# Patient Record
Sex: FEMALE | Race: BLACK OR AFRICAN AMERICAN | Employment: OTHER | URBAN - METROPOLITAN AREA
[De-identification: names, ages, dates, MRNs, and addresses within clinical notes are randomized per-mention and may not be internally consistent; named-entity substitution may affect disease eponyms.]

---

## 2017-01-16 ENCOUNTER — LAB ONLY (OUTPATIENT)
Dept: INTERNAL MEDICINE CLINIC | Age: 82
End: 2017-01-16

## 2017-01-16 ENCOUNTER — HOSPITAL ENCOUNTER (OUTPATIENT)
Dept: LAB | Age: 82
Discharge: HOME | End: 2017-01-16
Payer: MEDICARE

## 2017-01-16 DIAGNOSIS — D64.9 ANEMIA, UNSPECIFIED TYPE: ICD-10-CM

## 2017-01-16 DIAGNOSIS — E78.00 HYPERCHOLESTEROLEMIA: ICD-10-CM

## 2017-01-16 DIAGNOSIS — I10 ESSENTIAL HYPERTENSION: ICD-10-CM

## 2017-01-16 DIAGNOSIS — E55.9 VITAMIN D DEFICIENCY: ICD-10-CM

## 2017-01-16 PROCEDURE — 85025 COMPLETE CBC W/AUTO DIFF WBC: CPT

## 2017-01-16 PROCEDURE — 36415 COLL VENOUS BLD VENIPUNCTURE: CPT

## 2017-01-16 PROCEDURE — 80053 COMPREHEN METABOLIC PANEL: CPT

## 2017-01-16 PROCEDURE — 82306 VITAMIN D 25 HYDROXY: CPT

## 2017-01-16 PROCEDURE — 80061 LIPID PANEL: CPT

## 2017-01-17 LAB
25(OH)D3+25(OH)D2 SERPL-MCNC: 34.4 NG/ML (ref 30–100)
ALBUMIN SERPL-MCNC: 3.7 G/DL (ref 3.5–4.7)
ALBUMIN/GLOB SERPL: 1.4 {RATIO} (ref 1.1–2.5)
ALP SERPL-CCNC: 76 IU/L (ref 39–117)
ALT SERPL-CCNC: 33 IU/L (ref 0–32)
AST SERPL-CCNC: 24 IU/L (ref 0–40)
BASOPHILS # BLD AUTO: 0 X10E3/UL (ref 0–0.2)
BASOPHILS NFR BLD AUTO: 0 %
BILIRUB SERPL-MCNC: 0.3 MG/DL (ref 0–1.2)
BUN SERPL-MCNC: 14 MG/DL (ref 8–27)
BUN/CREAT SERPL: 16 (ref 11–26)
CALCIUM SERPL-MCNC: 10.4 MG/DL (ref 8.7–10.3)
CHLORIDE SERPL-SCNC: 101 MMOL/L (ref 96–106)
CHOLEST SERPL-MCNC: 157 MG/DL (ref 100–199)
CO2 SERPL-SCNC: 21 MMOL/L (ref 18–29)
CREAT SERPL-MCNC: 0.89 MG/DL (ref 0.57–1)
EOSINOPHIL # BLD AUTO: 0.1 X10E3/UL (ref 0–0.4)
EOSINOPHIL NFR BLD AUTO: 1 %
ERYTHROCYTE [DISTWIDTH] IN BLOOD BY AUTOMATED COUNT: 12.5 % (ref 12.3–15.4)
GLOBULIN SER CALC-MCNC: 2.7 G/DL (ref 1.5–4.5)
GLUCOSE SERPL-MCNC: 142 MG/DL (ref 65–99)
HCT VFR BLD AUTO: 37.5 % (ref 34–46.6)
HDLC SERPL-MCNC: 66 MG/DL
HGB BLD-MCNC: 12.3 G/DL (ref 11.1–15.9)
IMM GRANULOCYTES # BLD: 0 X10E3/UL (ref 0–0.1)
IMM GRANULOCYTES NFR BLD: 0 %
LDLC SERPL CALC-MCNC: 70 MG/DL (ref 0–99)
LYMPHOCYTES # BLD AUTO: 1.8 X10E3/UL (ref 0.7–3.1)
LYMPHOCYTES NFR BLD AUTO: 25 %
MCH RBC QN AUTO: 29.4 PG (ref 26.6–33)
MCHC RBC AUTO-ENTMCNC: 32.8 G/DL (ref 31.5–35.7)
MCV RBC AUTO: 90 FL (ref 79–97)
MONOCYTES # BLD AUTO: 0.5 X10E3/UL (ref 0.1–0.9)
MONOCYTES NFR BLD AUTO: 6 %
NEUTROPHILS # BLD AUTO: 4.9 X10E3/UL (ref 1.4–7)
NEUTROPHILS NFR BLD AUTO: 68 %
PLATELET # BLD AUTO: 310 X10E3/UL (ref 150–379)
POTASSIUM SERPL-SCNC: 4.3 MMOL/L (ref 3.5–5.2)
PROT SERPL-MCNC: 6.4 G/DL (ref 6–8.5)
RBC # BLD AUTO: 4.18 X10E6/UL (ref 3.77–5.28)
SODIUM SERPL-SCNC: 145 MMOL/L (ref 134–144)
TRIGL SERPL-MCNC: 107 MG/DL (ref 0–149)
VLDLC SERPL CALC-MCNC: 21 MG/DL (ref 5–40)
WBC # BLD AUTO: 7.2 X10E3/UL (ref 3.4–10.8)

## 2017-01-30 ENCOUNTER — OFFICE VISIT (OUTPATIENT)
Dept: INTERNAL MEDICINE CLINIC | Age: 82
End: 2017-01-30

## 2017-01-30 VITALS
HEART RATE: 60 BPM | HEIGHT: 62 IN | DIASTOLIC BLOOD PRESSURE: 70 MMHG | RESPIRATION RATE: 16 BRPM | WEIGHT: 121.6 LBS | TEMPERATURE: 97.9 F | BODY MASS INDEX: 22.38 KG/M2 | SYSTOLIC BLOOD PRESSURE: 149 MMHG | OXYGEN SATURATION: 97 %

## 2017-01-30 DIAGNOSIS — J45.20 MILD INTERMITTENT ASTHMA WITHOUT COMPLICATION: ICD-10-CM

## 2017-01-30 DIAGNOSIS — G56.90 NEUROPATHY, UPPER EXTREMITY, UNSPECIFIED LATERALITY: ICD-10-CM

## 2017-01-30 DIAGNOSIS — E78.00 HYPERCHOLESTEROLEMIA: ICD-10-CM

## 2017-01-30 DIAGNOSIS — E55.9 VITAMIN D DEFICIENCY: ICD-10-CM

## 2017-01-30 DIAGNOSIS — I10 ESSENTIAL HYPERTENSION: Primary | ICD-10-CM

## 2017-01-30 DIAGNOSIS — Z23 ENCOUNTER FOR IMMUNIZATION: ICD-10-CM

## 2017-01-30 DIAGNOSIS — D64.9 ANEMIA, UNSPECIFIED TYPE: ICD-10-CM

## 2017-01-30 NOTE — PROGRESS NOTES
1. Have you been to the ER, urgent care clinic since your last visit? Hospitalized since your last visit?no    2. Have you seen or consulted any other health care providers outside of the [address] [place] [address] since your last visit? Include any pap smears or colon screening.  no

## 2017-01-30 NOTE — MR AVS SNAPSHOT
Visit Information Date & Time Provider Department Dept. Phone Encounter #  
 1/30/2017  9:45 AM Yaima Lopez, [address] [phone] [id number] Follow-up Instructions Return in about 4 months (around 5/30/2017) for htn. Upcoming Health Maintenance Date Due DTaP/Tdap/Td series (1 - Tdap) 9/12/1949 GLAUCOMA SCREENING Q2Y 9/12/1993 INFLUENZA AGE 9 TO ADULT 8/1/2016 MEDICARE YEARLY EXAM 2/22/2017 Pneumococcal 65+ Low/Medium Risk (2 of 2 - PPSV23) 10/17/2017 Allergies as of 1/30/2017  Review Complete On: 1/30/2017 By: Yaima Lopez MD  
  
 Severity Noted Reaction Type Reactions Aspirin  10/27/2009    Nausea and Vomiting Diclofenac  02/22/2016    Itching The voltaren gel caused itching. Gabapentin  10/13/2016    Swelling Hydrochlorothiazide  10/27/2009    Other (comments)  
 dizziness Pcn [Penicillins]  10/27/2009    Swelling Current Immunizations  Reviewed on 10/15/2010 Name Date Influenza Vaccine 12/1/2014, 10/21/2013 Influenza Vaccine Alon Me) 10/7/2015 Influenza Vaccine Split 10/17/2012, 10/15/2010 12:30 PM  
 Pneumococcal Vaccine (Unspecified Type) 10/17/2012 Not reviewed this visit Vitals BP Pulse Temp Resp Height(growth percentile) Weight(growth percentile) 149/70 (BP 1 [place] Left arm, BP Patient Position: Sitting) 60 97.9 °F (36.6 °C) (Oral) 16 5' 1.5\" (1.562 m) 121 lb 9.6 oz (55.2 kg) SpO2 BMI OB Status Smoking Status 97% 22.6 kg/m2 Hysterectomy Never Smoker Vitals History BMI and BSA Data Body Mass Index Body Surface Area  
 22.6 kg/m 2 1.55 m 2 Preferred Pharmacy Pharmacy Name Phone [facility]  [place] [phone] Your Updated Medication List  
  
   
This list is accurate as of: 1/30/17 10:27 AM.  Always use your most recent med list.  
  
  
  
  
 albuterol 90 mcg/actuation inhaler Commonly known as:  PROVENTIL HFA, VENTOLIN HFA, PROAIR HFA Take 1 Puff by inhalation as needed for Wheezing. amLODIPine-valsartan  mg per tablet Commonly known as:  Wm Query Take 1 Tab by mouth daily. atenolol 25 mg tablet Commonly known as:  TENORMIN  
TAKE 1 TABLET BY MOUTH DAILY  
  
 atorvastatin 20 mg tablet Commonly known as:  LIPITOR Take 1 Tab by mouth nightly. DULoxetine 30 mg capsule Commonly known as:  CYMBALTA Take 1 Cap by mouth daily. lidocaine 5 % Commonly known as:  LIDODERM  
1 Patch by TransDERmal route every twenty-four (24) hours. Apply patch to the affected area for 12 hours a day and remove for 12 hours a day. MULTIVITAMIN PO Take  by mouth daily. VITAMIN D3 1,000 unit tablet Generic drug:  cholecalciferol Take  by mouth daily. Follow-up Instructions Return in about 4 months (around 5/30/2017) for htn. Introducing \[facility] [place] [facility]\"" & HEALTH SERVICES! [employer] introduces [employer] patient portal. Now you can access parts of your medical record, email your doctor's office, and request medication refills online. 1. In your internet browser, go to [email or web address] 2. Click on the First Time User? Click Here link in the Sign In box. You will see the New Member Sign Up page. 3. Enter your [employer] Access Code exactly as it appears below. You will not need to use this code after youve completed the sign-up process. If you do not sign up before the expiration date, you must request a new code. · [employer] Access Code: [email or web address] Expires: 4/30/2017 10:27 AM 
 
4. Enter the last four digits of your Social Security Number (xxxx) and Date of Birth (mm/dd/yyyy) as indicated and click Submit. You will be taken to the next sign-up page. 5. Create a [email or web address] ID.  This will be your [employer] login ID and cannot be changed, so think of one that is secure and easy to remember. 6. Create a [email or web address] password. You can change your password at any time. 7. Enter your Password Reset Question and Answer. This can be used at a later time if you forget your password. 8. Enter your e-mail address. You will receive e-mail notification when new information is available in [address] 9. Click Sign Up. You can now view and download portions of your medical record. 10. Click the Download Summary menu link to download a portable copy of your medical information. If you have questions, please visit the Frequently Asked Questions section of the [email or web address] website. Remember, [email or web address] is NOT to be used for urgent needs. For medical emergencies, dial 911. Now available from your iPhone and Android! Please provide this summary of care documentation to your next provider. Your primary care clinician is listed as [place]. If you have any questions after today's visit, please call [phone].

## 2017-01-30 NOTE — PROGRESS NOTES
SUBJECTIVE  Ms. Ramya Izquierdo presents today for follow up. Chief Complaint   Patient presents with    Hypertension    Follow-up     4 month f.u    Immunization/Injection     pt wants a flu shot        She continues to have a lot of pain, saqib left knee, however \"those blue and white capsules are doing great\" (cymbalta). She was unable to tolerate gabapentin . The tramadol we gave her \"didn't do anything for the pain and kept me awake, so I stopped taking it. \"   Also has pain in R lateral pelvis. We noted in early 2016:  L knee swells. In the past has received corticosteroid injections from Dr. Twila Hodgkins. She sees Dr. Chamorro, and he has done injections. \"He says you can do the needle\"--no plans for further follow up with him. GI bleed Anemia: No recent melena or hematochezia. She has had problems with diclofenac (itching) and celebrex--As we noted in January 2015: \"I had to stop the pills [celebrex] because of bleeding, and had a scope. \"  Dr. Javier Mendez did colonoscopy finding a cecal polyp and diverticuli. Still has the neuropathic pain in leg, as noted in prior notes. This is doing better on cymbalta. Vertigo is \"every now and then\". She is not as worried about weight loss; however, it appears this is stable: Wt Readings from Last 5 Encounters:   01/30/17 121 lb 9.6 oz (55.2 kg)   10/13/16 130 lb 3.2 oz (59.1 kg)   09/26/16 126 lb 12.8 oz (57.5 kg)   06/22/16 125 lb (56.7 kg)   02/22/16 124 lb 3.2 oz (56.3 kg)       She has seen Dr. En Jaimes for spinal stenosis / sciatica. Asthma stable, uses inhaler \"now and then. \"    She still has arthritis pain: R neck pain is stable. Saw Dr. Liam Alonzo, who diagnosed cervical spine arthritis. Depression is doing fine. No suicidal ideation. For all issues addressed today, see A/P below. PMH: She has a past medical history of Anemia NEC;  Asthma; Cervical spondylarthritis; Depression; DJD (degenerative joint disease) of knee; Esophageal spasm; GERD (gastroesophageal reflux disease); Hypercholesterolemia; Hypertension; Lumbar stenosis; Neuropathy, upper extremity; Vertigo ([id number]); and Vitamin D deficiency (1/28/2011). Had pneumovax once. PSH:  has a past surgical history that includes hysterectomy; cataract removal; tonsillectomy; and orthopaedic. Colonoscopy 2014 showed cecal polyp, diverticuli, and internal hemorrhoids, Dr. Roslyn Schultz. All and MED reviewed. Current Outpatient Prescriptions on File Prior to Visit   Medication Sig Dispense Refill    lidocaine (LIDODERM) 5 % 1 Patch by TransDERmal route every twenty-four (24) hours. Apply patch to the affected area for 12 hours a day and remove for 12 hours a day. 30 Each 11    DULoxetine (CYMBALTA) 30 mg capsule Take 1 Cap by mouth daily. 30 Cap 11    atenolol (TENORMIN) 25 mg tablet TAKE 1 TABLET BY MOUTH DAILY 30 Tab 5    atorvastatin (LIPITOR) 20 mg tablet Take 1 Tab by mouth nightly. 90 Tab 1    amLODIPine-valsartan (EXFORGE)  mg per tablet Take 1 Tab by mouth daily. 90 Tab 1    albuterol (PROVENTIL HFA, VENTOLIN HFA, PROAIR HFA) 90 mcg/actuation inhaler Take 1 Puff by inhalation as needed for Wheezing. 1 Inhaler 5    cholecalciferol, vitamin d3, (VITAMIN D) 1,000 unit tablet Take  by mouth daily.  MULTIVITAMINS (MULTIVITAMIN PO) Take  by mouth daily. No current facility-administered medications on file prior to visit. FH: Her family history includes Heart Disease in her brother, brother, father, and mother; Hypertension in her mother; Stroke in her mother. SH: She is . Her son lives with her. She reports that she has never used tobacco.  She reports that she drinks alcohol. ROS: Complete review of systems was performed and is otherwise unremarkable except as noted elsewhere.      OBJECTIVE  Vitals:   Visit Vitals    /70 (BP 1 [place] Left arm, BP Patient Position: Sitting)    Pulse 60    Temp 97.9 °F (36.6 °C) (Oral)    Resp 16    Ht 5' 1.5\" (1.562 m)    Wt 121 lb 9.6 oz (55.2 kg)    SpO2 97%    BMI 22.6 kg/m2    Gen: Pleasant 80 y.o. AA female in NAD.   HEENT: PERRLA. EOMI. OP moist and pink. +TTP R cheek and forehead.  Neck: Supple.  No LAD.  HEART: RRR, No M/G/R.   LUNGS: CTAB No W/R.   ABDOMEN: S, NT, ND, BS+.   EXTREMITIES: Warm. No C/C/E. MUSCULOSKELETAL: Normal ROM, muscle strength 5/5 all groups. NEURO: Alert and oriented x 3.  Cranial nerves grossly intact.  No focal sensory or motor deficits noted. SKIN: Warm. Dry. No rashes or other lesions noted. Lab Results   Component Value Date/Time    Cholesterol, total 157 01/16/2017 10:46 AM    HDL Cholesterol 66 01/16/2017 10:46 AM    LDL, calculated 70 01/16/2017 10:46 AM    VLDL, calculated 21 01/16/2017 10:46 AM    Triglyceride 107 01/16/2017 10:46 AM    CHOL/HDL Ratio 2.3 07/20/2010 09:25 AM      Lab Results   Component Value Date/Time    WBC 7.2 01/16/2017 10:46 AM    WBC 6.7 05/21/2012 08:23 AM    HGB 12.3 01/16/2017 10:46 AM    HCT 37.5 01/16/2017 10:46 AM    PLATELET 687 48/33/7314 10:46 AM    MCV 90 01/16/2017 10:46 AM     Lab Results   Component Value Date/Time    Vitamin D 25-Hydroxy 24.2 09/23/2011 09:38 AM    VITAMIN D, 25-HYDROXY 34.4 01/16/2017 10:46 AM       Lab Results   Component Value Date/Time    Sodium 145 01/16/2017 10:46 AM    Potassium 4.3 01/16/2017 10:46 AM    Chloride 101 01/16/2017 10:46 AM    CO2 21 01/16/2017 10:46 AM    Anion gap 8 02/28/2012 01:40 PM    Glucose 142 01/16/2017 10:46 AM    BUN 14 01/16/2017 10:46 AM    Creatinine 0.89 01/16/2017 10:46 AM    BUN/Creatinine ratio 16 01/16/2017 10:46 AM    GFR est AA 67 01/16/2017 10:46 AM    GFR est non-AA 58 01/16/2017 10:46 AM    Calcium 10.4 01/16/2017 10:46 AM       ASSESSMENT / PLAN :  Daya Alcazar was seen today for follow up chronic condition. 1. Knee pain: DJD. Better with cymbalta. Tylenol. Off celebrex. Follow up with orthopedics. Also, try lidoderm patch.    2. L leg neuropathic pain of lower extremity, left: Ongoing. Likely secondary to her spinal stenosis. Sees Dr. Nydia Freire. On cymbalta. 3. GI bleed: S/p colonoscopy. As per Dr. Whit Vega. None recently. Off NSAIDs and ASA. 4. Vertigo: None recently. Rx for meclizine. Follow up with Neurology. 5. Cervical spine stenosis: Not wanting surgery. 6. Hypertension: BP fine today. 7. Hypercholesterolemia: Reasonable at last check. 8. Anemia: Hb okay. 9. Depression: Stable to improved. 10. GERD (gastroesophageal reflux disease): Stable. 11. Asthma: Controlled. 12. Head and neck pain: Stable. Arthritis. Avoid NSAIDs. 13. Vit D deficiency: Continue supplement. Recheck. 14. Night sweats: Improved. Follow-up Disposition:  Return in about 4 months (around 5/30/2017) for htn.

## 2017-02-17 RX ORDER — DULOXETIN HYDROCHLORIDE 30 MG/1
CAPSULE, DELAYED RELEASE ORAL
Qty: 90 CAP | Refills: 11 | Status: SHIPPED | OUTPATIENT
Start: 2017-02-17 | End: 2018-09-10

## 2017-03-14 RX ORDER — ATENOLOL 25 MG/1
TABLET ORAL
Qty: 90 TAB | Refills: 5 | Status: SHIPPED | OUTPATIENT
Start: 2017-03-14 | End: 2018-09-10

## 2017-04-03 RX ORDER — AMLODIPINE AND VALSARTAN 10; 320 MG/1; MG/1
TABLET ORAL
Qty: 90 TAB | Refills: 0 | Status: SHIPPED | OUTPATIENT
Start: 2017-04-03 | End: 2017-05-02

## 2017-04-04 ENCOUNTER — PATIENT OUTREACH (OUTPATIENT)
Dept: INTERNAL MEDICINE CLINIC | Age: 82
End: 2017-04-04

## 2017-04-11 RX ORDER — ATORVASTATIN CALCIUM 20 MG/1
TABLET, FILM COATED ORAL
Qty: 90 TAB | Refills: 0 | Status: SHIPPED | OUTPATIENT
Start: 2017-04-11 | End: 2017-07-09 | Stop reason: SDUPTHER

## 2017-04-27 ENCOUNTER — HOSPITAL ENCOUNTER (INPATIENT)
Age: 82
LOS: 5 days | Discharge: NURSING, REHAB OR LONG-TERM CARE | DRG: 469 | End: 2017-05-02
Attending: EMERGENCY MEDICINE | Admitting: INTERNAL MEDICINE
Payer: MEDICARE

## 2017-04-27 ENCOUNTER — APPOINTMENT (OUTPATIENT)
Dept: GENERAL RADIOLOGY | Age: 82
DRG: 469 | End: 2017-04-27
Attending: PHYSICIAN ASSISTANT
Payer: MEDICARE

## 2017-04-27 DIAGNOSIS — S72.001A CLOSED RIGHT HIP FRACTURE, INITIAL ENCOUNTER (HCC): Primary | ICD-10-CM

## 2017-04-27 PROBLEM — S72.009A HIP FRACTURE (HCC): Status: ACTIVE | Noted: 2017-04-27

## 2017-04-27 LAB
ABO + RH BLD: NORMAL
ALBUMIN SERPL BCP-MCNC: 3.3 G/DL (ref 3.5–5)
ALBUMIN/GLOB SERPL: 1 {RATIO} (ref 1.1–2.2)
ALP SERPL-CCNC: 74 U/L (ref 45–117)
ALT SERPL-CCNC: 38 U/L (ref 12–78)
ANION GAP BLD CALC-SCNC: 10 MMOL/L (ref 5–15)
APPEARANCE UR: CLEAR
APTT PPP: 23.2 SEC (ref 22.1–32.5)
AST SERPL W P-5'-P-CCNC: 21 U/L (ref 15–37)
BACTERIA URNS QL MICRO: NEGATIVE /HPF
BASOPHILS # BLD AUTO: 0 K/UL (ref 0–0.1)
BASOPHILS # BLD: 0 % (ref 0–1)
BILIRUB SERPL-MCNC: 0.3 MG/DL (ref 0.2–1)
BILIRUB UR QL: NEGATIVE
BLOOD GROUP ANTIBODIES SERPL: NORMAL
BUN SERPL-MCNC: 18 MG/DL (ref 6–20)
BUN/CREAT SERPL: 21 (ref 12–20)
CALCIUM SERPL-MCNC: 9.8 MG/DL (ref 8.5–10.1)
CHLORIDE SERPL-SCNC: 107 MMOL/L (ref 97–108)
CO2 SERPL-SCNC: 26 MMOL/L (ref 21–32)
COLOR UR: ABNORMAL
CREAT SERPL-MCNC: 0.87 MG/DL (ref 0.55–1.02)
EOSINOPHIL # BLD: 0 K/UL (ref 0–0.4)
EOSINOPHIL NFR BLD: 0 % (ref 0–7)
EPITH CASTS URNS QL MICRO: ABNORMAL /LPF
ERYTHROCYTE [DISTWIDTH] IN BLOOD BY AUTOMATED COUNT: 12 % (ref 11.5–14.5)
GLOBULIN SER CALC-MCNC: 3.4 G/DL (ref 2–4)
GLUCOSE SERPL-MCNC: 126 MG/DL (ref 65–100)
GLUCOSE UR STRIP.AUTO-MCNC: NEGATIVE MG/DL
HCT VFR BLD AUTO: 35.4 % (ref 35–47)
HGB BLD-MCNC: 12 G/DL (ref 11.5–16)
HGB UR QL STRIP: NEGATIVE
INR PPP: 1 (ref 0.9–1.1)
KETONES UR QL STRIP.AUTO: ABNORMAL MG/DL
LEUKOCYTE ESTERASE UR QL STRIP.AUTO: NEGATIVE
LYMPHOCYTES # BLD AUTO: 11 % (ref 12–49)
LYMPHOCYTES # BLD: 1.3 K/UL (ref 0.8–3.5)
MCH RBC QN AUTO: 30.2 PG (ref 26–34)
MCHC RBC AUTO-ENTMCNC: 33.9 G/DL (ref 30–36.5)
MCV RBC AUTO: 89.2 FL (ref 80–99)
MONOCYTES # BLD: 0.5 K/UL (ref 0–1)
MONOCYTES NFR BLD AUTO: 5 % (ref 5–13)
MUCOUS THREADS URNS QL MICRO: ABNORMAL /LPF
NEUTS SEG # BLD: 9.3 K/UL (ref 1.8–8)
NEUTS SEG NFR BLD AUTO: 84 % (ref 32–75)
NITRITE UR QL STRIP.AUTO: NEGATIVE
PH UR STRIP: 7 [PH] (ref 5–8)
PLATELET # BLD AUTO: 241 K/UL (ref 150–400)
POTASSIUM SERPL-SCNC: 4 MMOL/L (ref 3.5–5.1)
PROT SERPL-MCNC: 6.7 G/DL (ref 6.4–8.2)
PROT UR STRIP-MCNC: ABNORMAL MG/DL
PROTHROMBIN TIME: 10.3 SEC (ref 9–11.1)
RBC # BLD AUTO: 3.97 M/UL (ref 3.8–5.2)
RBC #/AREA URNS HPF: ABNORMAL /HPF (ref 0–5)
SODIUM SERPL-SCNC: 143 MMOL/L (ref 136–145)
SP GR UR REFRACTOMETRY: 1.02 (ref 1–1.03)
SPECIMEN EXP DATE BLD: NORMAL
THERAPEUTIC RANGE,PTTT: NORMAL SECS (ref 58–77)
UA: UC IF INDICATED,UAUC: ABNORMAL
UROBILINOGEN UR QL STRIP.AUTO: 1 EU/DL (ref 0.2–1)
WBC # BLD AUTO: 11.1 K/UL (ref 3.6–11)
WBC URNS QL MICRO: ABNORMAL /HPF (ref 0–4)

## 2017-04-27 PROCEDURE — 71010 XR CHEST PORT: CPT

## 2017-04-27 PROCEDURE — 96374 THER/PROPH/DIAG INJ IV PUSH: CPT

## 2017-04-27 PROCEDURE — 96361 HYDRATE IV INFUSION ADD-ON: CPT

## 2017-04-27 PROCEDURE — 74011250637 HC RX REV CODE- 250/637: Performed by: NURSE PRACTITIONER

## 2017-04-27 PROCEDURE — 73502 X-RAY EXAM HIP UNI 2-3 VIEWS: CPT

## 2017-04-27 PROCEDURE — 65270000029 HC RM PRIVATE

## 2017-04-27 PROCEDURE — 85730 THROMBOPLASTIN TIME PARTIAL: CPT | Performed by: PHYSICIAN ASSISTANT

## 2017-04-27 PROCEDURE — 85610 PROTHROMBIN TIME: CPT | Performed by: PHYSICIAN ASSISTANT

## 2017-04-27 PROCEDURE — 74011250636 HC RX REV CODE- 250/636: Performed by: NURSE PRACTITIONER

## 2017-04-27 PROCEDURE — 74011250636 HC RX REV CODE- 250/636: Performed by: PHYSICIAN ASSISTANT

## 2017-04-27 PROCEDURE — 81001 URINALYSIS AUTO W/SCOPE: CPT | Performed by: PHYSICIAN ASSISTANT

## 2017-04-27 PROCEDURE — 85025 COMPLETE CBC W/AUTO DIFF WBC: CPT | Performed by: PHYSICIAN ASSISTANT

## 2017-04-27 PROCEDURE — 36415 COLL VENOUS BLD VENIPUNCTURE: CPT | Performed by: PHYSICIAN ASSISTANT

## 2017-04-27 PROCEDURE — 93005 ELECTROCARDIOGRAM TRACING: CPT

## 2017-04-27 PROCEDURE — 80053 COMPREHEN METABOLIC PANEL: CPT | Performed by: PHYSICIAN ASSISTANT

## 2017-04-27 PROCEDURE — 86900 BLOOD TYPING SEROLOGIC ABO: CPT | Performed by: NURSE PRACTITIONER

## 2017-04-27 PROCEDURE — 99285 EMERGENCY DEPT VISIT HI MDM: CPT

## 2017-04-27 PROCEDURE — 96375 TX/PRO/DX INJ NEW DRUG ADDON: CPT

## 2017-04-27 RX ORDER — NALOXONE HYDROCHLORIDE 0.4 MG/ML
0.4 INJECTION, SOLUTION INTRAMUSCULAR; INTRAVENOUS; SUBCUTANEOUS AS NEEDED
Status: DISCONTINUED | OUTPATIENT
Start: 2017-04-27 | End: 2017-04-28

## 2017-04-27 RX ORDER — AMOXICILLIN 250 MG
2 CAPSULE ORAL 2 TIMES DAILY
Status: DISCONTINUED | OUTPATIENT
Start: 2017-04-27 | End: 2017-04-28

## 2017-04-27 RX ORDER — SODIUM CHLORIDE 0.9 % (FLUSH) 0.9 %
5-10 SYRINGE (ML) INJECTION AS NEEDED
Status: DISCONTINUED | OUTPATIENT
Start: 2017-04-27 | End: 2017-04-28

## 2017-04-27 RX ORDER — HYDROMORPHONE HYDROCHLORIDE 1 MG/ML
0.5 INJECTION, SOLUTION INTRAMUSCULAR; INTRAVENOUS; SUBCUTANEOUS
Status: COMPLETED | OUTPATIENT
Start: 2017-04-27 | End: 2017-04-27

## 2017-04-27 RX ORDER — OXYCODONE HYDROCHLORIDE 5 MG/1
5 TABLET ORAL
Status: DISCONTINUED | OUTPATIENT
Start: 2017-04-27 | End: 2017-04-28

## 2017-04-27 RX ORDER — ACETAMINOPHEN 325 MG/1
650 TABLET ORAL EVERY 6 HOURS
Status: DISCONTINUED | OUTPATIENT
Start: 2017-04-27 | End: 2017-04-28

## 2017-04-27 RX ORDER — SODIUM CHLORIDE 0.9 % (FLUSH) 0.9 %
5-10 SYRINGE (ML) INJECTION EVERY 8 HOURS
Status: DISCONTINUED | OUTPATIENT
Start: 2017-04-27 | End: 2017-04-28

## 2017-04-27 RX ORDER — SODIUM CHLORIDE 9 MG/ML
75 INJECTION, SOLUTION INTRAVENOUS CONTINUOUS
Status: DISCONTINUED | OUTPATIENT
Start: 2017-04-27 | End: 2017-04-28

## 2017-04-27 RX ORDER — OXYCODONE HYDROCHLORIDE 5 MG/1
2.5 TABLET ORAL
Status: DISCONTINUED | OUTPATIENT
Start: 2017-04-27 | End: 2017-04-28

## 2017-04-27 RX ORDER — IPRATROPIUM BROMIDE AND ALBUTEROL SULFATE 2.5; .5 MG/3ML; MG/3ML
3 SOLUTION RESPIRATORY (INHALATION)
Status: DISCONTINUED | OUTPATIENT
Start: 2017-04-27 | End: 2017-05-02 | Stop reason: HOSPADM

## 2017-04-27 RX ORDER — MORPHINE SULFATE 2 MG/ML
2 INJECTION, SOLUTION INTRAMUSCULAR; INTRAVENOUS
Status: COMPLETED | OUTPATIENT
Start: 2017-04-27 | End: 2017-04-27

## 2017-04-27 RX ORDER — ONDANSETRON 2 MG/ML
4 INJECTION INTRAMUSCULAR; INTRAVENOUS
Status: DISCONTINUED | OUTPATIENT
Start: 2017-04-27 | End: 2017-04-28

## 2017-04-27 RX ADMIN — OXYCODONE HYDROCHLORIDE 5 MG: 5 TABLET ORAL at 19:02

## 2017-04-27 RX ADMIN — HYDROMORPHONE HYDROCHLORIDE 0.5 MG: 1 INJECTION, SOLUTION INTRAMUSCULAR; INTRAVENOUS; SUBCUTANEOUS at 16:45

## 2017-04-27 RX ADMIN — Medication 2 MG: at 15:30

## 2017-04-27 RX ADMIN — OXYCODONE HYDROCHLORIDE 5 MG: 5 TABLET ORAL at 22:58

## 2017-04-27 RX ADMIN — SODIUM CHLORIDE 75 ML/HR: 900 INJECTION, SOLUTION INTRAVENOUS at 19:01

## 2017-04-27 NOTE — IP AVS SNAPSHOT
[address] [facility] 
[phone] Patient: Adele Beltran MRN: [id number] JLR:[id number] You are allergic to the following Allergen Reactions Aspirin Nausea and Vomiting Diclofenac Itching The voltaren gel caused itching. Gabapentin Swelling Hydrochlorothiazide Other (comments)  
 dizziness Pcn (Penicillins) Swelling Recent Documentation Height Weight BMI OB Status Smoking Status 1.549 m 47.6 kg 19.84 kg/m2 Hysterectomy Never Smoker Unresulted Labs Order Current Status SAMPLE TO BLOOD BANK In process Emergency Contacts Name Discharge Info Relation Home Work Mobile Tali Patton [2] [phone] About your hospitalization You were admitted on:  April 27, 2017 You last received care in the:  [facility] ORTHOPEDICS You were discharged on:  May 2, 2017 Unit phone number:  [phone] Why you were hospitalized Your primary diagnosis was:  Not on File Your diagnoses also included:  Hip Fracture (Hcc) Providers Seen During Your Hospitalizations Provider Role Specialty Primary office phone Erin Arana MD Attending Provider Emergency Medicine [phone] Tam Gomes MD Attending Provider Internal Medicine [phone] Your Primary Care Physician (PCP) Primary Care Physician Office Phone Office Fax Amberly Davis [phone] Follow-up Information Follow up With Details Comments Contact Info Tam Gomes MD   [address] [facility] [address] [facility] 
[phone] Avril Longoria MD In 2 weeks  For wound re-check [id number] [facility] [address] [facility] 
[phone] [facility]) On 5/2/2017 This is your skilled nursing provider [address] [facility] [address] [address] [phone] Your Appointments Wednesday May 31, 2017  9:15 AM EDT ROUTINE CARE with Jose David Gentile MD  
[facility] [address] [facility] [address] [facility].  
[phone] Current Discharge Medication List  
  
START taking these medications Dose & Instructions Dispensing Information Comments Morning Noon Evening Bedtime  
 acetaminophen 325 mg tablet Commonly known as:  TYLENOL Your last dose was: Your next dose is:    
   
   
 Dose:  650 mg Take 2 Tabs by mouth every six (6) hours for 14 days. Quantity:  112 Tab Refills:  0  
     
   
   
   
  
 aspirin 81 mg chewable tablet Your last dose was: Your next dose is:    
   
   
 Dose:  81 mg Take 1 Tab by mouth two (2) times a day for 30 days. Quantity:  60 Tab Refills:  0  
     
   
   
   
  
 celecoxib 200 mg capsule Commonly known as:  CELEBREX Your last dose was: Your next dose is:    
   
   
 Dose:  200 mg Take 1 Cap by mouth daily for 90 days. Quantity:  1 Cap Refills:  0  
     
   
   
   
  
 ergocalciferol 50,000 unit capsule Commonly known as:  ERGOCALCIFEROL Your last dose was: Your next dose is:    
   
   
 Dose:  12311 Units Take 1 Cap by mouth every seven (7) days. Quantity:  1 Cap Refills:  0  
     
   
   
   
  
 famotidine 10 mg tablet Commonly known as:  PEPCID Your last dose was: Your next dose is:    
   
   
 Dose:  10 mg Take 1 Tab by mouth two (2) times a day for 30 days. Quantity:  60 Tab Refills:  0  
     
   
   
   
  
 haloperidol 1 mg tablet Commonly known as:  HALDOL Your last dose was: Your next dose is:    
   
   
 Dose:  1 mg Take 1 Tab by mouth every six (6) hours as needed. Quantity:  1 Tab Refills:  0  
     
   
   
   
  
 oxyCODONE IR 5 mg immediate release tablet Commonly known as:  Eric Kellogg Your last dose was: Your next dose is:    
   
   
 Dose:  2.5-5 mg Take 0.5-1 Tabs by mouth every three (3) hours as needed. Max Daily Amount: 40 mg.  
 Quantity:  30 Tab Refills:  0  
     
   
   
   
  
 polyethylene glycol 17 gram packet Commonly known as:  Linda Alexandre Your last dose was: Your next dose is:    
   
   
 Dose:  17 g Take 1 Packet by mouth daily as needed (constipation) for up to 15 days. Quantity:  15 Packet Refills:  0  
     
   
   
   
  
 senna-docusate 8.6-50 mg per tablet Commonly known as:  Yeimy Eugene Your last dose was: Your next dose is:    
   
   
 Dose:  1 Tab Take 1 Tab by mouth daily. Quantity:  30 Tab Refills:  0 CONTINUE these medications which have NOT CHANGED Dose & Instructions Dispensing Information Comments Morning Noon Evening Bedtime  
 albuterol 90 mcg/actuation inhaler Commonly known as:  PROVENTIL HFA, VENTOLIN HFA, PROAIR HFA Your last dose was: Your next dose is:    
   
   
 Dose:  1 Puff Take 1 Puff by inhalation as needed for Wheezing. Quantity:  1 Inhaler Refills:  5  
     
   
   
   
  
 atenolol 25 mg tablet Commonly known as:  TENORMIN Your last dose was: Your next dose is: TAKE 1 TABLET BY MOUTH DAILY Quantity:  90 Tab Refills:  5  
 **Patient requests 90 days supply**  
    
   
   
   
  
 atorvastatin 20 mg tablet Commonly known as:  LIPITOR Your last dose was: Your next dose is: TAKE 1 TABLET BY MOUTH EVERY NIGHT Quantity:  90 Tab Refills:  0 DULoxetine 30 mg capsule Commonly known as:  CYMBALTA Your last dose was: Your next dose is: TAKE 1 CAPSULE BY MOUTH DAILY Quantity:  90 Cap Refills:  11  
 **Patient requests 90 days supply**  
    
   
   
   
  
 lidocaine 5 % Commonly known as:  Hiral Lawson Your last dose was: Your next dose is:    
   
   
 Dose:  1 Patch 1 Patch by TransDERmal route every twenty-four (24) hours. Apply patch to the affected area for 12 hours a day and remove for 12 hours a day. Quantity:  30 Each Refills:  11 Wes Pool is OKAY MULTIVITAMIN PO Your last dose was: Your next dose is: Take  by mouth daily. Refills:  0  
     
   
   
   
  
 VITAMIN D3 1,000 unit tablet Generic drug:  cholecalciferol Your last dose was: Your next dose is: Take  by mouth daily. Refills:  0 STOP taking these medications   
 amLODIPine-valsartan  mg per tablet Commonly known as:  Sascha Dillard Where to Get Your Medications Information on where to get these meds will be given to you by the nurse or doctor. ! Ask your nurse or doctor about these medications  
  acetaminophen 325 mg tablet  
 aspirin 81 mg chewable tablet  
 celecoxib 200 mg capsule  
 ergocalciferol 50,000 unit capsule  
 famotidine 10 mg tablet  
 haloperidol 1 mg tablet  
 oxyCODONE IR 5 mg immediate release tablet  
 polyethylene glycol 17 gram packet  
 senna-docusate 8.6-50 mg per tablet Discharge Instructions PATIENT DISCHARGE INSTRUCTIONS PATIENT DISCHARGE INSTRUCTIONS Nima Shone / [id number] : 1928 Admitted 2017 Discharged: 2017 · It is important that you take the medication exactly as they are prescribed. · Keep your medication in the bottles provided by the pharmacist and keep a list of the medication names, dosages, and times to be taken in your wallet. · Do not take other medications without consulting your doctor. What to do at [facility] Recommended Diet: Cardiac Diet Recommended Activity: PT/OT Eval and Treat Signed By: Ashok Mendoza MD   
 May 1, 2017 [facility] Orthopedics [facility] Discharge Instruction Sheet: Hip Fracture Repair Dr. Sheila Langley Blood Clot Prevention Aspirin You will be discharged on Aspirin to prevent blood clots. You will have to take it for approximately 4 weeks. Do not take non-steroid anti-flammatory medications (Ibuprofen, Advil, Motrin, Naproxen,. etc) until told to do so. Pain control: 
Medications ? Typically, we will prescribe a narcotic usually 1-2 tabs every three to four hours as needed for your pain. Most patients need this only for the first few weeks. ? You should discontinue this as the pain decreases. ? Some of these medications contain a large dose of Tylenol (acetaminophen). Therefore, you should consult your pharmacist before taking any additional Tylenol at the same time. You should not drive while taking any narcotic pain medications. Take your pain medications with food to prevent nausea! Your last dose of pain medication in the hospital was given @ :__________ Ice Therapy ? You will be discharged home with ice/gel packs. ? Continue using these regularly to minimize pain and swelling, especially after physical activity. Constipation ? Pain medication and anesthesia can be constipating-this can be prevented by gentle physical activity and drinking plenty of fluid. ? It should be treated with over-the-counter medications such as Dulcolax, Miralax, Magnesium Citrate and/or Fleets enema. ? You should have a bowel movement at least every other day following surgery. Incision care ? You will be discharged with a transparent and waterproof dressing over your incision. o This should remain in place for 5-7 days after discharge. ? You will be given one additional dressing to use at home in 5-7 days if you are still having drainage ? You may shower with this dressing in place after you are discharged. o After showering pat the dressing/incision dry. ? When the incision is no longer draining, it may be left open to the air. ? DO NOT rub the incision. ? DO NOT take a tub bath or go swimming until cleared by your doctor. ? DO NOT apply lotions, oils, or creams to incision. To increase and promote healing: 
? Stop Smoking (or at least cut back on smoking). ? Eat a well-balanced diet (high in protein and vitamin C) ? If your appetite is poor, consider nutritional supplements like Ensure, Glucerna, or Dixie Instant Breakfast. 
? If you are diabetic, controlling you blood sugars is very important to prevent infection and promote wound healing. Nutrition: ? If you were on a supplement such as Ensure or Glucerna) while in the hospital, please continue using them with each meal for the next 30 days. ? Eat a well-balanced diet - High in protein, high in vitamins and minerals, especially vitamin C and zinc.  
 
Home Health: 
? If you have staples a home health nurse will remove them in about 10 days. ? Physical Therapy will come to your home to continue training for walking, transferring and exercises Physical Activity ? You can weight bear as tolerated on your new hip ***. ? Bed-rest may slow your recovery. ? Use your walker and take short walks about every 2 hours. ? Increase your distance each day. ? NO DRIVING until told to do so. Warning signs: Please call your physician immediately at [phone] if you have ? Bleeding from incision that is constant. ? Change in mental status (unusual behavior or confusion) ? If your incision develops redness or swelling 
? Change in wound drainage (increase in amount, color, or foul odor) ? Temperature over 101.5 degrees Fahrenheit ? Pain in the calf of your leg ? Tenderness or redness in the calf of your leg 
? Increased swelling of the thigh, ankle, calf, or foot. Emergency: CALL 911 if you have ? Shortness of breath ? Chest pain ? Localized chest pain when coughing or taking a deep breath Follow-up ? Please call your surgeons office at [phone] for a follow up appointment. o You should call as soon as you get home or the next day after discharge. o Ask to make an appointment in 2 weeks. ? You can return to work when cleared by a physician. During normal business hours you may reach Dr. Vicky Wang' team directly at [phone] if you have concerns or questions. Discharge Orders None ACO Transitions of Care Introducing [employer] offers a voluntary care coordination program to provide high quality service and care to [facility] fee-for-service beneficiaries. Samuellilia Altamirano was designed to help you enhance your health and well-being through the following services: ? Transitions of Care  support for individuals who are transitioning from one care setting to another (example: Hospital to home). ? Chronic and Complex Care Coordination  support for individuals and caregivers of those with serious or chronic illnesses or with more than one chronic (ongoing) condition and those who take a number of different medications. If you meet specific medical criteria, a [facility] [address] may call you directly to coordinate your care with your primary care physician and your other care providers. For questions about the [facility] programs, please, contact your physicians office. For general questions or additional information about Accountable Care Organizations: 
Please visit www.medicare.gov/acos. html or call 1-800-MEDICARE (2-716.511.9254) TTY users should call 5-132.508.2573. [employer] Announcement We are excited to announce that we are making your provider's discharge notes available to you in [employer].   You will see these notes when they are completed and signed by the physician that discharged you from your recent hospital stay. If you have any questions or concerns about any information you see in [employer], please call the Health Information Department where you were seen or reach out to your Primary Care Provider for more information about your plan of care. Introducing [facility]! Kylahalda Zhaodyan introduces [employer] patient portal. Now you can access parts of your medical record, email your doctor's office, and request medication refills online. 1. In your internet browser, go to [email or web address] 2. Click on the First Time User? Click Here link in the Sign In box. You will see the New Member Sign Up page. 3. Enter your [employer] Access Code exactly as it appears below. You will not need to use this code after youve completed the sign-up process. If you do not sign up before the expiration date, you must request a new code. · [employer] Access Code: [email or web address] Expires: 7/31/2017 10:38 AM 
 
4. Enter the last four digits of your Social Security Number (xxxx) and Date of Birth (mm/dd/yyyy) as indicated and click Submit. You will be taken to the next sign-up page. 5. Create a [employer] ID. This will be your [employer] login ID and cannot be changed, so think of one that is secure and easy to remember. 6. Create a [employer] password. You can change your password at any time. 7. Enter your Password Reset Question and Answer. This can be used at a later time if you forget your password. 8. Enter your e-mail address. You will receive e-mail notification when new information is available in [address] 9. Click Sign Up. You can now view and download portions of your medical record. 10. Click the Download Summary menu link to download a portable copy of your medical information.  
 
If you have questions, please visit the Frequently Asked Questions section of the [facility]. Remember, MyChart is NOT to be used for urgent needs. For medical emergencies, dial 911. Now available from your iPhone and Android! General Information Please provide this summary of care documentation to your next provider. Patient Signature:  ____________________________________________________________ Date:  ____________________________________________________________  
  
Cathlean Abu Provider Signature:  ____________________________________________________________ Date:  ____________________________________________________________

## 2017-04-27 NOTE — ED PROVIDER NOTES
HPI Comments: Deepa Jensen is a 80 y.o. female with PMhx significant for anemia, asthma, HTN, DJD, lumbar stenosis, and vertigo who presents via EMS to the ED for evaluation of acute right hip pain s/p GLF that occurred PTA. Pt states that she was walking when she suddenly began to feel dizzy, turned, and fell; she denies any head injury or LOC. She denies any hx of recurrent falls or prior falls. She does have a walker and cane, however, she denies use of either when ambulating. She has not been able to ambulate since the fall. She denies any current anticoagulant use. She denies any neck pain, back pain, HA, nausea, vomiting, vision changes. PCP: Hi Duff MD    There are no other complaints, changes or physical findings at this time. The history is provided by the patient. No  was used. Past Medical History:   Diagnosis Date    Anemia NEC     Asthma     Cervical spondylarthritis     Depression     DJD (degenerative joint disease) of knee     Esophageal spasm     Has required esophageal dilatation; Dr. Colt Rubio GERD (gastroesophageal reflux disease)     Hypercholesterolemia     Hypertension     Lumbar stenosis     Neuropathy, upper extremity     left    Vertigo [id number]    Saw Dr. Marisol Anthony; improved.  Vitamin D deficiency 1/28/2011       Past Surgical History:   Procedure Laterality Date    HX CATARACT REMOVAL      bilateral    HX HYSTERECTOMY      HX ORTHOPAEDIC      right bunion excision    HX TONSILLECTOMY           Family History:   Problem Relation Age of Onset    Heart Disease Mother     Hypertension Mother     Stroke Mother     Heart Disease Brother     Heart Disease Father     Heart Disease Brother        Social History     Social History    Marital status:      Spouse name: N/A    Number of children: N/A    Years of education: N/A     Occupational History    Not on file.      Social History Main Topics    Smoking status: Never Smoker    Smokeless tobacco: Never Used    Alcohol use No    Drug use: No    Sexual activity: No     Other Topics Concern    Not on file     Social History Narrative         ALLERGIES: Aspirin; Diclofenac; Gabapentin; Hydrochlorothiazide; and Pcn [penicillins]    Review of Systems   Constitutional: Negative. Negative for activity change, appetite change, chills, diaphoresis, fever and unexpected weight change. HENT: Negative for congestion, hearing loss, rhinorrhea, sinus pressure, sneezing, sore throat and trouble swallowing. Eyes: Negative for pain, redness, itching and visual disturbance. Respiratory: Negative for cough, shortness of breath and wheezing. Cardiovascular: Negative for chest pain, palpitations and leg swelling. Gastrointestinal: Negative for abdominal pain, constipation, diarrhea, nausea and vomiting. Genitourinary: Negative for dysuria. Musculoskeletal: Positive for arthralgias (R hip). Negative for gait problem, myalgias and neck pain. Skin: Negative for color change, pallor, rash and wound. Neurological: Negative for tremors, weakness, light-headedness, numbness and headaches. All other systems reviewed and are negative. Patient Vitals for the past 12 hrs:   Temp Pulse Resp BP SpO2   04/27/17 1720 - - - - 95 %   04/27/17 1715 - - - 129/56 94 %   04/27/17 1700 - - - 128/61 93 %   04/27/17 1646 - 75 16 130/59 95 %   04/27/17 1626 - - - 127/64 95 %   04/27/17 1524 98 °F (36.7 °C) 71 18 136/61 95 %            Physical Exam   Constitutional: She is oriented to person, place, and time. Vital signs are normal. She appears well-developed and well-nourished. No distress. HENT:   Head: Normocephalic and atraumatic. Right Ear: External ear normal. No hemotympanum. Left Ear: External ear normal. No hemotympanum. Nose: Nose normal. No septal deviation. Mouth/Throat: Oropharynx is clear and moist. No oropharyngeal exudate. No signs of head trauma.  Negative joseph sign or racoon eyes. Eyes: Conjunctivae and EOM are normal. Pupils are equal, round, and reactive to light. Right eye exhibits no discharge. Left eye exhibits no discharge. No scleral icterus. Neck: Normal range of motion. Neck supple. No tracheal deviation present. No midline cervical spinal tenderness   Cardiovascular: Normal rate, regular rhythm, normal heart sounds and intact distal pulses. Exam reveals no gallop and no friction rub. No murmur heard. Pulmonary/Chest: Effort normal and breath sounds normal. No stridor. No respiratory distress. She has no wheezes. She has no rales. She exhibits no tenderness. Abdominal: Soft. Bowel sounds are normal. She exhibits no distension. There is no tenderness. Musculoskeletal: She exhibits tenderness and deformity. She exhibits no edema. The right leg is lengthened and externally rotated; distal pulse is strong; non-ambulatory since the event  Tenderness to palpation over R hip joint  No neurologic, motor, vascular, or compartment embarrassment observed on exam. No focal neurologic deficits. Lymphadenopathy:     She has no cervical adenopathy. Neurological: She is alert and oriented to person, place, and time. No cranial nerve deficit. Coordination normal.   Pt communicating in full sentences and appropriately. Skin: Skin is warm and dry. No rash noted. She is not diaphoretic. No erythema. No pallor. Psychiatric: She has a normal mood and affect. Nursing note and vitals reviewed.        MDM  Number of Diagnoses or Management Options  Closed right hip fracture, initial encounter [facility]):   Diagnosis management comments: DDx: Fracture, sprain, strain, contusion, dislocation       Amount and/or Complexity of Data Reviewed  Clinical lab tests: ordered and reviewed  Tests in the radiology section of CPT®: ordered and reviewed  Tests in the medicine section of CPT®: ordered and reviewed  Review and summarize past medical records: yes  Discuss the patient with other providers: yes (Orthopedics, Hospitalist)  Independent visualization of images, tracings, or specimens: yes    Patient Progress  Patient progress: stable    ED Course       Procedures     I reviewed our electronic medical record system for any past medical records that were available that may contribute to the patients current condition, the nursing notes and and vital signs from today's visit     Nursing notes will be reviewed as they become available in realtime while the pt is in the ED. EKG interpretation: (Preliminary)  3:36 PM  Rhythm: sinus rhythm with premature supraventricular complexes; and regular . Rate (approx.): 71 bpm; Axis: left axis deviation; DE interval: normal; QRS interval: normal ; ST/T wave: normal; Other findings: LBBB. No significant change. Written by Konrad King ED Scribe, as dictated by Jennie Khanna MD     Progress Note:  [id number]  The patients presenting problems have been discussed, and they are in agreement with the care plan formulated and outlined with them. I have encouraged them to ask questions as they arise throughout their visit. CONSULT NOTE:  4:09 PM  JAYCE Linn PA-C spoke with Jordana Chacon NP,  Specialty: Orthopedics  Discussed pt's hx, disposition, and available diagnostic and imaging results. Reviewed care plans. Consultant will see and evaluate the pt. PROGRESS NOTE:  4:24 PM  Spoke with attending Jennie Khanna MD who has seen and evaluated pt. Dr. Sonia Meneses states she has updated pt on her x-ray results as well as the need for admission. CONSULT NOTE:   6:10 PM  JAYCE Linn PA-C spoke with Dr. Celso Chin,   Specialty: Hospitalist  Discussed pt's hx, disposition, and available diagnostic and imaging results. Reviewed care plans. Consultant will evaluate pt for admission.     LABORATORY TESTS:  Recent Results (from the past 12 hour(s))   EKG, 12 LEAD, INITIAL    Collection Time: 04/27/17  3:36 PM   Result Value Ref Range Ventricular Rate 71 BPM    Atrial Rate 71 BPM    P-R Interval 204 ms    QRS Duration 130 ms    Q-T Interval 446 ms    QTC Calculation (Bezet) 484 ms    Calculated P Axis 69 degrees    Calculated R Axis -50 degrees    Calculated T Axis 78 degrees    Diagnosis       Sinus rhythm with premature supraventricular complexes  Left axis deviation  Left bundle branch block  When compared with ECG of 18-SEP-2014 14:48,  premature supraventricular complexes are now present     CBC WITH AUTOMATED DIFF    Collection Time: 04/27/17  4:22 PM   Result Value Ref Range    WBC 11.1 (H) 3.6 - 11.0 K/uL    RBC 3.97 3.80 - 5.20 M/uL    HGB 12.0 11.5 - 16.0 g/dL    HCT 35.4 35.0 - 47.0 %    MCV 89.2 80.0 - 99.0 FL    MCH 30.2 26.0 - 34.0 PG    MCHC 33.9 30.0 - 36.5 g/dL    RDW 12.0 11.5 - 14.5 %    PLATELET 939 497 - 009 K/uL    NEUTROPHILS 84 (H) 32 - 75 %    LYMPHOCYTES 11 (L) 12 - 49 %    MONOCYTES 5 5 - 13 %    EOSINOPHILS 0 0 - 7 %    BASOPHILS 0 0 - 1 %    ABS. NEUTROPHILS 9.3 (H) 1.8 - 8.0 K/UL    ABS. LYMPHOCYTES 1.3 0.8 - 3.5 K/UL    ABS. MONOCYTES 0.5 0.0 - 1.0 K/UL    ABS. EOSINOPHILS 0.0 0.0 - 0.4 K/UL    ABS. BASOPHILS 0.0 0.0 - 0.1 K/UL   METABOLIC PANEL, COMPREHENSIVE    Collection Time: 04/27/17  4:22 PM   Result Value Ref Range    Sodium 143 136 - 145 mmol/L    Potassium 4.0 3.5 - 5.1 mmol/L    Chloride 107 97 - 108 mmol/L    CO2 26 21 - 32 mmol/L    Anion gap 10 5 - 15 mmol/L    Glucose 126 (H) 65 - 100 mg/dL    BUN 18 6 - 20 MG/DL    Creatinine 0.87 0.55 - 1.02 MG/DL    BUN/Creatinine ratio 21 (H) 12 - 20      GFR est AA >60 >60 ml/min/1.73m2    GFR est non-AA >60 >60 ml/min/1.73m2    Calcium 9.8 8.5 - 10.1 MG/DL    Bilirubin, total 0.3 0.2 - 1.0 MG/DL    ALT (SGPT) 38 12 - 78 U/L    AST (SGOT) 21 15 - 37 U/L    Alk.  phosphatase 74 45 - 117 U/L    Protein, total 6.7 6.4 - 8.2 g/dL    Albumin 3.3 (L) 3.5 - 5.0 g/dL    Globulin 3.4 2.0 - 4.0 g/dL    A-G Ratio 1.0 (L) 1.1 - 2.2     PROTHROMBIN TIME + INR    Collection Time: 04/27/17  4:22 PM   Result Value Ref Range    INR 1.0 0.9 - 1.1      Prothrombin time 10.3 9.0 - 11.1 sec   PTT    Collection Time: 04/27/17  4:22 PM   Result Value Ref Range    aPTT 23.2 22.1 - 32.5 sec    aPTT, therapeutic range     58.0 - 77.0 SECS       IMAGING RESULTS:  R HIP RT W OR WO PELV 2-3 VWS   Final Result   EXAM: XR HIP RT W OR WO PELV 2-3 VWS     INDICATION: Right hip pain with leg externally rotated.     COMPARISON: None.     FINDINGS: An AP view of the pelvis and a frogleg lateral view of the right hip  demonstrate a basocervical femoral neck fracture. The femoral head is located. There are degenerative changes of the lumbar spine.     IMPRESSION  IMPRESSION: Right basicervical femoral neck fracture. .     CXR Results  (Last 48 hours)               04/27/17 1556  XR CHEST PORT Final result    Impression:  IMPRESSION: Cardiomegaly. No acute abnormality. Narrative:  EXAM:  XR CHEST PORT. INDICATION: Ground-level fall. COMPARISON: .       FINDINGS:    A portable AP radiograph of the chest was obtained at a time not indicated on   the image. Lines and tubes: None. Lungs: The lungs are clear. Pleura: There is no pneumothorax or pleural effusion. Mediastinum: The heart is enlarged and the aorta is mildly atherosclerotic. Bones and soft tissues: There are degenerative changes of the spine. MEDICATIONS GIVEN:  Medications   morphine injection 2 mg (2 mg IntraVENous Given 4/27/17 1530)   HYDROmorphone (PF) (DILAUDID) injection 0.5 mg (0.5 mg IntraVENous Given 4/27/17 1645)       IMPRESSION:  No diagnosis found. PLAN:  1. Admit to Hospitalist     Admit Note:  6:11 PM  Patient is being admitted to the hospital by Dr. Elena Smith. The results of their tests and reasons for their admission have been discussed with them and/or available family. They convey agreement and understanding for the need to be admitted and for their admission diagnosis.   Consultation has been made with the inpatient physician specialist for hospitalization. Attestation: This note is prepared by Arvin Dunaway, acting as Scribe for [employer]. [employer]: The scribe's documentation has been prepared under my direction and personally reviewed by me in its entirety. I confirm that the note above accurately reflects all work, treatment, procedures, and medical decision making performed by me. This note will not be viewable in [address]

## 2017-04-27 NOTE — ED NOTES
Patient presents via EMS with son with complaint of right hip pain after falling prior to arrival. Patient denies LOC but thinks she may have hit her head. Patient denies any other complaints.

## 2017-04-27 NOTE — CONSULTS
ORTHOPAEDIC CONSULT NOTE    Subjective:     Date of Consultation:  April 27, 2017      Tere Casey is a 80 y.o. female who is being seen for R hip pain s/p GLF this afternoon. Work up has reveled R fem neck fracture. Pt lives at home with her son and does not use a walker. Pt's family at bedside during exam. Plan of care discussed with pt and family, all questions/concerns addressed and pt and family verbalized understanding of plan of care. Patient Active Problem List    Diagnosis Date Noted    Lumbar stenosis 03/27/2014    Anemia 03/27/2014    Dizziness 07/24/2013    Vertigo     Vitamin D deficiency 01/28/2011    Hypertension     Hypercholesterolemia     Depression     GERD (gastroesophageal reflux disease)     Asthma     Neuropathy, upper extremity      Family History   Problem Relation Age of Onset    Heart Disease Mother     Hypertension Mother     Stroke Mother     Heart Disease Brother     Heart Disease Father     Heart Disease Brother       Social History   Substance Use Topics    Smoking status: Never Smoker    Smokeless tobacco: Never Used    Alcohol use No     Past Medical History:   Diagnosis Date    Anemia NEC     Asthma     Cervical spondylarthritis     Depression     DJD (degenerative joint disease) of knee     Esophageal spasm     Has required esophageal dilatation; Dr. Arnie Phillips GERD (gastroesophageal reflux disease)     Hypercholesterolemia     Hypertension     Lumbar stenosis     Neuropathy, upper extremity     left    Vertigo [id number]    Saw Dr. Dorcas Napier; improved.  Vitamin D deficiency 1/28/2011      Past Surgical History:   Procedure Laterality Date    HX CATARACT REMOVAL      bilateral    HX HYSTERECTOMY      HX ORTHOPAEDIC      right bunion excision    HX TONSILLECTOMY        Prior to Admission medications    Medication Sig Start Date End Date Taking?  Authorizing Provider   atorvastatin (LIPITOR) 20 mg tablet TAKE 1 TABLET BY MOUTH EVERY NIGHT 4/11/17   Rosalino Cr III, DO   amLODIPine-valsartan (EXFORGE)  mg per tablet TAKE 1 TABLET BY MOUTH DAILY 4/3/17   Ashok Mendoza MD   atenolol (TENORMIN) 25 mg tablet TAKE 1 TABLET BY MOUTH DAILY 3/14/17   Ashok Mendoza MD   DULoxetine (CYMBALTA) 30 mg capsule TAKE 1 CAPSULE BY MOUTH DAILY 2/17/17   Ashok Mendoza MD   lidocaine (LIDODERM) 5 % 1 Patch by TransDERmal route every twenty-four (24) hours. Apply patch to the affected area for 12 hours a day and remove for 12 hours a day. 10/13/16   Ashok Mendoza MD   albuterol (PROVENTIL HFA, VENTOLIN HFA, PROAIR HFA) 90 mcg/actuation inhaler Take 1 Puff by inhalation as needed for Wheezing. 2/22/16   Ashok Mendoza MD   cholecalciferol, vitamin d3, (VITAMIN D) 1,000 unit tablet Take  by mouth daily. Historical Provider   MULTIVITAMINS (MULTIVITAMIN PO) Take  by mouth daily. Historical Provider     No current facility-administered medications for this encounter. Current Outpatient Prescriptions   Medication Sig    atorvastatin (LIPITOR) 20 mg tablet TAKE 1 TABLET BY MOUTH EVERY NIGHT    amLODIPine-valsartan (EXFORGE)  mg per tablet TAKE 1 TABLET BY MOUTH DAILY    atenolol (TENORMIN) 25 mg tablet TAKE 1 TABLET BY MOUTH DAILY    DULoxetine (CYMBALTA) 30 mg capsule TAKE 1 CAPSULE BY MOUTH DAILY    lidocaine (LIDODERM) 5 % 1 Patch by TransDERmal route every twenty-four (24) hours. Apply patch to the affected area for 12 hours a day and remove for 12 hours a day.  albuterol (PROVENTIL HFA, VENTOLIN HFA, PROAIR HFA) 90 mcg/actuation inhaler Take 1 Puff by inhalation as needed for Wheezing.  cholecalciferol, vitamin d3, (VITAMIN D) 1,000 unit tablet Take  by mouth daily.  MULTIVITAMINS (MULTIVITAMIN PO) Take  by mouth daily. Allergies   Allergen Reactions    Aspirin Nausea and Vomiting    Diclofenac Itching     The voltaren gel caused itching.      Gabapentin Swelling    Hydrochlorothiazide Other (comments) dizziness    Pcn [Penicillins] Swelling        Review of Systems:  A comprehensive review of systems was negative except for that written in the HPI. Mental Status: no dementia    Objective:     Patient Vitals for the past 8 hrs:   BP Temp Pulse Resp SpO2 Height Weight   17 1720 - - - - 95 % - -   17 1715 129/56 - - - 94 % - -   17 1700 128/61 - - - 93 % - -   17 1646 130/59 - 75 16 95 % - -   17 1626 127/64 - - - 95 % - -   17 1524 136/61 98 °F (36.7 °C) 71 18 95 % 5' 1\" (1.549 m) 56.7 kg (125 lb)     Temp (24hrs), Av °F (36.7 °C), Min:98 °F (36.7 °C), Max:98 °F (36.7 °C)      Gen: Well-developed,  in no acute distress    Musc: RLE - shortened, externally rotated, NVI    Skin: No skin breakdown noted. Skin warm, pink, dry  Neuro: Cranial nerves are grossly intact, no focal motor weakness, follows commands appropriately   Psych: Good insight, oriented to person, place and time, alert    Imaging Review: INDICATION: Right hip pain with leg externally rotated.     COMPARISON: None.     FINDINGS: An AP view of the pelvis and a frogleg lateral view of the right hip  demonstrate a basocervical femoral neck fracture. The femoral head is located. There are degenerative changes of the lumbar spine.     IMPRESSION  IMPRESSION: Right basicervical femoral neck fracture. .          Labs:   Recent Results (from the past 24 hour(s))   EKG, 12 LEAD, INITIAL    Collection Time: 17  3:36 PM   Result Value Ref Range    Ventricular Rate 71 BPM    Atrial Rate 71 BPM    P-R Interval 204 ms    QRS Duration 130 ms    Q-T Interval 446 ms    QTC Calculation (Bezet) 484 ms    Calculated P Axis 69 degrees    Calculated R Axis -50 degrees    Calculated T Axis 78 degrees    Diagnosis       Sinus rhythm with premature supraventricular complexes  Left axis deviation  Left bundle branch block  When compared with ECG of 18-SEP-2014 14:48,  premature supraventricular complexes are now present     CBC WITH AUTOMATED DIFF    Collection Time: 04/27/17  4:22 PM   Result Value Ref Range    WBC 11.1 (H) 3.6 - 11.0 K/uL    RBC 3.97 3.80 - 5.20 M/uL    HGB 12.0 11.5 - 16.0 g/dL    HCT 35.4 35.0 - 47.0 %    MCV 89.2 80.0 - 99.0 FL    MCH 30.2 26.0 - 34.0 PG    MCHC 33.9 30.0 - 36.5 g/dL    RDW 12.0 11.5 - 14.5 %    PLATELET 117 161 - 174 K/uL    NEUTROPHILS 84 (H) 32 - 75 %    LYMPHOCYTES 11 (L) 12 - 49 %    MONOCYTES 5 5 - 13 %    EOSINOPHILS 0 0 - 7 %    BASOPHILS 0 0 - 1 %    ABS. NEUTROPHILS 9.3 (H) 1.8 - 8.0 K/UL    ABS. LYMPHOCYTES 1.3 0.8 - 3.5 K/UL    ABS. MONOCYTES 0.5 0.0 - 1.0 K/UL    ABS. EOSINOPHILS 0.0 0.0 - 0.4 K/UL    ABS. BASOPHILS 0.0 0.0 - 0.1 K/UL   METABOLIC PANEL, COMPREHENSIVE    Collection Time: 04/27/17  4:22 PM   Result Value Ref Range    Sodium 143 136 - 145 mmol/L    Potassium 4.0 3.5 - 5.1 mmol/L    Chloride 107 97 - 108 mmol/L    CO2 26 21 - 32 mmol/L    Anion gap 10 5 - 15 mmol/L    Glucose 126 (H) 65 - 100 mg/dL    BUN 18 6 - 20 MG/DL    Creatinine 0.87 0.55 - 1.02 MG/DL    BUN/Creatinine ratio 21 (H) 12 - 20      GFR est AA >60 >60 ml/min/1.73m2    GFR est non-AA >60 >60 ml/min/1.73m2    Calcium 9.8 8.5 - 10.1 MG/DL    Bilirubin, total 0.3 0.2 - 1.0 MG/DL    ALT (SGPT) 38 12 - 78 U/L    AST (SGOT) 21 15 - 37 U/L    Alk.  phosphatase 74 45 - 117 U/L    Protein, total 6.7 6.4 - 8.2 g/dL    Albumin 3.3 (L) 3.5 - 5.0 g/dL    Globulin 3.4 2.0 - 4.0 g/dL    A-G Ratio 1.0 (L) 1.1 - 2.2     PROTHROMBIN TIME + INR    Collection Time: 04/27/17  4:22 PM   Result Value Ref Range    INR 1.0 0.9 - 1.1      Prothrombin time 10.3 9.0 - 11.1 sec   PTT    Collection Time: 04/27/17  4:22 PM   Result Value Ref Range    aPTT 23.2 22.1 - 32.5 sec    aPTT, therapeutic range     58.0 - 77.0 SECS         Impression:     Patient Active Problem List    Diagnosis Date Noted    Lumbar stenosis 03/27/2014    Anemia 03/27/2014    Dizziness 07/24/2013    Vertigo     Vitamin D deficiency 01/28/2011    Hypertension  Hypercholesterolemia     Depression     GERD (gastroesophageal reflux disease)     Asthma     Neuropathy, upper extremity      Active Problems:    * No active hospital problems. *      Plan:   -  Plan for R hemiarthroplasty with Dr. Ivana John at noon on Friday   -  NPO after midnight, bedrest, ladd, IVF, pain management  -  clearance pending  -  DVT Prophylaxis - SCDs pre op      Khurram Tolentino, NP  Orthopedic Nurse Practitioner   [place]     This patient was seen and examined by be me personally with the findings as documented above by the NP/PA with the following changes/additions:    NONE - displaced R femoral neck fx - plan for hemiarthroplasty    All pertinent medical history, medications, and test results and imaging were reviewed by me personally. Plan for treatment is as described and formulated by me after discussion with the patient and family personally.

## 2017-04-28 ENCOUNTER — APPOINTMENT (OUTPATIENT)
Dept: GENERAL RADIOLOGY | Age: 82
DRG: 469 | End: 2017-04-28
Attending: ORTHOPAEDIC SURGERY
Payer: MEDICARE

## 2017-04-28 ENCOUNTER — ANESTHESIA (OUTPATIENT)
Dept: SURGERY | Age: 82
DRG: 469 | End: 2017-04-28
Payer: MEDICARE

## 2017-04-28 ENCOUNTER — ANESTHESIA EVENT (OUTPATIENT)
Dept: SURGERY | Age: 82
DRG: 469 | End: 2017-04-28
Payer: MEDICARE

## 2017-04-28 LAB
ATRIAL RATE: 71 BPM
CALCULATED P AXIS, ECG09: 69 DEGREES
CALCULATED R AXIS, ECG10: -50 DEGREES
CALCULATED T AXIS, ECG11: 78 DEGREES
DIAGNOSIS, 93000: NORMAL
P-R INTERVAL, ECG05: 204 MS
Q-T INTERVAL, ECG07: 446 MS
QRS DURATION, ECG06: 130 MS
QTC CALCULATION (BEZET), ECG08: 484 MS
VENTRICULAR RATE, ECG03: 71 BPM

## 2017-04-28 PROCEDURE — 65270000029 HC RM PRIVATE

## 2017-04-28 PROCEDURE — 74011250637 HC RX REV CODE- 250/637: Performed by: INTERNAL MEDICINE

## 2017-04-28 PROCEDURE — 77030034479 HC ADH SKN CLSR PRINEO J&J -B: Performed by: ORTHOPAEDIC SURGERY

## 2017-04-28 PROCEDURE — 74011250637 HC RX REV CODE- 250/637: Performed by: PHYSICIAN ASSISTANT

## 2017-04-28 PROCEDURE — 77030006789 HC BLD SAW OSC STRY -C: Performed by: ORTHOPAEDIC SURGERY

## 2017-04-28 PROCEDURE — 77030020782 HC GWN BAIR PAWS FLX 3M -B

## 2017-04-28 PROCEDURE — 77030026438 HC STYL ET INTUB CARD -A: Performed by: ANESTHESIOLOGY

## 2017-04-28 PROCEDURE — 74011000250 HC RX REV CODE- 250

## 2017-04-28 PROCEDURE — 77030019908 HC STETH ESOPH SIMS -A: Performed by: ANESTHESIOLOGY

## 2017-04-28 PROCEDURE — 74011250636 HC RX REV CODE- 250/636: Performed by: ORTHOPAEDIC SURGERY

## 2017-04-28 PROCEDURE — 76060000033 HC ANESTHESIA 1 TO 1.5 HR: Performed by: ORTHOPAEDIC SURGERY

## 2017-04-28 PROCEDURE — 77030020788: Performed by: ORTHOPAEDIC SURGERY

## 2017-04-28 PROCEDURE — 77030003666 HC NDL SPINAL BD -A: Performed by: ORTHOPAEDIC SURGERY

## 2017-04-28 PROCEDURE — 77010033678 HC OXYGEN DAILY

## 2017-04-28 PROCEDURE — 76210000006 HC OR PH I REC 0.5 TO 1 HR: Performed by: ORTHOPAEDIC SURGERY

## 2017-04-28 PROCEDURE — 74011250636 HC RX REV CODE- 250/636: Performed by: ANESTHESIOLOGY

## 2017-04-28 PROCEDURE — 77030018723 HC ELCTRD BLD COVD -A: Performed by: ORTHOPAEDIC SURGERY

## 2017-04-28 PROCEDURE — 76000 FLUOROSCOPY <1 HR PHYS/QHP: CPT

## 2017-04-28 PROCEDURE — 77030018846 HC SOL IRR STRL H20 ICUM -A: Performed by: ORTHOPAEDIC SURGERY

## 2017-04-28 PROCEDURE — 77030032490 HC SLV COMPR SCD KNE COVD -B: Performed by: ORTHOPAEDIC SURGERY

## 2017-04-28 PROCEDURE — 77030011640 HC PAD GRND REM COVD -A: Performed by: ORTHOPAEDIC SURGERY

## 2017-04-28 PROCEDURE — 77030008684 HC TU ET CUF COVD -B: Performed by: ANESTHESIOLOGY

## 2017-04-28 PROCEDURE — 74011250637 HC RX REV CODE- 250/637: Performed by: NURSE PRACTITIONER

## 2017-04-28 PROCEDURE — 77030008467 HC STPLR SKN COVD -B: Performed by: ORTHOPAEDIC SURGERY

## 2017-04-28 PROCEDURE — 74011250636 HC RX REV CODE- 250/636: Performed by: NURSE PRACTITIONER

## 2017-04-28 PROCEDURE — 77030018836 HC SOL IRR NACL ICUM -A: Performed by: ORTHOPAEDIC SURGERY

## 2017-04-28 PROCEDURE — 77030033138 HC SUT PGA STRATFX J&J -B: Performed by: ORTHOPAEDIC SURGERY

## 2017-04-28 PROCEDURE — 74011250636 HC RX REV CODE- 250/636

## 2017-04-28 PROCEDURE — 77030013160 HC PROTCT ARM THMB DERY -A

## 2017-04-28 PROCEDURE — 74011000258 HC RX REV CODE- 258: Performed by: ORTHOPAEDIC SURGERY

## 2017-04-28 PROCEDURE — 76010000149 HC OR TIME 1 TO 1.5 HR: Performed by: ORTHOPAEDIC SURGERY

## 2017-04-28 PROCEDURE — 0SRR0JA REPLACEMENT OF RIGHT HIP JOINT, FEMORAL SURFACE WITH SYNTHETIC SUBSTITUTE, UNCEMENTED, OPEN APPROACH: ICD-10-PCS | Performed by: ORTHOPAEDIC SURGERY

## 2017-04-28 PROCEDURE — 73501 X-RAY EXAM HIP UNI 1 VIEW: CPT

## 2017-04-28 PROCEDURE — 74011250636 HC RX REV CODE- 250/636: Performed by: PHYSICIAN ASSISTANT

## 2017-04-28 PROCEDURE — C1776 JOINT DEVICE (IMPLANTABLE): HCPCS | Performed by: ORTHOPAEDIC SURGERY

## 2017-04-28 PROCEDURE — 77030035236 HC SUT PDS STRATFX BARB J&J -B: Performed by: ORTHOPAEDIC SURGERY

## 2017-04-28 DEVICE — IMPLANTABLE DEVICE: Type: IMPLANTABLE DEVICE | Site: HIP | Status: FUNCTIONAL

## 2017-04-28 DEVICE — SLEEVE FEM -3.5MM 12/14 TI UPLR TAPR PLSM SPRY: Type: IMPLANTABLE DEVICE | Site: HIP | Status: FUNCTIONAL

## 2017-04-28 RX ORDER — AMOXICILLIN 250 MG
1 CAPSULE ORAL 2 TIMES DAILY
Status: DISCONTINUED | OUTPATIENT
Start: 2017-04-28 | End: 2017-05-02 | Stop reason: HOSPADM

## 2017-04-28 RX ORDER — POLYETHYLENE GLYCOL 3350 17 G/17G
17 POWDER, FOR SOLUTION ORAL DAILY
Status: DISCONTINUED | OUTPATIENT
Start: 2017-04-29 | End: 2017-05-02 | Stop reason: HOSPADM

## 2017-04-28 RX ORDER — LIDOCAINE HYDROCHLORIDE 20 MG/ML
INJECTION, SOLUTION EPIDURAL; INFILTRATION; INTRACAUDAL; PERINEURAL AS NEEDED
Status: DISCONTINUED | OUTPATIENT
Start: 2017-04-28 | End: 2017-04-28 | Stop reason: HOSPADM

## 2017-04-28 RX ORDER — DIPHENHYDRAMINE HCL 12.5MG/5ML
12.5 ELIXIR ORAL
Status: ACTIVE | OUTPATIENT
Start: 2017-04-28 | End: 2017-04-29

## 2017-04-28 RX ORDER — MORPHINE SULFATE 2 MG/ML
1 INJECTION, SOLUTION INTRAMUSCULAR; INTRAVENOUS
Status: ACTIVE | OUTPATIENT
Start: 2017-04-28 | End: 2017-04-29

## 2017-04-28 RX ORDER — MELATONIN
1000 DAILY
Status: DISCONTINUED | OUTPATIENT
Start: 2017-04-28 | End: 2017-05-02 | Stop reason: HOSPADM

## 2017-04-28 RX ORDER — SODIUM CHLORIDE 0.9 % (FLUSH) 0.9 %
5-10 SYRINGE (ML) INJECTION AS NEEDED
Status: DISCONTINUED | OUTPATIENT
Start: 2017-04-28 | End: 2017-04-28 | Stop reason: HOSPADM

## 2017-04-28 RX ORDER — DEXAMETHASONE SODIUM PHOSPHATE 4 MG/ML
INJECTION, SOLUTION INTRA-ARTICULAR; INTRALESIONAL; INTRAMUSCULAR; INTRAVENOUS; SOFT TISSUE AS NEEDED
Status: DISCONTINUED | OUTPATIENT
Start: 2017-04-28 | End: 2017-04-28 | Stop reason: HOSPADM

## 2017-04-28 RX ORDER — ATORVASTATIN CALCIUM 20 MG/1
20 TABLET, FILM COATED ORAL
Status: DISCONTINUED | OUTPATIENT
Start: 2017-04-28 | End: 2017-05-02 | Stop reason: HOSPADM

## 2017-04-28 RX ORDER — ATENOLOL 25 MG/1
25 TABLET ORAL DAILY
Status: DISCONTINUED | OUTPATIENT
Start: 2017-04-28 | End: 2017-05-02 | Stop reason: HOSPADM

## 2017-04-28 RX ORDER — NEOSTIGMINE METHYLSULFATE 1 MG/ML
INJECTION INTRAVENOUS AS NEEDED
Status: DISCONTINUED | OUTPATIENT
Start: 2017-04-28 | End: 2017-04-28 | Stop reason: HOSPADM

## 2017-04-28 RX ORDER — SODIUM CHLORIDE, SODIUM LACTATE, POTASSIUM CHLORIDE, CALCIUM CHLORIDE 600; 310; 30; 20 MG/100ML; MG/100ML; MG/100ML; MG/100ML
25 INJECTION, SOLUTION INTRAVENOUS CONTINUOUS
Status: DISCONTINUED | OUTPATIENT
Start: 2017-04-28 | End: 2017-04-28 | Stop reason: HOSPADM

## 2017-04-28 RX ORDER — GUAIFENESIN 100 MG/5ML
81 LIQUID (ML) ORAL 2 TIMES DAILY
Status: DISCONTINUED | OUTPATIENT
Start: 2017-04-28 | End: 2017-05-02 | Stop reason: HOSPADM

## 2017-04-28 RX ORDER — ROCURONIUM BROMIDE 10 MG/ML
INJECTION, SOLUTION INTRAVENOUS AS NEEDED
Status: DISCONTINUED | OUTPATIENT
Start: 2017-04-28 | End: 2017-04-28 | Stop reason: HOSPADM

## 2017-04-28 RX ORDER — ACETAMINOPHEN 325 MG/1
650 TABLET ORAL EVERY 6 HOURS
Status: DISCONTINUED | OUTPATIENT
Start: 2017-04-28 | End: 2017-05-02 | Stop reason: HOSPADM

## 2017-04-28 RX ORDER — MORPHINE SULFATE 10 MG/ML
2 INJECTION, SOLUTION INTRAMUSCULAR; INTRAVENOUS
Status: DISCONTINUED | OUTPATIENT
Start: 2017-04-28 | End: 2017-04-28 | Stop reason: HOSPADM

## 2017-04-28 RX ORDER — DEXAMETHASONE SODIUM PHOSPHATE 4 MG/ML
10 INJECTION, SOLUTION INTRA-ARTICULAR; INTRALESIONAL; INTRAMUSCULAR; INTRAVENOUS; SOFT TISSUE ONCE
Status: COMPLETED | OUTPATIENT
Start: 2017-04-29 | End: 2017-04-29

## 2017-04-28 RX ORDER — FENTANYL CITRATE 50 UG/ML
INJECTION, SOLUTION INTRAMUSCULAR; INTRAVENOUS AS NEEDED
Status: DISCONTINUED | OUTPATIENT
Start: 2017-04-28 | End: 2017-04-28 | Stop reason: HOSPADM

## 2017-04-28 RX ORDER — ONDANSETRON 2 MG/ML
INJECTION INTRAMUSCULAR; INTRAVENOUS AS NEEDED
Status: DISCONTINUED | OUTPATIENT
Start: 2017-04-28 | End: 2017-04-28 | Stop reason: HOSPADM

## 2017-04-28 RX ORDER — ONDANSETRON 2 MG/ML
4 INJECTION INTRAMUSCULAR; INTRAVENOUS
Status: DISCONTINUED | OUTPATIENT
Start: 2017-04-28 | End: 2017-05-02 | Stop reason: HOSPADM

## 2017-04-28 RX ORDER — FENTANYL CITRATE 50 UG/ML
25 INJECTION, SOLUTION INTRAMUSCULAR; INTRAVENOUS
Status: DISCONTINUED | OUTPATIENT
Start: 2017-04-28 | End: 2017-04-28 | Stop reason: HOSPADM

## 2017-04-28 RX ORDER — OXYCODONE HYDROCHLORIDE 5 MG/1
5 TABLET ORAL
Status: DISCONTINUED | OUTPATIENT
Start: 2017-04-28 | End: 2017-05-02 | Stop reason: HOSPADM

## 2017-04-28 RX ORDER — LORAZEPAM 2 MG/ML
1 INJECTION INTRAMUSCULAR ONCE
Status: COMPLETED | OUTPATIENT
Start: 2017-04-28 | End: 2017-04-28

## 2017-04-28 RX ORDER — SODIUM CHLORIDE 0.9 % (FLUSH) 0.9 %
5-10 SYRINGE (ML) INJECTION AS NEEDED
Status: DISCONTINUED | OUTPATIENT
Start: 2017-04-28 | End: 2017-05-02 | Stop reason: HOSPADM

## 2017-04-28 RX ORDER — MORPHINE SULFATE 10 MG/ML
INJECTION, SOLUTION INTRAMUSCULAR; INTRAVENOUS AS NEEDED
Status: DISCONTINUED | OUTPATIENT
Start: 2017-04-28 | End: 2017-04-28 | Stop reason: HOSPADM

## 2017-04-28 RX ORDER — ONDANSETRON 2 MG/ML
4 INJECTION INTRAMUSCULAR; INTRAVENOUS AS NEEDED
Status: DISCONTINUED | OUTPATIENT
Start: 2017-04-28 | End: 2017-04-28 | Stop reason: HOSPADM

## 2017-04-28 RX ORDER — LEVOFLOXACIN 5 MG/ML
500 INJECTION, SOLUTION INTRAVENOUS ONCE
Status: COMPLETED | OUTPATIENT
Start: 2017-04-28 | End: 2017-04-28

## 2017-04-28 RX ORDER — NALOXONE HYDROCHLORIDE 0.4 MG/ML
0.4 INJECTION, SOLUTION INTRAMUSCULAR; INTRAVENOUS; SUBCUTANEOUS AS NEEDED
Status: DISCONTINUED | OUTPATIENT
Start: 2017-04-28 | End: 2017-05-02 | Stop reason: HOSPADM

## 2017-04-28 RX ORDER — OXYCODONE HYDROCHLORIDE 5 MG/1
2.5 TABLET ORAL
Status: DISCONTINUED | OUTPATIENT
Start: 2017-04-28 | End: 2017-05-02 | Stop reason: HOSPADM

## 2017-04-28 RX ORDER — HYDROMORPHONE HYDROCHLORIDE 1 MG/ML
.2-.5 INJECTION, SOLUTION INTRAMUSCULAR; INTRAVENOUS; SUBCUTANEOUS
Status: DISCONTINUED | OUTPATIENT
Start: 2017-04-28 | End: 2017-04-28 | Stop reason: HOSPADM

## 2017-04-28 RX ORDER — GLYCOPYRROLATE 0.2 MG/ML
INJECTION INTRAMUSCULAR; INTRAVENOUS AS NEEDED
Status: DISCONTINUED | OUTPATIENT
Start: 2017-04-28 | End: 2017-04-28 | Stop reason: HOSPADM

## 2017-04-28 RX ORDER — FAMOTIDINE 20 MG/1
10 TABLET, FILM COATED ORAL 2 TIMES DAILY
Status: DISCONTINUED | OUTPATIENT
Start: 2017-04-28 | End: 2017-05-02 | Stop reason: HOSPADM

## 2017-04-28 RX ORDER — PROPOFOL 10 MG/ML
INJECTION, EMULSION INTRAVENOUS AS NEEDED
Status: DISCONTINUED | OUTPATIENT
Start: 2017-04-28 | End: 2017-04-28 | Stop reason: HOSPADM

## 2017-04-28 RX ORDER — FACIAL-BODY WIPES
10 EACH TOPICAL DAILY PRN
Status: DISCONTINUED | OUTPATIENT
Start: 2017-04-30 | End: 2017-05-02 | Stop reason: HOSPADM

## 2017-04-28 RX ORDER — DIPHENHYDRAMINE HYDROCHLORIDE 50 MG/ML
12.5 INJECTION, SOLUTION INTRAMUSCULAR; INTRAVENOUS AS NEEDED
Status: DISCONTINUED | OUTPATIENT
Start: 2017-04-28 | End: 2017-04-28 | Stop reason: HOSPADM

## 2017-04-28 RX ORDER — SODIUM CHLORIDE 0.9 % (FLUSH) 0.9 %
5-10 SYRINGE (ML) INJECTION EVERY 8 HOURS
Status: DISCONTINUED | OUTPATIENT
Start: 2017-04-29 | End: 2017-05-02 | Stop reason: HOSPADM

## 2017-04-28 RX ORDER — SODIUM CHLORIDE 0.9 % (FLUSH) 0.9 %
5-10 SYRINGE (ML) INJECTION EVERY 8 HOURS
Status: DISCONTINUED | OUTPATIENT
Start: 2017-04-28 | End: 2017-04-28 | Stop reason: HOSPADM

## 2017-04-28 RX ORDER — SODIUM CHLORIDE 9 MG/ML
125 INJECTION, SOLUTION INTRAVENOUS CONTINUOUS
Status: DISPENSED | OUTPATIENT
Start: 2017-04-28 | End: 2017-04-29

## 2017-04-28 RX ORDER — MORPHINE SULFATE 2 MG/ML
2 INJECTION, SOLUTION INTRAMUSCULAR; INTRAVENOUS
Status: DISCONTINUED | OUTPATIENT
Start: 2017-04-28 | End: 2017-04-28

## 2017-04-28 RX ORDER — SUCCINYLCHOLINE CHLORIDE 20 MG/ML
INJECTION INTRAMUSCULAR; INTRAVENOUS AS NEEDED
Status: DISCONTINUED | OUTPATIENT
Start: 2017-04-28 | End: 2017-04-28 | Stop reason: HOSPADM

## 2017-04-28 RX ADMIN — SODIUM CHLORIDE 125 ML/HR: 900 INJECTION, SOLUTION INTRAVENOUS at 14:03

## 2017-04-28 RX ADMIN — SODIUM CHLORIDE, SODIUM LACTATE, POTASSIUM CHLORIDE, AND CALCIUM CHLORIDE 25 ML/HR: 600; 310; 30; 20 INJECTION, SOLUTION INTRAVENOUS at 11:44

## 2017-04-28 RX ADMIN — ROCURONIUM BROMIDE 15 MG: 10 INJECTION, SOLUTION INTRAVENOUS at 12:40

## 2017-04-28 RX ADMIN — FENTANYL CITRATE 25 MCG: 50 INJECTION, SOLUTION INTRAMUSCULAR; INTRAVENOUS at 12:32

## 2017-04-28 RX ADMIN — ROCURONIUM BROMIDE 5 MG: 10 INJECTION, SOLUTION INTRAVENOUS at 12:32

## 2017-04-28 RX ADMIN — GLYCOPYRROLATE 0.4 MG: 0.2 INJECTION INTRAMUSCULAR; INTRAVENOUS at 13:28

## 2017-04-28 RX ADMIN — PROPOFOL 100 MG: 10 INJECTION, EMULSION INTRAVENOUS at 12:32

## 2017-04-28 RX ADMIN — ROCURONIUM BROMIDE 10 MG: 10 INJECTION, SOLUTION INTRAVENOUS at 13:02

## 2017-04-28 RX ADMIN — OXYCODONE HYDROCHLORIDE 5 MG: 5 TABLET ORAL at 08:05

## 2017-04-28 RX ADMIN — DOCUSATE SODIUM AND SENNOSIDES 2 TABLET: 8.6; 5 TABLET, FILM COATED ORAL at 08:05

## 2017-04-28 RX ADMIN — ACETAMINOPHEN 650 MG: 325 TABLET, FILM COATED ORAL at 00:03

## 2017-04-28 RX ADMIN — SODIUM CHLORIDE 75 ML/HR: 900 INJECTION, SOLUTION INTRAVENOUS at 10:05

## 2017-04-28 RX ADMIN — ASPIRIN 81 MG 81 MG: 81 TABLET ORAL at 17:03

## 2017-04-28 RX ADMIN — SUCCINYLCHOLINE CHLORIDE 120 MG: 20 INJECTION INTRAMUSCULAR; INTRAVENOUS at 12:32

## 2017-04-28 RX ADMIN — LEVOFLOXACIN 500 MG: 5 INJECTION, SOLUTION INTRAVENOUS at 17:32

## 2017-04-28 RX ADMIN — LIDOCAINE HYDROCHLORIDE 40 MG: 20 INJECTION, SOLUTION EPIDURAL; INFILTRATION; INTRACAUDAL; PERINEURAL at 12:32

## 2017-04-28 RX ADMIN — NEOSTIGMINE METHYLSULFATE 3 MG: 1 INJECTION INTRAVENOUS at 13:28

## 2017-04-28 RX ADMIN — FENTANYL CITRATE 25 MCG: 50 INJECTION, SOLUTION INTRAMUSCULAR; INTRAVENOUS at 12:24

## 2017-04-28 RX ADMIN — ROPIVACAINE HYDROCHLORIDE: 5 INJECTION, SOLUTION EPIDURAL; INFILTRATION; PERINEURAL at 13:33

## 2017-04-28 RX ADMIN — MORPHINE SULFATE 2 MG: 10 INJECTION, SOLUTION INTRAMUSCULAR; INTRAVENOUS at 13:14

## 2017-04-28 RX ADMIN — VANCOMYCIN HYDROCHLORIDE 1000 MG: 1 INJECTION, POWDER, LYOPHILIZED, FOR SOLUTION INTRAVENOUS at 11:44

## 2017-04-28 RX ADMIN — DEXAMETHASONE SODIUM PHOSPHATE 4 MG: 4 INJECTION, SOLUTION INTRA-ARTICULAR; INTRALESIONAL; INTRAMUSCULAR; INTRAVENOUS; SOFT TISSUE at 12:47

## 2017-04-28 RX ADMIN — ONDANSETRON 4 MG: 2 INJECTION INTRAMUSCULAR; INTRAVENOUS at 13:27

## 2017-04-28 RX ADMIN — LORAZEPAM 1 MG: 2 INJECTION INTRAMUSCULAR; INTRAVENOUS at 19:09

## 2017-04-28 RX ADMIN — ATENOLOL 25 MG: 25 TABLET ORAL at 08:05

## 2017-04-28 NOTE — PROGRESS NOTES
Dr. Suzan Chase paged for order for sitter due to patient's uncooperative behavior. Pt is consistently trying to take off oxygen, pull off clothes, and jump out of the bed despite continuous education. Pt is also being verbally and physically aggressive as well. Currently pt is only alert to self. New order given for ativan 1mg iv once and prn sitter.

## 2017-04-28 NOTE — PROGRESS NOTES
2255- TRANSFER - IN REPORT:    Verbal report received from [address] [place] [address] Namrata RN(name) on Lyndol Osgood  being received from ED (unit) for routine progression of care      Report consisted of patients Situation, Background, Assessment and   Recommendations(SBAR). Information from the following report(s) SBAR, Kardex, ED Summary, Intake/Output, MAR, Recent Results and Cardiac Rhythm NSR with PACs was reviewed with the receiving nurse. Opportunity for questions and clarification was provided. 4895- Family at bedside. Bedside shift change report given to Divina KITCHEN (oncoming nurse) by Kassandra Suresh (offgoing nurse). Report included the following information SBAR, Kardex, ED Summary, Intake/Output, MAR and Recent Results.

## 2017-04-28 NOTE — ANESTHESIA POSTPROCEDURE EVALUATION
Post-Anesthesia Evaluation and Assessment    Patient: Chao Beltran MRN: [id number]  SSN: [id number]    YOB: 1928  Age: 80 y.o. Sex: female       Cardiovascular Function/Vital Signs  Visit Vitals    /57 (BP 1 [place] Right arm, BP Patient Position: At rest)    Pulse 81    Temp 36.9 °C (98.4 °F)    Resp 18    Ht 5' 1\" (1.549 m)    Wt 56.7 kg (125 lb)    SpO2 96%    BMI 23.62 kg/m2       Patient is status post general anesthesia for Procedure(s):  RIGHT HIP HEMIARTHROPLASTY. Nausea/Vomiting: None    Postoperative hydration reviewed and adequate. Pain:  Pain Scale 1: Numeric (0 - 10) (04/28/17 1430)  Pain Intensity 1: 0 (04/28/17 1430)   Managed    Neurological Status:   Neuro (WDL): Exceptions to WDL (04/28/17 1349)  Neuro  Orientation Level: Disoriented to place; Disoriented to situation;Oriented to person (04/28/17 1349)  Cognition: Follows commands (04/28/17 1349)  Speech: Nods appropriately (04/28/17 1349)  LUE Motor Response: Purposeful (04/28/17 1349)  LLE Motor Response: Purposeful (04/28/17 1349)  RUE Motor Response: Purposeful (04/28/17 1349)  RLE Motor Response: Weak (04/28/17 1349)   At baseline    Mental Status and Level of Consciousness: Arousable    Pulmonary Status:   O2 Device: Nasal cannula (04/28/17 1430)   Adequate oxygenation and airway patent    Complications related to anesthesia: None    Post-anesthesia assessment completed.  No concerns    Signed By: Priscilla Matias MD     April 28, 2017

## 2017-04-28 NOTE — PERIOP NOTES
TRANSFER - IN REPORT:    Verbal report received from Divina RN(name) on Stu Bird  being received from [facility](unit) for ordered procedure      Report consisted of patients Situation, Background, Assessment and   Recommendations(SBAR). Information from the following report(s) SBAR, MAR and Recent Results was reviewed with the receiving nurse. Opportunity for questions and clarification was provided. Assessment completed upon patients arrival to unit and care assumed.

## 2017-04-28 NOTE — OP NOTES
Judson Michelle MD - Adult Reconstruction and Total Joint Replacement    Taran Spears - MRN [id number] - : 1928 (80 y.o.)      Date: 2017    Pre-operative Diagnosis: Right Hip Femoral Neck Fracture    Post-operative Diagnosis: same     Procedure: Right  Hip Hemiarthroplasty, Direct Anterior Approach     Implants Used:     Implant Name Type Inv. Item Serial No.  Lot No. LRB No. Used Action   NOVATION ELEMENT FEMORAL STEM  COLLARED HA COATED STANDARD OFFSET CEMENTLESS SIZE 10 NECK LENGTH 32.8MM   [address] [facility] [id number] Right 1 Implanted   SLEEVE HIP UNIPOLAR 14 -3.5 --  - [id number]  SLEEVE HIP UNIPOLAR 14 -3.5 --  [id number] [facility] [id number] Right 1 Implanted   [facility] STD 47MM --  - [id number]   [facility] STD 47MM --  [id number] [address] [id number] Right 1 Implanted       Anesthesia: GETA    Surgeon: Judson Michelle     Assistant: VIN Florez    EBL: 250cc     Drains: none     Specimens: none     Complications: none     Condition: stable to PACU     Brief History: Displaced femoral neck fracture. The patient/family understood no guarantees could be given about the outcome of the procedure and wished to proceed. Description of Procedure: After being identified in the preoperative holding area and having their operative site marked, the patient was brought back to the operating room where they underwent anesthesia to good effect. They were  placed in the supine position with a bump under the hip. The operative extremity was then prepped and draped in the usual fashion using sterile technique. Preoperative antibiotics had been administered. An appropriate time-out was performed. A curvilinear incision was made 2 fingerbreadths lateral and distal to the ASIS. The skin flaps were developed down to the tensor fascia. This was divided sharply. Blunt dissection was taken medially over the tensor muscle.  Retractors were placed superior and inferior to the femoral neck. The anterior fat pad was debrided. Circumflex vessels were identified and cauterized. A provisional neck cut was performed. The head was removed from the acetabulum and sized. We then turned our attention to the femur. Elevators were used to gain access to the proximal femur. Soft tissue releases were performed as necessary. The lateralizer was utilized. We then sequentially broached up to the final size trial. We placed a trial neck and head and had excellent restoration of leg length and offset with good soft tissue balance. We selected these as our final implants. Final components were inserted and hip was reduced after thorough lavage. Local anesthetic was utilized. The tensor fascia was closed. Periarticular injection was performed. Skin closure was performed in layers. A sterile dressing was applied. The patient was awakened, moved to the stretcher, and taken to the recovery room in stable condition. At the conclusion of the procedure, all counts were correct. There were no immediate complications.       Khushboo Stubbs MD

## 2017-04-28 NOTE — ROUTINE PROCESS
TRANSFER - OUT REPORT:    Verbal report given to David Cornell RN(name) on Lashaun Ketan  being transferred to Orthopedic(unit) for routine progression of care       Report consisted of patients Situation, Background, Assessment and   Recommendations(SBAR). Information from the following report(s) SBAR, ED Summary, [facility] and Recent Results was reviewed with the receiving nurse. Lines:   Peripheral IV 04/27/17 Right Antecubital (Active)   Site Assessment Clean, dry, & intact 4/27/2017  3:29 PM   Phlebitis Assessment 0 4/27/2017  3:29 PM   Infiltration Assessment 0 4/27/2017  3:29 PM   Dressing Status Clean, dry, & intact 4/27/2017  3:29 PM   Dressing Type Transparent 4/27/2017  3:29 PM   Hub Color/Line Status Pink 4/27/2017  3:29 PM        Opportunity for questions and clarification was provided.

## 2017-04-28 NOTE — ANESTHESIA PREPROCEDURE EVALUATION
Anesthetic History   No history of anesthetic complications            Review of Systems / Medical History  Patient summary reviewed, nursing notes reviewed and pertinent labs reviewed    Pulmonary            Asthma        Neuro/Psych         Psychiatric history    Comments: Depression  Cervical spine arthritis  Neuropathy, upper extremity  Vertigo  Lumbar Stenosis Cardiovascular    Hypertension              Exercise tolerance: <4 METS  Comments: BB at 8:05 this am.  Known LBB   GI/Hepatic/Renal     GERD: well controlled          Comments: Rectal Bleeding Endo/Other        Arthritis and anemia     Other Findings   Comments: Cervical spondylarthritis          Physical Exam    Airway  Mallampati: II  TM Distance: > 6 cm  Neck ROM: normal range of motion   Mouth opening: Normal     Cardiovascular  Regular rate and rhythm,  S1 and S2 normal,  no murmur, click, rub, or gallop             Dental    Dentition: Full lower dentures, Edentulous and Full upper dentures     Pulmonary  Breath sounds clear to auscultation               Abdominal  GI exam deferred       Other Findings            Anesthetic Plan    ASA: 2  Anesthesia type: general          Induction: Intravenous  Anesthetic plan and risks discussed with: Patient

## 2017-04-28 NOTE — OP NOTES
Vida Montoya MD - Adult Reconstruction and Total Joint Replacement    Yaquelin Dumont - MRN [id number] - : 1928 (80 y.o.)      Date: 2017    Preoperative diagnosis: Right Femoral Neck Fracture    Postoperative diagnosis: Right Femoral Neck Fracture    Procedure performed: Procedure(s) with comments:  RIGHT HIP HEMIARTHROPLASTY - Ex[employer]ech, Anterior approach. Anesthesia: General    Surgeon(s) and Role:     * Vida Montoya MD - Primary    Assistant: VIN Yusuf      This note describes the use of intraoperative fluoroscopy. Fluoroscopic imaging was utilized in orthogonal planes as well as using live technique for all phases of the procedure, described separately in the operative report, including hardware placement.     Vida Montoya MD    CPT code: 44604

## 2017-04-28 NOTE — PROGRESS NOTES
CM met pt however she wasn't oriented for an assessment. JOSEPH send to pt PCP office. CM will revisit pt to complete an initial assessment.      Tami Ureña  Ext [phone]

## 2017-04-28 NOTE — PROGRESS NOTES
Bedside and Verbal shift change report given to IMTIAZ Tran (oncoming nurse) by Monica Maurer (offgoing nurse). Report included the following information SBAR, Kardex, ED Summary, Procedure Summary, Intake/Output, MAR, Recent Results and Med Rec Status.

## 2017-04-28 NOTE — PROGRESS NOTES
Duloxetine 30mg daily not resumed on admission.     If appropriate, please consider resuming Duloxetine 30mg daily    thanks

## 2017-04-28 NOTE — PROGRESS NOTES
PT consult received; noted procedure this date. Will f/u tomorrow AM for PT evaluation. Hakan Judge PT, DPT.

## 2017-04-28 NOTE — PROGRESS NOTES
S: Ms. Inderjit Hayes was seen by me today during rounds. At this time, she is resting comfortably. Rates pain 8/10. The patient has no new complaints today. ROS otherwise unremarkable except as noted elsewhere. O: Blood pressure 138/61, pulse 80, temperature 98.7 °F (37.1 °C), resp. rate 18, height 5' 1\" (1.549 m), weight 125 lb (56.7 kg), SpO2 94 %. Gen: Patient is in no acute distress. Lungs: CTAB. Heart: RRR. Abd: S, NT, ND, BS present. Extremities: Warm. No results found for this or any previous visit (from the past 12 hour(s)). A / P:  1. Hip fracture ([facility] [address] (4/27/2017): Orthopedics consulted; ORIF planned. 2. Anemia NEC: Watch labs. 3. Asthma: No wheezing/ SOB at this time. Stable. Prn duonebs. 4. Hypertension: BP okay. 5. Hyperlipidemia: Continue statin. 6. Vitamin D deficiency (1/28/2011): I'll check this with AM labs. Sage Islas

## 2017-04-28 NOTE — PERIOP NOTES
TRANSFER - OUT REPORT:    Verbal report given to Michelle) on Taran Spears  being transferred to [facility](unit) for routine progression of care       Report consisted of patients Situation, Background, Assessment and   Recommendations(SBAR). Information from the following report(s) OR Summary, Procedure Summary, Intake/Output and MAR was reviewed with the receiving nurse. Opportunity for questions and clarification was provided.       Patient transported with:   O2 @ 3 liters  Tech

## 2017-04-28 NOTE — PERIOP NOTES
08:19= called Solange Koenig RN to inform that we would be coming to  pt for OR around 10:45 for 12PM surgery.

## 2017-04-28 NOTE — H&P
Hospitalist Admission Note    NAME: Lashaun Aceves   :  1928   MRN:  [id number]     Date/Time:  2017 9:40 PM    Patient PCP: Brinti Sherwood MD  ________________________________________________________________________    My assessment of this patient's clinical condition and my plan of care is as follows. Assessment / Plan:    Right femoral neck fracture  Pre-op cardiac risk assessment:  Pt evaluated using revised cardiac risk index and is felt to be low cardiovascular risk for intermediate risk hip surgery with a 0.4 risk for major perioperative cardiac complications based on these criteria. This risk has been discussed with the patient and family and pt wishes to proceed. Plan for surgery without further cardiac testing if this risk is acceptable per surgery and anesthesia. Further risk reduction will involve medical management of other comorbid conditions in the perioperative period. -pain management and DVT prophylaxis per Ortho service    HTN  -continue atenolol. Can restart Exforge post op if appropriate. Hold for now. HLD  -continue statin    Asthma  -stable. Not wheezing. Duonebs prn      Code Status:   Full    DVT Prophylaxis:  Per orthopedics  GI Prophylaxis: not indicated    Baseline: . Lives with son. Subjective:   CHIEF COMPLAINT:   Right hip pain    HISTORY OF PRESENT ILLNESS:     Lupillo Adler is a 80 y.o.  female with a history of HTN, Asthma, vertigo and DJD who presents with right hip pain after a GLF today. Patient was in her kitchen when she fell. Her son saw her fall and reports no head injury or LOC. Patient denies feeling lightheaded or having palpitation, CP or SOB. Patient has no history of CAD, MI, CHF, CVA, TIA, CKD or DM. She has no prior history of reactions to anesthesia. ER evaluation demonstrates right femoral neck fracture. Orthopedics has seen her and surgery is scheduled for tomorrow.     We were asked to admit for Dr Patrizia Scott and to complete her pre op evaluation. Past Medical History:   Diagnosis Date    Anemia NEC     Asthma     Cervical spondylarthritis     Depression     DJD (degenerative joint disease) of knee     Esophageal spasm     Has required esophageal dilatation; Dr. Mickie Mejia GERD (gastroesophageal reflux disease)     Hypercholesterolemia     Hypertension     Lumbar stenosis     Neuropathy, upper extremity     left    Vertigo [id number]    Saw Dr. Grace Lewis; improved.  Vitamin D deficiency 1/28/2011        Past Surgical History:   Procedure Laterality Date    HX CATARACT REMOVAL      bilateral    HX HYSTERECTOMY      HX ORTHOPAEDIC      right bunion excision    HX TONSILLECTOMY         Social History   Substance Use Topics    Smoking status: Never Smoker    Smokeless tobacco: Never Used    Alcohol use No        Family History   Problem Relation Age of Onset    Heart Disease Mother     Hypertension Mother     Stroke Mother     Heart Disease Brother     Heart Disease Father     Heart Disease Brother      Allergies   Allergen Reactions    Aspirin Nausea and Vomiting    Diclofenac Itching     The voltaren gel caused itching.  Gabapentin Swelling    Hydrochlorothiazide Other (comments)     dizziness    Pcn [Penicillins] Swelling        Prior to Admission medications    Medication Sig Start Date End Date Taking? Authorizing Provider   atorvastatin (LIPITOR) 20 mg tablet TAKE 1 TABLET BY MOUTH EVERY NIGHT 4/11/17   Rosalino Cr III, DO   amLODIPine-valsartan (EXFORGE)  mg per tablet TAKE 1 TABLET BY MOUTH DAILY 4/3/17   Priti Gray MD   atenolol (TENORMIN) 25 mg tablet TAKE 1 TABLET BY MOUTH DAILY 3/14/17   Priti Gray MD   DULoxetine (CYMBALTA) 30 mg capsule TAKE 1 CAPSULE BY MOUTH DAILY 2/17/17   Priti Gray MD   lidocaine (LIDODERM) 5 % 1 Patch by TransDERmal route every twenty-four (24) hours.  Apply patch to the affected area for 12 hours a day and remove for 12 hours a day. 10/13/16   Eladio Shin MD   albuterol (PROVENTIL HFA, VENTOLIN HFA, PROAIR HFA) 90 mcg/actuation inhaler Take 1 Puff by inhalation as needed for Wheezing. 2/22/16   Eladio Shin MD   cholecalciferol, vitamin d3, (VITAMIN D) 1,000 unit tablet Take  by mouth daily. Historical Provider   MULTIVITAMINS (MULTIVITAMIN PO) Take  by mouth daily. Historical Provider       REVIEW OF SYSTEMS:       Total of 12 systems reviewed as follows:       POSITIVE= underlined text  Negative = text not underlined  General:  fever, chills, sweats, generalized weakness, weight loss/gain,      loss of appetite   Eyes:    blurred vision, eye pain, loss of vision, double vision  ENT:    rhinorrhea, pharyngitis   Respiratory:   cough, sputum production, SOB, [place], wheezing, pleuritic pain   Cardiology:   chest pain, palpitations, orthopnea, PND, edema, syncope   Gastrointestinal:  abdominal pain , N/V, diarrhea, dysphagia, constipation, bleeding   Genitourinary:  frequency, urgency, dysuria, hematuria, incontinence   Muskuloskeletal :  arthralgia, myalgia, back pain  Right hip pain  Hematology:  easy bruising, nose or gum bleeding, lymphadenopathy   Dermatological: rash, ulceration, pruritis, color change / jaundice  Endocrine:   hot flashes or polydipsia   Neurological:  headache, dizziness, confusion, focal weakness, paresthesia,     Speech difficulties, memory loss, gait difficulty  Psychological: Feelings of anxiety, depression, agitation    Objective:   VITALS:    Visit Vitals    /63    Pulse 75    Temp 98 °F (36.7 °C)    Resp 16    Ht 5' 1\" (1.549 m)    Wt 56.7 kg (125 lb)    SpO2 97%    BMI 23.62 kg/m2       PHYSICAL EXAM:    General:    Alert, cooperative, no distress, appears stated age.      HEENT: Atraumatic, anicteric sclerae, pink conjunctivae     No oral ulcers, mucosa moist, throat clear, dentition fair  Neck:  Supple, symmetrical,  thyroid: non tender  No carotid bruits  Lungs:   Clear to auscultation bilaterally. No Wheezing or Rhonchi. No rales. Chest wall:  No tenderness  No Accessory muscle use. Heart:   Regular  rhythm,  No  murmur   No edema  Abdomen:   Soft, non-tender. Not distended. Bowel sounds normal  Extremities: No cyanosis. No clubbing,  (+) right leg externally rotated and shortened     Skin turgor normal, Capillary refill normal, Radial dial pulse 2+  Skin:     Not pale. Not Jaundiced  No rashes   Psych:  Good insight. Not depressed. Not anxious or agitated. Neurologic: EOMs intact. No facial asymmetry. No aphasia or slurred speech. Symmetrical strength, Sensation grossly intact. Alert and oriented X 4.     _______________________________________________________________________  Care Plan discussed with:    Comments   Patient x    Family  x    RN     Care Manager                    Consultant:      _______________________________________________________________________  Expected  Disposition:   Home with Family    HH/PT/OT/RN    SNF/LTC x   DALJIT    ________________________________________________________________________  TOTAL TIME:  39    Minutes    Critical Care Provided     Minutes non procedure based      Comments    x Reviewed previous records   >50% of visit spent in counseling and coordination of care  Discussion with patient and/or family and questions answered       Given the patient's current clinical presentation, I have a high level of concern for decompensation if discharged from the ED. Complex decision making was performed which includes reviewing the patient's available past medical records, laboratory results, and Xray films.  I have also directly communicated my plan and discussed this case with the involved ED physician.     ____________________________________________________________________  Farrah De La Fuente MD    Procedures: see electronic medical records for all procedures/Xrays and details which were not copied into this note but were reviewed prior to creation of Plan. LAB DATA REVIEWED:    Recent Results (from the past 24 hour(s))   EKG, 12 LEAD, INITIAL    Collection Time: 04/27/17  3:36 PM   Result Value Ref Range    Ventricular Rate 71 BPM    Atrial Rate 71 BPM    P-R Interval 204 ms    QRS Duration 130 ms    Q-T Interval 446 ms    QTC Calculation (Bezet) 484 ms    Calculated P Axis 69 degrees    Calculated R Axis -50 degrees    Calculated T Axis 78 degrees    Diagnosis       Sinus rhythm with premature supraventricular complexes  Left axis deviation  Left bundle branch block  When compared with ECG of 18-SEP-2014 14:48,  premature supraventricular complexes are now present     CBC WITH AUTOMATED DIFF    Collection Time: 04/27/17  4:22 PM   Result Value Ref Range    WBC 11.1 (H) 3.6 - 11.0 K/uL    RBC 3.97 3.80 - 5.20 M/uL    HGB 12.0 11.5 - 16.0 g/dL    HCT 35.4 35.0 - 47.0 %    MCV 89.2 80.0 - 99.0 FL    MCH 30.2 26.0 - 34.0 PG    MCHC 33.9 30.0 - 36.5 g/dL    RDW 12.0 11.5 - 14.5 %    PLATELET 107 418 - 743 K/uL    NEUTROPHILS 84 (H) 32 - 75 %    LYMPHOCYTES 11 (L) 12 - 49 %    MONOCYTES 5 5 - 13 %    EOSINOPHILS 0 0 - 7 %    BASOPHILS 0 0 - 1 %    ABS. NEUTROPHILS 9.3 (H) 1.8 - 8.0 K/UL    ABS. LYMPHOCYTES 1.3 0.8 - 3.5 K/UL    ABS. MONOCYTES 0.5 0.0 - 1.0 K/UL    ABS. EOSINOPHILS 0.0 0.0 - 0.4 K/UL    ABS. BASOPHILS 0.0 0.0 - 0.1 K/UL   METABOLIC PANEL, COMPREHENSIVE    Collection Time: 04/27/17  4:22 PM   Result Value Ref Range    Sodium 143 136 - 145 mmol/L    Potassium 4.0 3.5 - 5.1 mmol/L    Chloride 107 97 - 108 mmol/L    CO2 26 21 - 32 mmol/L    Anion gap 10 5 - 15 mmol/L    Glucose 126 (H) 65 - 100 mg/dL    BUN 18 6 - 20 MG/DL    Creatinine 0.87 0.55 - 1.02 MG/DL    BUN/Creatinine ratio 21 (H) 12 - 20      GFR est AA >60 >60 ml/min/1.73m2    GFR est non-AA >60 >60 ml/min/1.73m2    Calcium 9.8 8.5 - 10.1 MG/DL    Bilirubin, total 0.3 0.2 - 1.0 MG/DL    ALT (SGPT) 38 12 - 78 U/L    AST (SGOT) 21 15 - 37 U/L    Alk. phosphatase 74 45 - 117 U/L    Protein, total 6.7 6.4 - 8.2 g/dL    Albumin 3.3 (L) 3.5 - 5.0 g/dL    Globulin 3.4 2.0 - 4.0 g/dL    A-G Ratio 1.0 (L) 1.1 - 2.2     PROTHROMBIN TIME + INR    Collection Time: 04/27/17  4:22 PM   Result Value Ref Range    INR 1.0 0.9 - 1.1      Prothrombin time 10.3 9.0 - 11.1 sec   PTT    Collection Time: 04/27/17  4:22 PM   Result Value Ref Range    aPTT 23.2 22.1 - 32.5 sec    aPTT, therapeutic range     58.0 - 77.0 SECS   TYPE & SCREEN    Collection Time: 04/27/17  4:22 PM   Result Value Ref Range    Crossmatch Expiration 04/30/2017     ABO/Rh(D) O POSITIVE     Antibody screen NEG    URINALYSIS W/ REFLEX CULTURE    Collection Time: 04/27/17  6:55 PM   Result Value Ref Range    Color YELLOW/STRAW      Appearance CLEAR CLEAR      Specific gravity 1.022 1.003 - 1.030      pH (UA) 7.0 5.0 - 8.0      Protein TRACE (A) NEG mg/dL    Glucose NEGATIVE  NEG mg/dL    Ketone TRACE (A) NEG mg/dL    Bilirubin NEGATIVE  NEG      Blood NEGATIVE  NEG      Urobilinogen 1.0 0.2 - 1.0 EU/dL    Nitrites NEGATIVE  NEG      Leukocyte Esterase NEGATIVE  NEG      WBC 0-4 0 - 4 /hpf    RBC 0-5 0 - 5 /hpf    Epithelial cells FEW FEW /lpf    Bacteria NEGATIVE  NEG /hpf    UA:UC IF INDICATED CULTURE NOT INDICATED BY UA RESULT CNI      Mucus TRACE (A) NEG /lpf

## 2017-04-28 NOTE — ROUTINE PROCESS
TRANSFER - IN REPORT:    Verbal report received from S/.Heather RN(name) on Mary Brice  being received from OR(unit) for routine post - op      Report consisted of patients Situation, Background, Assessment and   Recommendations(SBAR). Information from the following report(s) OR Summary was reviewed with the receiving nurse. Opportunity for questions and clarification was provided. Assessment completed upon patients arrival to unit and care assumed.

## 2017-04-28 NOTE — ROUTINE PROCESS
Bedside and Verbal shift change report given to [address] [place] [address] (oncoming nurse) by Marycruz Sanders RN (offgoing nurse). Report included the following information SBAR, Kardex, Intake/Output, MAR and Recent Results.

## 2017-04-28 NOTE — ROUTINE PROCESS
1337: Patient: Johny Lieberman MRN: [id number]  SSN: [id number]   YOB: 1928  Age: 80 y.o. Sex: female     Patient is status post Procedure(s) with comments:  RIGHT HIP HEMIARTHROPLASTY - [employer]ech, Anterior approach. Debbie Canchola) and Role:     * Shanell Velasquez MD - Primary    Local/Dose/Irrigation:  DR. SUAZO SOLUTION                  Peripheral IV 04/27/17 Right Antecubital (Active)   Site Assessment Clean, dry, & intact 4/28/2017  9:26 AM   Phlebitis Assessment 0 4/28/2017  9:26 AM   Infiltration Assessment 0 4/28/2017  9:26 AM   Dressing Status Clean, dry, & intact 4/28/2017  9:26 AM   Dressing Type Tape;Transparent 4/28/2017  9:26 AM   Hub Color/Line Status Pink; Infusing 4/28/2017  9:26 AM            Airway - Endotracheal Tube 04/28/17 Oral (Active)                   Dressing/Packing:  Wound Hip Right-DRESSING TYPE: Staples; Other (Comment) (HONEY COMB) (04/28/17 2135)  Splint/Cast:  ]    Other:

## 2017-04-28 NOTE — PROGRESS NOTES
Jessica 70- Primary Nurse Yogi Solorzano, RN and Divina RN, RN performed a dual skin assessment on this patient No impairment noted  Victorino score is 16

## 2017-04-29 LAB
25(OH)D3 SERPL-MCNC: 13.8 NG/ML (ref 30–100)
ALBUMIN SERPL BCP-MCNC: 2.2 G/DL (ref 3.5–5)
ALBUMIN/GLOB SERPL: 0.7 {RATIO} (ref 1.1–2.2)
ALP SERPL-CCNC: 53 U/L (ref 45–117)
ALT SERPL-CCNC: 26 U/L (ref 12–78)
ANION GAP BLD CALC-SCNC: 34 MMOL/L (ref 5–15)
AST SERPL W P-5'-P-CCNC: 17 U/L (ref 15–37)
BASOPHILS # BLD AUTO: 0 K/UL (ref 0–0.1)
BASOPHILS # BLD: 0 % (ref 0–1)
BILIRUB SERPL-MCNC: 0.4 MG/DL (ref 0.2–1)
BUN SERPL-MCNC: 9 MG/DL (ref 6–20)
BUN/CREAT SERPL: 18 (ref 12–20)
CALCIUM SERPL-MCNC: 7.8 MG/DL (ref 8.5–10.1)
CHLORIDE SERPL-SCNC: 93 MMOL/L (ref 97–108)
CHOLEST SERPL-MCNC: 128 MG/DL
CO2 SERPL-SCNC: 19 MMOL/L (ref 21–32)
CREAT SERPL-MCNC: 0.51 MG/DL (ref 0.55–1.02)
DIFFERENTIAL METHOD BLD: ABNORMAL
EOSINOPHIL # BLD: 0 K/UL (ref 0–0.4)
EOSINOPHIL NFR BLD: 0 % (ref 0–7)
ERYTHROCYTE [DISTWIDTH] IN BLOOD BY AUTOMATED COUNT: 12.1 % (ref 11.5–14.5)
GLOBULIN SER CALC-MCNC: 3.1 G/DL (ref 2–4)
GLUCOSE SERPL-MCNC: 108 MG/DL (ref 65–100)
HCT VFR BLD AUTO: 34.2 % (ref 35–47)
HDLC SERPL-MCNC: 82 MG/DL
HDLC SERPL: 1.6 {RATIO} (ref 0–5)
HGB BLD-MCNC: 11.4 G/DL (ref 11.5–16)
LDLC SERPL CALC-MCNC: 37 MG/DL (ref 0–100)
LIPID PROFILE,FLP: NORMAL
LYMPHOCYTES # BLD AUTO: 5 % (ref 12–49)
LYMPHOCYTES # BLD: 0.6 K/UL (ref 0.8–3.5)
MCH RBC QN AUTO: 29.3 PG (ref 26–34)
MCHC RBC AUTO-ENTMCNC: 33.3 G/DL (ref 30–36.5)
MCV RBC AUTO: 87.9 FL (ref 80–99)
MONOCYTES # BLD: 1.6 K/UL (ref 0–1)
MONOCYTES NFR BLD AUTO: 14 % (ref 5–13)
NEUTS SEG # BLD: 9 K/UL (ref 1.8–8)
NEUTS SEG NFR BLD AUTO: 81 % (ref 32–75)
PLATELET # BLD AUTO: 192 K/UL (ref 150–400)
POTASSIUM SERPL-SCNC: 3.5 MMOL/L (ref 3.5–5.1)
PROT SERPL-MCNC: 5.3 G/DL (ref 6.4–8.2)
RBC # BLD AUTO: 3.89 M/UL (ref 3.8–5.2)
RBC MORPH BLD: ABNORMAL
SODIUM SERPL-SCNC: 146 MMOL/L (ref 136–145)
TRIGL SERPL-MCNC: 45 MG/DL (ref ?–150)
VLDLC SERPL CALC-MCNC: 9 MG/DL
WBC # BLD AUTO: 11.2 K/UL (ref 3.6–11)

## 2017-04-29 PROCEDURE — 97165 OT EVAL LOW COMPLEX 30 MIN: CPT | Performed by: OCCUPATIONAL THERAPIST

## 2017-04-29 PROCEDURE — 97161 PT EVAL LOW COMPLEX 20 MIN: CPT | Performed by: PHYSICAL THERAPIST

## 2017-04-29 PROCEDURE — G8987 SELF CARE CURRENT STATUS: HCPCS | Performed by: OCCUPATIONAL THERAPIST

## 2017-04-29 PROCEDURE — 65270000029 HC RM PRIVATE

## 2017-04-29 PROCEDURE — 77010033678 HC OXYGEN DAILY

## 2017-04-29 PROCEDURE — 36415 COLL VENOUS BLD VENIPUNCTURE: CPT | Performed by: INTERNAL MEDICINE

## 2017-04-29 PROCEDURE — 74011250637 HC RX REV CODE- 250/637: Performed by: INTERNAL MEDICINE

## 2017-04-29 PROCEDURE — 97116 GAIT TRAINING THERAPY: CPT | Performed by: PHYSICAL THERAPIST

## 2017-04-29 PROCEDURE — G8978 MOBILITY CURRENT STATUS: HCPCS | Performed by: PHYSICAL THERAPIST

## 2017-04-29 PROCEDURE — 85025 COMPLETE CBC W/AUTO DIFF WBC: CPT | Performed by: INTERNAL MEDICINE

## 2017-04-29 PROCEDURE — 74011250636 HC RX REV CODE- 250/636: Performed by: PHYSICIAN ASSISTANT

## 2017-04-29 PROCEDURE — 80053 COMPREHEN METABOLIC PANEL: CPT | Performed by: INTERNAL MEDICINE

## 2017-04-29 PROCEDURE — G8979 MOBILITY GOAL STATUS: HCPCS | Performed by: PHYSICAL THERAPIST

## 2017-04-29 PROCEDURE — 82306 VITAMIN D 25 HYDROXY: CPT | Performed by: INTERNAL MEDICINE

## 2017-04-29 PROCEDURE — 74011250637 HC RX REV CODE- 250/637: Performed by: PHYSICIAN ASSISTANT

## 2017-04-29 PROCEDURE — 97535 SELF CARE MNGMENT TRAINING: CPT | Performed by: OCCUPATIONAL THERAPIST

## 2017-04-29 PROCEDURE — G8988 SELF CARE GOAL STATUS: HCPCS | Performed by: OCCUPATIONAL THERAPIST

## 2017-04-29 PROCEDURE — 97530 THERAPEUTIC ACTIVITIES: CPT

## 2017-04-29 PROCEDURE — 80061 LIPID PANEL: CPT | Performed by: INTERNAL MEDICINE

## 2017-04-29 RX ADMIN — DOCUSATE SODIUM AND SENNOSIDES 1 TABLET: 8.6; 5 TABLET, FILM COATED ORAL at 18:14

## 2017-04-29 RX ADMIN — ACETAMINOPHEN 650 MG: 325 TABLET, FILM COATED ORAL at 13:59

## 2017-04-29 RX ADMIN — POLYETHYLENE GLYCOL 3350 17 G: 17 POWDER, FOR SOLUTION ORAL at 08:54

## 2017-04-29 RX ADMIN — ASPIRIN 81 MG 81 MG: 81 TABLET ORAL at 18:14

## 2017-04-29 RX ADMIN — SODIUM CHLORIDE 125 ML/HR: 900 INJECTION, SOLUTION INTRAVENOUS at 08:57

## 2017-04-29 RX ADMIN — ACETAMINOPHEN 650 MG: 325 TABLET, FILM COATED ORAL at 18:14

## 2017-04-29 RX ADMIN — DEXAMETHASONE SODIUM PHOSPHATE 10 MG: 4 INJECTION, SOLUTION INTRAMUSCULAR; INTRAVENOUS at 08:53

## 2017-04-29 RX ADMIN — FAMOTIDINE 10 MG: 20 TABLET ORAL at 18:14

## 2017-04-29 RX ADMIN — OXYCODONE HYDROCHLORIDE 5 MG: 5 TABLET ORAL at 22:30

## 2017-04-29 RX ADMIN — ATORVASTATIN CALCIUM 20 MG: 20 TABLET, FILM COATED ORAL at 22:00

## 2017-04-29 RX ADMIN — ACETAMINOPHEN 650 MG: 325 TABLET, FILM COATED ORAL at 00:00

## 2017-04-29 RX ADMIN — Medication 10 ML: at 23:06

## 2017-04-29 RX ADMIN — Medication 10 ML: at 06:19

## 2017-04-29 NOTE — PROGRESS NOTES
Tele video monitoring utilized. Staff sitter outside the room monitoring pt through room window to ensure pt safety since pt is more cooperative and less combative than earlier this morning.

## 2017-04-29 NOTE — PROGRESS NOTES
Problem: Mobility Impaired (Adult and Pediatric)  Goal: *Acute Goals and Plan of Care (Insert Text)  Physical Therapy Goals  Initiated 4/29/2017  1. Patient will move from supine to sit and sit to supine in bed with minimal assistance/contact guard assist within 7 day(s). 2. Patient will transfer from bed to chair and chair to bed with minimal assistance/contact guard assist using the least restrictive device within 7 day(s). 3. Patient will perform sit to stand with minimal assistance/contact guard assist within 7 day(s). 4. Patient will ambulate with minimal assistance/contact guard assist for 50 feet with the least restrictive device within 7 day(s). 5. Patient will participate in general rom/strengthening exercises with min assist within 7 days. PHYSICAL THERAPY EVALUATION  SEEN 1204 TO 1233        Patient: Rogers Wallace (77 y.o. female)  Date: 4/29/2017  Primary Diagnosis: Right Femoral Neck Fracture  Hip fracture (HCC)  Procedure(s) (LRB):  RIGHT HIP HEMIARTHROPLASTY (Right) 1 Day Post-Op   Precautions: WBAT RLE; Falls         ASSESSMENT :  Based on the objective data described below, the patient presents with decreased functional mobility and gait skills. She was very cooperative and pleasant during the PT evaluation/treatment session. Did gentle rom exercises with her prior to getting her up OOB. Assist x 2 for bed mobility and transfers to/from the RW. Ambulated 10' with RW and min assist x 2. Slow steady pace. Impaired balance. Antalgic gait. Up in chair at end of session. Ice to right hip. Chair alarm in place and activated. Nurse aware that patient is up [address] [place] [address]. Has telesitter and regular sitter. Will need SNF placement. Patient will benefit from skilled intervention to address the above impairments.   Patients rehabilitation potential is considered to be Good  Factors which may influence rehabilitation potential include:   [ ]         None noted  [ ]         Mental ability/status  [X]         Medical condition  [ ]         Home/family situation and support systems  [X]         Safety awareness  [ ]         Pain tolerance/management  [ ]         Other:        PLAN :  Recommendations and Planned Interventions:  [X]           Bed Mobility Training             [ ]    Neuromuscular Re-Education  [X]           Transfer Training                   [ ]    Orthotic/Prosthetic Training  [X]           Gait Training                         [ ]    Modalities  [X]           Therapeutic Exercises           [ ]    Edema Management/Control  [ ]           Therapeutic Activities            [X]    Patient and Family Training/Education  [ ]           Other (comment):     Frequency/Duration: Patient will be followed by physical therapy  twice daily to address goals. Discharge Recommendations: [facility] Dele[facility]  Further Equipment Recommendations for Discharge: defer to SNF rehab       SUBJECTIVE:   Patient stated Sure, I'll try.   Stated when we explained that we were there from therapy to work with her. OBJECTIVE DATA SUMMARY:   HISTORY:    Past Medical History:   Diagnosis Date    Anemia NEC      Asthma      Cervical spondylarthritis      Depression      DJD (degenerative joint disease) of knee      Esophageal spasm       Has required esophageal dilatation; Dr. Vika Mandujano GERD (gastroesophageal reflux disease)      Hypercholesterolemia      Hypertension      Lumbar stenosis      Neuropathy, upper extremity       left    Vertigo 2419-1076     Saw  [address] [place] [address]; improved.      Vitamin D deficiency 1/28/2011     Past Surgical History:   Procedure Laterality Date    HX CATARACT REMOVAL         bilateral    HX HYSTERECTOMY        HX ORTHOPAEDIC         right bunion excision    HX TONSILLECTOMY         Prior Level of Function/Home Situation: ambulating in home without an assistive device, using cane or RW in community  Personal factors and/or comorbidities impacting plan of care: Home Situation  Home Environment: Private residence  One/Two Story Residence: One story  Living Alone: No  Support Systems: Family member(s)  Patient Expects to be Discharged to[de-identified] Private residence  Current DME Used/Available at Home: Grab bars, Shower chair, Walker, rolling, Cane, straight  Tub or Shower Type: Tub/Shower combination     EXAMINATION/PRESENTATION/DECISION MAKING:   Critical Behavior:  Neurologic State: Alert, Confused  Orientation Level: Oriented X4  Cognition: Follows commands, Appropriate for age attention/concentration, Impaired decision making  Safety/Judgement: Decreased awareness of need for safety  Hearing: Auditory  Auditory Impairment: None  Hearing Aids/Status: Does not own     Range Of Motion:  AROM: Generally decreased, functional     Strength:    Strength: Generally decreased, functional     Tone & Sensation:   Tone: Normal  Sensation: Intact     Vision:   Acuity: Within Defined Limits  Corrective Lenses: Glasses  Functional Mobility:  Bed Mobility:  Rolling: Maximum assistance  Supine to Sit: Maximum assistance  Scooting: Maximum assistance      Transfers:  Sit to Stand:  Moderate assistance;Assist x2  Stand to Sit: Moderate assistance;Assist x2  Bed to Chair: Moderate assistance;Assist x2        Balance:   Sitting: Impaired  Sitting - Static: Fair (occasional)  Sitting - Dynamic: Fair (occasional)  Standing: Impaired (with support of RW)  Standing - Static: Poor  Standing - Dynamic : Poor     Ambulation/Gait Training:  Distance (ft): 10 Feet (ft)  Assistive Device: Gait belt;Walker, rolling  Ambulation - Level of Assistance: Minimal assistance;Assist x2  Gait Abnormalities: Antalgic;Decreased step clearance  Right Side Weight Bearing: As tolerated           Functional Measure:     Elder Mobility Scale      4/20         EMS and G-code impairment scale:  Percentage of impairment CH  0% CI  1-19% CJ  20-39% CK  40-59% CL  60-79% CM  80-99% CN  100%   EMS Score 0-20 20 17-19 13-16 9-12 5-8 1-4 0      Scores under 10  generally these patients are dependent in mobility maneuvers; require help with  basic ADL, such as transfers, toileting and dressing. Scores between 10  13  generally these patients are borderline in terms of safe mobility and  independence in ADL i.e. they require some help with some mobility maneuvers. Scores over 14  Generally these patients are able to perform mobility maneuvers alone and safely  and are independent in basic ADL. G codes: In compliance with CMSs Claims Based Outcome Reporting, the following G-code set was chosen for this patient based on their primary functional limitation being treated: The outcome measure chosen to determine the severity of the functional limitation was the Elderly Mobility Scale Score with a score of 4/20 which was correlated with the impairment scale. · Mobility - Walking and Moving Around:               - CURRENT STATUS:    CM - 80%-99% impaired, limited or restricted               - GOAL STATUS:           CL - 60%-79% impaired, limited or restricted               - D/C STATUS:                       ---------------To be determined---------------      Pain:  Pain Scale 1: Numeric (0 - 10)  Pain Intensity 1: 0  Pain Location 1: Hip  Pain Orientation 1: Right  Pain Description 1: Aching  Pain Intervention(s) 1: Cold pack; Medication (see MAR) (allowed administration of tylenol)      Activity Tolerance: Tolerated PT evaluation/treatment session well. Please refer to the flowsheet for vital signs taken during this treatment.   After treatment:   [X]         Patient left in no apparent distress sitting up in chair  [ ]         Patient left in no apparent distress in bed  [X]         Call bell left within reach  [X]         Nursing notified  [ ]         Caregiver present  [X]         Bed alarm activated      COMMUNICATION/EDUCATION:   The patients plan of care was discussed with: Occupational Therapist and Registered Nurse.  [X]         Fall prevention education was provided and the patient/caregiver indicated understanding. [ ]         Patient/family have participated as able in goal setting and plan of care. [X]         Patient/family agree to work toward stated goals and plan of care. [ ]         Patient understands intent and goals of therapy, but is neutral about his/her participation. [ ]         Patient is unable to participate in goal setting and plan of care.      Thank you for this referral.  Partha Gandhi, PT  Time Calculation: 29 mins

## 2017-04-29 NOTE — PROGRESS NOTES
Occupational Therapy Goals  Initiated 4/29/2017  1. Patient will perform grooming standing at sink with minimal assistance/contact guard assist within 7 day(s). 2.  Patient will perform upper body dressing with minimal assistance/contact guard assist within 7 day(s). 3.  Patient will perform lower body dressing with moderate assistance, using AE, within 7 day(s). 4.  Patient will perform toilet/BSC transfers with minimal assistance/contact guard assist within 7 day(s). 5.  Patient will perform all aspects of toileting with minimal assistance/contact guard assist within 7 day(s). 6.  Patient will perform sponge bating with minimal assistance/contact guard assist within 7 day(s). Occupational Therapy EVALUATION  Patient: Thompson Villafuerte (69 y.o. female)  Date: 4/29/2017  Primary Diagnosis: Right Femoral Neck Fracture  Hip fracture (HCC)  Procedure(s) (LRB):  RIGHT HIP HEMIARTHROPLASTY (Right) 1 Day Post-Op   Precautions: WBAT on RLE s/p anterior CHAPIS, Falls       ASSESSMENT :  Based on the objective data described below, the patient presents with R hip pain and decreased R hip AROM, GW, resolving confusion, decreased activity tolerance, decreased safety awareness and decreased balance which is impairing her functional independence. She is functioning below her independent to mod I baseline, now min A to total A for ADLs, max A for bed mobility and is mod A of 2 for transfers and ambulation with a RW. Patient will benefit from skilled intervention to address the above impairments.   Patients rehabilitation potential is considered to be Good  Factors which may influence rehabilitation potential include:   []             None noted  [x]             Mental ability/status  []             Medical condition  []             Home/family situation and support systems  [x]             Safety awareness  [x]             Pain tolerance/management  []             Other:      PLAN :  Recommendations and Planned Interventions:  [x]               Self Care Training                  []        Therapeutic Activities  [x]               Functional Mobility Training    []        Cognitive Retraining  [x]               Therapeutic Exercises           [x]        Endurance Activities  [x]               Balance Training                   []        Neuromuscular Re-Education  []               Visual/Perceptual Training     [x]   Home Safety Training  [x]               Patient Education                 [x]        Family Training/Education  []               Other (comment):    Frequency/Duration: Patient will be followed by occupational therapy 5 times a week to address goals. Discharge Recommendations: Skilled Nursing Facility  Further Equipment Recommendations for Discharge: TBD         OBJECTIVE DATA SUMMARY:     Past Medical History:   Diagnosis Date    Anemia NEC     Asthma     Cervical spondylarthritis     Depression     DJD (degenerative joint disease) of knee     Esophageal spasm     Has required esophageal dilatation; Dr. Edward Wang GERD (gastroesophageal reflux disease)     Hypercholesterolemia     Hypertension     Lumbar stenosis     Neuropathy, upper extremity     left    Vertigo [id number]    Saw Dr. Nestora Cushing; improved.      Vitamin D deficiency 1/28/2011     Past Surgical History:   Procedure Laterality Date    HX CATARACT REMOVAL      bilateral    HX HYSTERECTOMY      HX ORTHOPAEDIC      right bunion excision    HX TONSILLECTOMY       Prior Level of Function/Home Situation: patient may be a limited historian, but reports sometimes ambulating with a cane or RW, and is independent to mod I for ADLs  Expanded or extensive additional review of patient history:     Home Situation  Home Environment: Private residence  One/Two Story Residence: One story  Living Alone: No  Support Systems: Family member(s)  Patient Expects to be Discharged to[de-identified] Private residence  Current DME Used/Available at Home: Grab bars, Shower chair, Walker, rolling, Cane, straight  Tub or Shower Type: Tub/Shower combination  [x]  Right hand dominant   []  Left hand dominant  Cognitive/Behavioral Status:  Neurologic State: Alert;Confused  Orientation Level: Oriented X4  Cognition: Follows commands; Appropriate for age attention/concentration; Impaired decision making        Safety/Judgement: Decreased awareness of need for safety    Vision/Perceptual:            Acuity: Within Defined Limits    Corrective Lenses: Glasses  Range of Motion:   Generally decreased, but functional     Strength:    Generally decreased, but functional  Coordination:     Fine Motor Skills-Upper: Left Impaired;Right Impaired    Gross Motor Skills-Upper: Left Impaired;Right Impaired  Tone & Sensation:     Balance:  Sitting: Impaired  Sitting - Static: Fair (occasional)  Sitting - Dynamic: Fair (occasional)  Standing: Impaired (with support of RW)  Standing - Static: Poor  Standing - Dynamic : Poor  Functional Mobility and Transfers for ADLs:  Bed Mobility:  Rolling: Maximum assistance  Supine to Sit: Maximum assistance     Scooting: Maximum assistance  Transfers:  Sit to Stand: Moderate assistance;Assist x2     Bed to Chair: Moderate assistance;Assist x2                         ADL Assessment:  Feeding: Minimum assistance    Oral Facial Hygiene/Grooming: Moderate assistance    Bathing: Maximum assistance    Upper Body Dressing: Maximum assistance    Lower Body Dressing: Maximum assistance    Toileting:  Total assistance                Functional Measure:  Barthel Index:    Bathin  Bladder: 0  Bowels: 5  Groomin  Dressin  Feedin  Mobility: 0  Stairs: 0  Toilet Use: 0  Transfer (Bed to Chair and Back): 5  Total: 10       Barthel and G-code impairment scale:  Percentage of impairment CH  0% CI  1-19% CJ  20-39% CK  40-59% CL  60-79% CM  80-99% CN  100%   Barthel Score 0-100 100 99-80 79-60 59-40 20-39 1-19   0   Barthel Score 0-20 20 17-19 13-16 9-12 5-8 1-4 0      The Barthel ADL Index: Guidelines  1. The index should be used as a record of what a patient does, not as a record of what a patient could do. 2. The main aim is to establish degree of independence from any help, physical or verbal, however minor and for whatever reason. 3. The need for supervision renders the patient not independent. 4. A patient's performance should be established using the best available evidence. Asking the patient, friends/relatives and nurses are the usual sources, but direct observation and common sense are also important. However direct testing is not needed. 5. Usually the patient's performance over the preceding 24-48 hours is important, but occasionally longer periods will be relevant. 6. Middle categories imply that the patient supplies over 50 per cent of the effort. 7. Use of aids to be independent is allowed. Shikha Gamble., Barthel, DCARLA. (0396). Functional evaluation: the Barthel Index. [facility] (14)2. Deepa Jimenez win OLYA Katz, Elaine Bernstein., Chika Driver., [place], [address] [place] [address] (1999). Measuring the change indisability after inpatient rehabilitation; comparison of the responsiveness of the Barthel Index and Functional [place] Measure. Journal of Neurology, Neurosurgery, and Psychiatry, 66(4), 451-131. Francia Andrade, N.J.A, HANNA Pathak, & Lali Damon M.A. (2004.) Assessment of post-stroke quality of life in cost-effectiveness studies: The usefulness of the Barthel Index and the EuroQoL-5D. Quality of Life Research, 13, 427-01       In compliance with CMSs Claims Based Outcome Reporting, the following G-code set was chosen for this patient based on their primary functional limitation being treated: The outcome measure chosen to determine the severity of the functional limitation was the Barthel Index with a score of 10/100 which was correlated with the impairment scale. ?  Self Care:     - CURRENT STATUS: CM - 80%-99% impaired, limited or restricted    - GOAL STATUS:  CK - 40%-59% impaired, limited or restricted    - D/C STATUS:  ---------------To be determined---------------       Occupational Therapy Evaluation Charge Determination   History Examination Decision-Making   LOW Complexity : Brief history review  LOW Complexity : 1-3 performance deficits relating to physical, cognitive , or psychosocial skils that result in activity limitations and / or participation restrictions  LOW Complexity : No comorbidities that affect functional and no verbal or physical assistance needed to complete eval tasks       Based on the above components, the patient evaluation is determined to be of the following complexity level: LOW       ADL Intervention and task modifications:  Initiated UB dressing, bed mobility, transfer, grooming and safety training. Pain:  Pain Scale 1: Numeric (0 - 10)  Pain Intensity 1: 0  Pain Location 1: Hip  Pain Orientation 1: Right  Pain Description 1: Aching  Pain Intervention(s) 1: Cold pack; Medication (see MAR) (allowed administration of tylenol)  Activity Tolerance:   VSS. Patient with h/o vertigo and complaining of feeling d  Please refer to the flowsheet for vital signs taken during this treatment. After treatment:   [x] Patient left in no apparent distress sitting up in chair  [] Patient left in no apparent distress in bed  [x] Call bell left within reach  [x] Nursing notified  [] Caregiver present  [x] Bed alarm activated    COMMUNICATION/EDUCATION:   The patients plan of care was discussed with: Physical Therapist.  [x] Home safety education was provided and the patient/caregiver indicated understanding. [x] Patient/family have participated as able in goal setting and plan of care. [x] Patient/family agree to work toward stated goals and plan of care. [] Patient understands intent and goals of therapy, but is neutral about his/her participation.   [] Patient is unable to participate in goal setting and plan of care.  This patients plan of care is appropriate for delegation to [facility].     Thank you for this referral.  Theron Alcantar, OTR/L  Time Calculation: 32 mins

## 2017-04-29 NOTE — PROGRESS NOTES
CM spoke with pt's daughter and FOC given; she selected [facility]. [facility] through [address] [place] [address].  Awaiting approval.     Usman Oliveira  [phone]

## 2017-04-29 NOTE — PROGRESS NOTES
[facility] FRACTURE PROGRESS NOTE    2017  Admit Date:   2017    Post Op day: 1 Day Post-Op    Subjective:    David Chauhan is resting comfortably at this time. Events from overnight noted. Sitter in room. No current complaints.      PT/OT:   Gait:                    Vital Signs:    Patient Vitals for the past 8 hrs:   BP Temp Pulse Resp SpO2   17 0751 136/66 99 °F (37.2 °C) 85 18 94 %   17 0600 127/64 99.2 °F (37.3 °C) 82 16 97 %     Temp (24hrs), Av.6 °F (37 °C), Min:97.7 °F (36.5 °C), Max:99.2 °F (37.3 °C)      Pain Control:   Pain Assessment  Pain Scale 1: Visual  Pain Intensity 1: 0  Pain Location 1: Hip  Pain Orientation 1: Right  Pain Description 1: Aching  Pain Intervention(s) 1: Cold pack, Repositioned, Rest    Meds:    Current Facility-Administered Medications   Medication Dose Route Frequency    atenolol (TENORMIN) tablet 25 mg  25 mg Oral DAILY    atorvastatin (LIPITOR) tablet 20 mg  20 mg Oral QHS    cholecalciferol (VITAMIN D3) tablet 1,000 Units  1,000 Units Oral DAILY    0.9% sodium chloride infusion  125 mL/hr IntraVENous CONTINUOUS    sodium chloride (NS) flush 5-10 mL  5-10 mL IntraVENous Q8H    sodium chloride (NS) flush 5-10 mL  5-10 mL IntraVENous PRN    acetaminophen (TYLENOL) tablet 650 mg  650 mg Oral Q6H    oxyCODONE IR (ROXICODONE) tablet 2.5 mg  2.5 mg Oral Q3H PRN    oxyCODONE IR (ROXICODONE) tablet 5 mg  5 mg Oral Q3H PRN    naloxone (NARCAN) injection 0.4 mg  0.4 mg IntraVENous PRN    ondansetron (ZOFRAN) injection 4 mg  4 mg IntraVENous Q4H PRN    diphenhydrAMINE (BENADRYL) 12.5 mg/5 mL oral elixir 12.5 mg  12.5 mg Oral Q6H PRN    senna-docusate (PERICOLACE) 8.6-50 mg per tablet 1 Tab  1 Tab Oral BID    polyethylene glycol (MIRALAX) packet 17 g  17 g Oral DAILY    [START ON 2017] bisacodyl (DULCOLAX) suppository 10 mg  10 mg Rectal DAILY PRN    morphine injection 1 mg  1 mg IntraVENous Q3H PRN    famotidine (PEPCID) tablet 10 mg  10 mg Oral BID    aspirin chewable tablet 81 mg  81 mg Oral BID    albuterol-ipratropium (DUO-NEB) 2.5 MG-0.5 MG/3 ML  3 mL Nebulization Q4H PRN       LAB:    Recent Labs      04/29/17   0538  04/27/17   1622   HCT  34.2*  35.4   HGB  11.4*  12.0   INR   --   1.0       Transfuse PRBC's:      Assessment & Physician's Comment:  Patient asleep  Wound is clean dry and intact  Disoriented overnight - possibly new onset    Active Problems:    Hip fracture ([address] (4/27/2017)        Plan:    Cont PT/OT - work to mobilize if able  Cont Tylenol for pain for now  Cont ASA for DVT prophylaxis  Ativan given for combativeness last night   Medical management per primary team  Disposition pending    Patient seen and examined with Dr Shari Mckenna, [address]

## 2017-04-29 NOTE — PROGRESS NOTES
Dr. Menjivar Lines notified due to patient's defiant combative both verbally and physically aggression towards staff and inability to administer po medications to patient this am, the main concern being atenolol and Asprin. Spoke to pt daughter julio cesar in attempt to see if they would be by this morning as pt may be more willing to take medication from family. Apurva Robels stated they will not be by till later this afternoon. No new orders given at this time.

## 2017-04-29 NOTE — PROGRESS NOTES
General Daily Progress Note    Admit Date: 4/27/2017  Hospital day 3    Subjective:     Patient has no complaint of shortness of breath or chest pain. Was combative last night refusing to take meds, and now has sitter. ..   Medication side effects: none    Current Facility-Administered Medications   Medication Dose Route Frequency    atenolol (TENORMIN) tablet 25 mg  25 mg Oral DAILY    atorvastatin (LIPITOR) tablet 20 mg  20 mg Oral QHS    cholecalciferol (VITAMIN D3) tablet 1,000 Units  1,000 Units Oral DAILY    0.9% sodium chloride infusion  125 mL/hr IntraVENous CONTINUOUS    sodium chloride (NS) flush 5-10 mL  5-10 mL IntraVENous Q8H    sodium chloride (NS) flush 5-10 mL  5-10 mL IntraVENous PRN    acetaminophen (TYLENOL) tablet 650 mg  650 mg Oral Q6H    oxyCODONE IR (ROXICODONE) tablet 2.5 mg  2.5 mg Oral Q3H PRN    oxyCODONE IR (ROXICODONE) tablet 5 mg  5 mg Oral Q3H PRN    naloxone (NARCAN) injection 0.4 mg  0.4 mg IntraVENous PRN    ondansetron (ZOFRAN) injection 4 mg  4 mg IntraVENous Q4H PRN    diphenhydrAMINE (BENADRYL) 12.5 mg/5 mL oral elixir 12.5 mg  12.5 mg Oral Q6H PRN    senna-docusate (PERICOLACE) 8.6-50 mg per tablet 1 Tab  1 Tab Oral BID    polyethylene glycol (MIRALAX) packet 17 g  17 g Oral DAILY    [START ON 4/30/2017] bisacodyl (DULCOLAX) suppository 10 mg  10 mg Rectal DAILY PRN    morphine injection 1 mg  1 mg IntraVENous Q3H PRN    famotidine (PEPCID) tablet 10 mg  10 mg Oral BID    aspirin chewable tablet 81 mg  81 mg Oral BID    albuterol-ipratropium (DUO-NEB) 2.5 MG-0.5 MG/3 ML  3 mL Nebulization Q4H PRN        Review of Systems  Pertinent items are noted in HPI.     Objective:     Patient Vitals for the past 8 hrs:   BP Temp Pulse Resp SpO2   04/29/17 0751 136/66 99 °F (37.2 °C) 85 18 94 %   04/29/17 0600 127/64 99.2 °F (37.3 °C) 82 16 97 %        04/27 1901 - 04/29 0700  In: 2156.3 [I.V.:2156.3]  Out: 2300 [Urine:2200]    Physical Exam:   Visit Vitals    /66  Pulse 85    Temp 99 °F (37.2 °C)    Resp 18    Ht 5' 1\" (1.549 m)    Wt 125 lb (56.7 kg)    SpO2 94%    BMI 23.62 kg/m2     Lungs: clear to auscultation bilaterally  Heart: regular rate and rhythm, S1, S2 normal, no murmur, click, rub or gallop  Abdomen: soft, non-tender. Bowel sounds normal. No masses,  no organomegaly  Extremities: extremities normal, atraumatic, no cyanosis or edema      ECG: normal sinus rhythm     Data Review   Recent Results (from the past 24 hour(s))   VITAMIN D, 25 HYDROXY    Collection Time: 04/29/17  5:37 AM   Result Value Ref Range    Vitamin D 25-Hydroxy 13.8 (L) 30 - 905 ng/mL   METABOLIC PANEL, COMPREHENSIVE    Collection Time: 04/29/17  5:38 AM   Result Value Ref Range    Sodium 146 (H) 136 - 145 mmol/L    Potassium 3.5 3.5 - 5.1 mmol/L    Chloride 93 (L) 97 - 108 mmol/L    CO2 19 (L) 21 - 32 mmol/L    Anion gap 34 (H) 5 - 15 mmol/L    Glucose 108 (H) 65 - 100 mg/dL    BUN 9 6 - 20 MG/DL    Creatinine 0.51 (L) 0.55 - 1.02 MG/DL    BUN/Creatinine ratio 18 12 - 20      GFR est AA >60 >60 ml/min/1.73m2    GFR est non-AA >60 >60 ml/min/1.73m2    Calcium 7.8 (L) 8.5 - 10.1 MG/DL    Bilirubin, total 0.4 0.2 - 1.0 MG/DL    ALT (SGPT) 26 12 - 78 U/L    AST (SGOT) 17 15 - 37 U/L    Alk.  phosphatase 53 45 - 117 U/L    Protein, total 5.3 (L) 6.4 - 8.2 g/dL    Albumin 2.2 (L) 3.5 - 5.0 g/dL    Globulin 3.1 2.0 - 4.0 g/dL    A-G Ratio 0.7 (L) 1.1 - 2.2     LIPID PANEL    Collection Time: 04/29/17  5:38 AM   Result Value Ref Range    LIPID PROFILE          Cholesterol, total 128 <200 MG/DL    Triglyceride 45 <150 MG/DL    HDL Cholesterol 82 MG/DL    LDL, calculated 37 0 - 100 MG/DL    VLDL, calculated 9 MG/DL    CHOL/HDL Ratio 1.6 0 - 5.0     CBC WITH AUTOMATED DIFF    Collection Time: 04/29/17  5:38 AM   Result Value Ref Range    WBC 11.2 (H) 3.6 - 11.0 K/uL    RBC 3.89 3.80 - 5.20 M/uL    HGB 11.4 (L) 11.5 - 16.0 g/dL    HCT 34.2 (L) 35.0 - 47.0 %    MCV 87.9 80.0 - 99.0 FL    MCH 29.3 26.0 - 34.0 PG    MCHC 33.3 30.0 - 36.5 g/dL    RDW 12.1 11.5 - 14.5 %    PLATELET 182 619 - 665 K/uL    NEUTROPHILS 81 (H) 32 - 75 %    LYMPHOCYTES 5 (L) 12 - 49 %    MONOCYTES 14 (H) 5 - 13 %    EOSINOPHILS 0 0 - 7 %    BASOPHILS 0 0 - 1 %    ABS. NEUTROPHILS 9.0 (H) 1.8 - 8.0 K/UL    ABS. LYMPHOCYTES 0.6 (L) 0.8 - 3.5 K/UL    ABS. MONOCYTES 1.6 (H) 0.0 - 1.0 K/UL    ABS. EOSINOPHILS 0.0 0.0 - 0.4 K/UL    ABS. BASOPHILS 0.0 0.0 - 0.1 K/UL    DF SMEAR SCANNED      RBC COMMENTS NORMOCYTIC, NORMOCHROMIC             Assessment:     Active Problems:    [facility] (4/27/2017)        Plan:     1. Sp surgery for hip fracture  2. Combativeness, refusal to take meds- continue sitter. Try to avoid oversedation unless becomes much worse. Otherwise seems to be doing well.

## 2017-04-29 NOTE — PROGRESS NOTES
Prn sitter no longer needed at the moment. Pt's confusion and aggression has improved. Between the tele video monitor sitter and the bed alarm pt seems to be doing well and not trying to get out of the bed currently. Will continue to monitor and if prn sitter is needed one can be provided.

## 2017-04-29 NOTE — ROUTINE PROCESS
Bedside and Verbal shift change report given to [address] [place] [address] (oncoming nurse) by Jared Grace RN (offgoing nurse). Report included the following information SBAR, Kardex, Intake/Output, MAR and Recent Results.

## 2017-04-30 LAB — HGB BLD-MCNC: 10.8 G/DL (ref 11.5–16)

## 2017-04-30 PROCEDURE — 74011250637 HC RX REV CODE- 250/637: Performed by: INTERNAL MEDICINE

## 2017-04-30 PROCEDURE — 65270000029 HC RM PRIVATE

## 2017-04-30 PROCEDURE — 97530 THERAPEUTIC ACTIVITIES: CPT

## 2017-04-30 PROCEDURE — 74011250637 HC RX REV CODE- 250/637: Performed by: PHYSICIAN ASSISTANT

## 2017-04-30 PROCEDURE — 85018 HEMOGLOBIN: CPT | Performed by: PHYSICIAN ASSISTANT

## 2017-04-30 PROCEDURE — 36415 COLL VENOUS BLD VENIPUNCTURE: CPT | Performed by: PHYSICIAN ASSISTANT

## 2017-04-30 PROCEDURE — 97116 GAIT TRAINING THERAPY: CPT

## 2017-04-30 RX ORDER — CELECOXIB 200 MG/1
200 CAPSULE ORAL DAILY
Status: DISCONTINUED | OUTPATIENT
Start: 2017-04-30 | End: 2017-05-02 | Stop reason: HOSPADM

## 2017-04-30 RX ADMIN — FAMOTIDINE 10 MG: 20 TABLET ORAL at 17:34

## 2017-04-30 RX ADMIN — POLYETHYLENE GLYCOL 3350 17 G: 17 POWDER, FOR SOLUTION ORAL at 08:31

## 2017-04-30 RX ADMIN — Medication 10 ML: at 06:30

## 2017-04-30 RX ADMIN — DOCUSATE SODIUM AND SENNOSIDES 1 TABLET: 8.6; 5 TABLET, FILM COATED ORAL at 17:34

## 2017-04-30 RX ADMIN — ACETAMINOPHEN 650 MG: 325 TABLET, FILM COATED ORAL at 12:35

## 2017-04-30 RX ADMIN — CELECOXIB 200 MG: 200 CAPSULE ORAL at 12:35

## 2017-04-30 RX ADMIN — ACETAMINOPHEN 650 MG: 325 TABLET, FILM COATED ORAL at 17:34

## 2017-04-30 RX ADMIN — ATENOLOL 25 MG: 25 TABLET ORAL at 08:32

## 2017-04-30 RX ADMIN — ACETAMINOPHEN 650 MG: 325 TABLET, FILM COATED ORAL at 06:30

## 2017-04-30 RX ADMIN — VITAMIN D, TAB 1000IU (100/BT) 1000 UNITS: 25 TAB at 08:32

## 2017-04-30 RX ADMIN — ASPIRIN 81 MG 81 MG: 81 TABLET ORAL at 08:32

## 2017-04-30 RX ADMIN — DOCUSATE SODIUM AND SENNOSIDES 1 TABLET: 8.6; 5 TABLET, FILM COATED ORAL at 08:32

## 2017-04-30 RX ADMIN — ASPIRIN 81 MG 81 MG: 81 TABLET ORAL at 17:34

## 2017-04-30 RX ADMIN — FAMOTIDINE 10 MG: 20 TABLET ORAL at 08:32

## 2017-04-30 NOTE — ROUTINE PROCESS
Bedside and Verbal shift change report given to Gloria Mcknight (oncoming nurse) by Claudell Madison RN (offgoing nurse). Report included the following information SBAR, Kardex, Intake/Output, MAR and Recent Results.

## 2017-04-30 NOTE — PROGRESS NOTES
Problem: Mobility Impaired (Adult and Pediatric)  Goal: *Acute Goals and Plan of Care (Insert Text)  Physical Therapy Goals  Initiated 4/29/2017  1. Patient will move from supine to sit and sit to supine in bed with minimal assistance/contact guard assist within 7 day(s). 2. Patient will transfer from bed to chair and chair to bed with minimal assistance/contact guard assist using the least restrictive device within 7 day(s). 3. Patient will perform sit to stand with minimal assistance/contact guard assist within 7 day(s). 4. Patient will ambulate with minimal assistance/contact guard assist for 50 feet with the least restrictive device within 7 day(s). 5. Patient will participate in general rom/strengthening exercises with min assist within 7 days. PHYSICAL THERAPY TREATMENT  Patient: Laxmi Martin (76 y.o. female)  Date: 4/30/2017  Diagnosis: Right Femoral Neck Fracture  Hip fracture (HCC) <principal problem not specified>  Procedure(s) (LRB):  RIGHT HIP HEMIARTHROPLASTY (Right) 2 Days Post-Op  Precautions: WBAT      ASSESSMENT:  Patient received sitting in the chair, agreeable to PT. Patient required mod A for sit <> stand with RW. Patient requires constant verbal cues for hand placement with transfer and repeatedly needing cues, likely due to confusion. Patient continues to be a high fall risk due to decreased safety awareness and confusion. Patient improved gait distance this date to 75 feet with CGA-min A and RW. Patient with mild posterior lean requiring assist and cues to correct. Patient required min A for RW management with turning. At the end of the session, patient attempting to get up to go to the bathroom, requiring cues for safety. Patient able to perform bathroom hygiene while sitting without assist. Patient left sitting in the chair with the bed alarm on at the conclusion of PT treatment session. Patient will benefit from SNF at discharge.    Progression toward goals:  [X]    Improving appropriately and progressing toward goals  [ ]    Improving slowly and progressing toward goals  [ ]    Not making progress toward goals and plan of care will be adjusted       PLAN:  Patient continues to benefit from skilled intervention to address the above impairments. Continue treatment per established plan of care. Discharge Recommendations:  Skilled Nursing Facility  Further Equipment Recommendations for Discharge:  TBD       SUBJECTIVE:   Patient stated You want me to grab here?  (grabbing the walker)      OBJECTIVE DATA SUMMARY:   Critical Behavior:  Neurologic State: Alert, Confused  Orientation Level: Oriented X4  Cognition: Follows commands  Safety/Judgement: Decreased awareness of need for safety  Functional Mobility Training:  Bed Mobility:                    Transfers:  Sit to Stand: Moderate assistance  Stand to Sit: Moderate assistance        Bed to Chair: Minimum assistance;Assist x2                    Balance:  Sitting: Intact; Without support  Standing: Impaired; With support  Standing - Static: Constant support; Fair  Standing - Dynamic : Fair  Ambulation/Gait Training:  Distance (ft): 60 Feet (ft)  Assistive Device: Gait belt;Walker, rolling  Ambulation - Level of Assistance: Minimal assistance;Assist x2        Gait Abnormalities: Antalgic;Decreased step clearance                             Therapeutic Exercises:   Seated: LAQ, ankle pumps x 10 B  Pain:  Pain Scale 1: Numeric (0 - 10)  Pain Intensity 1: 0              Activity Tolerance:   Improving. Please refer to the flowsheet for vital signs taken during this treatment.   After treatment:   [X]    Patient left in no apparent distress sitting up in chair  [ ]    Patient left in no apparent distress in bed  [X]    Call bell left within reach  [X]    Nursing notified  [ ]    Caregiver present  [X]    Bed alarm activated      COMMUNICATION/COLLABORATION:   The patients plan of care was discussed with: Registered Nurse and  Clifford Tran, PT, DPT   Time Calculation: 32 mins

## 2017-04-30 NOTE — PROGRESS NOTES
General Daily Progress Note    Admit Date: 4/27/2017  Hospital day 4    Subjective:     Patient has no complaint of shortness of breath or chest pain. Up and walking this am with PT. Some hip pain. Medication side effects: none    Current Facility-Administered Medications   Medication Dose Route Frequency    celecoxib (CELEBREX) capsule 200 mg  200 mg Oral DAILY    atenolol (TENORMIN) tablet 25 mg  25 mg Oral DAILY    atorvastatin (LIPITOR) tablet 20 mg  20 mg Oral QHS    cholecalciferol (VITAMIN D3) tablet 1,000 Units  1,000 Units Oral DAILY    sodium chloride (NS) flush 5-10 mL  5-10 mL IntraVENous Q8H    sodium chloride (NS) flush 5-10 mL  5-10 mL IntraVENous PRN    acetaminophen (TYLENOL) tablet 650 mg  650 mg Oral Q6H    oxyCODONE IR (ROXICODONE) tablet 2.5 mg  2.5 mg Oral Q3H PRN    oxyCODONE IR (ROXICODONE) tablet 5 mg  5 mg Oral Q3H PRN    naloxone (NARCAN) injection 0.4 mg  0.4 mg IntraVENous PRN    ondansetron (ZOFRAN) injection 4 mg  4 mg IntraVENous Q4H PRN    senna-docusate (PERICOLACE) 8.6-50 mg per tablet 1 Tab  1 Tab Oral BID    polyethylene glycol (MIRALAX) packet 17 g  17 g Oral DAILY    bisacodyl (DULCOLAX) suppository 10 mg  10 mg Rectal DAILY PRN    famotidine (PEPCID) tablet 10 mg  10 mg Oral BID    aspirin chewable tablet 81 mg  81 mg Oral BID    albuterol-ipratropium (DUO-NEB) 2.5 MG-0.5 MG/3 ML  3 mL Nebulization Q4H PRN        Review of Systems  Pertinent items are noted in HPI. Objective:     Patient Vitals for the past 8 hrs:   BP Temp Pulse Resp SpO2   04/30/17 0758 142/65 99.1 °F (37.3 °C) 88 18 99 %   04/30/17 0347 157/78 98.5 °F (36.9 °C) 64 18 100 %        04/28 1901 - 04/30 0700  In: 2511.7 [P.O.:220;  I.V.:2291.7]  Out: 1475 [Urine:1475]    Physical Exam:   Visit Vitals    /65    Pulse 88    Temp 99.1 °F (37.3 °C)    Resp 18    Ht 5' 1\" (1.549 m)    Wt 125 lb (56.7 kg)    SpO2 99%    BMI 23.62 kg/m2     Lungs: clear to auscultation bilaterally  Heart: regular rate and rhythm, S1, S2 normal, no murmur, click, rub or gallop  Abdomen: soft, non-tender. Bowel sounds normal. No masses,  no organomegaly  Extremities: extremities normal, atraumatic, no cyanosis or edema  Musculoskeletal - walking in sepulveda with PT>       ECG: normal sinus rhythm     Data Review   Recent Results (from the past 24 hour(s))   HEMOGLOBIN    Collection Time: 04/30/17  3:35 AM   Result Value Ref Range    HGB 10.8 (L) 11.5 - 16.0 g/dL           Assessment:     Active Problems:    Hip fracture ([facility] (4/27/2017)        Plan:     1. Sp surgery for hip fracture. Up and walking this am with PT.   2. Combativeness, refusal to take meds- much better. Try to avoid oversedation unless becomes much worse. Otherwise seems to be doing well.

## 2017-04-30 NOTE — PROGRESS NOTES
Bedside and Verbal shift change report given to Selma RN (oncoming nurse) by Phuong Milligan RN (offgoing nurse). Report included the following information SBAR, Kardex, Procedure Summary, Intake/Output, MAR, Accordion and Recent Results.

## 2017-04-30 NOTE — PROGRESS NOTES
Ortho:     S/p R hip hemiarthroplasty    Pain is mild. Has some knee pain - apparently hx of knee OA. States she was dizzy at first getting OOB today. Better now. No sitter. Tolerating diet. No N/V.    AA, pleasant, oriented to person, place, events  OOB in matthieu  No dyspnea, no pallor  Dressing with scant old drainage  Min edema  NVI   No calf tenderness    VSS, mild hypertension, AF  Hgb 10.8  SCr 0.5 and GFR >60    Plan:    Mobilize with PT today  No sitter needed  Minimize narcotic use - has Tylenol and I added low dose Celebrex  Disposition pending, awaiting response from [facility] Gayle aware of and agrees with above plan

## 2017-05-01 PROCEDURE — 74011250637 HC RX REV CODE- 250/637: Performed by: PHYSICIAN ASSISTANT

## 2017-05-01 PROCEDURE — 74011250637 HC RX REV CODE- 250/637: Performed by: INTERNAL MEDICINE

## 2017-05-01 PROCEDURE — 97110 THERAPEUTIC EXERCISES: CPT

## 2017-05-01 PROCEDURE — 65270000029 HC RM PRIVATE

## 2017-05-01 PROCEDURE — 97116 GAIT TRAINING THERAPY: CPT

## 2017-05-01 RX ORDER — HALOPERIDOL 1 MG/1
1 TABLET ORAL
Qty: 1 TAB | Refills: 0 | Status: ON HOLD
Start: 2017-05-01 | End: 2017-05-09

## 2017-05-01 RX ORDER — ERGOCALCIFEROL 1.25 MG/1
50000 CAPSULE ORAL
Qty: 1 CAP | Refills: 0 | Status: SHIPPED
Start: 2017-05-01 | End: 2021-12-06 | Stop reason: SDUPTHER

## 2017-05-01 RX ORDER — HALOPERIDOL 1 MG/1
1 TABLET ORAL
Status: DISCONTINUED | OUTPATIENT
Start: 2017-05-01 | End: 2017-05-02 | Stop reason: HOSPADM

## 2017-05-01 RX ORDER — CELECOXIB 200 MG/1
200 CAPSULE ORAL DAILY
Qty: 1 CAP | Refills: 0 | Status: SHIPPED
Start: 2017-05-01 | End: 2017-06-01 | Stop reason: SDUPTHER

## 2017-05-01 RX ADMIN — FAMOTIDINE 10 MG: 20 TABLET ORAL at 10:07

## 2017-05-01 RX ADMIN — VITAMIN D, TAB 1000IU (100/BT) 1000 UNITS: 25 TAB at 10:07

## 2017-05-01 RX ADMIN — ASPIRIN 81 MG 81 MG: 81 TABLET ORAL at 10:05

## 2017-05-01 RX ADMIN — ACETAMINOPHEN 650 MG: 325 TABLET, FILM COATED ORAL at 12:00

## 2017-05-01 RX ADMIN — HALOPERIDOL 1 MG: 1 TABLET ORAL at 18:01

## 2017-05-01 RX ADMIN — CELECOXIB 200 MG: 200 CAPSULE ORAL at 10:07

## 2017-05-01 RX ADMIN — OXYCODONE HYDROCHLORIDE 5 MG: 5 TABLET ORAL at 23:15

## 2017-05-01 RX ADMIN — OXYCODONE HYDROCHLORIDE 5 MG: 5 TABLET ORAL at 19:26

## 2017-05-01 RX ADMIN — HALOPERIDOL 1 MG: 1 TABLET ORAL at 23:15

## 2017-05-01 RX ADMIN — Medication 10 ML: at 06:19

## 2017-05-01 RX ADMIN — ATENOLOL 25 MG: 25 TABLET ORAL at 10:05

## 2017-05-01 RX ADMIN — Medication 10 ML: at 14:02

## 2017-05-01 NOTE — DISCHARGE SUMMARY
Physician Discharge Summary     Patient ID:  Johny Lieberman  [id number]  68 y.o.  1928    Admit date: 2017    Discharge date and time: 2017    Admission Diagnoses: Right Femoral Neck Fracture; Hip fracture [facility])    Discharge Diagnoses: Active Problems:    Hip fracture ([facility] (2017)       Consults: Orthopedic Surgery    Significant Diagnostic Studies:   XR hip: Right basicervical femoral neck fracture. Hospital Course: Ms. Johny Lieberman is a patient of mine who presented with the problems above. See the initial H and P for full details. By problem. ..    1. Hip fracture ([facility] (2017): Orthopedics consulted; s/p R hip hemiarthroplasty . PT / OT recommending inpatient PT at SNF. 2. Encephalopathy/combativeness:  Possibly some \"sundowning\" due to underlying dementia +/- effect of meds. Hold narcotics. Prn haldol. 3. Anemia NEC: Watch labs. 4. Asthma: No wheezing/ SOB at this time. Stable. Prn duonebs. 5. Hypertension: BP okay. She is off the Valsartan-Amlodipine; for now, stay off.   6. Hyperlipidemia: Continue statin. 7. Vitamin D deficiency (2011): I'll start her on ergocalciferal.   8. D/C plannin [facility] for inpt PT / OT. Disposition: [facility]    Patient Instructions:   Current Discharge Medication List      START taking these medications    Details   haloperidol (HALDOL) 1 mg tablet Take 1 Tab by mouth every six (6) hours as needed. Qty: 1 Tab, Refills: 0      celecoxib (CELEBREX) 200 mg capsule Take 1 Cap by mouth daily for 90 days.   Qty: 1 Cap, Refills: 0      Ergocalciferal 50,000 units per dose Take 1 by mouth weekly  Qty: 1 Cap, Refills: 0         CONTINUE these medications which have NOT CHANGED    Details   atorvastatin (LIPITOR) 20 mg tablet TAKE 1 TABLET BY MOUTH EVERY NIGHT  Qty: 90 Tab, Refills: 0      atenolol (TENORMIN) 25 mg tablet TAKE 1 TABLET BY MOUTH DAILY  Qty: 90 Tab, Refills: 5    Comments: **Patient requests 90 days supply**      albuterol (PROVENTIL HFA, VENTOLIN HFA, PROAIR HFA) 90 mcg/actuation inhaler Take 1 Puff by inhalation as needed for Wheezing. Qty: 1 Inhaler, Refills: 5      cholecalciferol, vitamin d3, (VITAMIN D) 1,000 unit tablet Take  by mouth daily. MULTIVITAMINS (MULTIVITAMIN PO) Take  by mouth daily. DULoxetine (CYMBALTA) 30 mg capsule TAKE 1 CAPSULE BY MOUTH DAILY  Qty: 90 Cap, Refills: 11    Comments: **Patient requests 90 days supply**      lidocaine (LIDODERM) 5 % 1 Patch by TransDERmal route every twenty-four (24) hours. Apply patch to the affected area for 12 hours a day and remove for 12 hours a day. Qty: 30 Each, Refills: 11    Comments: GENERIC EQUIVALENT is OKAY         STOP taking these medications       amLODIPine-valsartan (EXFORGE)  mg per tablet Comments:   Reason for Stopping:             Activity: PT/OT Eval and Treat  Diet: Cardiac Diet  Wound Care: Per Dr. Maritza Philippe. Follow-up with Dr. Vicki Hubbard after d/c from SNF. Follow-up with Dr. Maritza Philippe as directed by him. Signed:  Montez Cortez MD  5/2/2017  8:31 AM    Note: Greater than 30 minutes were spent on activities related to this discharge.

## 2017-05-01 NOTE — ROUTINE PROCESS
Patient is becoming agitated and refuses to follow directions. Patient is becoming verbally aggressive, threatened to hit staff. Paged Dr. Pauline Givens to get medication for agitation.   He prescribed haldol 1 mg po prn

## 2017-05-01 NOTE — DISCHARGE INSTRUCTIONS
PATIENT DISCHARGE INSTRUCTIONS      PATIENT DISCHARGE INSTRUCTIONS    Inderjit aHyes / [id number] : 1928    Admitted 2017 Discharged: 2017       · It is important that you take the medication exactly as they are prescribed. · Keep your medication in the bottles provided by the pharmacist and keep a list of the medication names, dosages, and times to be taken in your wallet. · Do not take other medications without consulting your doctor. What to do at Home    Recommended Diet: Cardiac Diet    Recommended Activity: PT/OT Eval and Treat        Signed By: Giuliana Vickers MD     May 1, 2017       [address] [facility]    Discharge Instruction Sheet: Hip Fracture Repair    Dr. Audi Bragg    Blood Clot Prevention  Aspirin  You will be discharged on Aspirin to prevent blood clots. You will have to take it for approximately 4 weeks. Do not take non-steroid anti-flammatory medications (Ibuprofen, Advil, Motrin, Naproxen,. etc) until told to do so. Pain control:  Medications   Typically, we will prescribe a narcotic usually 1-2 tabs every three to four hours as needed for your pain. Most patients need this only for the first few weeks.  You should discontinue this as the pain decreases.  Some of these medications contain a large dose of Tylenol (acetaminophen). Therefore, you should consult your pharmacist before taking any additional Tylenol at the same time. You should not drive while taking any narcotic pain medications. Take your pain medications with food to prevent nausea! Your last dose of pain medication in the hospital was given @ :__________    [address] will be discharged home with ice/gel packs.  Continue using these regularly to minimize pain and swelling, especially after physical activity.     Constipation   Pain medication and anesthesia can be constipating-this can be prevented by gentle physical activity and drinking plenty of fluid.  It should be treated with over-the-counter medications such as Dulcolax, Miralax, Magnesium Citrate and/or Fleets enema.  You should have a bowel movement at least every other day following surgery. Incision care   You will be discharged with a transparent and waterproof dressing over your incision. o This should remain in place for 5-7 days after discharge.   You will be given one additional dressing to use at home in 5-7 days if you are still having drainage   You may shower with this dressing in place after you are discharged. o After showering pat the dressing/incision dry.  When the incision is no longer draining, it may be left open to the air.  DO NOT rub the incision.  DO NOT take a tub bath or go swimming until cleared by your doctor.  DO NOT apply lotions, oils, or creams to incision. To increase and promote healing:   Stop Smoking (or at least cut back on smoking).  Eat a well-balanced diet (high in protein and vitamin C)   If your appetite is poor, consider nutritional supplements like Ensure, Glucerna, or [place] Instant Breakfast.   If you are diabetic, controlling you blood sugars is very important to prevent infection and promote wound healing. Nutrition:   If you were on a supplement such as Ensure or Glucerna) while in the hospital, please continue using them with each meal for the next 30 days.  Eat a well-balanced diet - High in protein, high in vitamins and minerals, especially vitamin C and zinc.     Home Health:   If you have staples a home health nurse will remove them in about 10 days.  Physical Therapy will come to your home to continue training for walking, transferring and exercises    Physical Activity     You can weight bear as tolerated on your new hip ***.  Bed-rest may slow your recovery.  Use your walker and take short walks about every 2 hours.  Increase your distance each day.  NO DRIVING until told to do so. Warning signs: Please call your physician immediately at [phone] if you have   Bleeding from incision that is constant.  Change in mental status (unusual behavior or confusion)   If your incision develops redness or swelling   Change in wound drainage (increase in amount, color, or foul odor)   Temperature over 101.5 degrees Fahrenheit    Pain in the calf of your leg   Tenderness or redness in the calf of your leg   Increased swelling of the thigh, ankle, calf, or foot. Emergency: CALL 911 if you have   Shortness of breath   Chest pain   Localized chest pain when coughing or taking a deep breath    Follow-up    Please call your surgeons office at [phone] for a follow up appointment.   o You should call as soon as you get home or the next day after discharge. o Ask to make an appointment in 2 weeks.  You can return to work when cleared by a physician. During normal business hours you may reach Dr. Louis Cortes' team directly at [phone] if you have concerns or questions.

## 2017-05-01 NOTE — PROGRESS NOTES
Problem: Mobility Impaired (Adult and Pediatric)  Goal: *Acute Goals and Plan of Care (Insert Text)  Physical Therapy Goals  Initiated 4/29/2017  1. Patient will move from supine to sit and sit to supine in bed with minimal assistance/contact guard assist within 7 day(s). 2. Patient will transfer from bed to chair and chair to bed with minimal assistance/contact guard assist using the least restrictive device within 7 day(s). 3. Patient will perform sit to stand with minimal assistance/contact guard assist within 7 day(s). 4. Patient will ambulate with minimal assistance/contact guard assist for 50 feet with the least restrictive device within 7 day(s). 5. Patient will participate in general rom/strengthening exercises with min assist within 7 days. PHYSICAL THERAPY TREATMENT  Patient: Frida Serna (89 y.o. female)  Date: 5/1/2017  Diagnosis: Right Femoral Neck Fracture  Hip fracture ([facility] [address] <principal problem not specified>  Procedure(s) (LRB):  RIGHT HIP HEMIARTHROPLASTY (Right) 3 Days Post-Op  Precautions: WBAT  Chart, physical therapy assessment, plan of care and goals were reviewed. ASSESSMENT:  Pt pleasantly confused this am and agreeable to PT session. Pt required mod assist for bed mobility and min assist to stand from bed. Pt able to ambulate 50 ft to hallway with RW and min assist including assist for RW management and safety. Pt remained in chair after session with bed alarm applied. Recommend SNF upon discharge. Progression toward goals:  [ ]      Improving appropriately and progressing toward goals  [X]      Improving slowly and progressing toward goals  [ ]      Not making progress toward goals and plan of care will be adjusted       PLAN:  Patient continues to benefit from skilled intervention to address the above impairments. Continue treatment per established plan of care.   Discharge Recommendations:  Emile Deleon  Further Equipment Recommendations for Discharge:  rolling walker       SUBJECTIVE:   Patient stated we can go see my [address].       OBJECTIVE DATA SUMMARY:   Critical Behavior:  Neurologic State: Confused, Restless  Orientation Level: Disoriented to person  Cognition: Appropriate decision making, Appropriate for age attention/concentration, Appropriate safety awareness  Safety/Judgement: Decreased awareness of need for safety  Functional Mobility Training:  Bed Mobility:     Supine to Sit: Moderate assistance;Assist x1;Additional time                          Transfers:  Sit to Stand: Moderate assistance;Assist x1  Stand to Sit: Minimum assistance;Assist x1              Balance:  Sitting: Intact  Standing: Impaired; With support  Standing - Static: Fair  Standing - Dynamic : Fair  Ambulation/Gait Training:  Distance (ft): 50 Feet (ft)  Assistive Device: Gait belt;Walker, rolling  Ambulation - Level of Assistance: Minimal assistance;Assist x2        Gait Abnormalities: Antalgic;Decreased step clearance  Right Side Weight Bearing: As tolerated              Therapeutic Exercises:   SUPINE  EXERCISES   Sets   Reps   Active Active Assist   Passive Self ROM   Comments   Ankle Pumps 1 10 [X]                           [ ]                           [ ]                           [ ]                               Quad Sets 1 10 [X]                           [ ]                           [ ]                           [ ]                               Heel Slides 1 8 [ ]                           [X]                           [ ]                           [ ]                               Hip Abduction 1 8 [ ]                           [X]                           [ ]                           [ ]                               Glut Sets 1 10 [X]                           [ ]                           [ ]                           [ ]                                     [ ]                           [ ]                           [ ]                           [ ] [ ]                           [ ]                           [ ]                           [ ]                                        Pain:  Pain Scale 1: Numeric (0 - 10)  Pain Intensity 1: 7  Pain Location 1: Groin  Pain Orientation 1: Right  Pain Description 1: Burning  Pain Intervention(s) 1: Medication (see MAR); Cold pack  Activity Tolerance:   No s/s of distress with activity     Please refer to the flowsheet for vital signs taken during this treatment.   After treatment:   [X] Patient left in no apparent distress sitting up in chair  [ ] Patient left in no apparent distress in bed  [X] Call bell left within reach  [X] Nursing notified  [ ] Caregiver present  [X] Bed alarm activated      COMMUNICATION/COLLABORATION:   The patients plan of care was discussed with: Registered Nurse     Kay Lenz PTA   Time Calculation: 23 mins

## 2017-05-01 NOTE — PROGRESS NOTES
Bedside interdisciplinary rounds were held today to discuss patient plan of care and outcomes. The following members were present: Physician, Nurse, Clinical Care Leader, Pharmacy, Physical Therapy, and Case Management.     Plan:  BETTINA planning for [address] [facility], possibly this PM.

## 2017-05-01 NOTE — PROGRESS NOTES
Met with patient for CM initial assessment and to discuss discharge planning needs. Confirmed contact information and emergency contact information. CM spoke to pt daughter Eloisa Rahman at [phone] to discuss pt transitional care plan. Pt daughter chose [facility] SNF and requested for Ambulance service for her transportation. FOC given copy attached to pt bedside chart. CM sends referral through CC link to [address]. CM will follow-up with SNF regarding pt referral status. 12.30 PM    Spoke with pt attending physician  about pt discharge status.  informed that he will work on discharge plan afternoon. CM spoke with Miller Estrada at [facility] regarding pt referral status. Pt has a bed available today. CM will assist pt to arrange ambulance transportation to SNF. Care Management Interventions  PCP Verified by CM:  Yes  Mode of Transport at Discharge: BLS  Transition of Care Consult (CM Consult): SNF ([facility])  Partner SNF: Yes  Health Maintenance Reviewed: Yes  Physical Therapy Consult: Yes  Occupational Therapy Consult: Yes  Speech Therapy Consult: No  Current Support Network: Lives with Caregiver (Pt lives with daughter Demar)  Confirm Follow Up Transport: Family  Plan discussed with Pt/Family/Caregiver: Yes  Freedom of Choice Offered: Yes  Discharge Location  Discharge Placement: Skilled nursing facility    [place]  Ext [phone]

## 2017-05-01 NOTE — ROUTINE PROCESS
Bedside and Verbal shift change report given to Mika Monsalve RN (oncoming nurse) by Zoe Mcghee RN (offgoing nurse). Report included the following information SBAR, Kardex, Intake/Output, MAR, Recent Results and Med Rec Status.

## 2017-05-01 NOTE — PROGRESS NOTES
Ortho/ NeuroSurgery NP Note    POD# 3  s/p RIGHT HIP HEMIARTHROPLASTY   Pt seen by Dr. Paco Agudelo this AM.     Pt OOB to chair. No complaints. Denies pain. Reports walking in the sepulveda this AM. Pleasant and appropriate. VSS Afebrile. Sands removed. + void. Labs  Lab Results   Component Value Date/Time    HGB 10.8 04/30/2017 03:35 AM      Lab Results   Component Value Date/Time    INR 1.0 04/27/2017 04:22 PM        Body mass index is 19.84 kg/(m^2). BMI greater than 30 is classified as obesity. Dressing with bloody drainage. Nursing to change. Cryotherapy  Calves soft and supple; No pain with passive stretch  Sensation and motor intact  SCDs for mechanical DVT proph while in bed     PLAN:  1) PT BID  2) Aspirin 81 mg PO BID for DVT Prophylaxis   3) Plan d/c to SNF. Orthopedically ready for d/c.      iMkey Weber NP

## 2017-05-01 NOTE — PROGRESS NOTES
Physical Therapy  Pt seated in chair from am session. Pt appears agitated this pm, asking for her mother and brother and insisting that people are lying to her. Pt refusing to work with PT or get back into bed. Pt remained in chair with safety alarm applied and tele sitter. nsg aware.

## 2017-05-01 NOTE — PROGRESS NOTES
S: Ms. David Chauhan was seen by me today during rounds. At this time, she is resting comfortably. She has a sitter; has been combative at times. The patient has no new complaints today. ROS otherwise unremarkable except as noted elsewhere. O: Blood pressure 141/68, pulse 72, temperature 98.5 °F (36.9 °C), resp. rate 18, height 5' 1\" (1.549 m), weight 105 lb (47.6 kg), SpO2 95 %. Gen: Patient is in no acute distress. Lungs: CTAB. Heart: RRR. Abd: S, NT, ND, BS present. Extremities: Warm. No results found for this or any previous visit (from the past 12 hour(s)). A / P:  1. Hip fracture ([address] (4/27/2017): Orthopedics consulted; s/p hemiarthroplasty 4/28. 2. Encephalopathy/combativeness:    3. Anemia NEC: Watch labs. 4. Asthma: No wheezing/ SOB at this time. Stable. Prn duonebs. 5. Hypertension: BP okay. 6. Hyperlipidemia: Continue statin. 7. Vitamin D deficiency (1/28/2011): I'll check this with AM labs. 8. D/C planning: ? SNF

## 2017-05-01 NOTE — PROGRESS NOTES
Spoke with PTA to discuss patient's progress in therapy. Per PTA patient very cooperative and pleasant during morning session, but upon attempting to see patient this afternoon the patient was agitated, paranoid and resistant to working with therapy. Will defer OT this PM and try to see her tomorrow before noon.

## 2017-05-02 VITALS
BODY MASS INDEX: 19.83 KG/M2 | HEART RATE: 79 BPM | DIASTOLIC BLOOD PRESSURE: 77 MMHG | RESPIRATION RATE: 18 BRPM | WEIGHT: 105 LBS | HEIGHT: 61 IN | OXYGEN SATURATION: 95 % | TEMPERATURE: 98 F | SYSTOLIC BLOOD PRESSURE: 138 MMHG

## 2017-05-02 PROCEDURE — 97116 GAIT TRAINING THERAPY: CPT

## 2017-05-02 PROCEDURE — 97530 THERAPEUTIC ACTIVITIES: CPT

## 2017-05-02 PROCEDURE — 74011250637 HC RX REV CODE- 250/637: Performed by: PHYSICIAN ASSISTANT

## 2017-05-02 RX ORDER — GUAIFENESIN 100 MG/5ML
81 LIQUID (ML) ORAL 2 TIMES DAILY
Qty: 60 TAB | Refills: 0 | Status: SHIPPED
Start: 2017-05-02 | End: 2017-06-01

## 2017-05-02 RX ORDER — POLYETHYLENE GLYCOL 3350 17 G/17G
17 POWDER, FOR SOLUTION ORAL
Qty: 15 PACKET | Refills: 0 | Status: SHIPPED
Start: 2017-05-02 | End: 2017-05-17

## 2017-05-02 RX ORDER — OXYCODONE HYDROCHLORIDE 5 MG/1
2.5-5 TABLET ORAL
Qty: 30 TAB | Refills: 0 | Status: SHIPPED | OUTPATIENT
Start: 2017-05-02 | End: 2017-10-09

## 2017-05-02 RX ORDER — ACETAMINOPHEN 325 MG/1
650 TABLET ORAL EVERY 6 HOURS
Qty: 112 TAB | Refills: 0 | Status: SHIPPED
Start: 2017-05-02 | End: 2017-05-16

## 2017-05-02 RX ORDER — FAMOTIDINE 10 MG/1
10 TABLET ORAL 2 TIMES DAILY
Qty: 60 TAB | Refills: 0 | Status: SHIPPED
Start: 2017-05-02 | End: 2017-06-01

## 2017-05-02 RX ORDER — AMOXICILLIN 250 MG
1 CAPSULE ORAL DAILY
Qty: 30 TAB | Refills: 0 | Status: SHIPPED
Start: 2017-05-02 | End: 2018-09-10

## 2017-05-02 RX ADMIN — ACETAMINOPHEN 650 MG: 325 TABLET, FILM COATED ORAL at 00:00

## 2017-05-02 NOTE — PROGRESS NOTES
Problem: Mobility Impaired (Adult and Pediatric)  Goal: *Acute Goals and Plan of Care (Insert Text)  Physical Therapy Goals  Initiated 4/29/2017  1. Patient will move from supine to sit and sit to supine in bed with minimal assistance/contact guard assist within 7 day(s). 2. Patient will transfer from bed to chair and chair to bed with minimal assistance/contact guard assist using the least restrictive device within 7 day(s). 3. Patient will perform sit to stand with minimal assistance/contact guard assist within 7 day(s). 4. Patient will ambulate with minimal assistance/contact guard assist for 50 feet with the least restrictive device within 7 day(s). 5. Patient will participate in general rom/strengthening exercises with min assist within 7 days. PHYSICAL THERAPY TREATMENT  Patient: Rogers Wallace (31 y.o. female)  Date: 5/2/2017  Diagnosis: Right Femoral Neck Fracture  Hip fracture ([facility] [address] <principal problem not specified>  Procedure(s) (LRB):  RIGHT HIP HEMIARTHROPLASTY (Right) 4 Days Post-Op  Precautions: WBAT      ASSESSMENT:  Pt was received for a.m. treatment session confused but pleasant and agreeable to PT session. She required constant verbal cues for safety to reduce impulsivity and perseveration on objects in environment such as gown pocket, brief, wanting to carry personal items etc. She followed commands to ambulate in room around bed to chair; verbal and tactile cues required for hand placement on RW and keeping walker close to COG as she tended to hold it at arm's length with trunk flexion. Chair alarm placed but pt kept attempting to stand up and pull brief off, then stated she had to go to the bathroom. Ambulated to toilet with min A x2, bowel leakage noted and pt required VCs to attend to B knee buckling and activate LE muscles. Nurse and assistant returned pt to bed once sheets were changed.  She repeatedly attempted to get up from bed or chair and will need 24 hr care; D/C planned to SNF today.   Progression toward goals:  [ ]    Improving appropriately and progressing toward goals  [X]    Improving slowly and progressing toward goals  [ ]    Not making progress toward goals and plan of care will be adjusted       PLAN:  Patient continues to benefit from skilled intervention to address the above impairments. Continue treatment per established plan of care. Discharge Recommendations:  Skilled Nursing Facility  Further Equipment Recommendations for Discharge:  TBD        SUBJECTIVE:   Patient stated Weren't you already in here today?       OBJECTIVE DATA SUMMARY:   Critical Behavior:  Neurologic State: Alert, Confused  Orientation Level: Oriented to person  Cognition: Impulsive, Impaired decision making, Poor safety awareness, Follows commands  Safety/Judgement: Decreased insight into deficits, Decreased awareness of need for assistance, Fall prevention, Home safety, Decreased awareness of need for safety, Decreased awareness of environment  Functional Mobility Training:  Bed Mobility:     Supine to Sit: Moderate assistance;Assist x1     Scooting: Moderate assistance        Transfers:  Sit to Stand: Minimum assistance;Assist x1;Additional time (VC's for hand placement)  Stand to Sit: Minimum assistance;Assist x1        Bed to Chair: Minimum assistance;Assist x1                    Balance:  Sitting - Static: Fair (occasional)  Sitting - Dynamic: Fair (occasional)  Standing: Impaired  Standing - Static: Fair  Standing - Dynamic : Fair (occasional knees buckling)  Ambulation/Gait Training:  Distance (ft): 30 Feet (ft)  Assistive Device: Gait belt;Walker, rolling  Ambulation - Level of Assistance: Minimal assistance     Gait Description (WDL): Exceptions to WDL  Gait Abnormalities: Decreased step clearance; Step to gait; Path deviations  Right Side Weight Bearing: As tolerated           Speed/Hollie: Shuffled  Step Length: Left shortened;Right shortened                             Therapeutic Exercises: Pain:  Pain Scale 1: Numeric (0 - 10)  Pain Intensity 1: 0  Pain Location 1: Hip  Pain Orientation 1: Right  Pain Description 1: Aching  Pain Intervention(s) 1: Medication (see MAR)  Activity Tolerance:      Please refer to the flowsheet for vital signs taken during this treatment.   After treatment:   [X]    Patient left in no apparent distress sitting up in chair  [ ]    Patient left in no apparent distress in bed  [X]    Call bell left within reach  [X]    Nursing notified  [X]    Caregiver present  [X]    Bed alarm activated      COMMUNICATION/COLLABORATION:   The patients plan of care was discussed with: Occupational Therapist and Registered Nurse     Sal Staton, PT, DPT   Time Calculation: 24 mins

## 2017-05-02 NOTE — ROUTINE PROCESS
[facility] Kathy Mehta                                                                        80 y.o.   female    [address] [facility]   Room: 3240/[facility] 3 ORTHOPEDICS  Unit Phone# :  [phone]      [address]  [facility] [address]  [address] [address]  Dept: [phone]  Loc: [phone]                    SITUATION     Admitted:  4/27/2017         Attending Provider:  Priti Gray MD       Consultations:  IP CONSULT TO ORTHOPEDIC SURGERY    PCP:  Priti Gray MD   [phone]    Treatment Team: Attending Provider: Priti Gray MD; Consulting Provider: Paz Stuart MD; Consulting Provider: Priti Gray MD; Utilization Review: Chad Rivera RN    Admitting Dx:  Right Femoral Neck Fracture  Hip fracture [facility])       Principal Problem: <principal problem not specified>    4 Days Post-Op of   Procedure(s):  RIGHT HIP HEMIARTHROPLASTY   BY: Paz Stuart MD             ON: 4/28/2017                  Code Status: Prior                Advance Directives:   Advance Care Planning 4/28/2017   Patient's Healthcare Decision Maker is: Verbal statement (Legal Next of Kin remains as decision maker)   Primary Decision Maker Name Marilee Ellsworth Decision Maker Phone Number [phone]   Primary Decision Maker Relationship to Patient Adult child   Confirm Advance Directive Yes, on file    (Send w/patient)   Yes Not W Pt       Isolation:  There are currently no Active Isolations       MDRO: No current active infections    Pain Medications given:  NA    Last dose: 5/2/2017 at  [address] needed: yes  Type of equipment:    hemodialysis  (Not currently on dialysis)  (Not currently on dialysis)  (Not currently on dialysis)     BACKGROUND     Allergies: Allergies   Allergen Reactions    Aspirin Nausea and Vomiting    Diclofenac Itching     The voltaren gel caused itching.      Gabapentin Swelling    Hydrochlorothiazide Other (comments)     dizziness    Pcn [Penicillins] Swelling       Past Medical History:   Diagnosis Date    Anemia NEC     Asthma     Cervical spondylarthritis     Depression     DJD (degenerative joint disease) of knee     Esophageal spasm     Has required esophageal dilatation; Dr. Barbara Kilpatrick GERD (gastroesophageal reflux disease)     Hypercholesterolemia     Hypertension     Lumbar stenosis     Neuropathy, upper extremity     left    Vertigo [id number]    Saw Dr. Jamee Cabral; improved.  Vitamin D deficiency 1/28/2011       Past Surgical History:   Procedure Laterality Date    HX CATARACT REMOVAL      bilateral    HX HYSTERECTOMY      HX ORTHOPAEDIC      right bunion excision    HX TONSILLECTOMY         Prescriptions Prior to Admission   Medication Sig    atorvastatin (LIPITOR) 20 mg tablet TAKE 1 TABLET BY MOUTH EVERY NIGHT    atenolol (TENORMIN) 25 mg tablet TAKE 1 TABLET BY MOUTH DAILY    albuterol (PROVENTIL HFA, VENTOLIN HFA, PROAIR HFA) 90 mcg/actuation inhaler Take 1 Puff by inhalation as needed for Wheezing.  cholecalciferol, vitamin d3, (VITAMIN D) 1,000 unit tablet Take  by mouth daily.  MULTIVITAMINS (MULTIVITAMIN PO) Take  by mouth daily.  amLODIPine-valsartan (EXFORGE)  mg per tablet TAKE 1 TABLET BY MOUTH DAILY    DULoxetine (CYMBALTA) 30 mg capsule TAKE 1 CAPSULE BY MOUTH DAILY    lidocaine (LIDODERM) 5 % 1 Patch by TransDERmal route every twenty-four (24) hours. Apply patch to the affected area for 12 hours a day and remove for 12 hours a day.        Hard scripts included in transfer packet yes    Vaccinations:    Immunization History   Administered Date(s) Administered    Influenza Vaccine 10/21/2013, 12/01/2014    Influenza Vaccine (Quad) 10/07/2015    Influenza Vaccine (Quad) PF 01/30/2017    Influenza Vaccine Split 10/15/2010, 10/17/2012    Pneumococcal Vaccine (Unspecified Type) 10/17/2012       Readmission Risks:    Known Risks: none          The Charlson CoMorbitiy Index tool is an evidenced based tool that has more automatic generated information. The tool looks at many different items such as the age of the patient, how many times they were admitted in the last calendar year, current length of stay in the hospital and their diagnosis. All of these items are pulled automatically from information documented in the chart from various places and will generate a score that predicts whether a patient is at low (less than 13), medium (13-20) or high (21 or greater) risk of being readmitted.         ASSESSMENT                Temp: 98 °F (36.7 °C) (05/02/17 0806) Pulse (Heart Rate): 79 (05/02/17 0806)     Resp Rate: 18 (05/02/17 0806)           BP: 138/77 (05/02/17 0806)     O2 Sat (%): 95 % (05/02/17 0806)     Weight: 47.6 kg (105 lb)    Height: 5' 1\" (154.9 cm) (04/28/17 1118)       If above not within 1 hour of discharge:    BP:_____  P:____  R:____ T:_____ O2 Sat: ___%  O2: ______    Active Orders   Diet    DIET REGULAR         Orientation: only aware of  person     Active Behaviors: Agitation                                   Active Lines/Drains:  (Peg Tube / Sands / CL or S/L?): no    Urinary Status: Voiding     Last BM: Last Bowel Movement Date: 05/01/17     Skin Integrity: Intact   Wound Hip Right-DRESSING STATUS: Breakthrough drainage    Wound Hip Right-DRESSING TYPE: Non-adherent    Mobility: Very limited   Weight Bearing Status: WBAT (Weight Bearing as Tolerated)      Gait Training  Assistive Device: Gait belt, Walker, rolling  Ambulation - Level of Assistance: Minimal assistance  Distance (ft): 30 Feet (ft)         Lab Results   Component Value Date/Time    Glucose 108 04/29/2017 05:38 AM    INR 1.0 04/27/2017 04:22 PM    HGB 10.8 04/30/2017 03:35 AM    HGB 11.4 04/29/2017 05:38 AM        RECOMMENDATION     See After Visit Summary (AVS) for:  · Discharge instructions  · After [address]   · Special equipment needed (entered pre-discharge by Care Management)  · Medication Reconciliation    · Follow up Appointment(s)         Report given/sent by:  Karlee Maharaj RN                    Verbal report given to: Chrissy Verduzco RN  FAXED to:  ***         Estimated discharge time:  5/2/2017 at [facility]

## 2017-05-02 NOTE — PROGRESS NOTES
As per pt family request CM arranged transportation through [facility]. Notified family that if pt insurance denied pt will responsible to pay the bill for transportation. Family agreed. [facility] given copy attached to pt bedside chart. notified family and SNF regarding pt discharge plan. Provider info updated to pt AVS.    Transport has been arranged with a  time of 11.00 AM  by [facility]. Floor nurse can call report to [address] [place] [address] at [phone] pt is going to room 314 at [facility], please send most recent MAR, copy of d/c instructions, SNF hand off report, and any prescriptions that need to go with the pt.     Tobias Morataya   Ext [phone]

## 2017-05-02 NOTE — PROGRESS NOTES
Ortho/ NeuroSurgery NP Note    POD# 4  s/p RIGHT HIP HEMIARTHROPLASTY   Pt seen by Dr. Fabiana Ryan this AM.     Pt resting in bed. No complaints. Denies pain reports expected post-op \"stinging\". Pleasant and appropriate. VSS Afebrile. Wearing an incontinent brief. Labs  Lab Results   Component Value Date/Time    HGB 10.8 04/30/2017 03:35 AM      Lab Results   Component Value Date/Time    INR 1.0 04/27/2017 04:22 PM        Body mass index is 19.84 kg/(m^2). BMI greater than 30 is classified as obesity. Dressing scant serous drainage. Cryotherapy  Calves soft and supple; No pain with passive stretch  Sensation and motor intact  SCDs for mechanical DVT proph while in bed     PLAN:  1) PT BID  2) Aspirin 81 mg PO BID for DVT Prophylaxis   3) Plan d/c to SNF. Orthopedically ready for d/c. Dr. Fabiana Ryan d/c'd this morning.      Martir Weaver NP

## 2017-05-03 ENCOUNTER — PATIENT OUTREACH (OUTPATIENT)
Dept: INTERNAL MEDICINE CLINIC | Age: 82
End: 2017-05-03

## 2017-05-03 NOTE — PROGRESS NOTES
[facility] Hospitalization             Referral from [address]; [address] \[facility]\"". Patient admitted to [facility] on 4/27/17 and discharged on 5/2/17 with diagnosis of Hip Fracture. - s/p Right Hip Hemiarthroplasty (Dr. Daja Nelson) on 4/28/17; diagnosis of Right Hip Femoral Neck Fracture. Significant Lab/Diagnostic Findings:  Lab Results  Component Value Date/Time   WBC 11.2 04/29/2017 05:38 AM   WBC 6.7 05/21/2012 08:23 AM   HGB 10.8 04/30/2017 03:35 AM   HCT 34.2 04/29/2017 05:38 AM   PLATELET 801 26/43/0221 05:38 AM   MCV 87.9 04/29/2017 05:38 AM       Lab Results  Component Value Date/Time   Cholesterol, total 128 04/29/2017 05:38 AM   HDL Cholesterol 82 04/29/2017 05:38 AM   LDL, calculated 37 04/29/2017 05:38 AM   Triglyceride 45 04/29/2017 05:38 AM   CHOL/HDL Ratio 1.6 04/29/2017 05:38 AM       Lab Results   Component Value Date/Time    Sodium 146 04/29/2017 05:38 AM    Potassium 3.5 04/29/2017 05:38 AM    Chloride 93 04/29/2017 05:38 AM    CO2 19 04/29/2017 05:38 AM    Anion gap 34 04/29/2017 05:38 AM    Glucose 108 04/29/2017 05:38 AM    BUN 9 04/29/2017 05:38 AM    Creatinine 0.51 04/29/2017 05:38 AM    BUN/Creatinine ratio 18 04/29/2017 05:38 AM    GFR est AA >60 04/29/2017 05:38 AM    GFR est non-AA >60 04/29/2017 05:38 AM    Calcium 7.8 04/29/2017 05:38 AM      Lab Results   Component Value Date/Time    Sodium 146 04/29/2017 05:38 AM    Potassium 3.5 04/29/2017 05:38 AM    Chloride 93 04/29/2017 05:38 AM    CO2 19 04/29/2017 05:38 AM    Anion gap 34 04/29/2017 05:38 AM    Glucose 108 04/29/2017 05:38 AM    BUN 9 04/29/2017 05:38 AM    Creatinine 0.51 04/29/2017 05:38 AM    BUN/Creatinine ratio 18 04/29/2017 05:38 AM    GFR est AA >60 04/29/2017 05:38 AM    GFR est non-AA >60 04/29/2017 05:38 AM    Calcium 7.8 04/29/2017 05:38 AM    Bilirubin, total 0.4 04/29/2017 05:38 AM    ALT (SGPT) 26 04/29/2017 05:38 AM    AST (SGOT) 17 04/29/2017 05:38 AM    Alk.  phosphatase 53 04/29/2017 05:38 AM    Protein, total 5.3 04/29/2017 05:38 AM    Albumin 2.2 04/29/2017 05:38 AM    Globulin 3.1 04/29/2017 05:38 AM    A-G Ratio 0.7 04/29/2017 05:38 AM      Component      Latest Ref Rng & Units 4/30/2017           3:35 AM   HGB      11.5 - 16.0 g/dL 10.8 (L)               NN has updated care team members in the patient's chart. RRAT score:   Low Risk            12       Total Score        3 Relationship with PCP    4 More than 1 Admission in calendar year    5 Charlson Comorbidity Score        Criteria that do not apply:    Patient Living Status    Patient Length of Stay > 5    Patient Insurance is Medicare, Medicaid or Self Pay                  Advance Medical Directive on file in EMR? No.  Patient was evaluated by care management during hospitalization. Per notes, Discharge Disposition from [facility]: [address] [place] [address],[address] Floor (SNF). Recommended Post-Hospital Discharge follow-up (see below as listed on Hospital Discharge AVS/Instructions). Follow-up Information     Follow up With Details Comments [address] [facility],[address] [place] [address], MD     [facility]  [address] [facility] [address]  [facility]  [phone]     Almita Nichole MD In 2 weeks For wound re-check [facility]  [address] [facility],[address]  [facility]  [phone]     [facility] [address] [facility] On 5/2/2017 This is your skilled nursing provider [address] [facility]  [phone]                 Most recent HIPAA form in the patient's chart (dated 2/22/16) was reviewed; authorized individual(s) listed on HIPAA form include Bello Allen.        - Patient currently admitted to [address] [place] [address],[address] Floor (SNF). NN follow-up  Patient to be followed by [facility] during SNF admission.       New medications at discharge include: Tylenol, Aspirin, Celebrex, Ergocalciferol, Haldol, Oxycodone, Miralax, Pericolace  Medication(s) changed at hospital discharge include: N/A  Medication(s) discontinued at hospital discharge include: Exforge            NN will route this encounter to Dr. Krystina Wood for notification/review. This note will not be viewable in [address]

## 2017-05-04 ENCOUNTER — PATIENT OUTREACH (OUTPATIENT)
Dept: CASE MANAGEMENT | Age: 82
End: 2017-05-04
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
## 2017-05-08 ENCOUNTER — APPOINTMENT (OUTPATIENT)
Dept: ULTRASOUND IMAGING | Age: 82
DRG: 689 | End: 2017-05-08
Attending: EMERGENCY MEDICINE
Payer: MEDICARE

## 2017-05-08 ENCOUNTER — APPOINTMENT (OUTPATIENT)
Dept: GENERAL RADIOLOGY | Age: 82
DRG: 689 | End: 2017-05-08
Attending: EMERGENCY MEDICINE
Payer: MEDICARE

## 2017-05-08 ENCOUNTER — HOSPITAL ENCOUNTER (INPATIENT)
Age: 82
LOS: 2 days | Discharge: NURSING, REHAB OR LONG-TERM CARE | DRG: 689 | End: 2017-05-10
Attending: EMERGENCY MEDICINE | Admitting: HOSPITALIST
Payer: MEDICARE

## 2017-05-08 ENCOUNTER — APPOINTMENT (OUTPATIENT)
Dept: CT IMAGING | Age: 82
DRG: 689 | End: 2017-05-08
Attending: EMERGENCY MEDICINE
Payer: MEDICARE

## 2017-05-08 DIAGNOSIS — N30.00 ACUTE CYSTITIS WITHOUT HEMATURIA: ICD-10-CM

## 2017-05-08 DIAGNOSIS — G93.40 ACUTE ENCEPHALOPATHY: Primary | ICD-10-CM

## 2017-05-08 PROBLEM — N39.0 UTI (URINARY TRACT INFECTION): Status: ACTIVE | Noted: 2017-05-08

## 2017-05-08 LAB
ALBUMIN SERPL BCP-MCNC: 2.7 G/DL (ref 3.5–5)
ALBUMIN/GLOB SERPL: 0.7 {RATIO} (ref 1.1–2.2)
ALP SERPL-CCNC: 107 U/L (ref 45–117)
ALT SERPL-CCNC: 64 U/L (ref 12–78)
ANION GAP BLD CALC-SCNC: 9 MMOL/L (ref 5–15)
APPEARANCE UR: ABNORMAL
AST SERPL W P-5'-P-CCNC: 27 U/L (ref 15–37)
BACTERIA URNS QL MICRO: ABNORMAL /HPF
BASOPHILS # BLD AUTO: 0 K/UL (ref 0–0.1)
BASOPHILS # BLD: 0 % (ref 0–1)
BILIRUB SERPL-MCNC: 0.4 MG/DL (ref 0.2–1)
BILIRUB UR QL: NEGATIVE
BUN SERPL-MCNC: 18 MG/DL (ref 6–20)
BUN/CREAT SERPL: 24 (ref 12–20)
CALCIUM SERPL-MCNC: 9.6 MG/DL (ref 8.5–10.1)
CHLORIDE SERPL-SCNC: 106 MMOL/L (ref 97–108)
CK MB CFR SERPL CALC: 2.6 % (ref 0–2.5)
CK MB SERPL-MCNC: 2.6 NG/ML (ref 5–25)
CK SERPL-CCNC: 101 U/L (ref 26–192)
CO2 SERPL-SCNC: 27 MMOL/L (ref 21–32)
COLOR UR: ABNORMAL
CREAT SERPL-MCNC: 0.76 MG/DL (ref 0.55–1.02)
EOSINOPHIL # BLD: 0.2 K/UL (ref 0–0.4)
EOSINOPHIL NFR BLD: 2 % (ref 0–7)
EPITH CASTS URNS QL MICRO: ABNORMAL /LPF
ERYTHROCYTE [DISTWIDTH] IN BLOOD BY AUTOMATED COUNT: 12.7 % (ref 11.5–14.5)
GLOBULIN SER CALC-MCNC: 3.7 G/DL (ref 2–4)
GLUCOSE SERPL-MCNC: 97 MG/DL (ref 65–100)
GLUCOSE UR STRIP.AUTO-MCNC: NEGATIVE MG/DL
HCT VFR BLD AUTO: 30.8 % (ref 35–47)
HGB BLD-MCNC: 10.2 G/DL (ref 11.5–16)
HGB UR QL STRIP: ABNORMAL
KETONES UR QL STRIP.AUTO: NEGATIVE MG/DL
LEUKOCYTE ESTERASE UR QL STRIP.AUTO: ABNORMAL
LYMPHOCYTES # BLD AUTO: 11 % (ref 12–49)
LYMPHOCYTES # BLD: 1.4 K/UL (ref 0.8–3.5)
MAGNESIUM SERPL-MCNC: 2.2 MG/DL (ref 1.6–2.4)
MCH RBC QN AUTO: 29.4 PG (ref 26–34)
MCHC RBC AUTO-ENTMCNC: 33.1 G/DL (ref 30–36.5)
MCV RBC AUTO: 88.8 FL (ref 80–99)
MONOCYTES # BLD: 1 K/UL (ref 0–1)
MONOCYTES NFR BLD AUTO: 8 % (ref 5–13)
NEUTS SEG # BLD: 9.4 K/UL (ref 1.8–8)
NEUTS SEG NFR BLD AUTO: 79 % (ref 32–75)
NITRITE UR QL STRIP.AUTO: POSITIVE
PH UR STRIP: 7 [PH] (ref 5–8)
PLATELET # BLD AUTO: 318 K/UL (ref 150–400)
POTASSIUM SERPL-SCNC: 4.5 MMOL/L (ref 3.5–5.1)
PROT SERPL-MCNC: 6.4 G/DL (ref 6.4–8.2)
PROT UR STRIP-MCNC: NEGATIVE MG/DL
RBC # BLD AUTO: 3.47 M/UL (ref 3.8–5.2)
RBC #/AREA URNS HPF: ABNORMAL /HPF (ref 0–5)
SODIUM SERPL-SCNC: 142 MMOL/L (ref 136–145)
SP GR UR REFRACTOMETRY: 1.01 (ref 1–1.03)
TROPONIN I SERPL-MCNC: <0.04 NG/ML
UA: UC IF INDICATED,UAUC: ABNORMAL
UROBILINOGEN UR QL STRIP.AUTO: 0.2 EU/DL (ref 0.2–1)
WBC # BLD AUTO: 12 K/UL (ref 3.6–11)
WBC URNS QL MICRO: ABNORMAL /HPF (ref 0–4)

## 2017-05-08 PROCEDURE — 36415 COLL VENOUS BLD VENIPUNCTURE: CPT | Performed by: EMERGENCY MEDICINE

## 2017-05-08 PROCEDURE — 93971 EXTREMITY STUDY: CPT

## 2017-05-08 PROCEDURE — 87086 URINE CULTURE/COLONY COUNT: CPT | Performed by: EMERGENCY MEDICINE

## 2017-05-08 PROCEDURE — 82550 ASSAY OF CK (CPK): CPT | Performed by: EMERGENCY MEDICINE

## 2017-05-08 PROCEDURE — 93005 ELECTROCARDIOGRAM TRACING: CPT

## 2017-05-08 PROCEDURE — 87186 SC STD MICRODIL/AGAR DIL: CPT | Performed by: EMERGENCY MEDICINE

## 2017-05-08 PROCEDURE — 84484 ASSAY OF TROPONIN QUANT: CPT | Performed by: EMERGENCY MEDICINE

## 2017-05-08 PROCEDURE — 87040 BLOOD CULTURE FOR BACTERIA: CPT | Performed by: EMERGENCY MEDICINE

## 2017-05-08 PROCEDURE — 70450 CT HEAD/BRAIN W/O DYE: CPT

## 2017-05-08 PROCEDURE — 71010 XR CHEST PORT: CPT

## 2017-05-08 PROCEDURE — 87077 CULTURE AEROBIC IDENTIFY: CPT | Performed by: EMERGENCY MEDICINE

## 2017-05-08 PROCEDURE — 83735 ASSAY OF MAGNESIUM: CPT | Performed by: EMERGENCY MEDICINE

## 2017-05-08 PROCEDURE — 81001 URINALYSIS AUTO W/SCOPE: CPT | Performed by: EMERGENCY MEDICINE

## 2017-05-08 PROCEDURE — 74011250636 HC RX REV CODE- 250/636: Performed by: EMERGENCY MEDICINE

## 2017-05-08 PROCEDURE — 80053 COMPREHEN METABOLIC PANEL: CPT | Performed by: EMERGENCY MEDICINE

## 2017-05-08 PROCEDURE — 74011000258 HC RX REV CODE- 258: Performed by: EMERGENCY MEDICINE

## 2017-05-08 PROCEDURE — 99285 EMERGENCY DEPT VISIT HI MDM: CPT

## 2017-05-08 PROCEDURE — 65270000029 HC RM PRIVATE

## 2017-05-08 PROCEDURE — 85025 COMPLETE CBC W/AUTO DIFF WBC: CPT | Performed by: EMERGENCY MEDICINE

## 2017-05-08 RX ORDER — DEXTROSE MONOHYDRATE AND SODIUM CHLORIDE 5; .45 G/100ML; G/100ML
75 INJECTION, SOLUTION INTRAVENOUS CONTINUOUS
Status: DISCONTINUED | OUTPATIENT
Start: 2017-05-08 | End: 2017-05-10 | Stop reason: HOSPADM

## 2017-05-08 RX ORDER — SODIUM CHLORIDE 0.9 % (FLUSH) 0.9 %
5-10 SYRINGE (ML) INJECTION AS NEEDED
Status: DISCONTINUED | OUTPATIENT
Start: 2017-05-08 | End: 2017-05-10 | Stop reason: HOSPADM

## 2017-05-08 RX ORDER — ACETAMINOPHEN 325 MG/1
650 TABLET ORAL
Status: DISCONTINUED | OUTPATIENT
Start: 2017-05-08 | End: 2017-05-10 | Stop reason: HOSPADM

## 2017-05-08 RX ORDER — DULOXETIN HYDROCHLORIDE 30 MG/1
30 CAPSULE, DELAYED RELEASE ORAL DAILY
Status: DISCONTINUED | OUTPATIENT
Start: 2017-05-09 | End: 2017-05-10 | Stop reason: HOSPADM

## 2017-05-08 RX ORDER — AMOXICILLIN 250 MG
1 CAPSULE ORAL DAILY
Status: DISCONTINUED | OUTPATIENT
Start: 2017-05-09 | End: 2017-05-10 | Stop reason: HOSPADM

## 2017-05-08 RX ORDER — ONDANSETRON 2 MG/ML
4 INJECTION INTRAMUSCULAR; INTRAVENOUS
Status: DISCONTINUED | OUTPATIENT
Start: 2017-05-08 | End: 2017-05-10 | Stop reason: HOSPADM

## 2017-05-08 RX ORDER — FAMOTIDINE 20 MG/1
10 TABLET, FILM COATED ORAL 2 TIMES DAILY
Status: DISCONTINUED | OUTPATIENT
Start: 2017-05-09 | End: 2017-05-10 | Stop reason: HOSPADM

## 2017-05-08 RX ORDER — HYDRALAZINE HYDROCHLORIDE 20 MG/ML
10 INJECTION INTRAMUSCULAR; INTRAVENOUS
Status: DISCONTINUED | OUTPATIENT
Start: 2017-05-08 | End: 2017-05-10 | Stop reason: HOSPADM

## 2017-05-08 RX ORDER — POLYETHYLENE GLYCOL 3350 17 G/17G
17 POWDER, FOR SOLUTION ORAL DAILY
Status: DISCONTINUED | OUTPATIENT
Start: 2017-05-09 | End: 2017-05-10 | Stop reason: HOSPADM

## 2017-05-08 RX ORDER — MELATONIN
1000 DAILY
Status: DISCONTINUED | OUTPATIENT
Start: 2017-05-09 | End: 2017-05-10 | Stop reason: HOSPADM

## 2017-05-08 RX ORDER — ENOXAPARIN SODIUM 100 MG/ML
40 INJECTION SUBCUTANEOUS EVERY 24 HOURS
Status: DISCONTINUED | OUTPATIENT
Start: 2017-05-09 | End: 2017-05-10 | Stop reason: HOSPADM

## 2017-05-08 RX ORDER — ATENOLOL 25 MG/1
25 TABLET ORAL DAILY
Status: DISCONTINUED | OUTPATIENT
Start: 2017-05-09 | End: 2017-05-10 | Stop reason: HOSPADM

## 2017-05-08 RX ORDER — SODIUM CHLORIDE 0.9 % (FLUSH) 0.9 %
5-10 SYRINGE (ML) INJECTION EVERY 8 HOURS
Status: DISCONTINUED | OUTPATIENT
Start: 2017-05-08 | End: 2017-05-10 | Stop reason: HOSPADM

## 2017-05-08 RX ORDER — OXYCODONE HYDROCHLORIDE 5 MG/1
2.5-5 TABLET ORAL
Status: DISCONTINUED | OUTPATIENT
Start: 2017-05-08 | End: 2017-05-10 | Stop reason: HOSPADM

## 2017-05-08 RX ORDER — IPRATROPIUM BROMIDE AND ALBUTEROL SULFATE 2.5; .5 MG/3ML; MG/3ML
3 SOLUTION RESPIRATORY (INHALATION)
Status: DISCONTINUED | OUTPATIENT
Start: 2017-05-08 | End: 2017-05-10 | Stop reason: HOSPADM

## 2017-05-08 RX ORDER — ATORVASTATIN CALCIUM 20 MG/1
20 TABLET, FILM COATED ORAL
Status: DISCONTINUED | OUTPATIENT
Start: 2017-05-08 | End: 2017-05-10 | Stop reason: HOSPADM

## 2017-05-08 RX ORDER — GUAIFENESIN 100 MG/5ML
81 LIQUID (ML) ORAL 2 TIMES DAILY
Status: DISCONTINUED | OUTPATIENT
Start: 2017-05-09 | End: 2017-05-10 | Stop reason: HOSPADM

## 2017-05-08 RX ADMIN — CEFTRIAXONE 1 G: 1 INJECTION, POWDER, FOR SOLUTION INTRAMUSCULAR; INTRAVENOUS at 23:36
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
## 2017-05-08 NOTE — IP AVS SNAPSHOT
[address] [facility] 83. [phone] Patient: Thompson Villafuerte MRN: [id number] DR You are allergic to the following Allergen Reactions Aspirin Nausea and Vomiting Diclofenac Itching The voltaren gel caused itching. Gabapentin Swelling Hydrochlorothiazide Other (comments)  
 dizziness Pcn (Penicillins) Swelling Recent Documentation Weight BMI OB Status Smoking Status 61 kg 25.41 kg/m2 Hysterectomy Never Smoker Unresulted Labs Order Current Status CULTURE, BLOOD Preliminary result CULTURE, BLOOD Preliminary result CULTURE, URINE Preliminary result Emergency Contacts Name Discharge Info Relation Home Work Mobile Tali Patton  Child [2] [phone] About your hospitalization You were admitted on: May 8, 2017 You last received care in the:  [facility] 2 GENERAL SURGERY You were discharged on:  May 10, 2017 Unit phone number:  [phone] Why you were hospitalized Your primary diagnosis was:  Uti (Urinary Tract Infection) Providers Seen During Your Hospitalizations Provider Role Specialty Primary office phone Malia Miranda MD Attending Provider Emergency Medicine [phone] Jose L Chmabers MD Attending Provider Internal Medicine [phone] Your Primary Care Physician (PCP) Primary Care Physician Office Phone Office Fax Sayda Swanson [phone] Follow-up Information Follow up With Details Comments Contact Info Jose L Chambers MD   [address]  [address] [facility] 83. [phone] [facility] On 5/10/2017 THIS IS YOUR SNF PLACEMENT. IF YOU HAVE ADDITIONAL QUESTIONS AND/OR CONCERNS, PLEASE CONTACT FACILITY DIRECTLY. Kathleen [address] 
[phone] Your Appointments Wednesday May 31, 2017  9:15 AM EDT ROUTINE CARE with Nick Bradley MD  
[facility] [address]  
[phone] Current Discharge Medication List  
  
START taking these medications Dose & Instructions Dispensing Information Comments Morning Noon Evening Bedtime  
 cefUROXime 500 mg tablet Commonly known as:  CEFTIN Your last dose was: Your next dose is:    
   
   
 Dose:  500 mg Take 1 Tab by mouth two (2) times a day for 3 days. Quantity:  6 Tab Refills:  0 CONTINUE these medications which have CHANGED Dose & Instructions Dispensing Information Comments Morning Noon Evening Bedtime  
 ergocalciferol 50,000 unit capsule Commonly known as:  ERGOCALCIFEROL What changed:  when to take this Your last dose was: Your next dose is:    
   
   
 Dose:  10919 Units Take 1 Cap by mouth every seven (7) days. Quantity:  1 Cap Refills:  0  
     
   
   
   
  
 lidocaine 5 % Commonly known as:  Sonia Cote What changed:   
- how much to take 
- additional instructions Your last dose was: Your next dose is:    
   
   
 Dose:  1 Patch 1 Patch by TransDERmal route every twenty-four (24) hours. Apply patch to the affected area for 12 hours a day and remove for 12 hours a day. Quantity:  30 Each Refills:  11 GENERIC EQUIVALENT is OKAY  
    
   
   
   
  
 * oxyCODONE IR 5 mg immediate release tablet Commonly known as:  Irene Galloway What changed:  Another medication with the same name was changed. Make sure you understand how and when to take each. Your last dose was: Your next dose is:    
   
   
 Dose:  5 mg Take 5 mg by mouth every three (3) hours as needed for Pain (6-10). Refills:  0 * oxyCODONE IR 5 mg immediate release tablet Commonly known as:  David Zelaya What changed:   
- how much to take 
- reasons to take this Your last dose was: Your next dose is:    
   
   
 Dose:  2.5-5 mg Take 0.5-1 Tabs by mouth every three (3) hours as needed. Max Daily Amount: 40 mg.  
 Quantity:  30 Tab Refills:  0  
     
   
   
   
  
 * Notice: This list has 2 medication(s) that are the same as other medications prescribed for you. Read the directions carefully, and ask your doctor or other care provider to review them with you. CONTINUE these medications which have NOT CHANGED Dose & Instructions Dispensing Information Comments Morning Noon Evening Bedtime  
 acetaminophen 325 mg tablet Commonly known as:  TYLENOL Your last dose was: Your next dose is:    
   
   
 Dose:  650 mg Take 2 Tabs by mouth every six (6) hours for 14 days. Quantity:  112 Tab Refills:  0  
     
   
   
   
  
 albuterol 90 mcg/actuation inhaler Commonly known as:  PROVENTIL HFA, VENTOLIN HFA, PROAIR HFA Your last dose was: Your next dose is:    
   
   
 Dose:  1 Puff Take 1 Puff by inhalation as needed for Wheezing. Quantity:  1 Inhaler Refills:  5  
     
   
   
   
  
 aspirin 81 mg chewable tablet Your last dose was: Your next dose is:    
   
   
 Dose:  81 mg Take 1 Tab by mouth two (2) times a day for 30 days. Quantity:  60 Tab Refills:  0  
     
   
   
   
  
 atenolol 25 mg tablet Commonly known as:  TENORMIN Your last dose was: Your next dose is: TAKE 1 TABLET BY MOUTH DAILY Quantity:  90 Tab Refills:  5  
 **Patient requests 90 days supply**  
    
   
   
   
  
 atorvastatin 20 mg tablet Commonly known as:  LIPITOR Your last dose was: Your next dose is: TAKE 1 TABLET BY MOUTH EVERY NIGHT Quantity:  90 Tab Refills:  0 bisacodyl 10 mg suppository Commonly known as:  DULCOLAX Your last dose was: Your next dose is:    
   
   
 Dose:  10 mg Insert 10 mg into rectum daily as needed (Constipation). Refills:  0  
     
   
   
   
  
 celecoxib 200 mg capsule Commonly known as:  CELEBREX Your last dose was: Your next dose is:    
   
   
 Dose:  200 mg Take 1 Cap by mouth daily for 90 days. Quantity:  1 Cap Refills:  0 DULoxetine 30 mg capsule Commonly known as:  CYMBALTA Your last dose was: Your next dose is: TAKE 1 CAPSULE BY MOUTH DAILY Quantity:  90 Cap Refills:  11  
 **Patient requests 90 days supply**  
    
   
   
   
  
 famotidine 10 mg tablet Commonly known as:  PEPCID Your last dose was: Your next dose is:    
   
   
 Dose:  10 mg Take 1 Tab by mouth two (2) times a day for 30 days. Quantity:  60 Tab Refills:  0  
     
   
   
   
  
 magnesium hydroxide 400 mg/5 mL suspension Commonly known as:  MILK OF MAGNESIA Your last dose was: Your next dose is:    
   
   
 Dose:  30 mL Take 30 mL by mouth DIALYSIS PRN for Constipation (if no BM after 3 days). Refills:  0 MULTIVITAMIN PO Your last dose was: Your next dose is: Take  by mouth daily. Refills:  0  
     
   
   
   
  
 polyethylene glycol 17 gram packet Commonly known as:  Artice Daunt Your last dose was: Your next dose is:    
   
   
 Dose:  17 g Take 1 Packet by mouth daily as needed (constipation) for up to 15 days. Quantity:  15 Packet Refills:  0  
     
   
   
   
  
 senna-docusate 8.6-50 mg per tablet Commonly known as:  Melodye Alex Your last dose was: Your next dose is:    
   
   
 Dose:  1 Tab Take 1 Tab by mouth daily. Quantity:  30 Tab Refills:  0  
     
   
   
   
  
 sodium phosphate 19-7 gram/118 mL enema Commonly known as:  FLEET'S Your last dose was: Your next dose is:    
   
   
 Dose:  1 Enema Insert 1 Enema into rectum as needed (if no results from Bisacodyl Suppository). Refills:  0 Where to Get Your Medications Information on where to get these meds will be given to you by the nurse or doctor. ! Ask your nurse or doctor about these medications  
  cefUROXime 500 mg tablet Discharge Instructions PATIENT DISCHARGE INSTRUCTIONS PATIENT DISCHARGE INSTRUCTIONS Amara Mcintosh / [id number] : 1928 Admitted 2017 Discharged: 5/10/2017 · It is important that you take the medication exactly as they are prescribed. · Keep your medication in the bottles provided by the pharmacist and keep a list of the medication names, dosages, and times to be taken in your wallet. · Do not take other medications without consulting your doctor. What to do at [facility] Recommended Diet: Cardiac Diet Recommended Activity: PT/OT Eval and Treat Signed By: Jose David Gentile MD   
 May 10, 2017 Discharge Orders None ACO Transitions of Care Introducing [facility] [address] Kendal Dustin offers a voluntary care coordination program to provide high quality service and care to [facility] fee-for-service beneficiaries. Viviane Best was designed to help you enhance your health and well-being through the following services: ? Transitions of Care  support for individuals who are transitioning from one care setting to another (example: Hospital to home). ? Chronic and Complex Care Coordination  support for individuals and caregivers of those with serious or chronic illnesses or with more than one chronic (ongoing) condition and those who take a number of different medications.   
 
 
If you meet specific medical criteria, a [address] Coordinator may call you directly to coordinate your care with your primary care physician and your other care providers. For questions about the [facility] programs, please, contact your physicians office. For general questions or additional information about Accountable Care Organizations: 
Please visit www.medicare.gov/acos. html or call 1-800-MEDICARE (1-731.196.3312) TTY users should call 5-335.216.3161. Textingly Announcement We are excited to announce that we are making your provider's discharge notes available to you in Textingly. You will see these notes when they are completed and signed by the physician that discharged you from your recent hospital stay. If you have any questions or concerns about any information you see in Textingly, please call the Health Information Department where you were seen or reach out to your Primary Care Provider for more information about your plan of care. Introducing [facility]! Ai Navarrete introduces Textingly patient portal. Now you can access parts of your medical record, email your doctor's office, and request medication refills online. 1. In your internet browser, go to [email or web address] 2. Click on the First Time User? Click Here link in the Sign In box. You will see the New Member Sign Up page. 3. Enter your Textingly Access Code exactly as it appears below. You will not need to use this code after youve completed the sign-up process. If you do not sign up before the expiration date, you must request a new code. · Textingly Access Code: [email or web address] Expires: 7/31/2017 10:38 AM 
 
4. Enter the last four digits of your Social Security Number (xxxx) and Date of Birth (mm/dd/yyyy) as indicated and click Submit. You will be taken to the next sign-up page. 5. Create a Textingly ID. This will be your Textingly login ID and cannot be changed, so think of one that is secure and easy to remember. 6. Create a Kizoom password. You can change your password at any time. 7. Enter your Password Reset Question and Answer. This can be used at a later time if you forget your password. 8. Enter your e-mail address. You will receive e-mail notification when new information is available in [address] 9. Click Sign Up. You can now view and download portions of your medical record. 10. Click the Download Summary menu link to download a portable copy of your medical information. If you have questions, please visit the Frequently Asked Questions section of the Kizoom website. Remember, Kizoom is NOT to be used for urgent needs. For medical emergencies, dial 911. Now available from your iPhone and Android! General Information Please provide this summary of care documentation to your next provider. Patient Signature:  ____________________________________________________________ Date:  ____________________________________________________________  
  
Samantha Pepper Provider Signature:  ____________________________________________________________ Date:  ____________________________________________________________

## 2017-05-08 NOTE — IP AVS SNAPSHOT
Summary of Care Report The Summary of Care report has been created to help improve care coordination. Users with access to [employer] or [employer] [address] (Web-based application) may access additional patient information including the Discharge Summary. If you are not currently a [facility] user and need more information, please call the number listed below in the [address] section and ask to be connected with Medical Records. Facility Information Name Address Phone [address] [address] [phone] Patient Information Patient Name Sex LAQUITA Garrett ([id number]) Female 1928 Discharge Information Admitting Provider Service Area Unit Florin Roblero MD / [phone] [address] [facility] 2 General Surgery / [phone] Discharge Provider Discharge Date/Time Discharge Disposition Destination (none) 5/10/2017 (Pending) SNF (none) Patient Language Language ENGLISH [13] Hospital Problems as of 5/10/2017  Reviewed: 2017 11:47 PM by Florin Roblero MD  
  
  
  
 Class Noted - Resolved Last Modified POA Active Problems * (Principal)UTI (urinary tract infection)  2017 - Present 2017 by Florin Roblero MD Unknown Entered by Florin Roblero MD  
  
Non-Hospital Problems as of 5/10/2017  Reviewed: 2017 11:47 PM by Florin Roblero MD  
  
  
  
 Class Noted - Resolved Last Modified Active Problems   Hypertension  Unknown - Present 2009 by Liv Harmon MD  
  Entered by Liv Harmon MD  
  Hypercholesterolemia  Unknown - Present 2009 by Liv Harmon MD  
  Entered by Liv Harmon MD  
  Depression  Unknown - Present 2009 by Liv Harmon MD  
  Entered by Liv Harmon MD  
 GERD (gastroesophageal reflux disease)  Unknown - Present 12/4/2009 by Serafin Peterson MD  
  Entered by Serafin Peterson MD  
  Asthma  Unknown - Present 12/4/2009 by Serafin Peterson MD  
  Entered by Serafin Peterson MD  
  Neuropathy, upper extremity  Unknown - Present 12/4/2009 by Serafin Peterson MD  
  Entered by Serafin Peterson MD  
  Vitamin D deficiency  1/28/2011 - Present 1/28/2011 by Serafin Peterson MD  
  Entered by Serafin Peterson MD  
  Vertigo  Unknown - Present 10/17/2012 by Serafin Peterson MD  
  Entered by Serafin Peterson MD  
  Dizziness  7/24/2013 - Present 7/24/2013 by Dalia Junior Entered by Dalia Junior Lumbar stenosis  3/27/2014 - Present 3/27/2014 by Serafin Peterson MD  
  Entered by Serafin Peterson MD  
  Anemia  3/27/2014 - Present 3/27/2014 by Serafin Peterson MD  
  Entered by Serafin Peterson MD  
  Hip fracture [facility])  4/27/2017 - Present 4/27/2017 by Deborah Ferris MD  
  Entered by Deborah Ferris MD  
  
You are allergic to the following Allergen Reactions Aspirin Nausea and Vomiting Diclofenac Itching The voltaren gel caused itching. Gabapentin Swelling Hydrochlorothiazide Other (comments)  
 dizziness Pcn (Penicillins) Swelling Current Discharge Medication List  
  
START taking these medications Dose & Instructions Dispensing Information Comments  
 cefUROXime 500 mg tablet Commonly known as:  CEFTIN Dose:  500 mg Take 1 Tab by mouth two (2) times a day for 3 days. Quantity:  6 Tab Refills:  0 CONTINUE these medications which have CHANGED Dose & Instructions Dispensing Information Comments  
 ergocalciferol 50,000 unit capsule Commonly known as:  ERGOCALCIFEROL What changed:  when to take this Dose:  80421 Units Take 1 Cap by mouth every seven (7) days. Quantity:  1 Cap Refills:  0  
   
 lidocaine 5 % Commonly known as:  Karine Pillion What changed:   
- how much to take - additional instructions Dose:  1 Patch 1 Patch by TransDERmal route every twenty-four (24) hours. Apply patch to the affected area for 12 hours a day and remove for 12 hours a day. Quantity:  30 Each Refills:  11 GENERIC EQUIVALENT is OKAY  
  
 * oxyCODONE IR 5 mg immediate release tablet Commonly known as:  Sterling Bergman What changed:  Another medication with the same name was changed. Make sure you understand how and when to take each. Dose:  5 mg Take 5 mg by mouth every three (3) hours as needed for Pain (6-10). Refills:  0  
   
 * oxyCODONE IR 5 mg immediate release tablet Commonly known as:  Sterling Bergman What changed:   
- how much to take 
- reasons to take this Dose:  2.5-5 mg Take 0.5-1 Tabs by mouth every three (3) hours as needed. Max Daily Amount: 40 mg.  
 Quantity:  30 Tab Refills:  0  
   
 * Notice: This list has 2 medication(s) that are the same as other medications prescribed for you. Read the directions carefully, and ask your doctor or other care provider to review them with you. CONTINUE these medications which have NOT CHANGED Dose & Instructions Dispensing Information Comments  
 acetaminophen 325 mg tablet Commonly known as:  TYLENOL Dose:  650 mg Take 2 Tabs by mouth every six (6) hours for 14 days. Quantity:  112 Tab Refills:  0  
   
 albuterol 90 mcg/actuation inhaler Commonly known as:  PROVENTIL HFA, VENTOLIN HFA, PROAIR HFA Dose:  1 Puff Take 1 Puff by inhalation as needed for Wheezing. Quantity:  1 Inhaler Refills:  5  
   
 aspirin 81 mg chewable tablet Dose:  81 mg Take 1 Tab by mouth two (2) times a day for 30 days. Quantity:  60 Tab Refills:  0  
   
 atenolol 25 mg tablet Commonly known as:  TENORMIN  
 TAKE 1 TABLET BY MOUTH DAILY Quantity:  90 Tab Refills:  5  
 **Patient requests 90 days supply**  
  
 atorvastatin 20 mg tablet Commonly known as:  LIPITOR TAKE 1 TABLET BY MOUTH EVERY NIGHT Quantity:  90 Tab Refills:  0  
   
 bisacodyl 10 mg suppository Commonly known as:  DULCOLAX Dose:  10 mg Insert 10 mg into rectum daily as needed (Constipation). Refills:  0  
   
 celecoxib 200 mg capsule Commonly known as:  CELEBREX Dose:  200 mg Take 1 Cap by mouth daily for 90 days. Quantity:  1 Cap Refills:  0 DULoxetine 30 mg capsule Commonly known as:  CYMBALTA TAKE 1 CAPSULE BY MOUTH DAILY Quantity:  90 Cap Refills:  11  
 **Patient requests 90 days supply**  
  
 famotidine 10 mg tablet Commonly known as:  PEPCID Dose:  10 mg Take 1 Tab by mouth two (2) times a day for 30 days. Quantity:  60 Tab Refills:  0  
   
 magnesium hydroxide 400 mg/5 mL suspension Commonly known as:  MILK OF MAGNESIA Dose:  30 mL Take 30 mL by mouth DIALYSIS PRN for Constipation (if no BM after 3 days). Refills:  0 MULTIVITAMIN PO Take  by mouth daily. Refills:  0  
   
 polyethylene glycol 17 gram packet Commonly known as:  Olivia Hodgson Dose:  17 g Take 1 Packet by mouth daily as needed (constipation) for up to 15 days. Quantity:  15 Packet Refills:  0  
   
 senna-docusate 8.6-50 mg per tablet Commonly known as:  Elmer Salinas Dose:  1 Tab Take 1 Tab by mouth daily. Quantity:  30 Tab Refills:  0  
   
 sodium phosphate 19-7 gram/118 mL enema Commonly known as:  FLEET'S Dose:  1 Enema Insert 1 Enema into rectum as needed (if no results from Bisacodyl Suppository). Refills:  0 Current Immunizations Name Date Influenza Vaccine 12/1/2014, 10/21/2013 Influenza Vaccine Marene Coats) 10/7/2015 Influenza Vaccine (Quad) PF 1/30/2017 Influenza Vaccine Split 10/17/2012, 10/15/2010 Pneumococcal Vaccine (Unspecified Type) 10/17/2012 Follow-up Information Follow up With Details Comments Contact Info Guero Griffiths MD   [facility] [address] [facility]. 
[phone] [facility] On 5/10/2017 THIS IS YOUR SNF PLACEMENT. IF YOU HAVE ADDITIONAL QUESTIONS AND/OR CONCERNS, PLEASE CONTACT FACILITY DIRECTLY. Kathleen [address] [facility] 
[phone] Discharge Instructions PATIENT DISCHARGE INSTRUCTIONS PATIENT DISCHARGE INSTRUCTIONS Lyric Jerry / [id number] : 1928 Admitted 2017 Discharged: 5/10/2017 · It is important that you take the medication exactly as they are prescribed. · Keep your medication in the bottles provided by the pharmacist and keep a list of the medication names, dosages, and times to be taken in your wallet. · Do not take other medications without consulting your doctor. What to do at [facility] Recommended Diet: Cardiac Diet Recommended Activity: PT/OT Eval and Treat Signed By: Serafin Peterson MD   
 May 10, 2017 Chart Review Routing History Recipient Method Report Sent By Leanna Peterson MD  
Phone: [phone] In Basket IP Auto Routed Notes MD Koby Garcia 2017  9:49 PM 2017 Serafin Peterson MD  
Phone: [phone] In Basket IP Auto Routed Notes Shira Matias MD [[id number]] 2017 11:51 PM 2017
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
## 2017-05-08 NOTE — IP AVS SNAPSHOT
Current Discharge Medication List  
  
START taking these medications Dose & Instructions Dispensing Information Comments Morning Noon Evening Bedtime  
 cefUROXime 500 mg tablet Commonly known as:  CEFTIN Your last dose was: Your next dose is:    
   
   
 Dose:  500 mg Take 1 Tab by mouth two (2) times a day for 3 days. Quantity:  6 Tab Refills:  0 CONTINUE these medications which have CHANGED Dose & Instructions Dispensing Information Comments Morning Noon Evening Bedtime  
 ergocalciferol 50,000 unit capsule Commonly known as:  ERGOCALCIFEROL What changed:  when to take this Your last dose was: Your next dose is:    
   
   
 Dose:  42574 Units Take 1 Cap by mouth every seven (7) days. Quantity:  1 Cap Refills:  0  
     
   
   
   
  
 lidocaine 5 % Commonly known as:  Wanda Isaac What changed:   
- how much to take 
- additional instructions Your last dose was: Your next dose is:    
   
   
 Dose:  1 Patch 1 Patch by TransDERmal route every twenty-four (24) hours. Apply patch to the affected area for 12 hours a day and remove for 12 hours a day. Quantity:  30 Each Refills:  11 GENERIC EQUIVALENT is OKAY  
    
   
   
   
  
 * oxyCODONE IR 5 mg immediate release tablet Commonly known as:  Minda August What changed:  Another medication with the same name was changed. Make sure you understand how and when to take each. Your last dose was: Your next dose is:    
   
   
 Dose:  5 mg Take 5 mg by mouth every three (3) hours as needed for Pain (6-10). Refills:  0  
     
   
   
   
  
 * oxyCODONE IR 5 mg immediate release tablet Commonly known as:  Minda August What changed:   
- how much to take 
- reasons to take this Your last dose was:     
   
Your next dose is:    
   
   
 Dose:  2.5-5 mg  
 Take 0.5-1 Tabs by mouth every three (3) hours as needed. Max Daily Amount: 40 mg.  
 Quantity:  30 Tab Refills:  0  
     
   
   
   
  
 * Notice: This list has 2 medication(s) that are the same as other medications prescribed for you. Read the directions carefully, and ask your doctor or other care provider to review them with you. CONTINUE these medications which have NOT CHANGED Dose & Instructions Dispensing Information Comments Morning Noon Evening Bedtime  
 acetaminophen 325 mg tablet Commonly known as:  TYLENOL Your last dose was: Your next dose is:    
   
   
 Dose:  650 mg Take 2 Tabs by mouth every six (6) hours for 14 days. Quantity:  112 Tab Refills:  0  
     
   
   
   
  
 albuterol 90 mcg/actuation inhaler Commonly known as:  PROVENTIL HFA, VENTOLIN HFA, PROAIR HFA Your last dose was: Your next dose is:    
   
   
 Dose:  1 Puff Take 1 Puff by inhalation as needed for Wheezing. Quantity:  1 Inhaler Refills:  5  
     
   
   
   
  
 aspirin 81 mg chewable tablet Your last dose was: Your next dose is:    
   
   
 Dose:  81 mg Take 1 Tab by mouth two (2) times a day for 30 days. Quantity:  60 Tab Refills:  0  
     
   
   
   
  
 atenolol 25 mg tablet Commonly known as:  TENORMIN Your last dose was: Your next dose is: TAKE 1 TABLET BY MOUTH DAILY Quantity:  90 Tab Refills:  5  
 **Patient requests 90 days supply**  
    
   
   
   
  
 atorvastatin 20 mg tablet Commonly known as:  LIPITOR Your last dose was: Your next dose is: TAKE 1 TABLET BY MOUTH EVERY NIGHT Quantity:  90 Tab Refills:  0  
     
   
   
   
  
 bisacodyl 10 mg suppository Commonly known as:  DULCOLAX Your last dose was: Your next dose is:    
   
   
 Dose:  10 mg Insert 10 mg into rectum daily as needed (Constipation). Refills:  0 celecoxib 200 mg capsule Commonly known as:  CELEBREX Your last dose was: Your next dose is:    
   
   
 Dose:  200 mg Take 1 Cap by mouth daily for 90 days. Quantity:  1 Cap Refills:  0 DULoxetine 30 mg capsule Commonly known as:  CYMBALTA Your last dose was: Your next dose is: TAKE 1 CAPSULE BY MOUTH DAILY Quantity:  90 Cap Refills:  11  
 **Patient requests 90 days supply**  
    
   
   
   
  
 famotidine 10 mg tablet Commonly known as:  PEPCID Your last dose was: Your next dose is:    
   
   
 Dose:  10 mg Take 1 Tab by mouth two (2) times a day for 30 days. Quantity:  60 Tab Refills:  0  
     
   
   
   
  
 magnesium hydroxide 400 mg/5 mL suspension Commonly known as:  MILK OF MAGNESIA Your last dose was: Your next dose is:    
   
   
 Dose:  30 mL Take 30 mL by mouth DIALYSIS PRN for Constipation (if no BM after 3 days). Refills:  0 MULTIVITAMIN PO Your last dose was: Your next dose is: Take  by mouth daily. Refills:  0  
     
   
   
   
  
 polyethylene glycol 17 gram packet Commonly known as:  Miguelina Atwood Your last dose was: Your next dose is:    
   
   
 Dose:  17 g Take 1 Packet by mouth daily as needed (constipation) for up to 15 days. Quantity:  15 Packet Refills:  0  
     
   
   
   
  
 senna-docusate 8.6-50 mg per tablet Commonly known as:  Selene Posadas Your last dose was: Your next dose is:    
   
   
 Dose:  1 Tab Take 1 Tab by mouth daily. Quantity:  30 Tab Refills:  0  
     
   
   
   
  
 sodium phosphate 19-7 gram/118 mL enema Commonly known as:  FLEET'S Your last dose was: Your next dose is:    
   
   
 Dose:  1 Enema Insert 1 Enema into rectum as needed (if no results from Bisacodyl Suppository). Refills:  0 Where to Get Your Medications Information on where to get these meds will be given to you by the nurse or doctor. ! Ask your nurse or doctor about these medications  
  cefUROXime 500 mg tablet

## 2017-05-09 LAB
ANION GAP BLD CALC-SCNC: 7 MMOL/L (ref 5–15)
ATRIAL RATE: 63 BPM
BUN SERPL-MCNC: 12 MG/DL (ref 6–20)
BUN/CREAT SERPL: 19 (ref 12–20)
CALCIUM SERPL-MCNC: 9.5 MG/DL (ref 8.5–10.1)
CALCULATED P AXIS, ECG09: 87 DEGREES
CALCULATED R AXIS, ECG10: -46 DEGREES
CALCULATED T AXIS, ECG11: 61 DEGREES
CHLORIDE SERPL-SCNC: 107 MMOL/L (ref 97–108)
CO2 SERPL-SCNC: 28 MMOL/L (ref 21–32)
CREAT SERPL-MCNC: 0.62 MG/DL (ref 0.55–1.02)
DIAGNOSIS, 93000: NORMAL
ERYTHROCYTE [DISTWIDTH] IN BLOOD BY AUTOMATED COUNT: 12.6 % (ref 11.5–14.5)
GLUCOSE SERPL-MCNC: 89 MG/DL (ref 65–100)
HCT VFR BLD AUTO: 33 % (ref 35–47)
HGB BLD-MCNC: 10.7 G/DL (ref 11.5–16)
MAGNESIUM SERPL-MCNC: 2.1 MG/DL (ref 1.6–2.4)
MCH RBC QN AUTO: 28.8 PG (ref 26–34)
MCHC RBC AUTO-ENTMCNC: 32.4 G/DL (ref 30–36.5)
MCV RBC AUTO: 88.9 FL (ref 80–99)
P-R INTERVAL, ECG05: 188 MS
PHOSPHATE SERPL-MCNC: 2.5 MG/DL (ref 2.6–4.7)
PLATELET # BLD AUTO: 321 K/UL (ref 150–400)
POTASSIUM SERPL-SCNC: 4 MMOL/L (ref 3.5–5.1)
Q-T INTERVAL, ECG07: 450 MS
QRS DURATION, ECG06: 122 MS
QTC CALCULATION (BEZET), ECG08: 460 MS
RBC # BLD AUTO: 3.71 M/UL (ref 3.8–5.2)
SODIUM SERPL-SCNC: 142 MMOL/L (ref 136–145)
VENTRICULAR RATE, ECG03: 63 BPM
WBC # BLD AUTO: 10.9 K/UL (ref 3.6–11)

## 2017-05-09 PROCEDURE — G8987 SELF CARE CURRENT STATUS: HCPCS | Performed by: OCCUPATIONAL THERAPIST

## 2017-05-09 PROCEDURE — 83735 ASSAY OF MAGNESIUM: CPT | Performed by: HOSPITALIST

## 2017-05-09 PROCEDURE — 97535 SELF CARE MNGMENT TRAINING: CPT | Performed by: OCCUPATIONAL THERAPIST

## 2017-05-09 PROCEDURE — 85027 COMPLETE CBC AUTOMATED: CPT | Performed by: HOSPITALIST

## 2017-05-09 PROCEDURE — 65270000029 HC RM PRIVATE

## 2017-05-09 PROCEDURE — G8979 MOBILITY GOAL STATUS: HCPCS

## 2017-05-09 PROCEDURE — 36415 COLL VENOUS BLD VENIPUNCTURE: CPT | Performed by: HOSPITALIST

## 2017-05-09 PROCEDURE — 74011250637 HC RX REV CODE- 250/637: Performed by: HOSPITALIST

## 2017-05-09 PROCEDURE — 97165 OT EVAL LOW COMPLEX 30 MIN: CPT | Performed by: OCCUPATIONAL THERAPIST

## 2017-05-09 PROCEDURE — 80048 BASIC METABOLIC PNL TOTAL CA: CPT | Performed by: HOSPITALIST

## 2017-05-09 PROCEDURE — 74011000258 HC RX REV CODE- 258: Performed by: HOSPITALIST

## 2017-05-09 PROCEDURE — G8988 SELF CARE GOAL STATUS: HCPCS | Performed by: OCCUPATIONAL THERAPIST

## 2017-05-09 PROCEDURE — G8978 MOBILITY CURRENT STATUS: HCPCS

## 2017-05-09 PROCEDURE — 84100 ASSAY OF PHOSPHORUS: CPT | Performed by: HOSPITALIST

## 2017-05-09 PROCEDURE — 97116 GAIT TRAINING THERAPY: CPT

## 2017-05-09 PROCEDURE — 74011250636 HC RX REV CODE- 250/636: Performed by: HOSPITALIST

## 2017-05-09 PROCEDURE — 97161 PT EVAL LOW COMPLEX 20 MIN: CPT

## 2017-05-09 RX ORDER — OXYCODONE HYDROCHLORIDE 5 MG/1
5 TABLET ORAL
COMMUNITY
End: 2017-10-09

## 2017-05-09 RX ORDER — FACIAL-BODY WIPES
10 EACH TOPICAL
COMMUNITY
End: 2017-10-09

## 2017-05-09 RX ORDER — ADHESIVE BANDAGE
30 BANDAGE TOPICAL
COMMUNITY
End: 2017-10-09

## 2017-05-09 RX ADMIN — DOCUSATE SODIUM AND SENNOSIDES 1 TABLET: 8.6; 5 TABLET, FILM COATED ORAL at 09:11

## 2017-05-09 RX ADMIN — DULOXETINE HYDROCHLORIDE 30 MG: 30 CAPSULE, DELAYED RELEASE ORAL at 09:11

## 2017-05-09 RX ADMIN — ATENOLOL 25 MG: 25 TABLET ORAL at 09:11

## 2017-05-09 RX ADMIN — DEXTROSE MONOHYDRATE AND SODIUM CHLORIDE 75 ML/HR: 5; .45 INJECTION, SOLUTION INTRAVENOUS at 17:27

## 2017-05-09 RX ADMIN — ENOXAPARIN SODIUM 40 MG: 40 INJECTION SUBCUTANEOUS at 09:11

## 2017-05-09 RX ADMIN — OXYCODONE HYDROCHLORIDE 5 MG: 5 TABLET ORAL at 20:30

## 2017-05-09 RX ADMIN — Medication 10 ML: at 22:57

## 2017-05-09 RX ADMIN — ASPIRIN 81 MG CHEWABLE TABLET 81 MG: 81 TABLET CHEWABLE at 09:11

## 2017-05-09 RX ADMIN — FAMOTIDINE 10 MG: 20 TABLET ORAL at 17:11

## 2017-05-09 RX ADMIN — ATORVASTATIN CALCIUM 20 MG: 20 TABLET, FILM COATED ORAL at 22:55

## 2017-05-09 RX ADMIN — ASPIRIN 81 MG CHEWABLE TABLET 81 MG: 81 TABLET CHEWABLE at 17:11

## 2017-05-09 RX ADMIN — CEFTRIAXONE 1 G: 1 INJECTION, POWDER, FOR SOLUTION INTRAMUSCULAR; INTRAVENOUS at 22:55

## 2017-05-09 RX ADMIN — CHOLECALCIFEROL TAB 25 MCG (1000 UNIT) 1000 UNITS: 25 TAB at 09:11

## 2017-05-09 RX ADMIN — DEXTROSE MONOHYDRATE AND SODIUM CHLORIDE 75 ML/HR: 5; .45 INJECTION, SOLUTION INTRAVENOUS at 02:05

## 2017-05-09 RX ADMIN — ATORVASTATIN CALCIUM 20 MG: 20 TABLET, FILM COATED ORAL at 02:04

## 2017-05-09 RX ADMIN — POLYETHYLENE GLYCOL 3350 17 G: 17 POWDER, FOR SOLUTION ORAL at 09:11

## 2017-05-09 RX ADMIN — FAMOTIDINE 10 MG: 20 TABLET ORAL at 09:11

## 2017-05-09 RX ADMIN — Medication 10 ML: at 02:05

## 2017-05-09 NOTE — PROGRESS NOTES
S: Ms. Gail Lanes was seen by me today during rounds. At this time, she is resting comfortably. She is awake and answers questions. No SOB, CP, N/V. Hip pain okay right now. The patient has no new complaints today. ROS otherwise unremarkable except as noted elsewhere. O: Blood pressure 136/55, pulse 86, temperature 98.4 °F (36.9 °C), resp. rate 16, weight 134 lb 7.7 oz (61 kg), SpO2 99 %. Gen: Patient is in no acute distress. Lungs: CTAB. Heart: RRR. Abd: S, NT, ND, BS present. Extremities: Warm.     Recent Results (from the past 12 hour(s))   CULTURE, BLOOD    Collection Time: 05/08/17 10:32 PM   Result Value Ref Range    Special Requests: NO SPECIAL REQUESTS      Culture result: NO GROWTH AFTER 8 HOURS     CULTURE, BLOOD    Collection Time: 05/08/17 10:32 PM   Result Value Ref Range    Special Requests: NO SPECIAL REQUESTS      Culture result: NO GROWTH AFTER 8 HOURS     MAGNESIUM    Collection Time: 05/09/17  2:23 AM   Result Value Ref Range    Magnesium 2.1 1.6 - 2.4 mg/dL   CBC W/O DIFF    Collection Time: 05/09/17  2:23 AM   Result Value Ref Range    WBC 10.9 3.6 - 11.0 K/uL    RBC 3.71 (L) 3.80 - 5.20 M/uL    HGB 10.7 (L) 11.5 - 16.0 g/dL    HCT 33.0 (L) 35.0 - 47.0 %    MCV 88.9 80.0 - 99.0 FL    MCH 28.8 26.0 - 34.0 PG    MCHC 32.4 30.0 - 36.5 g/dL    RDW 12.6 11.5 - 14.5 %    PLATELET 833 353 - 689 K/uL   METABOLIC PANEL, BASIC    Collection Time: 05/09/17  2:23 AM   Result Value Ref Range    Sodium 142 136 - 145 mmol/L    Potassium 4.0 3.5 - 5.1 mmol/L    Chloride 107 97 - 108 mmol/L    CO2 28 21 - 32 mmol/L    Anion gap 7 5 - 15 mmol/L    Glucose 89 65 - 100 mg/dL    BUN 12 6 - 20 MG/DL    Creatinine 0.62 0.55 - 1.02 MG/DL    BUN/Creatinine ratio 19 12 - 20      GFR est AA >60 >60 ml/min/1.73m2    GFR est non-AA >60 >60 ml/min/1.73m2    Calcium 9.5 8.5 - 10.1 MG/DL   PHOSPHORUS    Collection Time: 05/09/17  2:23 AM   Result Value Ref Range    Phosphorus 2.5 (L) 2.6 - 4.7 MG/DL        A / P:  1. Acute encephalopathy: Likely due to infection. 2. UTI (urinary tract infection) (5/8/2017): Rocephin. 3. Recent Hip fracture ([facility] [address] (4/27/2017); s/p hemiarthroplasty 4/28: PT / OT. 4. Anemia NEC: Watch labs. 5. Asthma: No wheezing/ SOB at this time. Stable. Prn duonebs. 6. Hypertension: BP okay. 7. Hyperlipidemia: Continue statin.    8. D/C planning: Likely back to SNF

## 2017-05-09 NOTE — FORENSIC NURSE
FNE attempted to speak with patient regarding concerns at [facility]. Patient awakes but unable to carry on conversation. FNE will follow up with this evening.
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
## 2017-05-09 NOTE — PROGRESS NOTES
Problem: Mobility Impaired (Adult and Pediatric)  Goal: *Acute Goals and Plan of Care (Insert Text)  Physical Therapy Goals  Initiated 5/9/2017  1. Patient will move from supine to sit and sit to supine , scoot up and down and roll side to side in bed with minimal assistance/contact guard assist within 7 day(s). 2. Patient will transfer from bed to chair and chair to bed with supervision/set-up using the least restrictive device within 7 day(s). 3. Patient will perform sit to stand with supervision/set-up within 7 day(s). 4. Patient will ambulate with supervision/set-up for 75 feet with the least restrictive device within 7 day(s). PHYSICAL THERAPY EVALUATION  Patient: Inderjit Hayes (51 y.o. female)  Date: 5/9/2017  Primary Diagnosis: UTI (urinary tract infection)        Precautions: Fall, Bed alarm,  WBAT (R anterior hemiarthroplasty 4/28)      ASSESSMENT :  Based on the objective data described below, the patient presents with impaired functional mobility secondary to R LE weakness, impaired gait mechanics, poor safety awareness/decision making, impaired activity tolerance/endurance, and increased confusion. Pt received supine in bed, pleasant and cooperative throughout entire therapy session. Pt oriented to self and time however not to place this date. Pt required modAx1 in order to assume seated position EOB. Pt sit>>stand w/ modAx1 and ambulated 55ft w/ RW and minAx1 (however required mod-maxA for management/propulsion/navigation of RW). Pt exhibited double step-to gait pattern through R LE, taking 2 steps with RLE for every 1 step with L LE. Decreased step length throughout RLE initially however improved with verbal cueing. Gait slow and shuffled overall. Pt fatigued quickly with increased bilateral knee flexion noted. Verbal cues required for attention to task. Of note, pt required maxA during stand>>sit transfer secondary to uncontrolled descent.  At this time, recommend continued 1 person assist with use of RW for all OOB mobility as pt is a falls risk given the above mentioned deficits. Upon discharge, recommend return to SNF. Patient will benefit from skilled intervention to address the above impairments. Patients rehabilitation potential is considered to be Good  Factors which may influence rehabilitation potential include:   [ ]         None noted  [X]         Mental ability/status  [ ]         Medical condition  [ ]         Home/family situation and support systems  [ ]         Safety awareness  [ ]         Pain tolerance/management  [ ]         Other:        PLAN :  Recommendations and Planned Interventions:  [X]           Bed Mobility Training             [ ]    Neuromuscular Re-Education  [X]           Transfer Training                   [ ]    Orthotic/Prosthetic Training  [X]           Gait Training                         [ ]    Modalities  [X]           Therapeutic Exercises           [ ]    Edema Management/Control  [X]           Therapeutic Activities            [X]    Patient and Family Training/Education  [ ]           Other (comment):     Frequency/Duration: Patient will be followed by physical therapy  4 times a week to address goals. Discharge Recommendations: Skilled Nursing Facility  Further Equipment Recommendations for Discharge: TBD by facility        SUBJECTIVE:   Patient stated I feel much better today.       OBJECTIVE DATA SUMMARY:   HISTORY:    Past Medical History:   Diagnosis Date    Anemia NEC      Asthma      Cervical spondylarthritis      Depression      DJD (degenerative joint disease) of knee      Esophageal spasm       Has required esophageal dilatation; Dr. Lexii Small GERD (gastroesophageal reflux disease)      Hypercholesterolemia      Hypertension      Lumbar stenosis      Neuropathy, upper extremity       left    Vertigo [id number]     Saw Dr. Unique Nicole; improved.      Vitamin D deficiency 1/28/2011     Past Surgical History:   Procedure Laterality Date    HX CATARACT REMOVAL         bilateral    HX HYSTERECTOMY        HX ORTHOPAEDIC         right bunion excision    HX TONSILLECTOMY         Prior Level of Function/Home Situation: Pt recently discharged from [facility] on 5/2/17 to [facility] following R hip hemiarthroplasty s/p GLF at home. Pt has been ambulating with use of RW since fall/R hemiarthroplasty however was previously independent with household ambulation, use of RW or SPC during community ambulation. Personal factors and/or comorbidities impacting plan of care:      Home Situation  Home Environment: Rehabilitation facility  One/Two Story Residence: One story  Living Alone: Yes  Support Systems: Family member(s)  Patient Expects to be Discharged to[de-identified] Rehabilitation facility  Current DME Used/Available at Home: None     EXAMINATION/PRESENTATION/DECISION MAKING:   Critical Behavior:  Neurologic State: Alert, Confused  Orientation Level: Oriented to person, Disoriented to place, Disoriented to situation (Able to state month and year)  Cognition: Decreased attention/concentration, Decreased command following, Impaired decision making, Poor safety awareness  Safety/Judgement: Decreased awareness of environment, Decreased insight into deficits, Decreased awareness of need for safety, Decreased awareness of need for assistance  Hearing: Auditory  Auditory Impairment: None  Skin:  Intact   Edema: None noted  Range Of Motion:  AROM: Generally decreased, functional                       Strength:    Strength: Generally decreased, functional                    Tone & Sensation:   Tone: Normal                              Coordination:     Vision:      Functional Mobility:  Bed Mobility:     Supine to Sit: Moderate assistance;Assist x1;Additional time     Scooting: Moderate assistance  Transfers:  Sit to Stand:  Moderate assistance  Stand to Sit: Maximum assistance                       Balance:   Sitting: Intact  Standing: Impaired  Standing - Static: Fair  Standing - Dynamic : Poor  Ambulation/Gait Training:  Distance (ft): 55 Feet (ft)  Assistive Device: Walker, rolling;Gait belt  Ambulation - Level of Assistance: Minimal assistance        Gait Abnormalities: Shuffling gait; Step to gait;Trunk sway increased (double step-to w/ RLE)        Base of Support: Narrowed     Speed/Hollie: Pace decreased (<100 feet/min); Shuffled  Step Length: Right shortened                                                Functional Measure:  Barthel Index:      Bathin  Bladder: 0  Bowels: 10  Groomin  Dressin  Feedin  Mobility: 0  Stairs: 0  Toilet Use: 0  Transfer (Bed to Chair and Back): 5  Total: 20         Barthel and G-code impairment scale:  Percentage of impairment CH  0% CI  1-19% CJ  20-39% CK  40-59% CL  60-79% CM  80-99% CN  100%   Barthel Score 0-100 100 99-80 79-60 59-40 20-39 1-19    0   Barthel Score 0-20 20 17-19 13-16 9-12 5-8 1-4 0      The Barthel ADL Index: Guidelines  1. The index should be used as a record of what a patient does, not as a record of what a patient could do. 2. The main aim is to establish degree of independence from any help, physical or verbal, however minor and for whatever reason. 3. The need for supervision renders the patient not independent. 4. A patient's performance should be established using the best available evidence. Asking the patient, friends/relatives and nurses are the usual sources, but direct observation and common sense are also important. However direct testing is not needed. 5. Usually the patient's performance over the preceding 24-48 hours is important, but occasionally longer periods will be relevant. 6. Middle categories imply that the patient supplies over 50 per cent of the effort. 7. Use of aids to be independent is allowed. Randall Bob., Barthel, D.W. (3138). Functional evaluation: the Barthel Index. [facility] (14)2.   OLYA Nolasco, Alfredo Matias., Jess Amezquita., Portillo Nones. (1999). Measuring the change indisability after inpatient rehabilitation; comparison of the responsiveness of the Barthel Index and Functional Bulloch Measure. Journal of Neurology, Neurosurgery, and Psychiatry, 66(4), 247-375. ALISIA Dyson, HANNA Pathak, & Roosevelt Persaud M.A. (2004.) Assessment of post-stroke quality of life in cost-effectiveness studies: The usefulness of the Barthel Index and the EuroQoL-5D. Quality of Life Research, 13, 413-25         G codes: In compliance with CMSs Claims Based Outcome Reporting, the following G-code set was chosen for this patient based on their primary functional limitation being treated: The outcome measure chosen to determine the severity of the functional limitation was the Barthel Index with a score of 20/100 which was correlated with the impairment scale. · Mobility - Walking and Moving Around:               - CURRENT STATUS:    CL - 60%-79% impaired, limited or restricted               - GOAL STATUS:           CJ - 20%-39% impaired, limited or restricted               - D/C STATUS:                       ---------------To be determined---------------      Physical Therapy Evaluation Charge Determination   History Examination Presentation Decision-Making   MEDIUM  Complexity : 1-2 comorbidities / personal factors will impact the outcome/ POC  MEDIUM Complexity : 3 Standardized tests and measures addressing body structure, function, activity limitation and / or participation in recreation  MEDIUM Complexity : Evolving with changing characteristics  MEDIUM Complexity : FOTO score of 26-74      Based on the above components, the patient evaluation is determined to be of the following complexity level: MEDIUM     Pain:                    Activity Tolerance:   VSS throughout, pt fatigues quickly   Please refer to the flowsheet for vital signs taken during this treatment.   After treatment:   [X]         Patient left in no apparent distress sitting up in chair  [ ]         Patient left in no apparent distress in bed  [X]         Call bell left within reach  [X]         Nursing notified  [ ]         Caregiver present  [X]         Bed alarm activated      COMMUNICATION/EDUCATION:   The patients plan of care was discussed with: Registered Nurse.  [X]         Fall prevention education was provided and the patient/caregiver indicated understanding. [X]         Patient/family have participated as able in goal setting and plan of care. [X]         Patient/family agree to work toward stated goals and plan of care. [ ]         Patient understands intent and goals of therapy, but is neutral about his/her participation. [ ]         Patient is unable to participate in goal setting and plan of care.      Thank you for this referral.  Elsa Lainez, PT, DPT   Time Calculation: 24 mins

## 2017-05-09 NOTE — PROGRESS NOTES
End of Shift Nursing Note    Bedside shift change report given to Delfin Primrose, RN (oncoming nurse) by Kinga Dempsey RN (offgoing nurse). Report included the following information SBAR, Kardex and Intake/Output. Zone Phone:       Significant changes during shift:    none   Non-emergent issues for physician to address:   none     Number times ambulated in hallway past shift: 0      Number of times OOB to chair past shift: 2    POD #:      Vital Signs:    Temp: 98.5 °F (36.9 °C)     Pulse (Heart Rate): 69     BP: 123/42     Resp Rate: 14     O2 Sat (%): 100 %    Lines & Drains:     Urinary Catheter? No   Placement Date:    Medical Necessity:   Central Line? No   Placement Date:    Medical Necessity:   PICC Line? No   Placement Date:    Medical Necessity:     NG tube [] in [] removed [x] not applicable   Drains [] in [] removed [x] not applicable     Skin Integrity:      Wounds: yes   Dressings Present: yes    Wound Concerns: no      GI:    Current diet:  DIET REGULAR    Nausea: NO  Vomiting: NO  Bowel Sounds: YES  Flatus: YES  Last Bowel Movement: today   Appearance:     Respiratory:  Supplemental O2: No      Device:    via  Liters/min     Incentive Spirometer: YES  Volume:   Coughing and Deep Breathing: YES  Oral Care: YES  Understanding (patient/family education): YES   Getting out of bed: YES  Head of bed elevation: YES    Patient Safety:    Falls Score: 3  Mobility Score: 3  Bed Alarm On? Yes  Sitter? No      Opportunity for questions and clarification was given to oncoming nurse. Patient bed is in low position, side rails are up x 2, door & observation blinds open as needed, call bell within reach and patient not in distress.     Obie Benson

## 2017-05-09 NOTE — ED NOTES
Patients linen changed on urine incontinence, patient has no complaints at this time. Patient is resting comfortably, bed in the lowest position, side rails raised, call bell in hand, lights dim. Instructed patient to not get up without assistance, and to ring the call bell for any questions or concerns.  Updated patient on the plan of care: admission

## 2017-05-09 NOTE — ED NOTES
Spoke with Mellisa Lebron. FNE - patient will be seen tomorrow as the patient is being admitted. Per FNE, she will be seen on day shift tomorrow.

## 2017-05-09 NOTE — PROGRESS NOTES
Pharmacy Medication Reconciliation     The patient's current medication list was obtained from [facility]    Recommendations/Findings: The following amendments were made to the patient's active medication list on file at [id number]:     1)  Additions:       Bisacodyl       Fleets Enema       Milk of Magnesia    2)  Deletions:       Cholecalciferol       Haloperidol    3)  Changes:       Lidocaine Patches to Bilateral Knees       Oxycodone 2.5mg for Pain 1-5       Oxycodone 5mg for Pain 6-10      Prior to Admission Medications   Prescriptions Last Dose Informant Patient Reported? Taking? DULoxetine (CYMBALTA) 30 mg capsule   No No   Sig: TAKE 1 CAPSULE BY MOUTH DAILY   MULTIVITAMINS (MULTIVITAMIN PO)   Yes No   Sig: Take  by mouth daily. acetaminophen (TYLENOL) 325 mg tablet   No No   Sig: Take 2 Tabs by mouth every six (6) hours for 14 days. albuterol (PROVENTIL HFA, VENTOLIN HFA, PROAIR HFA) 90 mcg/actuation inhaler   No No   Sig: Take 1 Puff by inhalation as needed for Wheezing. aspirin 81 mg chewable tablet   No No   Sig: Take 1 Tab by mouth two (2) times a day for 30 days. atenolol (TENORMIN) 25 mg tablet   No No   Sig: TAKE 1 TABLET BY MOUTH DAILY   atorvastatin (LIPITOR) 20 mg tablet   No No   Sig: TAKE 1 TABLET BY MOUTH EVERY NIGHT   bisacodyl (DULCOLAX) 10 mg suppository   Yes Yes   Sig: Insert 10 mg into rectum daily as needed (Constipation). celecoxib (CELEBREX) 200 mg capsule   No No   Sig: Take 1 Cap by mouth daily for 90 days. ergocalciferol (ERGOCALCIFEROL) 50,000 unit capsule   No No   Sig: Take 1 Cap by mouth every seven (7) days. Patient taking differently: Take 50,000 Units by mouth every . famotidine (PEPCID) 10 mg tablet   No No   Sig: Take 1 Tab by mouth two (2) times a day for 30 days. lidocaine (LIDODERM) 5 %   No No   Si Patch by TransDERmal route every twenty-four (24) hours. Apply patch to the affected area for 12 hours a day and remove for 12 hours a day. Patient taking differently: 2 Patches by TransDERmal route every twenty-four (24) hours. Apply one patch to each knee for 12 hours a day and remove for 12 hours a day. magnesium hydroxide (MILK OF MAGNESIA) 400 mg/5 mL suspension   Yes Yes   Sig: Take 30 mL by mouth DIALYSIS PRN for Constipation (if no BM after 3 days). oxyCODONE IR (ROXICODONE) 5 mg immediate release tablet   No No   Sig: Take 0.5-1 Tabs by mouth every three (3) hours as needed. Max Daily Amount: 40 mg. Patient taking differently: Take 2.5 mg by mouth every three (3) hours as needed for Pain (1-5). oxyCODONE IR (ROXICODONE) 5 mg immediate release tablet   Yes Yes   Sig: Take 5 mg by mouth every three (3) hours as needed for Pain (6-10). polyethylene glycol (MIRALAX) 17 gram packet   No No   Sig: Take 1 Packet by mouth daily as needed (constipation) for up to 15 days. senna-docusate (PERICOLACE) 8.6-50 mg per tablet   No No   Sig: Take 1 Tab by mouth daily. sodium phosphate (FLEET'S) 19-7 gram/118 mL enema   Yes Yes   Sig: Insert 1 Enema into rectum as needed (if no results from Bisacodyl Suppository).       Facility-Administered Medications: None          Thank you,  Magdy Moran, [facility]
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
## 2017-05-09 NOTE — ROUTINE PROCESS
TRANSFER - OUT REPORT:    Verbal report given to [facility] on Lyndol Osgood  being transferred to General Surgery 2123(unit) for routine progression of care       Report consisted of patients Situation, Background, Assessment and   Recommendations(SBAR). Information from the following report(s) SBAR, Kardex, ED Summary, Procedure Summary, Intake/Output, MAR, Recent Results and Med Rec Status was reviewed with the receiving nurse. Lines:   Peripheral IV 05/08/17 Left Hand (Active)   Site Assessment Clean, dry, & intact 5/8/2017  6:45 PM   Phlebitis Assessment 0 5/8/2017  6:45 PM   Infiltration Assessment 0 5/8/2017  6:45 PM   Dressing Status Clean, dry, & intact 5/8/2017  6:45 PM   Dressing Type Transparent 5/8/2017  6:45 PM   Hub Color/Line Status Blue 5/8/2017  6:45 PM        Opportunity for questions and clarification was provided.

## 2017-05-09 NOTE — ED NOTES
Patient assisted to the bedside commode to provide urine specimen, patient incontinent in the bed. Linens changed.  Patient tolerated well, urine specimen sent to the lab
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
## 2017-05-09 NOTE — PROGRESS NOTES
Problem: Self Care Deficits Care Plan (Adult)  Goal: *Acute Goals and Plan of Care (Insert Text)  Occupational Therapy Goals  Initiated 5/9/2017  1. Patient will perform grooming while standing at the sink with contact guard assist within 7 day(s). 2. Patient will perform upper body dressing with supervision/set-up within 7 day(s). 3. Patient will perform lower body dressing with moderate assistance within 7 day(s). 4. Patient will perform toilet transfers with minimal assistance/contact guard assist within 7 day(s). 5. Patient will perform all aspects of toileting with moderate assistance within 7 day(s). OCCUPATIONAL THERAPY EVALUATION  Patient: Carlos Karimi (62 y.o. female)  Date: 5/9/2017  Primary Diagnosis: UTI (urinary tract infection)        Precautions:  WBAT (R anterior hemiarthroplasty 4/28)      ASSESSMENT :  Based on the objective data described below, the patient presents with significant deficits in self-care, primarily due to confusion/decreased safety, decreased awareness of deficits, decreased dynamic balance, general weakness, and decreased activity tolerance. She currently requires Min A to Total A for basic self-care tasks. Patient was here at the end of April and underwent R hemiarthroplasty (anterior) and was discharged to a SNF for rehab. She will benefit from skilled OT treatment to maximize independence and safety in basic self-care tasks. Recommend patient return to rehab in a SNF. Patient will benefit from skilled intervention to address the above impairments.   Patients rehabilitation potential is considered to be Fair  Factors which may influence rehabilitation potential include:   [ ]             None noted  [ ]             Mental ability/status  [ ]             Medical condition  [ ]             Home/family situation and support systems  [ ]             Safety awareness  [ ]             Pain tolerance/management  [ ]             Other:        PLAN :  Recommendations and Planned Interventions:  [x]               Self Care Training                  [x]        Therapeutic Activities  [x]               Functional Mobility Training    [x]        Cognitive Retraining  [x]               Therapeutic Exercises           [x]        Endurance Activities  [x]               Balance Training                   []        Neuromuscular Re-Education  [ ]               Visual/Perceptual Training     [x]   Home Safety Training  [x]               Patient Education                 [x]        Family Training/Education  [ ]               Other (comment):     Frequency/Duration: Patient will be followed by occupational therapy 4 times a week to address goals. Discharge Recommendations: Emile Deleon  Further Equipment Recommendations for Discharge: Defer to facility       SUBJECTIVE:   Patient was pleasantly confused and agreeable to therapy. OBJECTIVE DATA SUMMARY:   HISTORY:   Past Medical History:   Diagnosis Date    Anemia NEC      Asthma      Cervical spondylarthritis      Depression      DJD (degenerative joint disease) of knee      Esophageal spasm       Has required esophageal dilatation; Dr. Zee Bravo GERD (gastroesophageal reflux disease)      Hypercholesterolemia      Hypertension      Lumbar stenosis      Neuropathy, upper extremity       left    Vertigo [id number]     Saw Dr. Mir Renteria; improved.  Vitamin D deficiency 1/28/2011     Past Surgical History:   Procedure Laterality Date    HX CATARACT REMOVAL         bilateral    HX HYSTERECTOMY        HX ORTHOPAEDIC         right bunion excision    HX TONSILLECTOMY            Prior Level of Function/Home Situation: Admitted from SNF. She was there for rehab after recent femoral neck fracture/hemiarthroplasty. Expanded or extensive additional review of patient history: Per chart, patient was living with her son prior to recent admission and was Mod I to I with basic self-care tasks.      Home Situation  Home Environment: Rehabilitation facility  One/Two Story Residence: One story  Living Alone: Yes  Support Systems: Family member(s)  Patient Expects to be Discharged to[de-identified] Rehabilitation facility  Current DME Used/Available at Home: None  [X]  Right hand dominant             [ ]  Left hand dominant     EXAMINATION OF PERFORMANCE DEFICITS:  Cognitive/Behavioral Status:  Neurologic State: Alert;Confused  Orientation Level: Oriented to person;Disoriented to place; Disoriented to situation (Able to state month and year)  Cognition: Decreased attention/concentration;Decreased command following; Impaired decision making;Poor safety awareness  Perception: Cues to maintain midline in standing  Perseveration: No perseveration noted  Safety/Judgement: Decreased awareness of environment;Decreased insight into deficits; Decreased awareness of need for safety;Decreased awareness of need for assistance        Hearing: Auditory  Auditory Impairment: None     Vision/Perceptual:    Not formally assessed. Wears glasses. Range of Motion:  AROM: Generally decreased, functional                          Strength:  Strength: Generally decreased, functional                 Coordination:     Fine Motor Skills-Upper: Left Intact; Right Intact (grossly)    Gross Motor Skills-Upper: Left Intact; Right Intact (within available ROM)     Tone & Sensation:  Tone: Normal                          Balance:  Sitting: Intact  Standing: Impaired  Standing - Static: Fair  Standing - Dynamic : Poor     Functional Mobility and Transfers for ADLs:  Bed Mobility:  Supine to Sit: Moderate assistance;Assist x1;Additional time  Scooting: Moderate assistance     Transfers:  Sit to Stand: Moderate assistance  Stand to Sit: Maximum assistance  Toilet Transfer : Moderate assistance     ADL Assessment:        Oral Facial Hygiene/Grooming: Minimum assistance     Bathing: Maximum assistance     Upper Body Dressing:  Moderate assistance     Lower Body Dressing: Total assistance     Toileting: Maximum assistance                 ADL Intervention and task modifications:  Discussed safety during functional tasks. Educated on proper transfer techniques, but she has a difficult time. She tends to pull up on the walker, despite cues, and does not reach back for the chair when sitting. Reviewed use of call bell, including how to get assistance from the nurse if needed. Cognitive Retraining  Safety/Judgement: Decreased awareness of environment;Decreased insight into deficits; Decreased awareness of need for safety;Decreased awareness of need for assistance     Functional Measure:  Barthel Index:      Bathin  Bladder: 0  Bowels: 10  Groomin  Dressin  Feedin  Mobility: 0  Stairs: 0  Toilet Use: 0  Transfer (Bed to Chair and Back): 5  Total: 20         Barthel and G-code impairment scale:  Percentage of impairment CH  0% CI  1-19% CJ  20-39% CK  40-59% CL  60-79% CM  80-99% CN  100%   Barthel Score 0-100 100 99-80 79-60 59-40 20-39 1-19    0   Barthel Score 0-20 20 17-19 13-16 9-12 5-8 1-4 0      The Barthel ADL Index: Guidelines  1. The index should be used as a record of what a patient does, not as a record of what a patient could do. 2. The main aim is to establish degree of independence from any help, physical or verbal, however minor and for whatever reason. 3. The need for supervision renders the patient not independent. 4. A patient's performance should be established using the best available evidence. Asking the patient, friends/relatives and nurses are the usual sources, but direct observation and common sense are also important. However direct testing is not needed. 5. Usually the patient's performance over the preceding 24-48 hours is important, but occasionally longer periods will be relevant. 6. Middle categories imply that the patient supplies over 50 per cent of the effort.   7. Use of aids to be independent is allowed. Nazanin Valdivia., Barthel, D.W. (6058). Functional evaluation: the Barthel Index. [address] (14)2. OLYA Yadav, Emily Rivera., Parag Velasco., [place], [address] (1999). Measuring the change indisability after inpatient rehabilitation; comparison of the responsiveness of the Barthel Index and Functional [place] Measure. Journal of Neurology, Neurosurgery, and Psychiatry, 66(4), 013-617. ALISIA Donaldson, HANNA Pathak, & Vera Rajan M.A. (2004.) Assessment of post-stroke quality of life in cost-effectiveness studies: The usefulness of the Barthel Index and the EuroQoL-5D. Quality of Life Research, 13, 116-41         G codes: In compliance with CMSs Claims Based Outcome Reporting, the following G-code set was chosen for this patient based on their primary functional limitation being treated: The outcome measure chosen to determine the severity of the functional limitation was the Barthel Index with a score of 20/100 which was correlated with the impairment scale. · Self Care:               - CURRENT STATUS:    CL - 60%-79% impaired, limited or restricted               - GOAL STATUS:           CK - 40%-59% impaired, limited or restricted               - D/C STATUS:                       ---------------To be determined---------------      Occupational Therapy Evaluation Charge Determination   History Examination Decision-Making   LOW Complexity : Brief history review  MEDIUM Complexity : 3-5 performance deficits relating to physical, cognitive , or psychosocial skils that result in activity limitations and / or participation restrictions MEDIUM Complexity : Patient may present with comorbidities that affect occupational performnce.  Miniml to moderate modification of tasks or assistance (eg, physical or verbal ) with assesment(s) is necessary to enable patient to complete evaluation       Based on the above components, the patient evaluation is determined to be of the following complexity level: LOW   Pain:  No complaints                 Activity Tolerance:   Fair  Please refer to the flowsheet for vital signs taken during this treatment. After treatment:   [X] Patient left in no apparent distress sitting up in chair  [ ] Patient left in no apparent distress in bed  [X] Call bell left within reach  [X] Nursing notified  [ ] Caregiver present  [X] Bed alarm activated      COMMUNICATION/EDUCATION:   The patients plan of care was discussed with: Physical Therapist and Registered Nurse.  [X] Home safety education was provided and the patient/caregiver indicated understanding. [ ] Patient/family have participated as able in goal setting and plan of care. [ ] Patient/family agree to work toward stated goals and plan of care. [ ] Patient understands intent and goals of therapy, but is neutral about his/her participation. [X] Patient is unable to participate in goal setting and plan of care. This patients plan of care is appropriate for delegation to \[facility] [place] [facility]\"".      Thank you for this referral.  Toya Vyas OTR/L  Time Calculation: 25 mins

## 2017-05-10 VITALS
SYSTOLIC BLOOD PRESSURE: 117 MMHG | DIASTOLIC BLOOD PRESSURE: 58 MMHG | RESPIRATION RATE: 17 BRPM | OXYGEN SATURATION: 93 % | HEART RATE: 65 BPM | TEMPERATURE: 98.6 F | BODY MASS INDEX: 25.41 KG/M2 | WEIGHT: 134.48 LBS

## 2017-05-10 LAB
ANION GAP BLD CALC-SCNC: 5 MMOL/L (ref 5–15)
BACTERIA SPEC CULT: ABNORMAL
BASOPHILS # BLD AUTO: 0 K/UL (ref 0–0.1)
BASOPHILS # BLD: 0 % (ref 0–1)
BUN SERPL-MCNC: 11 MG/DL (ref 6–20)
BUN/CREAT SERPL: 17 (ref 12–20)
CALCIUM SERPL-MCNC: 9.5 MG/DL (ref 8.5–10.1)
CC UR VC: ABNORMAL
CHLORIDE SERPL-SCNC: 109 MMOL/L (ref 97–108)
CO2 SERPL-SCNC: 28 MMOL/L (ref 21–32)
CREAT SERPL-MCNC: 0.66 MG/DL (ref 0.55–1.02)
EOSINOPHIL # BLD: 0.2 K/UL (ref 0–0.4)
EOSINOPHIL NFR BLD: 2 % (ref 0–7)
ERYTHROCYTE [DISTWIDTH] IN BLOOD BY AUTOMATED COUNT: 12.8 % (ref 11.5–14.5)
GLUCOSE SERPL-MCNC: 107 MG/DL (ref 65–100)
HCT VFR BLD AUTO: 32.6 % (ref 35–47)
HGB BLD-MCNC: 10.8 G/DL (ref 11.5–16)
LYMPHOCYTES # BLD AUTO: 18 % (ref 12–49)
LYMPHOCYTES # BLD: 1.8 K/UL (ref 0.8–3.5)
MCH RBC QN AUTO: 29.8 PG (ref 26–34)
MCHC RBC AUTO-ENTMCNC: 33.1 G/DL (ref 30–36.5)
MCV RBC AUTO: 90.1 FL (ref 80–99)
MONOCYTES # BLD: 0.9 K/UL (ref 0–1)
MONOCYTES NFR BLD AUTO: 9 % (ref 5–13)
NEUTS SEG # BLD: 6.8 K/UL (ref 1.8–8)
NEUTS SEG NFR BLD AUTO: 71 % (ref 32–75)
PLATELET # BLD AUTO: 325 K/UL (ref 150–400)
POTASSIUM SERPL-SCNC: 4.2 MMOL/L (ref 3.5–5.1)
RBC # BLD AUTO: 3.62 M/UL (ref 3.8–5.2)
SERVICE CMNT-IMP: ABNORMAL
SODIUM SERPL-SCNC: 142 MMOL/L (ref 136–145)
WBC # BLD AUTO: 9.7 K/UL (ref 3.6–11)

## 2017-05-10 PROCEDURE — 74011250637 HC RX REV CODE- 250/637: Performed by: HOSPITALIST

## 2017-05-10 PROCEDURE — 85025 COMPLETE CBC W/AUTO DIFF WBC: CPT | Performed by: INTERNAL MEDICINE

## 2017-05-10 PROCEDURE — 80048 BASIC METABOLIC PNL TOTAL CA: CPT | Performed by: INTERNAL MEDICINE

## 2017-05-10 PROCEDURE — 74011250636 HC RX REV CODE- 250/636: Performed by: HOSPITALIST

## 2017-05-10 PROCEDURE — 36415 COLL VENOUS BLD VENIPUNCTURE: CPT | Performed by: INTERNAL MEDICINE

## 2017-05-10 RX ORDER — CEFUROXIME AXETIL 500 MG/1
500 TABLET ORAL 2 TIMES DAILY
Qty: 6 TAB | Refills: 0 | Status: SHIPPED
Start: 2017-05-10 | End: 2017-05-13

## 2017-05-10 RX ADMIN — POLYETHYLENE GLYCOL 3350 17 G: 17 POWDER, FOR SOLUTION ORAL at 10:24

## 2017-05-10 RX ADMIN — ENOXAPARIN SODIUM 40 MG: 40 INJECTION SUBCUTANEOUS at 10:24

## 2017-05-10 RX ADMIN — Medication 10 ML: at 07:13

## 2017-05-10 RX ADMIN — DOCUSATE SODIUM AND SENNOSIDES 1 TABLET: 8.6; 5 TABLET, FILM COATED ORAL at 10:23

## 2017-05-10 RX ADMIN — FAMOTIDINE 10 MG: 20 TABLET ORAL at 10:24

## 2017-05-10 RX ADMIN — ATENOLOL 25 MG: 25 TABLET ORAL at 10:23

## 2017-05-10 RX ADMIN — CHOLECALCIFEROL TAB 25 MCG (1000 UNIT) 1000 UNITS: 25 TAB at 10:23

## 2017-05-10 RX ADMIN — ASPIRIN 81 MG CHEWABLE TABLET 81 MG: 81 TABLET CHEWABLE at 10:23

## 2017-05-10 RX ADMIN — DULOXETINE HYDROCHLORIDE 30 MG: 30 CAPSULE, DELAYED RELEASE ORAL at 10:24

## 2017-05-10 NOTE — PROGRESS NOTES
CM completed room d/c with pt. Pt will be transported back to [facility] (SNF), via [facility] at 10:30am .  CM contacted Mrs. Simba Bhat (daughter), in regards to pt d/c. However, a message was left with unknown male regarding pt's d/c and transportation back to SNF. CM contacted [facility], and spoke with Natalia Huertas in admissions, as she reported that a referral will need to be done, via [facility] for updated notes. Pt aware of 2nd  Medicare letter (signed) placed in chart. Care Management Interventions  PCP Verified by CM: Yes  Mode of Transport at Discharge: Other (see comment) ([facility])  Transition of Care Consult (CM Consult): SNF, Discharge Planning  Discharge Durable Medical Equipment: No  Physical Therapy Consult: Yes  Occupational Therapy Consult: Yes  Speech Therapy Consult: No  Current Support Network:  Other, Own Home  Confirm Follow Up Transport: Family  Plan discussed with Pt/Family/Caregiver: Yes  Discharge Location  Discharge Placement: [address]   [phone]

## 2017-05-10 NOTE — PROGRESS NOTES
TRANSFER - OUT REPORT:    Verbal report given to Saurabh Kauffman (name) on Laurie Murphy  being transferred to [facility]) for routine progression of care       Report consisted of patients Situation, Background, Assessment and   Recommendations(SBAR). Information from the following report(s) SBAR was reviewed with the receiving nurse. Lines:       Opportunity for questions and clarification was provided.       Patient transported with:   pt going to room 8362
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
## 2017-05-10 NOTE — DISCHARGE INSTRUCTIONS
PATIENT DISCHARGE INSTRUCTIONS      PATIENT DISCHARGE INSTRUCTIONS    Johny Lieberman / [id number] : 1928    Admitted 2017 Discharged: 5/10/2017       · It is important that you take the medication exactly as they are prescribed. · Keep your medication in the bottles provided by the pharmacist and keep a list of the medication names, dosages, and times to be taken in your wallet. · Do not take other medications without consulting your doctor.      What to do at Home    Recommended Diet: Cardiac Diet    Recommended Activity: PT/OT Eval and Treat        Signed By: Flavio Darden MD     May 10, 2017

## 2017-05-11 ENCOUNTER — PATIENT OUTREACH (OUTPATIENT)
Dept: INTERNAL MEDICINE CLINIC | Age: 82
End: 2017-05-11

## 2017-05-11 NOTE — PROGRESS NOTES
[facility] Hospitalization             Referral from [address] [place] [address]; [facility], [facility]. Patient re-admitted to [facility] on 5/8/17 and discharged on 5/10/17 with diagnosis of acute encephalopathy, UTI. Significant Lab/Diagnostic Findings:  Lab Results  Component Value Date/Time   WBC 9.7 05/10/2017 03:50 AM   WBC 6.7 05/21/2012 08:23 AM   HGB 10.8 05/10/2017 03:50 AM   HCT 32.6 05/10/2017 03:50 AM   PLATELET 654 63/64/6306 03:50 AM   MCV 90.1 05/10/2017 03:50 AM       Lab Results   Component Value Date/Time     05/08/2017 06:42 PM    CK - MB 2.6 05/08/2017 06:42 PM    CK-MB Index 2.6 05/08/2017 06:42 PM    Troponin-I, Qt. <0.04 05/08/2017 06:42 PM      Lab Results   Component Value Date/Time    Sodium 142 05/10/2017 03:50 AM    Potassium 4.2 05/10/2017 03:50 AM    Chloride 109 05/10/2017 03:50 AM    CO2 28 05/10/2017 03:50 AM    Anion gap 5 05/10/2017 03:50 AM    Glucose 107 05/10/2017 03:50 AM    BUN 11 05/10/2017 03:50 AM    Creatinine 0.66 05/10/2017 03:50 AM    BUN/Creatinine ratio 17 05/10/2017 03:50 AM    GFR est AA >60 05/10/2017 03:50 AM    GFR est non-AA >60 05/10/2017 03:50 AM    Calcium 9.5 05/10/2017 03:50 AM      Lab Results   Component Value Date/Time    Sodium 142 05/10/2017 03:50 AM    Potassium 4.2 05/10/2017 03:50 AM    Chloride 109 05/10/2017 03:50 AM    CO2 28 05/10/2017 03:50 AM    Anion gap 5 05/10/2017 03:50 AM    Glucose 107 05/10/2017 03:50 AM    BUN 11 05/10/2017 03:50 AM    Creatinine 0.66 05/10/2017 03:50 AM    BUN/Creatinine ratio 17 05/10/2017 03:50 AM    GFR est AA >60 05/10/2017 03:50 AM    GFR est non-AA >60 05/10/2017 03:50 AM    Calcium 9.5 05/10/2017 03:50 AM    Bilirubin, total 0.4 05/08/2017 06:42 PM    ALT (SGPT) 64 05/08/2017 06:42 PM    AST (SGOT) 27 05/08/2017 06:42 PM    Alk.  phosphatase 107 05/08/2017 06:42 PM    Protein, total 6.4 05/08/2017 06:42 PM    Albumin 2.7 05/08/2017 06:42 PM    Globulin 3.7 05/08/2017 06:42 PM    A-G Ratio 0.7 05/08/2017 06:42 PM Urine Culture (final result):  Component      Latest Ref Rng & Units 5/8/2017           8:04 PM   Special Requests:       NO SPECIAL REQUESTS . . .   [place] Count       >100,000 . . .   Culture result:       ESCHERICHIA COLI (A)         Blood Culture (preliminary)  Component      Latest Ref Rng & Units 5/8/2017 5/8/2017          10:32 PM 10:32 PM   Special Requests:       NO SPECIAL REQUESTS NO SPECIAL REQUESTS   Culture result:       NO GROWTH 3 DAYS NO GROWTH 3 DAYS         NN has updated care team members in the patient's chart. RRAT score:   Low Risk            9       Total Score        4 More than 1 Admission in calendar year    5 Charlson Comorbidity Score        Criteria that do not apply:    Relationship with PCP    Patient Living Status    Patient Length of Stay > 5    Patient Insurance is Medicare, Medicaid or Self Pay                    Patient was evaluated by care management during hospitalization. Per notes, Discharge Disposition from [facility]: Back to [address] [place] [address] ([facility]). *Forensic Nurse was consulted during this hospital admission; see note for details. Recommended Post-Hospital Discharge follow-up (see below as listed on Hospital Discharge AVS/Instructions). Follow-up Information     Follow up With Details Comments [facility] [address]  [address] [address] [phone]     [address] [place] [address] On 5/10/2017 THIS IS YOUR SNF PLACEMENT. IF YOU HAVE ADDITIONAL QUESTIONS AND/OR CONCERNS, PLEASE CONTACT FACILITY DIRECTLY. Yvojorge l  [address] [facility]  [phone]                 - Patient currently admitted to [address] [place] [address] ([facility]). NN follow-up  NN contacted [address] [place] [address] ([facility]) today to confirm patient's admission; NN was informed that patient is currently admitted to [address] [place] [address] ([facility]) room 314. Patient to be followed by Canelo LEAL during SNF admission.           New medications at discharge include: Ceftin  Medication(s) changed at hospital discharge include: Ergocalciferol, Lidoderm, Oxycodone  Medication(s) discontinued at hospital discharge include: N/A      - Goals:  Goals        Post Hospitalization     Prevent complications post hospitalization. 5/3/17: Patient discharged from [address] [place] [address] to [facility] ([facility]); patient to be followed by Antonette Silva CCT during SNF admission. 5/11/17: Patient readmitted to [address] [place] [address] from [facility] ([facility]) on 5/8/17; discharged from [address] [place] [address] back to [facility] ([facility]) on 5/10/17. Patient will be followed by CCT during SNF admission. NN will route this encounter to Dr. Joyce Eden for notification/review. This note will not be viewable in [address]

## 2017-05-11 NOTE — PROGRESS NOTES
Community Care Team Documentation for Patient in [facility]  Initial Follow up     Patient was admitted to [facility] from 5/8/17 to 5/10/17. Patient was discharged to [facility], on 5/10/17 (date). Hospital Discharge diagnosis:  UTI. PCP : Eren Helm MD  Nurse Navigator in PCP office: Vickie Guzman    Information provided by SNF staff member, Marv Lyman, is as follows:    SNF Attending Provider:  Dr. Janice Alfaro  Anticipated discharge date from SNF:  Undetermined  SNF discharge plan:  Patient was readmitted with UTI and altered mental status. Therapy to reevaluate patient today. Plan to return home with son. Pt has two daughters that live nearby. Main Barrier: Cognition.      Mora Gloria, MSN, RN, ACNS-BC, [facility]  Nurse Navigator, [place] [phone]

## 2017-05-12 ENCOUNTER — HOSPITAL ENCOUNTER (EMERGENCY)
Age: 82
Discharge: NURSING, REHAB OR LONG-TERM CARE | End: 2017-05-12
Attending: EMERGENCY MEDICINE
Payer: MEDICARE

## 2017-05-12 ENCOUNTER — APPOINTMENT (OUTPATIENT)
Dept: GENERAL RADIOLOGY | Age: 82
End: 2017-05-12
Attending: PHYSICIAN ASSISTANT
Payer: MEDICARE

## 2017-05-12 VITALS
TEMPERATURE: 98.2 F | HEART RATE: 72 BPM | RESPIRATION RATE: 16 BRPM | HEIGHT: 61 IN | OXYGEN SATURATION: 97 % | BODY MASS INDEX: 25.39 KG/M2 | WEIGHT: 134.48 LBS | DIASTOLIC BLOOD PRESSURE: 72 MMHG | SYSTOLIC BLOOD PRESSURE: 157 MMHG

## 2017-05-12 DIAGNOSIS — M25.551 RIGHT HIP PAIN: Primary | ICD-10-CM

## 2017-05-12 PROCEDURE — 99284 EMERGENCY DEPT VISIT MOD MDM: CPT

## 2017-05-12 PROCEDURE — 73502 X-RAY EXAM HIP UNI 2-3 VIEWS: CPT

## 2017-05-12 NOTE — ED NOTES
Arranged transportation for patient back to [facility].  Juno Wilkins they would arrive within the hour

## 2017-05-12 NOTE — ED NOTES
Patient was brought in by transport. Resident of [facility], convalescing from rt hip repair. States she tripped and fell on rt hip and is experiencing pain.

## 2017-05-12 NOTE — DISCHARGE INSTRUCTIONS
Hip Pain: Care Instructions  Your Care Instructions  Hip pain may be caused by many things, including overuse, a fall, or a twisting movement. Another cause of hip pain is arthritis. Your pain may increase when you stand up, walk, or squat. The pain may come and go or may be constant. Home treatment can help relieve hip pain, swelling, and stiffness. If your pain is ongoing, you may need more tests and treatment. Follow-up care is a key part of your treatment and safety. Be sure to make and go to all appointments, and call your doctor if you are having problems. Its also a good idea to know your test results and keep a list of the medicines you take. How can you care for yourself at home? · Take pain medicines exactly as directed. ¨ If the doctor gave you a prescription medicine for pain, take it as prescribed. ¨ If you are not taking a prescription pain medicine, ask your doctor if you can take an over-the-counter medicine. · Rest and protect your hip. Take a break from any activity, including standing or walking, that may cause pain. · Put ice or a cold pack against your hip for 10 to 20 minutes at a time. Try to do this every 1 to 2 hours for the next 3 days (when you are awake) or until the swelling goes down. Put a thin cloth between the ice and your skin. · Sleep on your healthy side with a pillow between your knees, or sleep on your back with pillows under your knees. · If there is no swelling, you can put moist heat, a heating pad, or a warm cloth on your hip. Do gentle stretching exercises to help keep your hip flexible. · Learn how to prevent falls. Have your vision and hearing checked regularly. Wear slippers or shoes with a nonskid sole. · Stay at a healthy weight. · Wear comfortable shoes. When should you call for help? Call 911 anytime you think you may need emergency care. For example, call if:  · You have sudden chest pain and shortness of breath, or you cough up blood.   · You are not able to stand or walk or bear weight. · Your buttocks, legs, or feet feel numb or tingly. · Your leg or foot is cool or pale or changes color. · You have severe pain. Call your doctor now or seek immediate medical care if:  · You have signs of infection, such as:  ¨ Increased pain, swelling, warmth, or redness in the hip area. ¨ Red streaks leading from the hip area. ¨ Pus draining from the hip area. ¨ A fever. · You have signs of a blood clot, such as:  ¨ Pain in your calf, back of the knee, thigh, or groin. ¨ Redness and swelling in your leg or groin. · You are not able to bend, straighten, or move your leg normally. · You have trouble urinating or having bowel movements. Watch closely for changes in your health, and be sure to contact your doctor if:  · You do not get better as expected. Where can you learn more? Go to http://brian-home.info/. Enter Z064 in the search box to learn more about \"Hip Pain: Care Instructions. \"  Current as of: May 27, 2016  Content Version: 11.2  © 2133-4267 [employer]. Care instructions adapted under license by [employer] (which disclaims liability or warranty for this information). If you have questions about a medical condition or this instruction, always ask your healthcare professional. [place] [address] any warranty or liability for your use of this information.

## 2017-05-12 NOTE — ED PROVIDER NOTES
HPI Comments: Vika Wan is a 80 y.o. female with PMhx significant for anemia, asthma, depression, GERD, HLD, HTN, and DJD who presents via EMS to the ED with cc of right hip pain s/p glf on 5/11/2017. Per EMS, pt is currently at [facility] for treatment of right hip fracture. Pt states she was able to get up and ambulate after initial fall. She denies any pain while in ED. PCP: Britt Walker MD    History is limited due to dementia. The history is provided by the patient and the EMS personnel. The history is limited by the condition of the patient. Past Medical History:   Diagnosis Date    Anemia NEC     Asthma     Cervical spondylarthritis     Depression     DJD (degenerative joint disease) of knee     Esophageal spasm     Has required esophageal dilatation; Dr. Louise Alexander GERD (gastroesophageal reflux disease)     Hypercholesterolemia     Hypertension     Lumbar stenosis     Neuropathy, upper extremity     left    Vertigo 1440-2405    Saw Dr. Luke Delvalle; improved.  Vitamin D deficiency 1/28/2011       Past Surgical History:   Procedure Laterality Date    HX CATARACT REMOVAL      bilateral    HX HYSTERECTOMY      HX ORTHOPAEDIC      right bunion excision    HX TONSILLECTOMY           Family History:   Problem Relation Age of Onset    Heart Disease Mother     Hypertension Mother     Stroke Mother     Heart Disease Brother     Heart Disease Father     Heart Disease Brother        Social History     Social History    Marital status:      Spouse name: N/A    Number of children: N/A    Years of education: N/A     Occupational History    Not on file.      Social History Main Topics    Smoking status: Never Smoker    Smokeless tobacco: Never Used    Alcohol use No    Drug use: No    Sexual activity: No     Other Topics Concern    Not on file     Social History Narrative         ALLERGIES: Aspirin; Diclofenac; Gabapentin; Hydrochlorothiazide; and Pcn [penicillins]    Review of Systems   Unable to perform ROS: Dementia       Vitals:    05/12/17 1549   BP: 156/70   Pulse: 76   Resp: 16   Temp: 98.2 °F (36.8 °C)   SpO2: 97%   Weight: 61 kg (134 lb 7.7 oz)   Height: 5' 1\" (1.549 m)            Physical Exam   Constitutional: She appears well-developed and well-nourished. Non-toxic appearance. No distress. Not completely oriented to location and date, however pt is pleasant and aware of recent surgery   HENT:   Head: Normocephalic and atraumatic. Right Ear: External ear normal.   Left Ear: External ear normal.   Nose: Nose normal.   Mouth/Throat: Uvula is midline. No trismus in the jaw. Eyes: Conjunctivae and EOM are normal. Pupils are equal, round, and reactive to light. No scleral icterus. Neck: Normal range of motion and full passive range of motion without pain. Cardiovascular: Normal rate and regular rhythm. Pulmonary/Chest: Effort normal. No accessory muscle usage. No tachypnea. No respiratory distress. She has no decreased breath sounds. She has no wheezes. Abdominal: Soft. There is no tenderness. Musculoskeletal:   Able to flex R hip and R knee > 90 degrees  Surgical site to right anterolateral hip closed with staples and covered with Tegaderm  No redness, no apparent bruising  Minimal local tenderness   Neurological: She is alert. No cranial nerve deficit. GCS eye subscore is 4. GCS verbal subscore is 5. GCS motor subscore is 6. Oriented x 2   Skin: Skin is intact. No rash noted. Psychiatric: She has a normal mood and affect. Her speech is normal.   Nursing note and vitals reviewed. MDM  Number of Diagnoses or Management Options  Right hip pain:   Diagnosis management comments: DDx: fracture, strain, sprain  Afebrile, well appearing, bit confused but pleasant, XR with no acute findings. Pt currently taking antibiotics for presumed UTI. Will defer additional testing. Anticipate discharge back to SNF.        Amount and/or Complexity of Data Reviewed  Clinical lab tests: ordered and reviewed  Tests in the radiology section of CPT®: ordered and reviewed  Obtain history from someone other than the patient: yes (EMS)  Review and summarize past medical records: yes  Independent visualization of images, tracings, or specimens: yes    Patient Progress  Patient progress: stable    ED Course       Procedures    IMAGING RESULTS:  XR HIP RT W OR WO PELV 2-3 VWS   Final Result   EXAM: XR HIP RT W OR WO PELV 2-3 VWS     INDICATION: Trauma. right hip pain s/p fall     COMPARISON: 4/27/2017.     FINDINGS: An AP view of the pelvis and a frogleg lateral view of the right hip  demonstrate no fracture, dislocation or other acute abnormality. There is an  intact right total hip arthroplasty, with overlying subcutaneous staples. There  is left hip DJD. The bones are osteopenic     IMPRESSION  IMPRESSION: No acute abnormality. Intact right total hip arthroplasty. IMPRESSION:  1. Right hip pain        PLAN:  1. Discharge home  2. Follow-up Information     Follow up With Details Comments [address], [place] Schedule an appointment as soon as possible for a visit As needed [address]  [phone]          Return to ED if worse     DISCHARGE NOTE:  6:44 PM  The patient is ready for discharge. The patients signs, symptoms, diagnosis, and instructions for discharge have been discussed and the pt has conveyed their understanding. The patient is to follow up as recommended with PCP or return to the ER should their symptoms worsen. Plan has been discussed and patient has conveyed their agreement. This note is prepared by Luigi Garcia, acting as Scribe for Radha Gilbert. DINORAH Fajardo: The scribe's documentation has been prepared under my direction and personally reviewed by me in its entirety.  I confirm that the note above accurately reflects all work, treatment, procedures, and medical decision making performed by me.

## 2017-05-14 LAB
BACTERIA SPEC CULT: NORMAL
BACTERIA SPEC CULT: NORMAL
SERVICE CMNT-IMP: NORMAL
SERVICE CMNT-IMP: NORMAL

## 2017-05-18 ENCOUNTER — PATIENT OUTREACH (OUTPATIENT)
Dept: INTERNAL MEDICINE CLINIC | Age: 82
End: 2017-05-18

## 2017-05-18 NOTE — PROGRESS NOTES
Community Care Team Documentation for Patient in [facility]  Subsequent Follow up     Patient remains at [facility] ([facility]). See previous [facility] Team notes. RRAT score: Low Risk            9       Total Score        4 More than 1 Admission in calendar year    5 Charlson Comorbidity Score        Criteria that do not apply:    Relationship with PCP    Patient Living Status    Patient Length of Stay > 5    Patient Insurance is Medicare, Medicaid or Self Pay         PCP : Jordana Cotter MD  Nurse Navigator in PCP office:  Alberto Vickers  Note routed to Nurse Navigator. Information provided today by SNF staff member, Faraz Stanton, is as follows: Anticipated discharge date from SNF:  Undetermined  SNF discharge plan:  Started Keflex yesterday for tooth absess. Alinda Branch and went to the ER without injury, not admitted. UTI ABX completed for abscess.     PCP follow up appointment:  Patient has previously scheduled appointment for 5/31/17 9:15 AM    Sara Clancy, ETHAN, RN, ACNS-BC, [facility]  Nurse Navigator, [place] [phone]

## 2017-05-25 ENCOUNTER — PATIENT OUTREACH (OUTPATIENT)
Dept: CASE MANAGEMENT | Age: 82
End: 2017-05-25

## 2017-05-25 NOTE — PROGRESS NOTES
Community Care Team Documentation for Patient in [facility]  Subsequent Follow up     Patient remains at [facility] ([facility]). See previous [facility] Team notes. RRAT score: Low Risk            9       Total Score        4 More than 1 Admission in calendar year    5 Charlson Comorbidity Score        Criteria that do not apply:    Relationship with PCP    Patient Living Status    Patient Length of Stay > 5    Patient Insurance is Medicare, Medicaid or Self Pay         PCP : Ashley Stevens MD  Nurse Navigator in PCP office: Kristan Jamison provided today by SNF staff member, Bambi Allen, is as follows:    Tooth abscess improving. Currently open to PT/OT/SLP. Confusion clearing up. Plan to return home with family and [facility] for PT/OT/SLP. D/c date unknown.        Anticipated discharge date from SNF:  TBD    SNF discharge plan: Home with HH and family     SUZANNA Amador

## 2017-06-01 ENCOUNTER — PATIENT OUTREACH (OUTPATIENT)
Dept: CASE MANAGEMENT | Age: 82
End: 2017-06-01

## 2017-06-01 RX ORDER — CELECOXIB 200 MG/1
200 CAPSULE ORAL DAILY
Qty: 90 CAP | Refills: 0 | Status: SHIPPED | OUTPATIENT
Start: 2017-06-01 | End: 2017-09-26 | Stop reason: SDUPTHER

## 2017-06-01 NOTE — PROGRESS NOTES
Community Care Team Documentation for Patient in [facility]  Discharge    Patient discharged from [facility] ([facility]). See previous [facility] Team notes.     PCP : Britt Walker MD    Nurse Navigator in PCP office: Darleen Chawla    Information provided today by SNF staff member, Meagan Glynn, is as follows:    Discharged from SNF on:  5/31    SNF discharge plan:  Home with [place]    PCP follow up appointment:  6/6    SUZANNA Frankel

## 2017-06-02 ENCOUNTER — PATIENT OUTREACH (OUTPATIENT)
Dept: INTERNAL MEDICINE CLINIC | Age: 82
End: 2017-06-02

## 2017-06-02 NOTE — PROGRESS NOTES
NNTOCIP Post Hospitalization           Referral from [facility]; [facility]. Most recent HIPAA form in the patient's chart reviewed (dated 2/22/16) and authorized individual(s) listed include Rosana Lamb and [employer]. Seferino Negrete Patient discharged from [place] [address] ([facility]) on 5/31/17; discharged to home with home health services. NN follow-up phone call  NN contacted the patient today to complete NN post-hospital discharge assessment. Two patient identifiers verified. NN introduced self to the patient, including NN role and purpose of NN follow-up phone call; patient verbalizes understanding. NN inquired about the patient's current condition and how the patient is feeling; patient states, \"I feel fairly well. \"     Patient requests that this NN speak to her Son, Janel Narayan. NN introduced self to the patient's Son, including NN role and purpose of NN follow-up phone call; patient's Son verbalizes understanding. Son denies presence of and/or patient c/o fever, chills, dizziness, abdominal pain, nausea, vomiting since SNF discharge. See details of Functional Assessment below as reported by the Patient's Son, Janel Narayan. Living Situation/Support System: Son lives with patient; reports a good support system in place for the patient and denies any concerns/needs at this time. ADLs: Currently requiring minimal assistance with bathing and dressing. Mobility: Walker and wheelchair; reports primarily using the Alfrieda Gutter. Transportation: Family provides the patient with transportation; denies any concerns regarding patient transportation. Medication Management: Son assists patient with medication management. Financial Status: Denies any financial concerns regarding the patient's medications. Barriers to care?  no           Patient's allergies reviewed with Janel Narayan.   NN attempted to complete/update the patient's medication reconciliation, however Janel Narayan reports that the patient's Daughter has SNF discharge medication list; Kevyn Herrera states that he will bring SNF discharge medication list to the patient's scheduled appointment with PCP on 6/6/17.        - [address]. Patient currently open to [facility]. Per patient, home health admissions visit was completed today; reports next scheduled home health visit is Monday. Patient's Son, Kevyn Herrera, reports that the patient's Daughter was present during the home health visit. Opportunity for patient/ patient's Son to ask NN questions was provided. NN contact information provided and patient/ patient's Son advised to contact NN as needed. PLAN    - [facility] to [facility] [address]    -Patient to attend Transitions Of Care Appointment with Dr. SUNITA RICKETTS on 6/6/17.      -Patient to contact this NN and/or PCP office with any questions/concerns.  -Notified of availability of PCP on-call physician after-hours and on the weekends for non-emergent/non-life threatening medical questions/concerns; patient/ Son verbalize understanding.  -Goals:  Goals        Post Hospitalization     Prevent complications post hospitalization. 5/3/17: Patient discharged from [facility] to [facility] ([facility]); patient to be followed by [facility] during SNF admission. 5/11/17: Patient [facility] from [facility] ([facility]) on 5/8/17; discharged from [facility] back to [facility] ([facility]) on 5/10/17. Patient will be followed by [facility] during SNF admission. 6/2/17  - discharged from [facility] on 5/31/17  - currently open to home health services through [facility]; reports admissions visit completed today and next scheduled home health visit Monday  - functional assessment completed; no barriers to care reported/identified  - previously scheduled appt with Dr. SUNITA RICKETTS on 6/6/17; [place] [id number]                    Patient's currently scheduled future appointments are listed below.   Future Appointments      Provider Tyler Cagle   6/6/2017 1:45 PM Jennifer Hamlin Edward Kent, [address]               Patient/ Son expressed no questions, concerns or needs for this NN at this time. Patient/ Son verbalized understanding of all information discussed. NN contact information provided and patient/ Son advised to contact NN as needed. NN will route this encounter to Dr. Edward Kent for notification/review. This note will not be viewable in [address]

## 2017-06-02 NOTE — Clinical Note
Has previously scheduled appt with you on 6/6/17; can be billed as [place] as patient was discharged from facility on 5/31/17; they were unable to reschedule appt due to transportation arrangements.

## 2017-06-06 ENCOUNTER — OFFICE VISIT (OUTPATIENT)
Dept: INTERNAL MEDICINE CLINIC | Age: 82
End: 2017-06-06

## 2017-06-06 VITALS
WEIGHT: 126.2 LBS | RESPIRATION RATE: 14 BRPM | DIASTOLIC BLOOD PRESSURE: 49 MMHG | OXYGEN SATURATION: 100 % | HEART RATE: 62 BPM | TEMPERATURE: 98 F | SYSTOLIC BLOOD PRESSURE: 105 MMHG | HEIGHT: 61 IN | BODY MASS INDEX: 23.83 KG/M2

## 2017-06-06 DIAGNOSIS — S72.001D HIP FRACTURE, RIGHT, CLOSED, WITH ROUTINE HEALING, SUBSEQUENT ENCOUNTER: ICD-10-CM

## 2017-06-06 DIAGNOSIS — I10 ESSENTIAL HYPERTENSION: ICD-10-CM

## 2017-06-06 DIAGNOSIS — G93.40 ENCEPHALOPATHY: ICD-10-CM

## 2017-06-06 DIAGNOSIS — E55.9 VITAMIN D DEFICIENCY: ICD-10-CM

## 2017-06-06 DIAGNOSIS — N39.0 URINARY TRACT INFECTION WITHOUT HEMATURIA, SITE UNSPECIFIED: Primary | ICD-10-CM

## 2017-06-06 RX ORDER — MEMANTINE HYDROCHLORIDE 5 MG/1
TABLET ORAL DAILY
COMMUNITY
End: 2017-06-26 | Stop reason: SDUPTHER
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
## 2017-06-06 NOTE — PROGRESS NOTES
1. Have you been to the ER, urgent care clinic since your last visit? Hospitalized since your last visit?no  2. Have you seen or consulted any other health care providers outside of the [address] [place] [address] since your last visit? Include any pap smears or colon screening.  no

## 2017-06-06 NOTE — MR AVS SNAPSHOT
Visit Information Date & Time Provider Department Dept. Phone Encounter #  
 6/6/2017  1:45 PM August Current, [address] [phone] [id number] Follow-up Instructions Return in about 4 months (around 10/6/2017) for HTN, etc.  
 Follow-up and Disposition History Upcoming Health Maintenance Date Due DTaP/Tdap/Td series (1 - Tdap) 9/12/1949 GLAUCOMA SCREENING Q2Y 9/12/1993 MEDICARE YEARLY EXAM 2/22/2017 INFLUENZA AGE 9 TO ADULT 8/1/2017 Pneumococcal 65+ Low/Medium Risk (2 of 2 - PPSV23) 10/17/2017 Allergies as of 6/6/2017  Review Complete On: 6/6/2017 By: August MD Matteo  
  
 Severity Noted Reaction Type Reactions Aspirin  10/27/2009    Nausea and Vomiting Diclofenac  02/22/2016    Itching The voltaren gel caused itching. Gabapentin  10/13/2016    Swelling Hydrochlorothiazide  10/27/2009    Other (comments)  
 dizziness Pcn [Penicillins]  10/27/2009    Swelling Current Immunizations  Reviewed on 10/15/2010 Name Date Influenza Vaccine 12/1/2014, 10/21/2013 Influenza Vaccine José Larry) 10/7/2015 Influenza Vaccine (Quad) PF 1/30/2017 Influenza Vaccine Split 10/17/2012, 10/15/2010 12:30 PM  
 Pneumococcal Vaccine (Unspecified Type) 10/17/2012 Not reviewed this visit You Were Diagnosed With   
  
 Codes Comments Urinary tract infection without hematuria, site unspecified    -  Primary ICD-10-CM: N39.0 ICD-9-CM: 599.0 Hip fracture, right, closed, with routine healing, subsequent encounter     ICD-10-CM: S72.001D ICD-9-CM: V54.13 Essential hypertension     ICD-10-CM: I10 
ICD-9-CM: 401.9 Vitamin D deficiency     ICD-10-CM: E55.9 ICD-9-CM: 268.9 Encephalopathy     ICD-10-CM: G93.40 ICD-9-CM: 348.30 Vitals BP Pulse Temp Resp Height(growth percentile) Weight(growth percentile)  105/49 (BP 1 [place] Left arm, BP Patient Position: Sitting) 62 98 °F (36.7 °C) (Oral) 14 5' 1\" (1.549 m) 126 lb 3.2 oz (57.2 kg) SpO2 BMI OB Status Smoking Status 100% 23.85 kg/m2 Hysterectomy Never Smoker Vitals History BMI and BSA Data Body Mass Index Body Surface Area  
 23.85 kg/m 2 1.57 m 2 Preferred Pharmacy Pharmacy Name Phone [facility]  [place] [phone] Your Updated Medication List  
  
   
This list is accurate as of: 6/6/17  2:14 PM.  Always use your most recent med list.  
  
  
  
  
 albuterol 90 mcg/actuation inhaler Commonly known as:  PROVENTIL HFA, VENTOLIN HFA, PROAIR HFA Take 1 Puff by inhalation as needed for Wheezing. atenolol 25 mg tablet Commonly known as:  TENORMIN  
TAKE 1 TABLET BY MOUTH DAILY  
  
 atorvastatin 20 mg tablet Commonly known as:  LIPITOR  
TAKE 1 TABLET BY MOUTH EVERY NIGHT  
  
 bisacodyl 10 mg suppository Commonly known as:  DULCOLAX Insert 10 mg into rectum daily as needed (Constipation). celecoxib 200 mg capsule Commonly known as:  CELEBREX Take 1 Cap by mouth daily for 90 days. DULoxetine 30 mg capsule Commonly known as:  CYMBALTA TAKE 1 CAPSULE BY MOUTH DAILY  
  
 ergocalciferol 50,000 unit capsule Commonly known as:  ERGOCALCIFEROL Take 1 Cap by mouth every seven (7) days. lidocaine 5 % Commonly known as:  LIDODERM  
1 Patch by TransDERmal route every twenty-four (24) hours. Apply patch to the affected area for 12 hours a day and remove for 12 hours a day. magnesium hydroxide 400 mg/5 mL suspension Commonly known as:  MILK OF MAGNESIA Take 30 mL by mouth DIALYSIS PRN for Constipation (if no BM after 3 days). memantine 5 mg tablet Commonly known as:  Farayth Grade Take  by mouth daily. MULTIVITAMIN PO Take  by mouth daily. * oxyCODONE IR 5 mg immediate release tablet Commonly known as:  Diane De La Cruz  
 Take 5 mg by mouth every three (3) hours as needed for Pain (6-10). * oxyCODONE IR 5 mg immediate release tablet Commonly known as:  Yasmeen Torrez Take 0.5-1 Tabs by mouth every three (3) hours as needed. Max Daily Amount: 40 mg.  
  
 senna-docusate 8.6-50 mg per tablet Commonly known as:  Cindra Bullion Take 1 Tab by mouth daily. sodium phosphate 19-7 gram/118 mL enema Commonly known as:  FLEET'S Insert 1 Enema into rectum as needed (if no results from Bisacodyl Suppository). * Notice: This list has 2 medication(s) that are the same as other medications prescribed for you. Read the directions carefully, and ask your doctor or other care provider to review them with you. Follow-up Instructions Return in about 4 months (around 10/6/2017) for HTN, etc.  
  
To-Do List   
 09/07/2017 Lab:  CBC WITH AUTOMATED DIFF   
  
 09/07/2017 Lab:  LIPID PANEL   
  
 09/07/2017 Lab:  METABOLIC PANEL, COMPREHENSIVE   
  
 09/07/2017 Lab:  VITAMIN D, 25 HYDROXY Introducing [facility] SERVICES! Nikki Fine introduces Payment plugin patient portal. Now you can access parts of your medical record, email your doctor's office, and request medication refills online. 1. In your internet browser, go to [email or web address]. [email or web address] 2. Click on the First Time User? Click Here link in the Sign In box. You will see the New Member Sign Up page. 3. Enter your Payment plugin Access Code exactly as it appears below. You will not need to use this code after youve completed the sign-up process. If you do not sign up before the expiration date, you must request a new code. · Payment plugin Access Code: [email or web address] Expires: 7/31/2017 10:38 AM 
 
4. Enter the last four digits of your Social Security Number (xxxx) and Date of Birth (mm/dd/yyyy) as indicated and click Submit. You will be taken to the next sign-up page. 5. Create a [employer] ID. This will be your [employer] login ID and cannot be changed, so think of one that is secure and easy to remember. 6. Create a [employer] password. You can change your password at any time. 7. Enter your Password Reset Question and Answer. This can be used at a later time if you forget your password. 8. Enter your e-mail address. You will receive e-mail notification when new information is available in [address] 9. Click Sign Up. You can now view and download portions of your medical record. 10. Click the Download Summary menu link to download a portable copy of your medical information. If you have questions, please visit the Frequently Asked Questions section of the [employer] website. Remember, [employer] is NOT to be used for urgent needs. For medical emergencies, dial 911. Now available from your iPhone and Android! Please provide this summary of care documentation to your next provider. Your primary care clinician is listed as [place]. If you have any questions after today's visit, please call [phone].

## 2017-06-26 RX ORDER — MEMANTINE HYDROCHLORIDE 5 MG/1
TABLET ORAL
Qty: 180 TAB | Refills: 11 | Status: SHIPPED | OUTPATIENT
Start: 2017-06-26 | End: 2018-09-10

## 2017-06-26 RX ORDER — MEMANTINE HYDROCHLORIDE 5 MG/1
5 TABLET ORAL 2 TIMES DAILY
Qty: 60 TAB | Refills: 11 | Status: SHIPPED | OUTPATIENT
Start: 2017-06-26 | End: 2017-06-26 | Stop reason: SDUPTHER

## 2017-07-03 RX ORDER — AMLODIPINE AND VALSARTAN 10; 320 MG/1; MG/1
TABLET ORAL
Qty: 90 TAB | Refills: 0 | Status: SHIPPED | OUTPATIENT
Start: 2017-07-03 | End: 2017-10-01 | Stop reason: SDUPTHER

## 2017-07-06 ENCOUNTER — TELEPHONE (OUTPATIENT)
Dept: INTERNAL MEDICINE CLINIC | Age: 82
End: 2017-07-06

## 2017-07-06 NOTE — TELEPHONE ENCOUNTER
[facility] states she needs to get office notes from 6/6/17 faxed over for the patient. Please call if any questions.  Thank you        Fax# is [phone] ATTN: Sunil De La Torre Records have been faxed to above number

## 2017-07-06 NOTE — TELEPHONE ENCOUNTER
[facility] states she needs to get office notes from 6/6/17 faxed over for the patient. Please call if any questions.  Thank you      Fax# is [phone] ATTN: Humera Gramajo

## 2017-07-10 RX ORDER — ATORVASTATIN CALCIUM 20 MG/1
TABLET, FILM COATED ORAL
Qty: 90 TAB | Refills: 0 | Status: SHIPPED | OUTPATIENT
Start: 2017-07-10 | End: 2017-11-05 | Stop reason: SDUPTHER

## 2017-07-14 ENCOUNTER — PATIENT OUTREACH (OUTPATIENT)
Dept: INTERNAL MEDICINE CLINIC | Age: 82
End: 2017-07-14

## 2017-07-14 NOTE — PROGRESS NOTES
NNTOCIP Post Hospitalization       - Attended [facility] appointment with Ashley Taylor MD on 6/6/17. To the best of this NN's knowledge, this patient had no additional Hospital Admissions during 30 day JOSEPH period following admission to [address] [place] [address] 5/8/17-5/10/17. JOSEPH period has ended. Transitions of Care Episode Resolved. Goals Addressed             Most Recent       Post Hospitalization     COMPLETED: Prevent complications post hospitalization. On track (7/14/2017)             5/3/17   Patient discharged from [address] [place] [address] to [facility] ([facility]); patient to be followed by Krystle LEAL during SNF admission. 5/11/17   Patient readmitted to [address] [place] [address] from [facility] ([facility]) on 5/8/17; discharged from [address] [place] [address] back to [facility] ([facility]) on 5/10/17. Patient will be followed by CCT during SNF admission. 6/2/17  - discharged from [facility] on 5/31/17  - currently open to home health services through [facility]; reports admissions visit completed today and next scheduled home health visit Monday  - functional assessment completed; no barriers to care reported/identified  - previously scheduled appt with Dr. Zee Zamudio on 6/6/17; JOSEPH [id number]  7/14/17  - JOSEPH episode resolved. Future Appointments  Date Time Provider Tyler Cagle   10/9/2017 9:15 AM Ashley Taylor MD [address]            Last Appointment My Department:  6/6/2017          This note will not be viewable in [facility] [employer].

## 2017-09-27 RX ORDER — CELECOXIB 200 MG/1
CAPSULE ORAL
Qty: 90 CAP | Refills: 0 | Status: SHIPPED | OUTPATIENT
Start: 2017-09-27 | End: 2017-12-28 | Stop reason: SDUPTHER

## 2017-10-02 ENCOUNTER — LAB ONLY (OUTPATIENT)
Dept: INTERNAL MEDICINE CLINIC | Age: 82
End: 2017-10-02

## 2017-10-02 ENCOUNTER — HOSPITAL ENCOUNTER (OUTPATIENT)
Dept: LAB | Age: 82
Discharge: HOME | End: 2017-10-02
Payer: MEDICARE

## 2017-10-02 DIAGNOSIS — E55.9 VITAMIN D DEFICIENCY: ICD-10-CM

## 2017-10-02 DIAGNOSIS — I10 ESSENTIAL HYPERTENSION: ICD-10-CM

## 2017-10-02 PROCEDURE — 80061 LIPID PANEL: CPT

## 2017-10-02 PROCEDURE — 36415 COLL VENOUS BLD VENIPUNCTURE: CPT

## 2017-10-02 PROCEDURE — 85025 COMPLETE CBC W/AUTO DIFF WBC: CPT

## 2017-10-02 PROCEDURE — 82306 VITAMIN D 25 HYDROXY: CPT

## 2017-10-02 PROCEDURE — 80053 COMPREHEN METABOLIC PANEL: CPT

## 2017-10-02 RX ORDER — AMLODIPINE AND VALSARTAN 10; 320 MG/1; MG/1
TABLET ORAL
Qty: 90 TAB | Refills: 0 | Status: SHIPPED | OUTPATIENT
Start: 2017-10-02 | End: 2018-09-10 | Stop reason: SDUPTHER

## 2017-10-04 LAB
25(OH)D3+25(OH)D2 SERPL-MCNC: 36.5 NG/ML (ref 30–100)
ALBUMIN SERPL-MCNC: 4.1 G/DL (ref 3.5–4.7)
ALBUMIN/GLOB SERPL: 1.5 {RATIO} (ref 1.2–2.2)
ALP SERPL-CCNC: 94 IU/L (ref 39–117)
ALT SERPL-CCNC: 27 IU/L (ref 0–32)
AST SERPL-CCNC: 24 IU/L (ref 0–40)
BASOPHILS # BLD AUTO: 0 X10E3/UL (ref 0–0.2)
BASOPHILS NFR BLD AUTO: 1 %
BILIRUB SERPL-MCNC: 0.3 MG/DL (ref 0–1.2)
BUN SERPL-MCNC: 24 MG/DL (ref 8–27)
BUN/CREAT SERPL: 26 (ref 12–28)
CALCIUM SERPL-MCNC: 10.6 MG/DL (ref 8.7–10.3)
CHLORIDE SERPL-SCNC: 104 MMOL/L (ref 96–106)
CHOLEST SERPL-MCNC: 174 MG/DL (ref 100–199)
CO2 SERPL-SCNC: 27 MMOL/L (ref 18–29)
CREAT SERPL-MCNC: 0.92 MG/DL (ref 0.57–1)
EOSINOPHIL # BLD AUTO: 0.2 X10E3/UL (ref 0–0.4)
EOSINOPHIL NFR BLD AUTO: 2 %
ERYTHROCYTE [DISTWIDTH] IN BLOOD BY AUTOMATED COUNT: 13.8 % (ref 12.3–15.4)
GLOBULIN SER CALC-MCNC: 2.7 G/DL (ref 1.5–4.5)
GLUCOSE SERPL-MCNC: 97 MG/DL (ref 65–99)
HCT VFR BLD AUTO: 37.6 % (ref 34–46.6)
HDLC SERPL-MCNC: 68 MG/DL
HGB BLD-MCNC: 12.4 G/DL (ref 11.1–15.9)
IMM GRANULOCYTES # BLD: 0 X10E3/UL (ref 0–0.1)
IMM GRANULOCYTES NFR BLD: 0 %
LDLC SERPL CALC-MCNC: 83 MG/DL (ref 0–99)
LYMPHOCYTES # BLD AUTO: 2.3 X10E3/UL (ref 0.7–3.1)
LYMPHOCYTES NFR BLD AUTO: 29 %
MCH RBC QN AUTO: 29.2 PG (ref 26.6–33)
MCHC RBC AUTO-ENTMCNC: 33 G/DL (ref 31.5–35.7)
MCV RBC AUTO: 89 FL (ref 79–97)
MONOCYTES # BLD AUTO: 0.6 X10E3/UL (ref 0.1–0.9)
MONOCYTES NFR BLD AUTO: 8 %
NEUTROPHILS # BLD AUTO: 4.9 X10E3/UL (ref 1.4–7)
NEUTROPHILS NFR BLD AUTO: 60 %
PLATELET # BLD AUTO: 312 X10E3/UL (ref 150–379)
POTASSIUM SERPL-SCNC: 4.6 MMOL/L (ref 3.5–5.2)
PROT SERPL-MCNC: 6.8 G/DL (ref 6–8.5)
RBC # BLD AUTO: 4.25 X10E6/UL (ref 3.77–5.28)
SODIUM SERPL-SCNC: 143 MMOL/L (ref 134–144)
TRIGL SERPL-MCNC: 114 MG/DL (ref 0–149)
VLDLC SERPL CALC-MCNC: 23 MG/DL (ref 5–40)
WBC # BLD AUTO: 8 X10E3/UL (ref 3.4–10.8)

## 2017-10-09 ENCOUNTER — TELEPHONE (OUTPATIENT)
Dept: INTERNAL MEDICINE CLINIC | Age: 82
End: 2017-10-09

## 2017-10-09 ENCOUNTER — OFFICE VISIT (OUTPATIENT)
Dept: INTERNAL MEDICINE CLINIC | Age: 82
End: 2017-10-09

## 2017-10-09 VITALS
WEIGHT: 130 LBS | HEART RATE: 61 BPM | OXYGEN SATURATION: 99 % | BODY MASS INDEX: 24.55 KG/M2 | DIASTOLIC BLOOD PRESSURE: 62 MMHG | TEMPERATURE: 98.1 F | RESPIRATION RATE: 18 BRPM | HEIGHT: 61 IN | SYSTOLIC BLOOD PRESSURE: 127 MMHG

## 2017-10-09 DIAGNOSIS — D64.9 ANEMIA, UNSPECIFIED TYPE: ICD-10-CM

## 2017-10-09 DIAGNOSIS — Z23 ENCOUNTER FOR IMMUNIZATION: ICD-10-CM

## 2017-10-09 DIAGNOSIS — R42 VERTIGO: ICD-10-CM

## 2017-10-09 DIAGNOSIS — J45.20 MILD INTERMITTENT ASTHMA WITHOUT COMPLICATION: ICD-10-CM

## 2017-10-09 DIAGNOSIS — Z00.00 MEDICARE ANNUAL WELLNESS VISIT, SUBSEQUENT: Primary | ICD-10-CM

## 2017-10-09 DIAGNOSIS — E78.00 HYPERCHOLESTEROLEMIA: ICD-10-CM

## 2017-10-09 DIAGNOSIS — I10 ESSENTIAL HYPERTENSION: ICD-10-CM

## 2017-10-09 DIAGNOSIS — E55.9 VITAMIN D DEFICIENCY: ICD-10-CM

## 2017-10-09 NOTE — TELEPHONE ENCOUNTER
Today our office received a fax stating that atenolol 25mg is on back order, and asking if PCP can prescribed alternative.

## 2017-10-09 NOTE — PROGRESS NOTES
Wellness: This is a Subsequent Medicare Annual Wellness Exam (AWV) (Performed 12 months after IPPE or effective date of Medicare Part B enrollment, Once in a lifetime)    I have reviewed the patient's medical history in detail and updated the computerized patient record. History     Past Medical History:   Diagnosis Date    Anemia NEC     Asthma     Cervical spondylarthritis     Depression     DJD (degenerative joint disease) of knee     Esophageal spasm     Has required esophageal dilatation; Dr. Beth Johnson GERD (gastroesophageal reflux disease)     Hypercholesterolemia     Hypertension     Lumbar stenosis     Neuropathy, upper extremity     left    Vertigo [id number]    Saw Dr. Lauro Ellsworth; improved.  Vitamin D deficiency 1/28/2011      Past Surgical History:   Procedure Laterality Date    HX CATARACT REMOVAL      bilateral    HX HYSTERECTOMY      HX ORTHOPAEDIC      right bunion excision    HX OTHER SURGICAL      broken R hip    HX TONSILLECTOMY       Current Outpatient Prescriptions   Medication Sig Dispense Refill    amLODIPine-valsartan (EXFORGE)  mg per tablet TAKE 1 TABLET BY MOUTH DAILY 90 Tab 0    celecoxib (CELEBREX) 200 mg capsule TAKE ONE CAPSULE BY MOUTH EVERY DAY 90 Cap 0    atorvastatin (LIPITOR) 20 mg tablet TAKE 1 TABLET BY MOUTH EVERY NIGHT 90 Tab 0    memantine (NAMENDA) 5 mg tablet TAKE 1 TABLET BY MOUTH TWICE DAILY. THIS IS A DOSE CHANGE 180 Tab 11    senna-docusate (PERICOLACE) 8.6-50 mg per tablet Take 1 Tab by mouth daily. 30 Tab 0    ergocalciferol (ERGOCALCIFEROL) 50,000 unit capsule Take 1 Cap by mouth every seven (7) days.  (Patient taking differently: Take 50,000 Units by mouth every Sunday.) 1 Cap 0    atenolol (TENORMIN) 25 mg tablet TAKE 1 TABLET BY MOUTH DAILY 90 Tab 5    DULoxetine (CYMBALTA) 30 mg capsule TAKE 1 CAPSULE BY MOUTH DAILY 90 Cap 11    albuterol (PROVENTIL HFA, VENTOLIN HFA, PROAIR HFA) 90 mcg/actuation inhaler Take 1 Puff by inhalation as needed for Wheezing. 1 Inhaler 5    MULTIVITAMINS (MULTIVITAMIN PO) Take  by mouth daily. Allergies   Allergen Reactions    Diclofenac Itching     The voltaren gel caused itching.  Gabapentin Swelling    Hydrochlorothiazide Other (comments)     dizziness    Pcn [Penicillins] Swelling     Family History   Problem Relation Age of Onset    Heart Disease Mother     Hypertension Mother     Stroke Mother     Heart Disease Brother     Heart Disease Father     Heart Disease Brother      Social History   Substance Use Topics    Smoking status: Never Smoker    Smokeless tobacco: Never Used    Alcohol use No     Patient Active Problem List   Diagnosis Code    Hypertension I10    Hypercholesterolemia E78.00    Depression F32.9    GERD (gastroesophageal reflux disease) K21.9    Asthma J45.909    Neuropathy, upper extremity G56.90    Vitamin D deficiency E55.9    Vertigo R42    Dizziness R42    Lumbar stenosis M48.061    Anemia D64.9    Hip fracture ([facility] [address] S72.009A    UTI (urinary tract infection) N39.0       Depression Risk Factor Screening:     PHQ over the last two weeks 2/22/2016   Little interest or pleasure in doing things Not at all   Feeling down, depressed or hopeless Not at all   Total Score PHQ 2 0     Alcohol Risk Factor Screening: You do not drink alcohol or very rarely. Functional Ability and Level of Safety:   Hearing Loss  Hearing is good. Slight hearing loss vs \"selective hearing\" per son. Activities of Daily Living  The home contains: no safety equipment. Uses wheelchair when dizzy. Has grab bars. Shower chair. Son lives with her. She cooks a little. Patient does total self care    Fall RiskFall Risk Assessment, last 12 mths 6/6/2017   Able to walk? Yes   Fall in past 12 months? Yes   Fall with injury?  Yes   Number of falls in past 12 months 3   Fall Risk Score 4       Abuse Screen  Patient is not abused    \"Safe in home\"--Nervous at night because of \"what's going on out there; you see it in the news all the time. \"     Cognitive Screening   Evaluation of Cognitive Function:  Has your family/caregiver stated any concerns about your memory: no  Normal    Patient Care Team   Patient Care Team:  Aria Negrete MD as PCP - Bridgett Weiss MD (Gastroenterology)  Meera Vargas MD (Ophthalmology)  Lokesh Page MD (Orthopedic Surgery)    Assessment/Plan   Education and counseling provided:  Are appropriate based on today's review and evaluation    Diagnoses and all orders for this visit:    1. Essential hypertension    2. Vertigo  -     REFERRAL TO ENT-OTOLARYNGOLOGY    3. Hypercholesterolemia    4. Vitamin D deficiency    5. Mild intermittent asthma without complication    6.  Anemia, unspecified type        Health Maintenance Due   Topic Date Due    DTaP/Tdap/Td series (1 - Tdap) 09[birth year]/1949    GLAUCOMA SCREENING Q2Y  09/12/1993    MEDICARE YEARLY EXAM  02/22/2017    INFLUENZA AGE 9 TO ADULT  08/01/2017

## 2017-10-09 NOTE — PROGRESS NOTES
SUBJECTIVE  Ms. Rod Roche presents today for follow up. Chief Complaint   Patient presents with    Annual Wellness Visit     pt due for medicare wellness    Hypertension     pt here today for routine f.u    Dizziness     pt here today c/o feeling dizzy; pt concerned that the casue of dizziness could be from one of her medications    Medication Evaluation     pt needs alternative on atenolol; pt was told by pharmacist that they do not have atenolol     Immunization/Injection     pt wants a flu shot        Complains of dizziness. Sometimes vertigo--\"I can hardly walk sometimes. \"    It is recalled that in April 2017, she was admitted to ED [facility] for hip fracture; Underwent hip replacment, and discharged to SNF. Readmitted to ED [facility] for encephalopathy and UTI. Left knee pain better. On cymbalta and celebrex. She was unable to tolerate gabapentin . The tramadol we gave her \"didn't do anything for the pain and kept me awake, so I stopped taking it. \"   Also has pain in R lateral pelvis. We noted in early 2016:  L knee swells. In the past has received corticosteroid injections from Dr. Donavan Flynn. She sees Dr. Chamorro, and he has done injections. \"He says you can do the needle\"--no plans for further follow up with him. GI bleed Anemia: No recent melena or hematochezia. She has had problems with diclofenac (itching) and celebrex--As we noted in January 2015: \"I had to stop the pills [celebrex] because of bleeding, and had a scope. \"  Dr. Rajani Bliss did colonoscopy finding a cecal polyp and diverticuli. Still has the neuropathic pain in leg, as noted in prior notes. This is doing better on cymbalta. She is not as worried about weight loss; however, it appears this is stable:    Wt Readings from Last 5 Encounters:   10/09/17 130 lb (59 kg)   06/06/17 126 lb 3.2 oz (57.2 kg)   05/12/17 134 lb 7.7 oz (61 kg)   05/08/17 134 lb 7.7 oz (61 kg)   04/30/17 105 lb (47.6 kg)       She has seen Dr. Shahab Bryant for spinal stenosis / sciatica. Asthma stable, uses inhaler \"now and then. \"    She still has arthritis pain: R neck pain is stable. Saw Dr. Jarod Rossi, who diagnosed cervical spine arthritis. Depression is doing fine. No suicidal ideation. For all issues addressed today, see A/P below. PMH: She has a past medical history of Anemia NEC; Asthma; Cervical spondylarthritis; Depression; DJD (degenerative joint disease) of knee; Esophageal spasm; GERD (gastroesophageal reflux disease); Hypercholesterolemia; Hypertension; Lumbar stenosis; Neuropathy, upper extremity; Vertigo ([id number]); and Vitamin D deficiency (1/28/2011). Had pneumovax once. PSH:  has a past surgical history that includes hysterectomy; cataract removal; tonsillectomy; orthopaedic; and other surgical.  Colonoscopy 2014 showed cecal polyp, diverticuli, and internal hemorrhoids, Dr. Rachna Chaves. All and MED reviewed. I have taken aspirin off the list; caused N/V once, but she is on it now and tolerating. Current Outpatient Prescriptions on File Prior to Visit   Medication Sig Dispense Refill    amLODIPine-valsartan (EXFORGE)  mg per tablet TAKE 1 TABLET BY MOUTH DAILY 90 Tab 0    celecoxib (CELEBREX) 200 mg capsule TAKE ONE CAPSULE BY MOUTH EVERY DAY 90 Cap 0    atorvastatin (LIPITOR) 20 mg tablet TAKE 1 TABLET BY MOUTH EVERY NIGHT 90 Tab 0    memantine (NAMENDA) 5 mg tablet TAKE 1 TABLET BY MOUTH TWICE DAILY. THIS IS A DOSE CHANGE 180 Tab 11    senna-docusate (PERICOLACE) 8.6-50 mg per tablet Take 1 Tab by mouth daily. 30 Tab 0    ergocalciferol (ERGOCALCIFEROL) 50,000 unit capsule Take 1 Cap by mouth every seven (7) days.  (Patient taking differently: Take 50,000 Units by mouth every Sunday.) 1 Cap 0    atenolol (TENORMIN) 25 mg tablet TAKE 1 TABLET BY MOUTH DAILY 90 Tab 5    DULoxetine (CYMBALTA) 30 mg capsule TAKE 1 CAPSULE BY MOUTH DAILY 90 Cap 11    albuterol (PROVENTIL HFA, VENTOLIN HFA, PROAIR HFA) 90 mcg/actuation inhaler Take 1 Puff by inhalation as needed for Wheezing. 1 Inhaler 5    MULTIVITAMINS (MULTIVITAMIN PO) Take  by mouth daily.  oxyCODONE IR (ROXICODONE) 5 mg immediate release tablet Take 5 mg by mouth every three (3) hours as needed for Pain (6-10).  bisacodyl (DULCOLAX) 10 mg suppository Insert 10 mg into rectum daily as needed (Constipation).  sodium phosphate (FLEET'S) 19-7 gram/118 mL enema Insert 1 Enema into rectum as needed (if no results from Bisacodyl Suppository).  magnesium hydroxide (MILK OF MAGNESIA) 400 mg/5 mL suspension Take 30 mL by mouth DIALYSIS PRN for Constipation (if no BM after 3 days).  oxyCODONE IR (ROXICODONE) 5 mg immediate release tablet Take 0.5-1 Tabs by mouth every three (3) hours as needed. Max Daily Amount: 40 mg. (Patient taking differently: Take 2.5 mg by mouth every three (3) hours as needed for Pain (1-5). ) 30 Tab 0    lidocaine (LIDODERM) 5 % 1 Patch by TransDERmal route every twenty-four (24) hours. Apply patch to the affected area for 12 hours a day and remove for 12 hours a day. (Patient taking differently: 2 Patches by TransDERmal route every twenty-four (24) hours. Apply one patch to each knee for 12 hours a day and remove for 12 hours a day.) 30 Each 11     No current facility-administered medications on file prior to visit. FH: Her family history includes Heart Disease in her brother, brother, father, and mother; Hypertension in her mother; Stroke in her mother. SH: She is . Her son lives with her. She reports that she has never used tobacco.  She reports that she drinks alcohol. ROS: Complete review of systems was performed and is otherwise unremarkable except as noted elsewhere.      OBJECTIVE  Vitals:   Visit Vitals    /62 (BP 1 [place] Left arm, BP Patient Position: Sitting)    Pulse 61    Temp 98.1 °F (36.7 °C) (Oral)    Resp 18    Ht 5' 1\" (1.549 m)    Wt 130 lb (59 kg)    SpO2 99%    BMI 24.56 kg/m2    Gen: Pleasant 80 y.o. AA female in NAD.   HEENT: PERRLA. EOMI. OP moist and pink. +TTP R cheek and forehead.  Neck: Supple.  No LAD.  HEART: RRR, No M/G/R.   LUNGS: CTAB No W/R.   ABDOMEN: S, NT, ND, BS+.   EXTREMITIES: Warm. No C/C/E. MUSCULOSKELETAL: Normal ROM, muscle strength 5/5 all groups. NEURO: Alert and oriented x 3.  Cranial nerves grossly intact.  No focal sensory or motor deficits noted. SKIN: Warm. Dry. No rashes or other lesions noted. Lab Results   Component Value Date/Time    Cholesterol, total 174 10/02/2017 09:16 AM    HDL Cholesterol 68 10/02/2017 09:16 AM    LDL, calculated 83 10/02/2017 09:16 AM    VLDL, calculated 23 10/02/2017 09:16 AM    Triglyceride 114 10/02/2017 09:16 AM    CHOL/HDL Ratio 1.6 04/29/2017 05:38 AM      Lab Results   Component Value Date/Time    WBC 8.0 10/02/2017 09:16 AM    WBC 6.7 05/21/2012 08:23 AM    HGB 12.4 10/02/2017 09:16 AM    HCT 37.6 10/02/2017 09:16 AM    PLATELET 707 60/25/0347 09:16 AM    MCV 89 10/02/2017 09:16 AM     Lab Results   Component Value Date/Time    Vitamin D 25-Hydroxy 13.8 04/29/2017 05:37 AM    VITAMIN D, 25-HYDROXY 36.5 10/02/2017 09:16 AM       Lab Results   Component Value Date/Time    Sodium 143 10/02/2017 09:16 AM    Potassium 4.6 10/02/2017 09:16 AM    Chloride 104 10/02/2017 09:16 AM    CO2 27 10/02/2017 09:16 AM    Anion gap 5 05/10/2017 03:50 AM    Glucose 97 10/02/2017 09:16 AM    BUN 24 10/02/2017 09:16 AM    Creatinine 0.92 10/02/2017 09:16 AM    BUN/Creatinine ratio 26 10/02/2017 09:16 AM    GFR est AA 64 10/02/2017 09:16 AM    GFR est non-AA 55 10/02/2017 09:16 AM    Calcium 10.6 10/02/2017 09:16 AM       ASSESSMENT / PLAN :  Agnes Han was seen today for follow up chronic condition. 1. Fatigue and malaise: Labs reviewed and okay. Differential diagnosis for this is broad, and includes mood disorder, endocrine abnormalities such as hypothyroidism, anemia, SUSANA, inflammation, neoplasm, autoimmune disease, medication side effect (eg beta blocker).   We might see if she feels better off the atenolol (currently back ordered anyway at pharmacy). 2. Knee pain: DJD. Better with cymbalta. Tylenol. On celebrex. Follow up with orthopedics as desired. 3. L leg neuropathic pain of lower extremity, left: Ongoing. Likely secondary to her spinal stenosis. Sees Dr. Can Duarte. On cymbalta. 4. GI bleed: S/p colonoscopy. As per Dr. Florencia Soto. None recently. Off NSAIDs and ASA. 5. Vertigo: None recently. Rx for meclizine. Lost to follow up with Neurology--might get her into a balance clinic--Dr. Carolyn Neville. 6. Cervical spine stenosis: Not wanting surgery. 7. Hypertension: BP fine today. 8. Hypercholesterolemia: Reasonable at last check. 9. Anemia: Hb okay. 10. Depression: Stable to improved. 11. GERD (gastroesophageal reflux disease): Stable. 12. Asthma: Controlled. 13. Head and neck pain: Stable. Arthritis. 14. Vit D deficiency: Continue supplement. Recheck. 15. Night sweats: Improved. Follow-up Disposition:  Return in about 4 months (around 2/9/2018) for HTN.

## 2017-10-09 NOTE — TELEPHONE ENCOUNTER
Pt notified that atenolol will be on hold for know, to see if helps with the dizziness, pt informed to contact our office in 3 days to let Dr Watson Plenty know how she feels. Pt understood.

## 2017-10-09 NOTE — PATIENT INSTRUCTIONS
Medicare Part B Preventive Services Limitations Recommendation Scheduled   Bone Mass Measurement  (age 72 & older, biennial) Requires diagnosis related to osteoporosis or estrogen deficiency. Biennial benefit unless patient has history of long-term glucocorticoid tx or baseline is needed because initial test was by other method     Cardiovascular Screening Blood Tests (every 5 years)  Total cholesterol, HDL, Triglycerides Order as a panel if possible     Colorectal Cancer Screening  -Fecal occult blood test (annual)  -Flexible sigmoidoscopy (5y)  -Screening colonoscopy (10y)  -Barium Enema      Counseling to Prevent Tobacco Use (up to 8 sessions per year)  - Counseling greater than 3 and up to 10 minutes  - Counseling greater than 10 minutes Patients must be asymptomatic of tobacco-related conditions to receive as preventive service     Diabetes Screening Tests (at least every 3 years, Medicare covers annually or at 6-month intervals for prediabetic patients)    Fasting blood sugar (FBS) or glucose tolerance test (GTT) Patient must be diagnosed with one of the following:  -Hypertension, Dyslipidemia, obesity, previous impaired FBS or GTT  Or any two of the following: overweight, FH of diabetes, age ? 72, history of gestational diabetes, birth of baby weighing more than 9 pounds     Diabetes Self-Management Training (DSMT) (no USPSTF recommendation) Requires referral by treating physician for patient with diabetes or renal disease. 10 hours of initial DSMT session of no less than 30 minutes each in a continuous 12-month period. 2 hours of follow-up DSMT in subsequent years.      Glaucoma Screening (no USPSTF recommendation) Diabetes mellitus, family history, , age 48 or over,  American, age 72 or over     Human Immunodeficiency Virus (HIV) Screening (annually for increased risk patients)  HIV-1 and HIV-2 by EIA, JARAD, rapid antibody test, or oral mucosa transudate Patient must be at increased risk for HIV infection per USPSTF guidelines or pregnant. Tests covered annually for patients at increased risk. Pregnant patients may receive up to 3 test during pregnancy. Medical Nutrition Therapy (MNT) (for diabetes or renal disease not recommended schedule) Requires referral by treating physician for patient with diabetes or renal disease. Can be provided in same year as diabetes self-management training (DSMT), and CMS recommends medical nutrition therapy take place after DSMT. Up to 3 hours for initial year and 2 hours in subsequent years. Shingles Vaccination A shingles vaccine is also recommended once in a lifetime after age 61     Seasonal Influenza Vaccination (annually)      Pneumococcal Vaccination (once after 72)      Hepatitis B Vaccinations (if medium/high risk) Medium/high risk factors:  End-stage renal disease,  Hemophiliacs who received Factor VIII or IX concentrates, Clients of institutions for the mentally retarded, Persons who live in the same house as a HepB virus carrier, Homosexual men, Illicit injectable drug abusers. Screening Mammography (biennial age 54-69) Annually (age 36 or over)     Screening Pap Tests and Pelvic Examination (up to age 79 and after 79 if unknown history or abnormal study last 10 years) Every 25 months except high risk     Ultrasound Screening for Abdominal Aortic Aneurysm (AAA) (once) Patient must be referred through [facility] and not have had a screening for abdominal aortic aneurysm before under Medicare.   Limited to patients who meet one of the following criteria:  - Men who are 73-68 years old and have smoked more than 100 cigarettes in their lifetime.  -Anyone with a FH of AAA  -Anyone recommended for screening by USPSTF

## 2017-10-09 NOTE — MR AVS SNAPSHOT
Visit Information Date & Time Provider Department Dept. Phone Encounter #  
 10/9/2017  9:15 AM Lauro Denton, [address] [id number] Follow-up Instructions Return in about 4 months (around 2/9/2018) for HTN. Upcoming Health Maintenance Date Due DTaP/Tdap/Td series (1 - Tdap) 9/12/1949 GLAUCOMA SCREENING Q2Y 9/12/1993 MEDICARE YEARLY EXAM 2/22/2017 INFLUENZA AGE 9 TO ADULT 8/1/2017 Pneumococcal 65+ Low/Medium Risk (2 of 2 - PPSV23) 10/17/2017 Allergies as of 10/9/2017  Review Complete On: 10/9/2017 By: Lauro Denton MD  
  
 Severity Noted Reaction Type Reactions Diclofenac  02/22/2016    Itching The voltaren gel caused itching. Gabapentin  10/13/2016    Swelling Hydrochlorothiazide  10/27/2009    Other (comments)  
 dizziness Pcn [Penicillins]  10/27/2009    Swelling Current Immunizations  Reviewed on 10/15/2010 Name Date Influenza Vaccine 12/1/2014, 10/21/2013 Influenza Vaccine Frank Push) 10/7/2015 Influenza Vaccine (Quad) PF 1/30/2017 Influenza Vaccine Split 10/17/2012, 10/15/2010 12:30 PM  
 ZZZ-RETIRED (DO NOT USE) Pneumococcal Vaccine (Unspecified Type) 10/17/2012 Not reviewed this visit You Were Diagnosed With   
  
 Codes Comments Essential hypertension    -  Primary ICD-10-CM: I10 
ICD-9-CM: 401.9 Vertigo     ICD-10-CM: L91 ICD-9-CM: 780.4 Hypercholesterolemia     ICD-10-CM: E78.00 ICD-9-CM: 272.0 Vitamin D deficiency     ICD-10-CM: E55.9 ICD-9-CM: 268.9 Mild intermittent asthma without complication     XMJ-61-AN: J45.20 ICD-9-CM: 493.90 Anemia, unspecified type     ICD-10-CM: D64.9 ICD-9-CM: 376. 9 Vitals BP Pulse Temp Resp Height(growth percentile) Weight(growth percentile) 127/62 (BP 1 [place] Left arm, BP Patient Position: Sitting) 61 98.1 °F (36.7 °C) (Oral) 18 5' 1\" (1.549 m) 130 lb (59 kg) SpO2 BMI OB Status Smoking Status 99% 24.56 kg/m2 Hysterectomy Never Smoker Vitals History BMI and BSA Data Body Mass Index Body Surface Area 24.56 kg/m 2 1.59 m 2 Preferred Pharmacy Pharmacy Name Phone [facility] [address]  [facility] [phone] Your Updated Medication List  
  
   
This list is accurate as of: 10/9/17 10:23 AM.  Always use your most recent med list.  
  
  
  
  
 albuterol 90 mcg/actuation inhaler Commonly known as:  PROVENTIL HFA, VENTOLIN HFA, PROAIR HFA Take 1 Puff by inhalation as needed for Wheezing. amLODIPine-valsartan  mg per tablet Commonly known as:  EXFORGE  
TAKE 1 TABLET BY MOUTH DAILY  
  
 atenolol 25 mg tablet Commonly known as:  TENORMIN  
TAKE 1 TABLET BY MOUTH DAILY  
  
 atorvastatin 20 mg tablet Commonly known as:  LIPITOR  
TAKE 1 TABLET BY MOUTH EVERY NIGHT  
  
 celecoxib 200 mg capsule Commonly known as:  CELEBREX  
TAKE ONE CAPSULE BY MOUTH EVERY DAY DULoxetine 30 mg capsule Commonly known as:  CYMBALTA TAKE 1 CAPSULE BY MOUTH DAILY  
  
 ergocalciferol 50,000 unit capsule Commonly known as:  ERGOCALCIFEROL Take 1 Cap by mouth every seven (7) days. memantine 5 mg tablet Commonly known as:  Rene Yancey TAKE 1 TABLET BY MOUTH TWICE DAILY. THIS IS A DOSE CHANGE MULTIVITAMIN PO Take  by mouth daily. senna-docusate 8.6-50 mg per tablet Commonly known as:  Kyle Foxner Take 1 Tab by mouth daily. We Performed the Following REFERRAL TO ENT-OTOLARYNGOLOGY [EWS77 Custom] Follow-up Instructions Return in about 4 months (around 2/9/2018) for HTN. Referral Information Referral ID Referred By Referred To  
  
 [id number] [address], [place]   
   [place] [facility] [address] Phone: [phone] Fax: [phone] Visits Status Start Date End Date 1 New Request 10/9/17 10/9/18 If your referral has a status of pending review or denied, additional information will be sent to support the outcome of this decision. Introducing [facility]! Kelton Goetz introduces [employer] patient portal. Now you can access parts of your medical record, email your doctor's office, and request medication refills online. 1. In your internet browser, go to [email or web address] 2. Click on the First Time User? Click Here link in the Sign In box. You will see the New Member Sign Up page. 3. Enter your [employer] Access Code exactly as it appears below. You will not need to use this code after youve completed the sign-up process. If you do not sign up before the expiration date, you must request a new code. · [employer] Access Code: [email or web address] Expires: 1/7/2018 10:23 AM 
 
4. Enter the last four digits of your Social Security Number (xxxx) and Date of Birth (mm/dd/yyyy) as indicated and click Submit. You will be taken to the next sign-up page. 5. Create a [employer] ID. This will be your [employer] login ID and cannot be changed, so think of one that is secure and easy to remember. 6. Create a [employer] password. You can change your password at any time. 7. Enter your Password Reset Question and Answer. This can be used at a later time if you forget your password. 8. Enter your e-mail address. You will receive e-mail notification when new information is available in [address] 9. Click Sign Up. You can now view and download portions of your medical record. 10. Click the Download Summary menu link to download a portable copy of your medical information. If you have questions, please visit the Frequently Asked Questions section of the [employer] website. Remember, [employer] is NOT to be used for urgent needs. For medical emergencies, dial 911. Now available from your iPhone and Android! Please provide this summary of care documentation to your next provider. Your primary care clinician is listed as [place]. If you have any questions after today's visit, please call [phone].

## 2017-10-09 NOTE — PROGRESS NOTES
1. Have you been to the ER, urgent care clinic since your last visit? Hospitalized since your last visit?no    2. Have you seen or consulted any other health care providers outside of the [address] [place] [address] since your last visit? Include any pap smears or colon screening.  no

## 2017-10-12 ENCOUNTER — TELEPHONE (OUTPATIENT)
Dept: INTERNAL MEDICINE CLINIC | Age: 82
End: 2017-10-12

## 2017-10-12 NOTE — TELEPHONE ENCOUNTER
Identified patient 2 identifiers verified. Patient took Atenolol 25 mg and this makes her dizzy and feeling funny, and also the pharmacy told her she needs an alternative. 24 Wilver Joiner verified with patient.

## 2017-10-12 NOTE — TELEPHONE ENCOUNTER
Identified patient 2 identifiers verified. Patient made aware to stop the Atenolol and to come in next for BP check. Patient verbalized understanding.

## 2017-10-12 NOTE — TELEPHONE ENCOUNTER
#[phone] pt states she was to call back and report to [facility] about new medication she is taking.   Please call pt

## 2017-10-16 ENCOUNTER — TELEPHONE (OUTPATIENT)
Dept: INTERNAL MEDICINE CLINIC | Age: 82
End: 2017-10-16

## 2017-10-16 NOTE — TELEPHONE ENCOUNTER
Wellington Marcos is a 80 y.o. female came in today for BP check. /70 pulse 83. She is not taking Atenolol at this time. Patient  Wants to know if she can stay off or does she needs Metoprolol?

## 2017-10-17 NOTE — TELEPHONE ENCOUNTER
Call completed to patient, two identifiers verified. Patient advised per Dr. Jen Laureano to hold off on taking atenolol (TENORMIN) 25 mg tablet. Patient verbalized an understanding and agrees with recommendations.

## 2017-11-06 RX ORDER — ATORVASTATIN CALCIUM 20 MG/1
TABLET, FILM COATED ORAL
Qty: 90 TAB | Refills: 0 | Status: SHIPPED | OUTPATIENT
Start: 2017-11-06 | End: 2018-02-17 | Stop reason: SDUPTHER

## 2017-12-29 RX ORDER — CELECOXIB 200 MG/1
CAPSULE ORAL
Qty: 90 CAP | Refills: 0 | Status: SHIPPED | OUTPATIENT
Start: 2017-12-29 | End: 2018-09-10

## 2018-02-12 ENCOUNTER — OFFICE VISIT (OUTPATIENT)
Dept: INTERNAL MEDICINE CLINIC | Age: 83
End: 2018-02-12

## 2018-02-12 ENCOUNTER — HOSPITAL ENCOUNTER (OUTPATIENT)
Dept: LAB | Age: 83
Discharge: HOME | End: 2018-02-12
Payer: MEDICARE

## 2018-02-12 VITALS
SYSTOLIC BLOOD PRESSURE: 149 MMHG | DIASTOLIC BLOOD PRESSURE: 68 MMHG | HEIGHT: 61 IN | HEART RATE: 79 BPM | OXYGEN SATURATION: 98 % | BODY MASS INDEX: 24.58 KG/M2 | WEIGHT: 130.2 LBS | TEMPERATURE: 98.4 F | RESPIRATION RATE: 14 BRPM

## 2018-02-12 DIAGNOSIS — D64.9 ANEMIA, UNSPECIFIED TYPE: ICD-10-CM

## 2018-02-12 DIAGNOSIS — I10 ESSENTIAL HYPERTENSION: Primary | ICD-10-CM

## 2018-02-12 DIAGNOSIS — E78.00 HYPERCHOLESTEROLEMIA: ICD-10-CM

## 2018-02-12 DIAGNOSIS — E55.9 VITAMIN D DEFICIENCY: ICD-10-CM

## 2018-02-12 DIAGNOSIS — M25.562 LEFT KNEE PAIN, UNSPECIFIED CHRONICITY: ICD-10-CM

## 2018-02-12 PROCEDURE — 85025 COMPLETE CBC W/AUTO DIFF WBC: CPT

## 2018-02-12 PROCEDURE — 36415 COLL VENOUS BLD VENIPUNCTURE: CPT

## 2018-02-12 PROCEDURE — 80061 LIPID PANEL: CPT

## 2018-02-12 PROCEDURE — 80053 COMPREHEN METABOLIC PANEL: CPT

## 2018-02-12 PROCEDURE — 82306 VITAMIN D 25 HYDROXY: CPT

## 2018-02-12 NOTE — PROGRESS NOTES
1. Have you been to the ER, urgent care clinic since your last visit? Hospitalized since your last visit?no    2. Have you seen or consulted any other health care providers outside of the [address] [place] [address] since your last visit? Include any pap smears or colon screening.  no

## 2018-02-12 NOTE — MR AVS SNAPSHOT
[address] [facility] 
[phone] Patient: Zaria Wallace MRN:  [id number] Visit Information Date & Time Provider Department Dept. Phone Encounter #  
 2/12/2018  9:15 AM Wyatt Freitas, [address] [id number] Follow-up Instructions Return in about 4 months (around 6/12/2018) for HTN. Upcoming Health Maintenance Date Due DTaP/Tdap/Td series (1 - Tdap) 9/12/1949 GLAUCOMA SCREENING Q2Y 9/12/1993 Pneumococcal 65+ Low/Medium Risk (2 of 2 - PPSV23) 10/17/2017 MEDICARE YEARLY EXAM 10/10/2018 Allergies as of 2/12/2018  Review Complete On: 2/12/2018 By: Wyatt Freitas MD  
  
 Severity Noted Reaction Type Reactions Diclofenac  02/22/2016    Itching The voltaren gel caused itching. Gabapentin  10/13/2016    Swelling Hydrochlorothiazide  10/27/2009    Other (comments)  
 dizziness Pcn [Penicillins]  10/27/2009    Swelling Current Immunizations  Reviewed on 10/15/2010 Name Date Influenza Vaccine 12/1/2014, 10/21/2013 Influenza Vaccine Vickie Nose) 10/7/2015 Influenza Vaccine (Quad) PF 10/9/2017 10:45 AM, 1/30/2017 Influenza Vaccine Split 10/17/2012, 10/15/2010 12:30 PM  
 ZZZ-RETIRED (DO NOT USE) Pneumococcal Vaccine (Unspecified Type) 10/17/2012 Not reviewed this visit You Were Diagnosed With   
  
 Codes Comments Essential hypertension    -  Primary ICD-10-CM: I10 
ICD-9-CM: 401.9 Hypercholesterolemia     ICD-10-CM: E78.00 ICD-9-CM: 272.0 Vitamin D deficiency     ICD-10-CM: E55.9 ICD-9-CM: 268.9 Anemia, unspecified type     ICD-10-CM: D64.9 ICD-9-CM: 449. 9 Left knee pain, unspecified chronicity     ICD-10-CM: Z33.041 ICD-9-CM: 719.46 Vitals BP Pulse Temp Resp Height(growth percentile) Weight(growth percentile)  149/68 (BP 1 [place] Right arm, BP Patient Position: Sitting) 79 98.4 °F (36.9 °C) (Oral) 14 5' 1\" (1.549 m) 130 lb 3.2 oz (59.1 kg) SpO2 BMI OB Status Smoking Status 98% 24.6 kg/m2 Hysterectomy Never Smoker Vitals History BMI and BSA Data Body Mass Index Body Surface Area  
 24.6 kg/m 2 1.59 m 2 Preferred Pharmacy Pharmacy Name Phone [facility] [address] [phone] Your Updated Medication List  
  
   
This list is accurate as of: 2/12/18  9:59 AM.  Always use your most recent med list.  
  
  
  
  
 albuterol 90 mcg/actuation inhaler Commonly known as:  PROVENTIL HFA, VENTOLIN HFA, PROAIR HFA Take 1 Puff by inhalation as needed for Wheezing. amLODIPine-valsartan  mg per tablet Commonly known as:  EXFORGE  
TAKE 1 TABLET BY MOUTH DAILY  
  
 atenolol 25 mg tablet Commonly known as:  TENORMIN  
TAKE 1 TABLET BY MOUTH DAILY  
  
 atorvastatin 20 mg tablet Commonly known as:  LIPITOR  
TAKE 1 TABLET BY MOUTH EVERY NIGHT  
  
 celecoxib 200 mg capsule Commonly known as:  CELEBREX  
TAKE ONE CAPSULE BY MOUTH EVERY DAY DULoxetine 30 mg capsule Commonly known as:  CYMBALTA TAKE 1 CAPSULE BY MOUTH DAILY  
  
 ergocalciferol 50,000 unit capsule Commonly known as:  ERGOCALCIFEROL Take 1 Cap by mouth every seven (7) days. memantine 5 mg tablet Commonly known as:  Lesherlynn Dus TAKE 1 TABLET BY MOUTH TWICE DAILY. THIS IS A DOSE CHANGE MULTIVITAMIN PO Take  by mouth daily. senna-docusate 8.6-50 mg per tablet Commonly known as:  Allena Casarez Take 1 Tab by mouth daily. We Performed the Following CBC WITH AUTOMATED DIFF [29953 CPT(R)] LIPID PANEL [61729 CPT(R)] METABOLIC PANEL, COMPREHENSIVE [37657 CPT(R)] REFERRAL TO ORTHOPEDICS [BIG858 Custom] VITAMIN D, 25 HYDROXY [id number] CPT(R)] Follow-up Instructions Return in about 4 months (around 6/12/2018) for HTN. Referral Information Referral ID Referred By Referred To  
  
 [id number] King Omaira MD   
   [address] [place], [address] Phone: [phone] Fax: [phone] Visits Status Start Date End Date 1 New Request 2/12/18 2/12/19 If your referral has a status of pending review or denied, additional information will be sent to support the outcome of this decision. Introducing \[facility] [place] [facility]\"" & HEALTH SERVICES! Gloria Saucedo introduces [facility] patient portal. Now you can access parts of your medical record, email your doctor's office, and request medication refills online. 1. In your internet browser, go to [email or web address] 2. Click on the First Time User? Click Here link in the Sign In box. You will see the New Member Sign Up page. 3. Enter your [facility] Access Code exactly as it appears below. You will not need to use this code after youve completed the sign-up process. If you do not sign up before the expiration date, you must request a new code. · [employer] Access Code: [email or web address] Expires: 5/13/2018  9:59 AM 
 
4. Enter the last four digits of your Social Security Number (xxxx) and Date of Birth (mm/dd/yyyy) as indicated and click Submit. You will be taken to the next sign-up page. 5. Create a [facility] ID. This will be your [facility] login ID and cannot be changed, so think of one that is secure and easy to remember. 6. Create a [facility] password. You can change your password at any time. 7. Enter your Password Reset Question and Answer. This can be used at a later time if you forget your password. 8. Enter your e-mail address. You will receive e-mail notification when new information is available in [address] 9. Click Sign Up. You can now view and download portions of your medical record. 10. Click the Download Summary menu link to download a portable copy of your medical information. If you have questions, please visit the Frequently Asked Questions section of the [employer] website. Remember, [employer] is NOT to be used for urgent needs. For medical emergencies, dial 911. Now available from your iPhone and Android! Please provide this summary of care documentation to your next provider. Your primary care clinician is listed as [place]. If you have any questions after today's visit, please call [phone].

## 2018-02-12 NOTE — PROGRESS NOTES
SUBJECTIVE  Ms. Renetta Edwards presents today for follow up. Chief Complaint   Patient presents with    Hypertension     pt here today for 4 month f.u    Joint Pain     pt c.o pain to L side, arm/leg        Complains of pain in \"Left side. \"   Her dizziness has resolved. \"I didn't go to the lady ear doctor, because it got better. \"   Left knee pain ongoing. On cymbalta and celebrex. She was unable to tolerate gabapentin . The tramadol we gave her \"didn't do anything for the pain and kept me awake, so I stopped taking it. \"   Also has pain in R lateral pelvis. We noted in early 2016:  L knee swells. In the past has received corticosteroid injections from Dr. Francisco Javier Morrison. She sees Dr. Chamorro, and he has done injections. \"He says you can do the needle\"--no plans for further follow up with him. GI bleed Anemia: No recent melena or hematochezia. She has had problems with diclofenac (itching) and celebrex--As we noted in January 2015: \"I had to stop the pills [celebrex] because of bleeding, and had a scope. \"  Dr. Jeramie Moncada did colonoscopy finding a cecal polyp and diverticuli. Still has the neuropathic pain in leg, as noted in prior notes. This is doing better on cymbalta. She is not as worried about weight loss; however, it appears this is stable: Wt Readings from Last 5 Encounters:   02/12/18 130 lb 3.2 oz (59.1 kg)   10/09/17 130 lb (59 kg)   06/06/17 126 lb 3.2 oz (57.2 kg)   05/12/17 134 lb 7.7 oz (61 kg)   05/08/17 134 lb 7.7 oz (61 kg)     She has seen Dr. Pawel Curtis for spinal stenosis / sciatica. Asthma stable, uses inhaler \"now and then. \"    She still has arthritis pain: R neck pain is stable. Saw Dr. Oj Méndez, who diagnosed cervical spine arthritis. Depression is doing fine. No suicidal ideation. For all issues addressed today, see A/P below. PMH: She has a past medical history of Anemia NEC;  Asthma; Cervical spondylarthritis; Depression; DJD (degenerative joint disease) of knee; Esophageal spasm; GERD (gastroesophageal reflux disease); Hypercholesterolemia; Hypertension; Lumbar stenosis; Neuropathy, upper extremity; Vertigo (2144-7207); and Vitamin D deficiency (1/28/2011). Had pneumovax once. PSH:  has a past surgical history that includes hx hysterectomy; hx cataract removal; hx tonsillectomy; hx orthopaedic; and hx other surgical.  Colonoscopy 2014 showed cecal polyp, diverticuli, and internal hemorrhoids, Dr. Indiana Hopkins. All and MED reviewed. I have taken aspirin off the list; caused N/V once, but she is on it now and tolerating. Current Outpatient Prescriptions on File Prior to Visit   Medication Sig Dispense Refill    celecoxib (CELEBREX) 200 mg capsule TAKE ONE CAPSULE BY MOUTH EVERY DAY 90 Cap 0    atorvastatin (LIPITOR) 20 mg tablet TAKE 1 TABLET BY MOUTH EVERY NIGHT 90 Tab 0    amLODIPine-valsartan (EXFORGE)  mg per tablet TAKE 1 TABLET BY MOUTH DAILY 90 Tab 0    memantine (NAMENDA) 5 mg tablet TAKE 1 TABLET BY MOUTH TWICE DAILY. THIS IS A DOSE CHANGE 180 Tab 11    senna-docusate (PERICOLACE) 8.6-50 mg per tablet Take 1 Tab by mouth daily. 30 Tab 0    ergocalciferol (ERGOCALCIFEROL) 50,000 unit capsule Take 1 Cap by mouth every seven (7) days. (Patient taking differently: Take 50,000 Units by mouth every Sunday.) 1 Cap 0    atenolol (TENORMIN) 25 mg tablet TAKE 1 TABLET BY MOUTH DAILY 90 Tab 5    DULoxetine (CYMBALTA) 30 mg capsule TAKE 1 CAPSULE BY MOUTH DAILY 90 Cap 11    albuterol (PROVENTIL HFA, VENTOLIN HFA, PROAIR HFA) 90 mcg/actuation inhaler Take 1 Puff by inhalation as needed for Wheezing. 1 Inhaler 5    MULTIVITAMINS (MULTIVITAMIN PO) Take  by mouth daily. No current facility-administered medications on file prior to visit. FH: Her family history includes Heart Disease in her brother, brother, father, and mother; Hypertension in her mother; Stroke in her mother. SH: She is . Her son lives with her.  She reports that she has never used tobacco.  She reports that she drinks alcohol. ROS: Complete review of systems was performed and is otherwise unremarkable except as noted elsewhere. OBJECTIVE  Vitals:   Visit Vitals    /68 (BP 1 [place] Right arm, BP Patient Position: Sitting)    Pulse 79    Temp 98.4 °F (36.9 °C) (Oral)    Resp 14    Ht 5' 1\" (1.549 m)    Wt 130 lb 3.2 oz (59.1 kg)    SpO2 98%    BMI 24.6 kg/m2    Gen: Pleasant 80 y.o. AA female in NAD.   HEENT: PERRLA. EOMI. OP moist and pink. +TTP R cheek and forehead.  Neck: Supple.  No LAD.  HEART: RRR, No M/G/R.   LUNGS: CTAB No W/R.   ABDOMEN: S, NT, ND, BS+.   EXTREMITIES: Warm. No C/C/E. MUSCULOSKELETAL: Normal ROM, muscle strength 5/5 all groups. NEURO: Alert and oriented x 3.  Cranial nerves grossly intact.  No focal sensory or motor deficits noted. SKIN: Warm. Dry. No rashes or other lesions noted.     Lab Results   Component Value Date/Time    Cholesterol, total 174 10/02/2017 09:16 AM    HDL Cholesterol 68 10/02/2017 09:16 AM    LDL, calculated 83 10/02/2017 09:16 AM    VLDL, calculated 23 10/02/2017 09:16 AM    Triglyceride 114 10/02/2017 09:16 AM    CHOL/HDL Ratio 1.6 04/29/2017 05:38 AM      Lab Results   Component Value Date/Time    WBC 8.0 10/02/2017 09:16 AM    WBC 6.7 05/21/2012 08:23 AM    HGB 12.4 10/02/2017 09:16 AM    HCT 37.6 10/02/2017 09:16 AM    PLATELET 885 10/95/9010 09:16 AM    MCV 89 10/02/2017 09:16 AM     Lab Results   Component Value Date/Time    Vitamin D 25-Hydroxy 13.8 (L) 04/29/2017 05:37 AM    VITAMIN D, 25-HYDROXY 36.5 10/02/2017 09:16 AM       Lab Results   Component Value Date/Time    Sodium 143 10/02/2017 09:16 AM    Potassium 4.6 10/02/2017 09:16 AM    Chloride 104 10/02/2017 09:16 AM    CO2 27 10/02/2017 09:16 AM    Anion gap 5 05/10/2017 03:50 AM    Glucose 97 10/02/2017 09:16 AM    BUN 24 10/02/2017 09:16 AM    Creatinine 0.92 10/02/2017 09:16 AM    BUN/Creatinine ratio 26 10/02/2017 09:16 AM    GFR est AA 64 10/02/2017 09:16 AM    GFR est non-AA 55 (L) 10/02/2017 09:16 AM    Calcium 10.6 (H) 10/02/2017 09:16 AM       ASSESSMENT / PLAN:    1. \"Left side pain\": Neuropathy +/- L knee arthritis. Might get Dr. Chamorro to look at her knee. Referred to orthopedics. 2. Fatigue and malaise: Labs reviewed and okay. Doing better. 3. Knee pain: DJD. Better with cymbalta. Tylenol. On celebrex. Follow up with orthopedics as desired. 4. L leg neuropathic pain of lower extremity: Ongoing. Likely secondary to her spinal stenosis. Sees Dr. Melyssa Johnson. On cymbalta. 5. GI bleed: S/p colonoscopy. As per Dr. Mehnaz Gardner. None recently. Off NSAIDs and ASA. 6. Vertigo: None recently. Rx for meclizine. Lost to follow up with Neurology--might get her into a balance clinic--Dr. Hal Canchola. 7. Cervical spine stenosis: Not wanting surgery. 8. Hypertension: BP fine today. 9. Hypercholesterolemia: Reasonable at last check. 10. Anemia: Hb okay. 11. Depression: Stable to improved. 12. GERD (gastroesophageal reflux disease): Stable. 13. Asthma: Controlled. 14. Head and neck pain: Stable. Arthritis. 15. Vit D deficiency: Continue supplement. Recheck. 16. Night sweats: Improved. Follow-up Disposition:  Return in about 4 months (around 6/12/2018) for HTN.

## 2018-02-13 LAB
25(OH)D3+25(OH)D2 SERPL-MCNC: 30.7 NG/ML (ref 30–100)
ALBUMIN SERPL-MCNC: 4.1 G/DL (ref 3.5–4.7)
ALBUMIN/GLOB SERPL: 1.5 {RATIO} (ref 1.2–2.2)
ALP SERPL-CCNC: 94 IU/L (ref 39–117)
ALT SERPL-CCNC: 24 IU/L (ref 0–32)
AST SERPL-CCNC: 21 IU/L (ref 0–40)
BASOPHILS # BLD AUTO: 0 X10E3/UL (ref 0–0.2)
BASOPHILS NFR BLD AUTO: 0 %
BILIRUB SERPL-MCNC: 0.3 MG/DL (ref 0–1.2)
BUN SERPL-MCNC: 23 MG/DL (ref 8–27)
BUN/CREAT SERPL: 24 (ref 12–28)
CALCIUM SERPL-MCNC: 10.5 MG/DL (ref 8.7–10.3)
CHLORIDE SERPL-SCNC: 104 MMOL/L (ref 96–106)
CHOLEST SERPL-MCNC: 186 MG/DL (ref 100–199)
CO2 SERPL-SCNC: 25 MMOL/L (ref 18–29)
CREAT SERPL-MCNC: 0.96 MG/DL (ref 0.57–1)
EOSINOPHIL # BLD AUTO: 0.1 X10E3/UL (ref 0–0.4)
EOSINOPHIL NFR BLD AUTO: 1 %
ERYTHROCYTE [DISTWIDTH] IN BLOOD BY AUTOMATED COUNT: 13.1 % (ref 12.3–15.4)
GFR SERPLBLD CREATININE-BSD FMLA CKD-EPI: 53 ML/MIN/1.73
GFR SERPLBLD CREATININE-BSD FMLA CKD-EPI: 61 ML/MIN/1.73
GLOBULIN SER CALC-MCNC: 2.8 G/DL (ref 1.5–4.5)
GLUCOSE SERPL-MCNC: 94 MG/DL (ref 65–99)
HCT VFR BLD AUTO: 37.2 % (ref 34–46.6)
HDLC SERPL-MCNC: 68 MG/DL
HGB BLD-MCNC: 12.1 G/DL (ref 11.1–15.9)
IMM GRANULOCYTES # BLD: 0 X10E3/UL (ref 0–0.1)
IMM GRANULOCYTES NFR BLD: 0 %
LDLC SERPL CALC-MCNC: 97 MG/DL (ref 0–99)
LYMPHOCYTES # BLD AUTO: 1.5 X10E3/UL (ref 0.7–3.1)
LYMPHOCYTES NFR BLD AUTO: 20 %
MCH RBC QN AUTO: 29.4 PG (ref 26.6–33)
MCHC RBC AUTO-ENTMCNC: 32.5 G/DL (ref 31.5–35.7)
MCV RBC AUTO: 90 FL (ref 79–97)
MONOCYTES # BLD AUTO: 0.7 X10E3/UL (ref 0.1–0.9)
MONOCYTES NFR BLD AUTO: 9 %
NEUTROPHILS # BLD AUTO: 5.2 X10E3/UL (ref 1.4–7)
NEUTROPHILS NFR BLD AUTO: 70 %
PLATELET # BLD AUTO: 286 X10E3/UL (ref 150–379)
POTASSIUM SERPL-SCNC: 4.7 MMOL/L (ref 3.5–5.2)
PROT SERPL-MCNC: 6.9 G/DL (ref 6–8.5)
RBC # BLD AUTO: 4.12 X10E6/UL (ref 3.77–5.28)
SODIUM SERPL-SCNC: 144 MMOL/L (ref 134–144)
TRIGL SERPL-MCNC: 104 MG/DL (ref 0–149)
VLDLC SERPL CALC-MCNC: 21 MG/DL (ref 5–40)
WBC # BLD AUTO: 7.4 X10E3/UL (ref 3.4–10.8)

## 2018-02-14 ENCOUNTER — TELEPHONE (OUTPATIENT)
Dept: INTERNAL MEDICINE CLINIC | Age: 83
End: 2018-02-14

## 2018-02-18 RX ORDER — ATORVASTATIN CALCIUM 20 MG/1
TABLET, FILM COATED ORAL
Qty: 90 TAB | Refills: 0 | Status: SHIPPED | OUTPATIENT
Start: 2018-02-18 | End: 2018-05-25 | Stop reason: SDUPTHER

## 2018-02-26 RX ORDER — AMLODIPINE AND VALSARTAN 10; 320 MG/1; MG/1
TABLET ORAL
Qty: 90 TAB | Refills: 0 | Status: SHIPPED | OUTPATIENT
Start: 2018-02-26 | End: 2018-06-01 | Stop reason: SDUPTHER

## 2018-05-25 RX ORDER — ATORVASTATIN CALCIUM 20 MG/1
TABLET, FILM COATED ORAL
Qty: 90 TAB | Refills: 0 | Status: SHIPPED | OUTPATIENT
Start: 2018-05-25 | End: 2018-09-10 | Stop reason: SDUPTHER

## 2018-06-01 RX ORDER — AMLODIPINE AND VALSARTAN 10; 320 MG/1; MG/1
TABLET ORAL
Qty: 90 TAB | Refills: 0 | Status: SHIPPED | OUTPATIENT
Start: 2018-06-01 | End: 2018-06-11

## 2018-06-04 RX ORDER — AMLODIPINE AND VALSARTAN 10; 320 MG/1; MG/1
TABLET ORAL
Qty: 90 TAB | Refills: 0 | Status: SHIPPED | OUTPATIENT
Start: 2018-06-04 | End: 2018-06-11

## 2018-06-11 ENCOUNTER — OFFICE VISIT (OUTPATIENT)
Dept: INTERNAL MEDICINE CLINIC | Age: 83
End: 2018-06-11

## 2018-06-11 VITALS
RESPIRATION RATE: 18 BRPM | WEIGHT: 128.2 LBS | OXYGEN SATURATION: 100 % | TEMPERATURE: 97.5 F | DIASTOLIC BLOOD PRESSURE: 74 MMHG | HEART RATE: 78 BPM | SYSTOLIC BLOOD PRESSURE: 164 MMHG | BODY MASS INDEX: 24.2 KG/M2 | HEIGHT: 61 IN

## 2018-06-11 DIAGNOSIS — R53.83 FATIGUE, UNSPECIFIED TYPE: ICD-10-CM

## 2018-06-11 DIAGNOSIS — E55.9 VITAMIN D DEFICIENCY: ICD-10-CM

## 2018-06-11 DIAGNOSIS — I10 ESSENTIAL HYPERTENSION: Primary | ICD-10-CM

## 2018-06-11 DIAGNOSIS — D64.9 ANEMIA, UNSPECIFIED TYPE: ICD-10-CM

## 2018-06-11 DIAGNOSIS — E78.00 HYPERCHOLESTEROLEMIA: ICD-10-CM

## 2018-06-11 DIAGNOSIS — J45.20 MILD INTERMITTENT ASTHMA WITHOUT COMPLICATION: ICD-10-CM

## 2018-06-11 NOTE — PROGRESS NOTES
SUBJECTIVE  Ms. Julia Arana presents today for follow up. Chief Complaint   Patient presents with    Hypertension    Medication Evaluation    Depression        Has a lot of tiredness. No further vertigo since last visit. Left knee pain ongoing. Dr. Britney Giang following. On cymbalta and celebrex. She was unable to tolerate gabapentin . The tramadol we gave her \"didn't do anything for the pain and kept me awake, so I stopped taking it. \"   Also has pain in R lateral pelvis. We noted in early 2016:  L knee swells. In the past has received corticosteroid injections from Dr. Romy Biswas. She sees Dr. Britney Giang, and he has done injections. \"He says you can do the needle\"--no plans for further follow up with him. GI bleed Anemia: No recent melena or hematochezia. She has had problems with diclofenac (itching) and celebrex--As we noted in January 2015: \"I had to stop the pills [celebrex] because of bleeding, and had a scope. \"  Dr. Kit Arias did colonoscopy finding a cecal polyp and diverticuli. Still has the neuropathic pain in leg, as noted in prior notes. This is doing better on cymbalta. She is not as worried about weight loss; however, it appears this is stable: Wt Readings from Last 5 Encounters:   06/11/18 128 lb 3.2 oz (58.2 kg)   02/12/18 130 lb 3.2 oz (59.1 kg)   10/09/17 130 lb (59 kg)   06/06/17 126 lb 3.2 oz (57.2 kg)   05/12/17 134 lb 7.7 oz (61 kg)     She has seen Dr. Junior Rodriguez for spinal stenosis / sciatica. Asthma stable, uses inhaler \"now and then. \"    She still has arthritis pain: R neck pain is stable. Saw Dr. Edie Hernandez, who diagnosed cervical spine arthritis. Depression is doing fine. No suicidal ideation. For all issues addressed today, see A/P below. PMH: She has a past medical history of Anemia NEC; Asthma; Cervical spondylarthritis; Depression; DJD (degenerative joint disease) of knee; Esophageal spasm; GERD (gastroesophageal reflux disease); Hypercholesterolemia;  Hypertension; Lumbar stenosis; Neuropathy, upper extremity; Vertigo (7633-6478); and Vitamin D deficiency (1/28/2011). Had pneumovax once. PSH:  has a past surgical history that includes hx hysterectomy; hx cataract removal; hx tonsillectomy; hx orthopaedic; and hx other surgical.  Colonoscopy 2014 showed cecal polyp, diverticuli, and internal hemorrhoids, Dr. Jocelyn Stephens. All and MED reviewed. I have taken aspirin off the list; caused N/V once, but she is on it now and tolerating. Current Outpatient Prescriptions on File Prior to Visit   Medication Sig Dispense Refill    atorvastatin (LIPITOR) 20 mg tablet TAKE 1 TABLET BY MOUTH EVERY NIGHT 90 Tab 0    celecoxib (CELEBREX) 200 mg capsule TAKE ONE CAPSULE BY MOUTH EVERY DAY 90 Cap 0    amLODIPine-valsartan (EXFORGE)  mg per tablet TAKE 1 TABLET BY MOUTH DAILY 90 Tab 0    memantine (NAMENDA) 5 mg tablet TAKE 1 TABLET BY MOUTH TWICE DAILY. THIS IS A DOSE CHANGE 180 Tab 11    senna-docusate (PERICOLACE) 8.6-50 mg per tablet Take 1 Tab by mouth daily. 30 Tab 0    ergocalciferol (ERGOCALCIFEROL) 50,000 unit capsule Take 1 Cap by mouth every seven (7) days. (Patient taking differently: Take 50,000 Units by mouth every Sunday.) 1 Cap 0    atenolol (TENORMIN) 25 mg tablet TAKE 1 TABLET BY MOUTH DAILY 90 Tab 5    DULoxetine (CYMBALTA) 30 mg capsule TAKE 1 CAPSULE BY MOUTH DAILY 90 Cap 11    albuterol (PROVENTIL HFA, VENTOLIN HFA, PROAIR HFA) 90 mcg/actuation inhaler Take 1 Puff by inhalation as needed for Wheezing. 1 Inhaler 5    MULTIVITAMINS (MULTIVITAMIN PO) Take  by mouth daily.  amLODIPine-valsartan (EXFORGE)  mg per tablet TAKE 1 TABLET BY MOUTH DAILY 90 Tab 0    amLODIPine-valsartan (EXFORGE)  mg per tablet TAKE 1 TABLET BY MOUTH DAILY 90 Tab 0     No current facility-administered medications on file prior to visit.         FH: Her family history includes Heart Disease in her brother, brother, father, and mother; Hypertension in her mother; Stroke in her mother. SH: She is . Her son lives with her. She reports that she has never used tobacco.  She reports that she drinks alcohol. ROS: Complete review of systems was performed and is otherwise unremarkable except as noted elsewhere. OBJECTIVE  Vitals:   Visit Vitals    /74 (BP 1 [place] Right arm, BP Patient Position: Sitting)    Pulse 78    Temp 97.5 °F (36.4 °C) (Oral)    Resp 18    Ht 5' 1\" (1.549 m)    Wt 128 lb 3.2 oz (58.2 kg)    SpO2 100%    BMI 24.22 kg/m2    Gen: Pleasant 80 y.o. AA female in NAD.   HEENT: PERRLA. EOMI. OP moist and pink. +TTP R cheek and forehead.  Neck: Supple.  No LAD.  HEART: RRR, No M/G/R.   LUNGS: CTAB No W/R.   ABDOMEN: S, NT, ND, BS+.   EXTREMITIES: Warm. No C/C/E. MUSCULOSKELETAL: Normal ROM, muscle strength 5/5 all groups. NEURO: Alert and oriented x 3.  Cranial nerves grossly intact.  No focal sensory or motor deficits noted. SKIN: Warm. Dry. No rashes or other lesions noted.     Lab Results   Component Value Date/Time    Cholesterol, total 186 02/12/2018 10:37 AM    HDL Cholesterol 68 02/12/2018 10:37 AM    LDL, calculated 97 02/12/2018 10:37 AM    VLDL, calculated 21 02/12/2018 10:37 AM    Triglyceride 104 02/12/2018 10:37 AM    CHOL/HDL Ratio 1.6 04/29/2017 05:38 AM      Lab Results   Component Value Date/Time    WBC 7.4 02/12/2018 10:37 AM    WBC 6.7 05/21/2012 08:23 AM    HGB 12.1 02/12/2018 10:37 AM    HCT 37.2 02/12/2018 10:37 AM    PLATELET 607 44/26/7021 10:37 AM    MCV 90 02/12/2018 10:37 AM     Lab Results   Component Value Date/Time    Vitamin D 25-Hydroxy 13.8 (L) 04/29/2017 05:37 AM    VITAMIN D, 25-HYDROXY 30.7 02/12/2018 10:37 AM       Lab Results   Component Value Date/Time    Sodium 144 02/12/2018 10:37 AM    Potassium 4.7 02/12/2018 10:37 AM    Chloride 104 02/12/2018 10:37 AM    CO2 25 02/12/2018 10:37 AM    Anion gap 5 05/10/2017 03:50 AM    Glucose 94 02/12/2018 10:37 AM    BUN 23 02/12/2018 10:37 AM    Creatinine 0.96 02/12/2018 10:37 AM    BUN/Creatinine ratio 24 02/12/2018 10:37 AM    GFR est AA 61 02/12/2018 10:37 AM    GFR est non-AA 53 (L) 02/12/2018 10:37 AM    Calcium 10.5 (H) 02/12/2018 10:37 AM       ASSESSMENT / PLAN:    1. Fatigue and malaise: Labs reviewed and okay. Doing worse. ?Depression. 2. Knee pain: DJD. Better with cymbalta. Tylenol. On celebrex. Follow up with orthopedics as desired. 3. L leg neuropathic pain of lower extremity: Ongoing. Likely secondary to her spinal stenosis. Sees Dr. Michelle Matias On cymbalta. 4. GI bleed: S/p colonoscopy. As per Dr. Melody Baumgarten. None recently. Off NSAIDs and ASA. 5. Cervical spine stenosis: Not wanting surgery. 6. Hypertension: BP fine today. 7. Hypercholesterolemia: Reasonable at last check. 8. Anemia: Hb okay. 9. Depression: Stable. No SI.   10. GERD (gastroesophageal reflux disease): Stable. 11. Asthma: Controlled. 12. Head and neck pain: Stable. Arthritis. 13. Vit D deficiency: Continue supplement. Recheck. Follow-up Disposition:  Return in about 4 months (around 10/11/2018) for HTN.

## 2018-06-11 NOTE — MR AVS SNAPSHOT
[facility] [address] [place] [address] 
[phone] Patient: Nomi Jules MRN:  [id number] Visit Information Date & Time Provider Department Dept. Phone Encounter #  
 6/11/2018  9:15 AM Ayanna Galloway, [address] [place] [address],[address] [phone] [id number] Follow-up Instructions Return in about 4 months (around 10/11/2018) for HTN. Upcoming Health Maintenance Date Due DTaP/Tdap/Td series (1 - Tdap) 9[birth year]1949 GLAUCOMA SCREENING Q2Y 9/12/1993 Pneumococcal 65+ Low/Medium Risk (2 of 2 - PPSV23) 10/17/2017 Influenza Age 5 to Adult 8/1/2018 MEDICARE YEARLY EXAM 10/10/2018 Allergies as of 6/11/2018  Review Complete On: 6/11/2018 By: Ayanna Galloway MD  
  
 Severity Noted Reaction Type Reactions Diclofenac  02/22/2016    Itching The voltaren gel caused itching. Gabapentin  10/13/2016    Swelling Hydrochlorothiazide  10/27/2009    Other (comments)  
 dizziness Pcn [Penicillins]  10/27/2009    Swelling Current Immunizations  Reviewed on 10/15/2010 Name Date Influenza Vaccine 12/1/2014, 10/21/2013 Influenza Vaccine [place]) 10/7/2015 Influenza Vaccine (Quad) PF 10/9/2017 10:45 AM, 1/30/2017 Influenza Vaccine Split 10/17/2012, 10/15/2010 12:30 PM  
 ZZZ-RETIRED (DO NOT USE) Pneumococcal Vaccine (Unspecified Type) 10/17/2012 Not reviewed this visit You Were Diagnosed With   
  
 Codes Comments Essential hypertension    -  Primary ICD-10-CM: I10 
ICD-9-CM: 401.9 Hypercholesterolemia     ICD-10-CM: E78.00 ICD-9-CM: 272.0 Vitamin D deficiency     ICD-10-CM: E55.9 ICD-9-CM: 268.9 Anemia, unspecified type     ICD-10-CM: D64.9 ICD-9-CM: 285.9 Mild intermittent asthma without complication     UYH-48-UW: J45.20 ICD-9-CM: 493.90 Fatigue, unspecified type     ICD-10-CM: R53.83 ICD-9-CM: 780.79 Vitals BP Pulse Temp Resp Height(growth percentile) Weight(growth percentile) 164/74 (BP 1 [place] Right arm, BP Patient Position: Sitting) 78 97.5 °F (36.4 °C) (Oral) 18 5' 1\" (1.549 m) 128 lb 3.2 oz (58.2 kg) SpO2 BMI OB Status Smoking Status 100% 24.22 kg/m2 Hysterectomy Never Smoker BMI and BSA Data Body Mass Index Body Surface Area  
 24.22 kg/m 2 1.58 m 2 Preferred Pharmacy Pharmacy Name Phone [facility] [address]  [place] [phone] Your Updated Medication List  
  
   
This list is accurate as of 6/11/18  9:45 AM.  Always use your most recent med list.  
  
  
  
  
 albuterol 90 mcg/actuation inhaler Commonly known as:  PROVENTIL HFA, VENTOLIN HFA, PROAIR HFA Take 1 Puff by inhalation as needed for Wheezing. amLODIPine-valsartan  mg per tablet Commonly known as:  EXFORGE  
TAKE 1 TABLET BY MOUTH DAILY  
  
 atenolol 25 mg tablet Commonly known as:  TENORMIN  
TAKE 1 TABLET BY MOUTH DAILY  
  
 atorvastatin 20 mg tablet Commonly known as:  LIPITOR  
TAKE 1 TABLET BY MOUTH EVERY NIGHT  
  
 celecoxib 200 mg capsule Commonly known as:  CELEBREX  
TAKE ONE CAPSULE BY MOUTH EVERY DAY DULoxetine 30 mg capsule Commonly known as:  CYMBALTA TAKE 1 CAPSULE BY MOUTH DAILY  
  
 ergocalciferol 50,000 unit capsule Commonly known as:  ERGOCALCIFEROL Take 1 Cap by mouth every seven (7) days. memantine 5 mg tablet Commonly known as:  Gorge Laughlin TAKE 1 TABLET BY MOUTH TWICE DAILY. THIS IS A DOSE CHANGE MULTIVITAMIN PO Take  by mouth daily. senna-docusate 8.6-50 mg per tablet Commonly known as:  Camille Kerkhoven Take 1 Tab by mouth daily. We Performed the Following CBC WITH AUTOMATED DIFF [03241 CPT(R)] LIPID PANEL [38575 CPT(R)] METABOLIC PANEL, COMPREHENSIVE [39004 CPT(R)] TSH 3RD GENERATION [18889 CPT(R)] VITAMIN D, 25 HYDROXY [id number] CPT(R)] Follow-up Instructions Return in about 4 months (around 10/11/2018) for HTN. Introducing [facility] & HEALTH [facility]! [employer] introduces [employer] patient portal. Now you can access parts of your medical record, email your doctor's office, and request medication refills online. 1. In your internet browser, go to [email or web address] 2. Click on the First Time User? Click Here link in the Sign In box. You will see the New Member Sign Up page. 3. Enter your [employer] Access Code exactly as it appears below. You will not need to use this code after youve completed the sign-up process. If you do not sign up before the expiration date, you must request a new code. · [employer] Access Code: [email or web address] Expires: 9/9/2018  9:41 AM 
 
4. Enter the last four digits of your Social Security Number (xxxx) and Date of Birth (mm/dd/yyyy) as indicated and click Submit. You will be taken to the next sign-up page. 5. Create a [employer] ID. This will be your [employer] login ID and cannot be changed, so think of one that is secure and easy to remember. 6. Create a [employer] password. You can change your password at any time. 7. Enter your Password Reset Question and Answer. This can be used at a later time if you forget your password. 8. Enter your e-mail address. You will receive e-mail notification when new information is available in [address] 9. Click Sign Up. You can now view and download portions of your medical record. 10. Click the Download Summary menu link to download a portable copy of your medical information. If you have questions, please visit the Frequently Asked Questions section of the [employer] website. Remember, [employer] is NOT to be used for urgent needs. For medical emergencies, dial 911. Now available from your iPhone and Android! Please provide this summary of care documentation to your next provider. Your primary care clinician is listed as [place]. If you have any questions after today's visit, please call [phone].

## 2018-06-11 NOTE — PROGRESS NOTES
Reviewed record in preparation for visit and have obtained necessary documentation. Identified pt with two pt identifiers(name and ).     Chief Complaint   Patient presents with    Hypertension    Medication Evaluation       Health Maintenance Due   Topic Date Due    DTaP/Tdap/Td  (1 - Tdap) 1949    Glaucoma Screening   1993    Pneumococcal Vaccine (2 of 2 - PPSV23) 10/17/2017       Coordination of Care Questionnaire:  :     1) Have you been to an emergency room, urgent care clinic since your last visit? no   Hospitalized since your last visit? no             2) Have you seen or consulted any other health care providers outside of [address] [place] [address] since your last visit? no  (Include any pap smears or colon screenings in this section.)

## 2018-07-09 ENCOUNTER — HOSPITAL ENCOUNTER (OUTPATIENT)
Dept: LAB | Age: 83
Discharge: HOME | End: 2018-07-09
Payer: MEDICARE

## 2018-07-09 ENCOUNTER — APPOINTMENT (OUTPATIENT)
Dept: INTERNAL MEDICINE CLINIC | Age: 83
End: 2018-07-09

## 2018-07-09 PROCEDURE — 85025 COMPLETE CBC W/AUTO DIFF WBC: CPT

## 2018-07-09 PROCEDURE — 36415 COLL VENOUS BLD VENIPUNCTURE: CPT

## 2018-07-09 PROCEDURE — 82306 VITAMIN D 25 HYDROXY: CPT

## 2018-07-09 PROCEDURE — 80053 COMPREHEN METABOLIC PANEL: CPT

## 2018-07-09 PROCEDURE — 80061 LIPID PANEL: CPT

## 2018-07-09 PROCEDURE — 84443 ASSAY THYROID STIM HORMONE: CPT

## 2018-07-10 ENCOUNTER — TELEPHONE (OUTPATIENT)
Dept: INTERNAL MEDICINE CLINIC | Age: 83
End: 2018-07-10

## 2018-07-10 LAB
25(OH)D3+25(OH)D2 SERPL-MCNC: 31.6 NG/ML (ref 30–100)
ALBUMIN SERPL-MCNC: 3.8 G/DL (ref 3.5–4.7)
ALBUMIN/GLOB SERPL: 1.3 {RATIO} (ref 1.2–2.2)
ALP SERPL-CCNC: 90 IU/L (ref 39–117)
ALT SERPL-CCNC: 27 IU/L (ref 0–32)
AST SERPL-CCNC: 25 IU/L (ref 0–40)
BASOPHILS # BLD AUTO: 0.1 X10E3/UL (ref 0–0.2)
BASOPHILS NFR BLD AUTO: 1 %
BILIRUB SERPL-MCNC: 0.3 MG/DL (ref 0–1.2)
BUN SERPL-MCNC: 17 MG/DL (ref 8–27)
BUN/CREAT SERPL: 19 (ref 12–28)
CALCIUM SERPL-MCNC: 10.4 MG/DL (ref 8.7–10.3)
CHLORIDE SERPL-SCNC: 104 MMOL/L (ref 96–106)
CHOLEST SERPL-MCNC: 157 MG/DL (ref 100–199)
CO2 SERPL-SCNC: 25 MMOL/L (ref 20–29)
CREAT SERPL-MCNC: 0.88 MG/DL (ref 0.57–1)
EOSINOPHIL # BLD AUTO: 0.1 X10E3/UL (ref 0–0.4)
EOSINOPHIL NFR BLD AUTO: 1 %
ERYTHROCYTE [DISTWIDTH] IN BLOOD BY AUTOMATED COUNT: 13.5 % (ref 12.3–15.4)
GLOBULIN SER CALC-MCNC: 2.9 G/DL (ref 1.5–4.5)
GLUCOSE SERPL-MCNC: 142 MG/DL (ref 65–99)
HCT VFR BLD AUTO: 37.3 % (ref 34–46.6)
HDLC SERPL-MCNC: 60 MG/DL
HGB BLD-MCNC: 12.4 G/DL (ref 11.1–15.9)
IMM GRANULOCYTES # BLD: 0 X10E3/UL (ref 0–0.1)
IMM GRANULOCYTES NFR BLD: 0 %
LDLC SERPL CALC-MCNC: 78 MG/DL (ref 0–99)
LYMPHOCYTES # BLD AUTO: 1.6 X10E3/UL (ref 0.7–3.1)
LYMPHOCYTES NFR BLD AUTO: 25 %
MCH RBC QN AUTO: 29.1 PG (ref 26.6–33)
MCHC RBC AUTO-ENTMCNC: 33.2 G/DL (ref 31.5–35.7)
MCV RBC AUTO: 88 FL (ref 79–97)
MONOCYTES # BLD AUTO: 0.4 X10E3/UL (ref 0.1–0.9)
MONOCYTES NFR BLD AUTO: 6 %
NEUTROPHILS # BLD AUTO: 4.2 X10E3/UL (ref 1.4–7)
NEUTROPHILS NFR BLD AUTO: 67 %
PLATELET # BLD AUTO: 298 X10E3/UL (ref 150–379)
POTASSIUM SERPL-SCNC: 4.4 MMOL/L (ref 3.5–5.2)
PROT SERPL-MCNC: 6.7 G/DL (ref 6–8.5)
RBC # BLD AUTO: 4.26 X10E6/UL (ref 3.77–5.28)
SODIUM SERPL-SCNC: 143 MMOL/L (ref 134–144)
TRIGL SERPL-MCNC: 94 MG/DL (ref 0–149)
TSH SERPL DL<=0.005 MIU/L-ACNC: 1.19 UIU/ML (ref 0.45–4.5)
VLDLC SERPL CALC-MCNC: 19 MG/DL (ref 5–40)
WBC # BLD AUTO: 6.3 X10E3/UL (ref 3.4–10.8)

## 2018-09-10 ENCOUNTER — OFFICE VISIT (OUTPATIENT)
Dept: INTERNAL MEDICINE CLINIC | Age: 83
End: 2018-09-10

## 2018-09-10 VITALS
HEART RATE: 76 BPM | TEMPERATURE: 98.7 F | SYSTOLIC BLOOD PRESSURE: 133 MMHG | WEIGHT: 124.8 LBS | OXYGEN SATURATION: 99 % | HEIGHT: 61 IN | DIASTOLIC BLOOD PRESSURE: 67 MMHG | BODY MASS INDEX: 23.56 KG/M2 | RESPIRATION RATE: 18 BRPM

## 2018-09-10 DIAGNOSIS — E78.00 HYPERCHOLESTEROLEMIA: ICD-10-CM

## 2018-09-10 DIAGNOSIS — E55.9 VITAMIN D DEFICIENCY: ICD-10-CM

## 2018-09-10 DIAGNOSIS — R73.9 HYPERGLYCEMIA: ICD-10-CM

## 2018-09-10 DIAGNOSIS — I10 ESSENTIAL HYPERTENSION: ICD-10-CM

## 2018-09-10 DIAGNOSIS — J45.20 MILD INTERMITTENT ASTHMA WITHOUT COMPLICATION: ICD-10-CM

## 2018-09-10 DIAGNOSIS — R53.83 FATIGUE, UNSPECIFIED TYPE: Primary | ICD-10-CM

## 2018-09-10 DIAGNOSIS — D64.9 ANEMIA, UNSPECIFIED TYPE: ICD-10-CM

## 2018-09-10 LAB
GLUCOSE POC: 98 MG/DL (ref 70–110)
HBA1C MFR BLD HPLC: 5.4 % (ref 4.8–5.6)

## 2018-09-10 RX ORDER — AMLODIPINE AND VALSARTAN 10; 320 MG/1; MG/1
1 TABLET ORAL DAILY
Qty: 90 TAB | Refills: 3 | Status: SHIPPED | OUTPATIENT
Start: 2018-09-10 | End: 2019-01-23 | Stop reason: RX

## 2018-09-10 RX ORDER — ALBUTEROL SULFATE 90 UG/1
1 AEROSOL, METERED RESPIRATORY (INHALATION) AS NEEDED
Qty: 1 INHALER | Refills: 5 | Status: SHIPPED | OUTPATIENT
Start: 2018-09-10

## 2018-09-10 RX ORDER — ATORVASTATIN CALCIUM 20 MG/1
20 TABLET, FILM COATED ORAL DAILY
Qty: 90 TAB | Refills: 3 | Status: SHIPPED | OUTPATIENT
Start: 2018-09-10 | End: 2019-11-03 | Stop reason: SDUPTHER

## 2018-09-10 NOTE — PROGRESS NOTES
1. Have you been to the ER, urgent care clinic since your last visit? Hospitalized since your last visit?no    2. Have you seen or consulted any other health care providers outside of the [address] [place] [address] since your last visit? Include any pap smears or colon screening.  no

## 2018-09-10 NOTE — PROGRESS NOTES
SUBJECTIVE  Ms. Leora Martínez presents today for complaint of fatigue; also she is about due for routine follow up. Chief Complaint   Patient presents with    Hypertension     pt here today for 4 month f/u    Fatigue     pt states that she cant seem to wake up        Her main compalint is of a lot of tiredness. \"I just can't stay awake. \"  She is sleeping well. No SOB. No fever or other constitutional symptoms. This is not new--she has mentioned fatigue in past visits. No further vertigo since last visit. Left knee pain ongoing. Dr. Chamorro following. \"He gives me a needle for that. \"  Off cymbalta and celebrex. She was unable to tolerate gabapentin . The tramadol we gave her \"didn't do anything for the pain and kept me awake, so I stopped taking it. \"  We noted in early 2016:  L knee swells. In the past has received corticosteroid injections from Dr. Trinidad Arellano. She sees Dr. Chamorro, and he has done injections. \"He says you can do the needle\"--no plans for further follow up with him. GI bleed Anemia: No recent melena or hematochezia. She has had problems with diclofenac (itching) and celebrex--As we noted in January 2015: \"I had to stop the pills [celebrex] because of bleeding, and had a scope. \"  Dr. Rick Krause did colonoscopy finding a cecal polyp and diverticuli. Still has the neuropathic pain in leg, as noted in prior notes. This is doing better on cymbalta. She is not as worried about weight loss; however, it appears this is stable: Wt Readings from Last 5 Encounters:   09/10/18 124 lb 12.8 oz (56.6 kg)   06/11/18 128 lb 3.2 oz (58.2 kg)   02/12/18 130 lb 3.2 oz (59.1 kg)   10/09/17 130 lb (59 kg)   06/06/17 126 lb 3.2 oz (57.2 kg)     She has seen Dr. Rick West for spinal stenosis / sciatica. Asthma stable, uses inhaler \"now and then. \"    She still has arthritis pain: R neck pain is stable. Saw Dr. Mercedes Joy, who diagnosed cervical spine arthritis. Depression is doing fine.   No suicidal ideation. For all issues addressed today, see A/P below. PMH: She has a past medical history of Anemia NEC; Asthma; Cervical spondylarthritis; Depression; DJD (degenerative joint disease) of knee; Esophageal spasm; GERD (gastroesophageal reflux disease); Hypercholesterolemia; Hypertension; Lumbar stenosis; Neuropathy, upper extremity; Vertigo ([id number]); and Vitamin D deficiency (1/28/2011). Had pneumovax once. PSH:  has a past surgical history that includes hx hysterectomy; hx cataract removal; hx tonsillectomy; hx orthopaedic; and hx other surgical.  Colonoscopy 2014 showed cecal polyp, diverticuli, and internal hemorrhoids, Dr. Neo Araya. All and MED reviewed. I have taken aspirin off the list; caused N/V once, but she is on it now and tolerating. Current Outpatient Prescriptions on File Prior to Visit   Medication Sig Dispense Refill    atorvastatin (LIPITOR) 20 mg tablet TAKE 1 TABLET BY MOUTH EVERY NIGHT 90 Tab 0    amLODIPine-valsartan (EXFORGE)  mg per tablet TAKE 1 TABLET BY MOUTH DAILY 90 Tab 0    ergocalciferol (ERGOCALCIFEROL) 50,000 unit capsule Take 1 Cap by mouth every seven (7) days. (Patient taking differently: Take 50,000 Units by mouth every Sunday.) 1 Cap 0    MULTIVITAMINS (MULTIVITAMIN PO) Take  by mouth daily. No current facility-administered medications on file prior to visit. FH: Her family history includes Heart Disease in her brother, brother, father, and mother; Hypertension in her mother; Stroke in her mother. SH: She is . Her son lives with her. She reports that she has never used tobacco.  She reports that she drinks alcohol. ROS: Complete review of systems was performed and is otherwise unremarkable except as noted elsewhere.      OBJECTIVE  Vitals:   Visit Vitals    /67 (BP 1 [place] Left arm, BP Patient Position: Sitting)    Pulse 76    Temp 98.7 °F (37.1 °C) (Oral)    Resp 18    Ht 5' 1\" (1.549 m)    Wt 124 lb 12.8 oz (56.6 kg)  SpO2 99%    BMI 23.58 kg/m2    Gen: Pleasant 80 y.o. AA female in NAD.   HEENT: PERRLA. EOMI. OP moist and pink. +TTP R cheek and forehead.  Neck: Supple.  No LAD.  HEART: RRR, No M/G/R.   LUNGS: CTAB No W/R.   ABDOMEN: S, NT, ND, BS+.   EXTREMITIES: Warm. No C/C/E. MUSCULOSKELETAL: Normal ROM, muscle strength 5/5 all groups. NEURO: Alert and oriented x 3.  Cranial nerves grossly intact.  No focal sensory or motor deficits noted. SKIN: Warm. Dry. No rashes or other lesions noted.     Lab Results   Component Value Date/Time    Cholesterol, total 157 07/09/2018 11:10 AM    HDL Cholesterol 60 07/09/2018 11:10 AM    LDL, calculated 78 07/09/2018 11:10 AM    VLDL, calculated 19 07/09/2018 11:10 AM    Triglyceride 94 07/09/2018 11:10 AM    CHOL/HDL Ratio 1.6 04/29/2017 05:38 AM      Lab Results   Component Value Date/Time    WBC 6.3 07/09/2018 11:10 AM    WBC 6.7 05/21/2012 08:23 AM    HGB 12.4 07/09/2018 11:10 AM    HCT 37.3 07/09/2018 11:10 AM    PLATELET 707 12/45/0461 11:10 AM    MCV 88 07/09/2018 11:10 AM     Lab Results   Component Value Date/Time    Vitamin D 25-Hydroxy 13.8 (L) 04/29/2017 05:37 AM    VITAMIN D, 25-HYDROXY 31.6 07/09/2018 11:10 AM       Lab Results   Component Value Date/Time    Sodium 143 07/09/2018 11:10 AM    Potassium 4.4 07/09/2018 11:10 AM    Chloride 104 07/09/2018 11:10 AM    CO2 25 07/09/2018 11:10 AM    Anion gap 5 05/10/2017 03:50 AM    Glucose 142 (H) 07/09/2018 11:10 AM    BUN 17 07/09/2018 11:10 AM    Creatinine 0.88 07/09/2018 11:10 AM    BUN/Creatinine ratio 19 07/09/2018 11:10 AM    GFR est AA 67 07/09/2018 11:10 AM    GFR est non-AA 58 (L) 07/09/2018 11:10 AM    Calcium 10.4 (H) 07/09/2018 11:10 AM       Recent Results (from the past 12 hour(s))   AMB POC GLUCOSE BLOOD, BY GLUCOSE MONITORING DEVICE    Collection Time: 09/10/18  9:35 AM   Result Value Ref Range    Glucose POC 98 70 - 110 mg/dL   AMB POC HEMOGLOBIN A1C    Collection Time: 09/10/18  9:40 AM   Result Value Ref Range    Hemoglobin A1c (POC) 5.4 4.8 - 5.6 %         ASSESSMENT / PLAN:    1. Fatigue and malaise: Labs reviewed and okay. Recheck CBC, TSH, etc. Meds unlikely to be contributing. Doing worse. ?Depression. 2. Hyperglycemia on recent labs; Glc and A1C okay today. 3. Knee pain: DJD. Better with cymbalta. Tylenol. On celebrex. Follow up with orthopedics as desired. 4. L leg neuropathic pain of lower extremity: Ongoing. Likely secondary to her spinal stenosis. Sees Dr. Lokesh Marcos. Off cymbalta. 5. GI bleed: S/p colonoscopy. As per Dr. Rachna Chaves. None recently. Off NSAIDs and ASA. 6. Cervical spine stenosis: Not wanting surgery. 7. Hypertension: BP fine today. 8. Hypercholesterolemia: Reasonable at last check. 9. Anemia: Hb okay. 10. Depression: Stable. No SI.   11. GERD (gastroesophageal reflux disease): Stable. 12. Asthma: Controlled. 13. Head and neck pain: Stable. Arthritis. 14. Vit D deficiency: Continue supplement. Recheck. Follow-up Disposition:  Return in about 4 months (around 1/10/2019) for HTN.

## 2018-09-10 NOTE — MR AVS SNAPSHOT
[address] 
[phone] Patient: Mya Velasquez MRN:  [id number] Visit Information Date & Time Provider Department Dept. Phone Encounter #  
 9/10/2018  8:45 AM Lisha Bejarano, [address] [place] [address],[address] [phone] [id number] Follow-up Instructions Return in about 4 months (around 1/10/2019) for HTN. Upcoming Health Maintenance Date Due DTaP/Tdap/Td series (1 - Tdap) 9/12/1949 GLAUCOMA SCREENING Q2Y 9/12/1993 Pneumococcal 65+ Low/Medium Risk (2 of 2 - PPSV23) 10/17/2017 Influenza Age 5 to Adult 8/1/2018 MEDICARE YEARLY EXAM 10/10/2018 Allergies as of 9/10/2018  Review Complete On: 9/10/2018 By: Lisha Bejarano MD  
  
 Severity Noted Reaction Type Reactions Diclofenac  02/22/2016    Itching The voltaren gel caused itching. Gabapentin  10/13/2016    Swelling Hydrochlorothiazide  10/27/2009    Other (comments)  
 dizziness Pcn [Penicillins]  10/27/2009    Swelling Current Immunizations  Reviewed on 10/15/2010 Name Date Influenza Vaccine 12/1/2014, 10/21/2013 Influenza Vaccine Karole Adonay) 10/7/2015 Influenza Vaccine (Quad) PF 10/9/2017 10:45 AM, 1/30/2017 Influenza Vaccine Split 10/17/2012, 10/15/2010 12:30 PM  
 ZZZ-RETIRED (DO NOT USE) Pneumococcal Vaccine (Unspecified Type) 10/17/2012 Not reviewed this visit You Were Diagnosed With   
  
 Codes Comments Fatigue, unspecified type    -  Primary ICD-10-CM: R53.83 ICD-9-CM: 780.79 Essential hypertension     ICD-10-CM: I10 
ICD-9-CM: 401.9 Hypercholesterolemia     ICD-10-CM: E78.00 ICD-9-CM: 272.0 Mild intermittent asthma without complication     AWO-62-QP: J45.20 ICD-9-CM: 493.90 Vitamin D deficiency     ICD-10-CM: E55.9 ICD-9-CM: 268.9 Anemia, unspecified type     ICD-10-CM: D64.9 ICD-9-CM: 285.9 Hyperglycemia     ICD-10-CM: R73.9 ICD-9-CM: 790.29 Vitals BP Pulse Temp Resp Height(growth percentile) Weight(growth percentile) 133/67 (BP 1 [place] Left arm, BP Patient Position: Sitting) 76 98.7 °F (37.1 °C) (Oral) 18 5' 1\" (1.549 m) 124 lb 12.8 oz (56.6 kg) SpO2 BMI OB Status Smoking Status 99% 23.58 kg/m2 Hysterectomy Never Smoker Vitals History BMI and BSA Data Body Mass Index Body Surface Area  
 23.58 kg/m 2 1.56 m 2 Preferred Pharmacy Pharmacy Name Phone [facility] [address] [address]  [place] [phone] Your Updated Medication List  
  
   
This list is accurate as of 9/10/18  9:26 AM.  Always use your most recent med list.  
  
  
  
  
 albuterol 90 mcg/actuation inhaler Commonly known as:  PROVENTIL HFA, VENTOLIN HFA, PROAIR HFA Take 1 Puff by inhalation as needed for Wheezing. amLODIPine-valsartan  mg per tablet Commonly known as:  EXFORGE  
TAKE 1 TABLET BY MOUTH DAILY  
  
 atorvastatin 20 mg tablet Commonly known as:  LIPITOR  
TAKE 1 TABLET BY MOUTH EVERY NIGHT  
  
 ergocalciferol 50,000 unit capsule Commonly known as:  ERGOCALCIFEROL Take 1 Cap by mouth every seven (7) days. MULTIVITAMIN PO Take  by mouth daily. Prescriptions Sent to Pharmacy Refills  
 albuterol (PROVENTIL HFA, VENTOLIN HFA, PROAIR HFA) 90 mcg/actuation inhaler 5 Sig: Take 1 Puff by inhalation as needed for Wheezing. Class: Normal  
 [facility] [address] Ph #: [phone] Route: Inhalation We Performed the Following AMB POC GLUCOSE BLOOD, BY GLUCOSE MONITORING DEVICE [29773 CPT(R)] AMB POC HEMOGLOBIN A1C [24131 CPT(R)] Follow-up Instructions Return in about 4 months (around 1/10/2019) for HTN. Introducing [facility] & HEALTH SERVICES! [employer] introduces [employer] patient portal. Now you can access parts of your medical record, email your doctor's office, and request medication refills online. 1. In your internet browser, go to [email or web address] 2. Click on the First Time User? Click Here link in the Sign In box. You will see the New Member Sign Up page. 3. Enter your [employer] Access Code exactly as it appears below. You will not need to use this code after youve completed the sign-up process. If you do not sign up before the expiration date, you must request a new code. · [employer] Access Code: [email or web address] Expires: 12/9/2018  9:26 AM 
 
4. Enter the last four digits of your Social Security Number (xxxx) and Date of Birth (mm/dd/yyyy) as indicated and click Submit. You will be taken to the next sign-up page. 5. Create a [employer] ID. This will be your [employer] login ID and cannot be changed, so think of one that is secure and easy to remember. 6. Create a [employer] password. You can change your password at any time. 7. Enter your Password Reset Question and Answer. This can be used at a later time if you forget your password. 8. Enter your e-mail address. You will receive e-mail notification when new information is available in [address] 9. Click Sign Up. You can now view and download portions of your medical record. 10. Click the Download Summary menu link to download a portable copy of your medical information. If you have questions, please visit the Frequently Asked Questions section of the [employer] website. Remember, [employer] is NOT to be used for urgent needs. For medical emergencies, dial 911. Now available from your iPhone and Android! Please provide this summary of care documentation to your next provider. Your primary care clinician is listed as [place]. If you have any questions after today's visit, please call [phone].

## 2019-01-23 ENCOUNTER — OFFICE VISIT (OUTPATIENT)
Dept: INTERNAL MEDICINE CLINIC | Age: 84
End: 2019-01-23

## 2019-01-23 VITALS
DIASTOLIC BLOOD PRESSURE: 58 MMHG | WEIGHT: 123.8 LBS | OXYGEN SATURATION: 98 % | HEIGHT: 61 IN | TEMPERATURE: 98.7 F | HEART RATE: 72 BPM | RESPIRATION RATE: 16 BRPM | SYSTOLIC BLOOD PRESSURE: 133 MMHG | BODY MASS INDEX: 23.37 KG/M2

## 2019-01-23 DIAGNOSIS — Z23 ENCOUNTER FOR IMMUNIZATION: ICD-10-CM

## 2019-01-23 DIAGNOSIS — E78.00 HYPERCHOLESTEROLEMIA: ICD-10-CM

## 2019-01-23 DIAGNOSIS — J45.20 MILD INTERMITTENT ASTHMA WITHOUT COMPLICATION: ICD-10-CM

## 2019-01-23 DIAGNOSIS — I10 ESSENTIAL HYPERTENSION: ICD-10-CM

## 2019-01-23 DIAGNOSIS — Z00.00 MEDICARE ANNUAL WELLNESS VISIT, SUBSEQUENT: Primary | ICD-10-CM

## 2019-01-23 DIAGNOSIS — D64.9 ANEMIA, UNSPECIFIED TYPE: ICD-10-CM

## 2019-01-23 DIAGNOSIS — E55.9 VITAMIN D DEFICIENCY: ICD-10-CM

## 2019-01-23 RX ORDER — SERTRALINE HYDROCHLORIDE 50 MG/1
50 TABLET, FILM COATED ORAL DAILY
Qty: 90 TAB | Refills: 3 | Status: SHIPPED | OUTPATIENT
Start: 2019-01-23 | End: 2019-05-03

## 2019-01-23 RX ORDER — AMLODIPINE AND BENAZEPRIL HYDROCHLORIDE 5; 20 MG/1; MG/1
1 CAPSULE ORAL DAILY
Qty: 90 CAP | Refills: 3 | Status: SHIPPED | OUTPATIENT
Start: 2019-01-23 | End: 2020-02-24

## 2019-01-23 RX ORDER — DICLOFENAC SODIUM 10 MG/G
GEL TOPICAL 4 TIMES DAILY
COMMUNITY
End: 2020-09-18 | Stop reason: ALTCHOICE

## 2019-01-23 NOTE — PATIENT INSTRUCTIONS
Medicare Wellness Visit, Female The best way to live healthy is to have a lifestyle where you eat a well-balanced diet, exercise regularly, limit alcohol use, and quit all forms of tobacco/nicotine, if applicable. Regular preventive services are another way to keep healthy. Preventive services (vaccines, screening tests, monitoring & exams) can help personalize your care plan, which helps you manage your own care. Screening tests can find health problems at the earliest stages, when they are easiest to treat. [facility] follows the current, evidence-based guidelines published by the [facility] ([facility]) when recommending preventive services for our patients. Because we follow these guidelines, sometimes recommendations change over time as research supports it. (For example, mammograms used to be recommended annually. Even though Medicare will still pay for an annual mammogram, the newer guidelines recommend a mammogram every two years for women of average risk.) Of course, you and your doctor may decide to screen more often for some diseases, based on your risk and your health status. Preventive services for you include: - Medicare offers their members a free annual wellness visit, which is time for you and your primary care provider to discuss and plan for your preventive service needs. Take advantage of this benefit every year! 
-All adults over the age of 72 should receive the recommended pneumonia vaccines. Current USPSTF guidelines recommend a series of two vaccines for the best pneumonia protection.  
-All adults should have a flu vaccine yearly and a tetanus vaccine every 10 years. All adults age 61 and older should receive a shingles vaccine once in their lifetime.   
-A bone mass density test is recommended when a woman turns 65 to screen for osteoporosis. This test is only recommended one time, as a screening. Some providers will use this same test as a disease monitoring tool if you already have osteoporosis. -All adults age 38-68 who are overweight should have a diabetes screening test once every three years.  
-Other screening tests and preventive services for persons with diabetes include: an eye exam to screen for diabetic retinopathy, a kidney function test, a foot exam, and stricter control over your cholesterol.  
-Cardiovascular screening for adults with routine risk involves an electrocardiogram (ECG) at intervals determined by your doctor.  
-Colorectal cancer screenings should be done for adults age 54-65 with no increased risk factors for colorectal cancer. There are a number of acceptable methods of screening for this type of cancer. Each test has its own benefits and drawbacks. Discuss with your doctor what is most appropriate for you during your annual wellness visit. The different tests include: colonoscopy (considered the best screening method), a fecal occult blood test, a fecal DNA test, and sigmoidoscopy. -Breast cancer screenings are recommended every other year for women of normal risk, age 54-69. 
-Cervical cancer screenings for women over age 72 are only recommended with certain risk factors.  
-All adults born between [facility] should be screened once for Hepatitis C. Here is a list of your current Health Maintenance items (your personalized list of preventive services) with a due date: 
Health Maintenance Due Topic Date Due  
 DTaP/Tdap/Td  (1 - Tdap) 09[birth year]/1949  Shingles Vaccine (1 of 2) 09/12/1978  Glaucoma Screening   09/12/1993  Pneumococcal Vaccine (2 of 2 - PPSV23) 10/17/2017  Flu Vaccine  08/01/2018 [address] [place] [address] Annual Well Visit  10/10/2018

## 2019-01-23 NOTE — PROGRESS NOTES
SUBJECTIVE Ms. Isiah Michelle presents today for complaint of fatigue; also she is about due for routine follow up. Chief Complaint Patient presents with  Hypertension  
  pt here today for 4 month f/u  Dizziness  
  pt c.o dizziness since last week  Immunization/Injection  
  pt wants a flu shot Her BP med has been recalled. She doesn't recall any cough or problems with ACEI. Her main complaint today is of a lot of tiredness. \"I just can't stay awake. \"  She is sleeping well. No SOB. No fever or other constitutional symptoms. This is not new--she has mentioned fatigue in past visits. She also notes that she is \"not hungry\", worried she is losing weight:  
Wt Readings from Last 5 Encounters:  
01/23/19 123 lb 12.8 oz (56.2 kg) 09/10/18 124 lb 12.8 oz (56.6 kg) 06/11/18 128 lb 3.2 oz (58.2 kg) 02/12/18 130 lb 3.2 oz (59.1 kg) 10/09/17 130 lb (59 kg) Left knee pain ongoing. Dr. Chamorro following. \"He gives me a needle for that. \"  Off cymbalta and celebrex. She was unable to tolerate gabapentin . The tramadol we gave her \"didn't do anything for the pain and kept me awake, so I stopped taking it. \"  We noted in early 2016:  L knee swells. In the past has received corticosteroid injections from Dr. Nikita Rollins. She sees Dr. Chamorro, and he has done injections. \"He says you can do the needle\"--no plans for further follow up with him. History of GI bleed and anemia: No recent melena or hematochezia. She has had problems with diclofenac (itching) and celebrex--As we noted in January 2015: \"I had to stop the pills [celebrex] because of bleeding, and had a scope. \"  Dr. Fazal Chau did colonoscopy finding a cecal polyp and diverticuli. Still has the neuropathic pain in leg, as noted in prior notes. This is doing better on cymbalta. She has seen Dr. Yesenia Guillen for spinal stenosis / sciatica. Asthma stable, uses inhaler \"now and then. \"   
 She still has arthritis pain: R neck pain is stable. Saw Dr. Delon Gayle, who diagnosed cervical spine arthritis. Depression is doing fine. No suicidal ideation. For all issues addressed today, see A/P below. PMH: She has a past medical history of Anemia NEC, Asthma, Cervical spondylarthritis, Depression, DJD (degenerative joint disease) of knee, Esophageal spasm, GERD (gastroesophageal reflux disease), Hypercholesterolemia, Hypertension, Lumbar stenosis, Neuropathy, upper extremity, Vertigo ([id number]), and Vitamin D deficiency (1/28/2011). Had pneumovax once. PSH:  has a past surgical history that includes hx hysterectomy; hx cataract removal; hx tonsillectomy; hx orthopaedic; and hx other surgical.  Colonoscopy 2014 showed cecal polyp, diverticuli, and internal hemorrhoids, Dr. Michael Orta. All and MED reviewed. I have taken aspirin off the list; caused N/V once, but she is on it now and tolerating. Current Outpatient Medications on File Prior to Visit Medication Sig Dispense Refill  diclofenac (VOLTAREN) 1 % gel Apply  to affected area four (4) times daily.  albuterol (PROVENTIL HFA, VENTOLIN HFA, PROAIR HFA) 90 mcg/actuation inhaler Take 1 Puff by inhalation as needed for Wheezing. 1 Inhaler 5  
 amLODIPine-valsartan (EXFORGE)  mg per tablet Take 1 Tab by mouth daily. 90 Tab 3  
 atorvastatin (LIPITOR) 20 mg tablet Take 1 Tab by mouth daily. 90 Tab 3  
 ergocalciferol (ERGOCALCIFEROL) 50,000 unit capsule Take 1 Cap by mouth every seven (7) days. (Patient taking differently: Take 50,000 Units by mouth every Sunday.) 1 Cap 0  
 MULTIVITAMINS (MULTIVITAMIN PO) Take  by mouth daily. No current facility-administered medications on file prior to visit. FH: Her family history includes Heart Disease in her brother, brother, father, and mother; Hypertension in her mother; Stroke in her mother. SH: She is . Her son lives with her.  She reports that she has never used tobacco.  She reports that she drinks alcohol. ROS: Complete review of systems was performed and is otherwise unremarkable except as noted elsewhere. OBJECTIVE Vitals:  
Visit Vitals /58 (BP 1 [place] Left arm, BP Patient Position: Sitting) Pulse 72 Temp 98.7 °F (37.1 °C) (Oral) Resp 16 Ht 5' 1\" (1.549 m) Wt 123 lb 12.8 oz (56.2 kg) SpO2 98% BMI 23.39 kg/m² Gen: Pleasant 80 y.o. AA female in NAD.   HEENT: PERRLA. EOMI. OP moist and pink. +TTP R cheek and forehead.  Neck: Supple.  No LAD.  HEART: RRR, No M/G/R.   LUNGS: CTAB No W/R.   ABDOMEN: S, NT, ND, BS+.   EXTREMITIES: Warm. No C/C/E. MUSCULOSKELETAL: Normal ROM, muscle strength 5/5 all groups. NEURO: Alert and oriented x 3.  Cranial nerves grossly intact.  No focal sensory or motor deficits noted. SKIN: Warm. Dry. No rashes or other lesions noted. Lab Results Component Value Date/Time Cholesterol, total 157 07/09/2018 11:10 AM  
 HDL Cholesterol 60 07/09/2018 11:10 AM  
 LDL, calculated 78 07/09/2018 11:10 AM  
 VLDL, calculated 19 07/09/2018 11:10 AM  
 Triglyceride 94 07/09/2018 11:10 AM  
 CHOL/HDL Ratio 1.6 04/29/2017 05:38 AM  
  
Lab Results Component Value Date/Time WBC 6.3 07/09/2018 11:10 AM  
 WBC 6.7 05/21/2012 08:23 AM  
 HGB 12.4 07/09/2018 11:10 AM  
 HCT 37.3 07/09/2018 11:10 AM  
 PLATELET 614 22/11/3865 11:10 AM  
 MCV 88 07/09/2018 11:10 AM  
 
Lab Results Component Value Date/Time Vitamin D 25-Hydroxy 13.8 (L) 04/29/2017 05:37 AM  
 VITAMIN D, 25-HYDROXY 31.6 07/09/2018 11:10 AM  
   
Lab Results Component Value Date/Time  Sodium 143 07/09/2018 11:10 AM  
 Potassium 4.4 07/09/2018 11:10 AM  
 Chloride 104 07/09/2018 11:10 AM  
 CO2 25 07/09/2018 11:10 AM  
 Anion gap 5 05/10/2017 03:50 AM  
 Glucose 142 (H) 07/09/2018 11:10 AM  
 BUN 17 07/09/2018 11:10 AM  
 Creatinine 0.88 07/09/2018 11:10 AM  
 BUN/Creatinine ratio 19 07/09/2018 11:10 AM  
 GFR est AA 67 07/09/2018 11:10 AM  
 GFR est non-AA 58 (L) 07/09/2018 11:10 AM  
 Calcium 10.4 (H) 07/09/2018 11:10 AM  
 
 
No results found for this or any previous visit (from the past 12 hour(s)). ASSESSMENT / PLAN:   
1. Hypertension: We'll substitute Lotrel for Exforge. 2. Fatigue and malaise: Ongoing. Recent labs reviewed and okay. Meds unlikely to be contributing. ?Depression. 3. ?Weight loss--magnitude of this is 7 lb in 2 years, and she has had scopes/GI workup in the past few years. For now, encourage liberalized diet. 4. Hyperglycemia on recent labs; Glc and A1C okay. 5. Knee pain: DJD. Better with cymbalta. Tylenol. On celebrex. Follow up with orthopedics as desired. 6. L leg neuropathic pain of lower extremity: Ongoing. Likely secondary to her spinal stenosis. Sees Dr. Morena Vinson. Off cymbalta. 7. GI bleed: S/p colonoscopy. As per Dr. Debbie Saucedo. None recently. Off NSAIDs and ASA. 8. Cervical spine stenosis: Not wanting surgery. 9. Hypercholesterolemia: Reasonable at last check. 10. Anemia: Hb okay. 11. Depression: Stable. No SI. 12. GERD (gastroesophageal reflux disease): Stable. 13. Asthma: Controlled. 14. Head and neck pain: Stable. Arthritis. 15. Vit D deficiency: Continue supplement. Recheck.  
  
Follow-up Disposition: 
Return in about 4 months (around 5/23/2019) for HTN, etc.

## 2019-01-23 NOTE — PROGRESS NOTES
Wellness: This is the Subsequent Medicare Annual Wellness Exam, performed 12 months or more after the Initial AWV or the last Subsequent AWV I have reviewed the patient's medical history in detail and updated the computerized patient record. History Past Medical History:  
Diagnosis Date  Anemia NEC  Asthma  Cervical spondylarthritis  Depression  DJD (degenerative joint disease) of knee  Esophageal spasm Has required esophageal dilatation; Dr. Kit Jasmine  GERD (gastroesophageal reflux disease)  Hypercholesterolemia  Hypertension  Lumbar stenosis  Neuropathy, upper extremity   
 left  Vertigo [id number] Saw Dr. Chastity Hull; improved.  Vitamin D deficiency 1/28/2011 Past Surgical History:  
Procedure Laterality Date  HX CATARACT REMOVAL    
 bilateral  
 HX HYSTERECTOMY  HX ORTHOPAEDIC    
 right bunion excision  HX OTHER SURGICAL    
 broken R hip  HX TONSILLECTOMY Current Outpatient Medications Medication Sig Dispense Refill  diclofenac (VOLTAREN) 1 % gel Apply  to affected area four (4) times daily.  amLODIPine-benazepril (LOTREL) 5-20 mg per capsule Take 1 Cap by mouth daily. 90 Cap 3  
 albuterol (PROVENTIL HFA, VENTOLIN HFA, PROAIR HFA) 90 mcg/actuation inhaler Take 1 Puff by inhalation as needed for Wheezing. 1 Inhaler 5  
 atorvastatin (LIPITOR) 20 mg tablet Take 1 Tab by mouth daily. 90 Tab 3  
 ergocalciferol (ERGOCALCIFEROL) 50,000 unit capsule Take 1 Cap by mouth every seven (7) days. (Patient taking differently: Take 50,000 Units by mouth every Sunday.) 1 Cap 0  
 MULTIVITAMINS (MULTIVITAMIN PO) Take  by mouth daily. Allergies Allergen Reactions  Diclofenac Itching The voltaren gel caused itching.  Gabapentin Swelling  Hydrochlorothiazide Other (comments)  
  dizziness  Pcn [Penicillins] Swelling Family History Problem Relation Age of Onset  Heart Disease Mother  Hypertension Mother  Stroke Mother  Heart Disease Brother  Heart Disease Father  Heart Disease Brother Social History Tobacco Use  Smoking status: Never Smoker  Smokeless tobacco: Never Used Substance Use Topics  Alcohol use: No  
 
Patient Active Problem List  
Diagnosis Code  Hypertension I10  
 Hypercholesterolemia E78.00  Depression F32.9  GERD (gastroesophageal reflux disease) K21.9  Asthma J45.909  Neuropathy, upper extremity G56.90  Vitamin D deficiency E55.9  Vertigo R42  
 Dizziness R42  Lumbar stenosis M48.061  
 Anemia D64.9  
 Hip fracture ([address] S72.009A  UTI (urinary tract infection) N39.0 Depression Risk Factor Screening: PHQ over the last two weeks 6/11/2018 Little interest or pleasure in doing things More than half the days Feeling down, depressed, irritable, or hopeless More than half the days Total Score PHQ 2 4 She still feels down more than half the time. Still has little interest and enjoyment, more than half the time. Alcohol Risk Factor Screening: You do not drink alcohol or very rarely. Functional Ability and Level of Safety:  
Hearing Loss Hearing is diminished--Not yet interested in further testing. Lives with son. \"He helps me. \" Activities of Daily Living The home contains: handrails and grab bars Patient needs help with:  transportation Fall Risk Fall Risk Assessment, last 12 mths 1/23/2019 Able to walk? Yes Fall in past 12 months? Yes Fall with injury? Yes  
Number of falls in past 12 months 8 or more Fall Risk Score 9 Abuse Screen Patient is not abused Cognitive Screening Evaluation of Cognitive Function: 
Has your family/caregiver stated any concerns about your memory: \"I am getting forgetful. \"  
Normal 
 
Patient Care Team  
Patient Care Team: 
Chari Soto MD as PCP - General 
Avis Rowan MD (Gastroenterology) Lola Sams MD (Ophthalmology) Valerie Weeks MD (Orthopedic Surgery) Assessment/Plan Education and counseling provided: 
Are appropriate based on today's review and evaluation Diagnoses and all orders for this visit: 1. Encounter for immunization -     ADMIN INFLUENZA VIRUS VAC 
-     INFLUENZA VACCINE INACTIVATED (IIV), SUBUNIT, ADJUVANTED, IM 
-     ADMIN PNEUMOCOCCAL VACCINE 
-     PNEUMOCOCCAL CONJ VACCINE 13 VALENT IM Other orders 
-     amLODIPine-benazepril (LOTREL) 5-20 mg per capsule; Take 1 Cap by mouth daily. Health Maintenance Due Topic Date Due  
 DTaP/Tdap/Td series (1 - Tdap) 09/12/1949  Shingrix Vaccine Age 50> (1 of 2) 09/12/1978  GLAUCOMA SCREENING Q2Y  09/12/1993  Pneumococcal 65+ Low/Medium Risk (2 of 2 - PPSV23) 10/17/2017  Influenza Age 5 to Adult  08/01/2018  MEDICARE YEARLY EXAM  10/10/2018

## 2019-01-23 NOTE — PROGRESS NOTES
1. Have you been to the ER, urgent care clinic since your last visit? Hospitalized since your last visit?no 2. Have you seen or consulted any other health care providers outside of the [address] [place] [address] since your last visit? Include any pap smears or colon screening.  no

## 2019-05-03 ENCOUNTER — OFFICE VISIT (OUTPATIENT)
Dept: INTERNAL MEDICINE CLINIC | Age: 84
End: 2019-05-03

## 2019-05-03 ENCOUNTER — HOSPITAL ENCOUNTER (OUTPATIENT)
Dept: LAB | Age: 84
Discharge: HOME | End: 2019-05-03
Payer: MEDICARE

## 2019-05-03 VITALS
HEIGHT: 61 IN | SYSTOLIC BLOOD PRESSURE: 155 MMHG | DIASTOLIC BLOOD PRESSURE: 72 MMHG | HEART RATE: 73 BPM | RESPIRATION RATE: 16 BRPM | OXYGEN SATURATION: 99 % | BODY MASS INDEX: 22.58 KG/M2 | TEMPERATURE: 99.1 F | WEIGHT: 119.6 LBS

## 2019-05-03 DIAGNOSIS — E78.00 HYPERCHOLESTEROLEMIA: ICD-10-CM

## 2019-05-03 DIAGNOSIS — I10 ESSENTIAL HYPERTENSION: Primary | ICD-10-CM

## 2019-05-03 DIAGNOSIS — E55.9 VITAMIN D DEFICIENCY: ICD-10-CM

## 2019-05-03 DIAGNOSIS — D64.9 ANEMIA, UNSPECIFIED TYPE: ICD-10-CM

## 2019-05-03 PROCEDURE — 83550 IRON BINDING TEST: CPT

## 2019-05-03 PROCEDURE — 82306 VITAMIN D 25 HYDROXY: CPT

## 2019-05-03 PROCEDURE — 80061 LIPID PANEL: CPT

## 2019-05-03 PROCEDURE — 36415 COLL VENOUS BLD VENIPUNCTURE: CPT

## 2019-05-03 PROCEDURE — 80053 COMPREHEN METABOLIC PANEL: CPT

## 2019-05-03 PROCEDURE — 85025 COMPLETE CBC W/AUTO DIFF WBC: CPT

## 2019-05-03 RX ORDER — SERTRALINE HYDROCHLORIDE 25 MG/1
25 TABLET, FILM COATED ORAL DAILY
Qty: 90 TAB | Refills: 3 | Status: SHIPPED | OUTPATIENT
Start: 2019-05-03 | End: 2020-05-13

## 2019-05-03 NOTE — PROGRESS NOTES
SUBJECTIVE  Ms. Jocelyne Wheeler presents today for complaint of fatigue; also she is about due for routine follow up. Chief Complaint   Patient presents with    Hypertension     pt here today for routine f/u    Medication Evaluation     per pt sertraline makes her sleep alot; pt wants to discuss alternative    Dizziness     pt notice that she gets dizzy in the mornings and around lunch time     She feels that the sertraline is making her sleepy. (However, she has had complaints of fatigue prior to this). She also notes that she is \"not hungry\", worried she is losing weight:   Wt Readings from Last 5 Encounters:   05/03/19 119 lb 9.6 oz (54.3 kg)   01/23/19 123 lb 12.8 oz (56.2 kg)   09/10/18 124 lb 12.8 oz (56.6 kg)   06/11/18 128 lb 3.2 oz (58.2 kg)   02/12/18 130 lb 3.2 oz (59.1 kg)     Left knee pain ongoing. Dr. Eva Jimenez following. \"He gives me a needle for that. \"  Off cymbalta and celebrex. She was unable to tolerate gabapentin . The tramadol we gave her \"didn't do anything for the pain and kept me awake, so I stopped taking it. \"  We noted in early 2016:  L knee swells. In the past has received corticosteroid injections from Dr. Emerson Fitzgerald. She sees Dr. Eva Jimenez, and he has done injections. \"He says you can do the needle\"--no plans for further follow up with him. History of GI bleed and anemia: No recent melena or hematochezia. She has had problems with diclofenac (itching) and celebrex--As we noted in January 2015: \"I had to stop the pills [celebrex] because of bleeding, and had a scope. \"  Dr. Shamika Cash did colonoscopy finding a cecal polyp and diverticuli. Still has the neuropathic pain in leg, as noted in prior notes. This is doing better on cymbalta. She has seen Dr. Michael Coto for spinal stenosis / sciatica. Asthma stable, uses inhaler \"now and then. \"    She still has arthritis pain: R neck pain is stable. Saw Dr. Shamika Man, who diagnosed cervical spine arthritis. Depression is doing fine.   No suicidal ideation. For all issues addressed today, see A/P below. PMH: She has a past medical history of Anemia NEC, Asthma, Cervical spondylarthritis, Depression, DJD (degenerative joint disease) of knee, Esophageal spasm, GERD (gastroesophageal reflux disease), Hypercholesterolemia, Hypertension, Lumbar stenosis, Neuropathy, upper extremity, Vertigo ([id number]), and Vitamin D deficiency (1/28/2011). Had pneumovax once. PSH:  has a past surgical history that includes hx hysterectomy; hx cataract removal; hx tonsillectomy; hx orthopaedic; and hx other surgical.  Colonoscopy 2014 showed cecal polyp, diverticuli, and internal hemorrhoids, Dr. Beth López. All and MED reviewed. I have taken aspirin off the list; caused N/V once, but she is on it now and tolerating. Current Outpatient Medications on File Prior to Visit   Medication Sig Dispense Refill    diclofenac (VOLTAREN) 1 % gel Apply  to affected area four (4) times daily.  amLODIPine-benazepril (LOTREL) 5-20 mg per capsule Take 1 Cap by mouth daily. 90 Cap 3    sertraline (ZOLOFT) 50 mg tablet Take 1 Tab by mouth daily. 90 Tab 3    albuterol (PROVENTIL HFA, VENTOLIN HFA, PROAIR HFA) 90 mcg/actuation inhaler Take 1 Puff by inhalation as needed for Wheezing. 1 Inhaler 5    atorvastatin (LIPITOR) 20 mg tablet Take 1 Tab by mouth daily. 90 Tab 3    ergocalciferol (ERGOCALCIFEROL) 50,000 unit capsule Take 1 Cap by mouth every seven (7) days. (Patient taking differently: Take 50,000 Units by mouth every Sunday.) 1 Cap 0    MULTIVITAMINS (MULTIVITAMIN PO) Take  by mouth daily. No current facility-administered medications on file prior to visit. FH: Her family history includes Heart Disease in her brother, brother, father, and mother; Hypertension in her mother; Stroke in her mother. SH: She is . Her son lives with her. She reports that she has never used tobacco.  She reports that she drinks alcohol.      ROS: Complete review of systems was performed and is otherwise unremarkable except as noted elsewhere. OBJECTIVE  Vitals:   Visit Vitals  /72 (BP 1 [place] Left arm, BP Patient Position: Sitting)   Pulse 73   Temp 99.1 °F (37.3 °C) (Oral)   Resp 16   Ht 5' 1\" (1.549 m)   Wt 119 lb 9.6 oz (54.3 kg)   SpO2 99%   BMI 22.60 kg/m²    Gen: Pleasant 80 y.o. AA female in NAD.   HEENT: PERRLA. EOMI. OP moist and pink. +TTP R cheek and forehead.  Neck: Supple.  No LAD.  HEART: RRR, No M/G/R.   LUNGS: CTAB No W/R.   ABDOMEN: S, NT, ND, BS+.   EXTREMITIES: Warm. No C/C/E. MUSCULOSKELETAL: Normal ROM, muscle strength 5/5 all groups. NEURO: Alert and oriented x 3.  Cranial nerves grossly intact.  No focal sensory or motor deficits noted. SKIN: Warm. Dry. No rashes or other lesions noted.     Lab Results   Component Value Date/Time    Cholesterol, total 157 07/09/2018 11:10 AM    HDL Cholesterol 60 07/09/2018 11:10 AM    LDL, calculated 78 07/09/2018 11:10 AM    VLDL, calculated 19 07/09/2018 11:10 AM    Triglyceride 94 07/09/2018 11:10 AM    CHOL/HDL Ratio 1.6 04/29/2017 05:38 AM      Lab Results   Component Value Date/Time    WBC 6.3 07/09/2018 11:10 AM    WBC 6.7 05/21/2012 08:23 AM    HGB 12.4 07/09/2018 11:10 AM    HCT 37.3 07/09/2018 11:10 AM    PLATELET 311 97/29/1074 11:10 AM    MCV 88 07/09/2018 11:10 AM     Lab Results   Component Value Date/Time    Vitamin D 25-Hydroxy 13.8 (L) 04/29/2017 05:37 AM    VITAMIN D, 25-HYDROXY 31.6 07/09/2018 11:10 AM       Lab Results   Component Value Date/Time    Sodium 143 07/09/2018 11:10 AM    Potassium 4.4 07/09/2018 11:10 AM    Chloride 104 07/09/2018 11:10 AM    CO2 25 07/09/2018 11:10 AM    Anion gap 5 05/10/2017 03:50 AM    Glucose 142 (H) 07/09/2018 11:10 AM    BUN 17 07/09/2018 11:10 AM    Creatinine 0.88 07/09/2018 11:10 AM    BUN/Creatinine ratio 19 07/09/2018 11:10 AM    GFR est AA 67 07/09/2018 11:10 AM    GFR est non-AA 58 (L) 07/09/2018 11:10 AM    Calcium 10.4 (H) 07/09/2018 11:10 AM ASSESSMENT / PLAN:    1. Hypertension: High today; better in past. It is recalled that due to med shortage, we had to stop exforge. 2. Fatigue and malaise: Ongoing. Recent labs reviewed and okay. Meds unlikely to be contributing. ?Depression. 3. ?Weight loss--magnitude of this is 7 lb in 2 years, and she has had scopes/GI workup in the past few years. For now, encourage liberalized diet. 4. Hyperglycemia on recent labs; Glc and A1C okay. 5. Knee pain: DJD. Better with cymbalta. Tylenol. On celebrex. Follow up with orthopedics as desired. 6. L leg neuropathic pain of lower extremity: Ongoing. Likely secondary to her spinal stenosis. Sees Dr. Benito Ventura. Off cymbalta. 7. GI bleed: S/p colonoscopy. As per Dr. Dayton Mortimer. None recently. Off NSAIDs and ASA. 8. Cervical spine stenosis: Not wanting surgery. 9. Hypercholesterolemia: Reasonable at last check. 10. Anemia: Hb okay. 11. Depression: Stable. No SI. We'll try cutting back the zoloft to 25, and see if she tolerates this. 12. GERD (gastroesophageal reflux disease): Stable. 13. Asthma: Controlled. 14. Head and neck pain: Stable. Arthritis. 15. Vit D deficiency: Continue supplement. Recheck. Follow-up and Dispositions    · Return in about 4 months (around 9/3/2019) for HTN.

## 2019-05-03 NOTE — PROGRESS NOTES
1. Have you been to the ER, urgent care clinic since your last visit? Hospitalized since your last visit?no    2. Have you seen or consulted any other health care providers outside of the [address] [place] [address] since your last visit? Include any pap smears or colon screening.  no

## 2019-05-04 LAB
25(OH)D3+25(OH)D2 SERPL-MCNC: 36.2 NG/ML (ref 30–100)
ALBUMIN SERPL-MCNC: 4 G/DL (ref 3.2–4.6)
ALBUMIN/GLOB SERPL: 1.7 {RATIO} (ref 1.2–2.2)
ALP SERPL-CCNC: 89 IU/L (ref 39–117)
ALT SERPL-CCNC: 23 IU/L (ref 0–32)
AST SERPL-CCNC: 24 IU/L (ref 0–40)
BASOPHILS # BLD AUTO: 0 X10E3/UL (ref 0–0.2)
BASOPHILS NFR BLD AUTO: 0 %
BILIRUB SERPL-MCNC: 0.2 MG/DL (ref 0–1.2)
BUN SERPL-MCNC: 18 MG/DL (ref 10–36)
BUN/CREAT SERPL: 18 (ref 12–28)
CALCIUM SERPL-MCNC: 10.5 MG/DL (ref 8.7–10.3)
CHLORIDE SERPL-SCNC: 109 MMOL/L (ref 96–106)
CHOLEST SERPL-MCNC: 151 MG/DL (ref 100–199)
CO2 SERPL-SCNC: 24 MMOL/L (ref 20–29)
CREAT SERPL-MCNC: 0.98 MG/DL (ref 0.57–1)
EOSINOPHIL # BLD AUTO: 0.1 X10E3/UL (ref 0–0.4)
EOSINOPHIL NFR BLD AUTO: 1 %
ERYTHROCYTE [DISTWIDTH] IN BLOOD BY AUTOMATED COUNT: 13.5 % (ref 12.3–15.4)
GLOBULIN SER CALC-MCNC: 2.4 G/DL (ref 1.5–4.5)
GLUCOSE SERPL-MCNC: 108 MG/DL (ref 65–99)
HCT VFR BLD AUTO: 36.9 % (ref 34–46.6)
HDLC SERPL-MCNC: 55 MG/DL
HGB BLD-MCNC: 12.2 G/DL (ref 11.1–15.9)
IMM GRANULOCYTES # BLD AUTO: 0 X10E3/UL (ref 0–0.1)
IMM GRANULOCYTES NFR BLD AUTO: 0 %
IRON SATN MFR SERPL: 18 % (ref 15–55)
IRON SERPL-MCNC: 45 UG/DL (ref 27–139)
LDLC SERPL CALC-MCNC: 79 MG/DL (ref 0–99)
LYMPHOCYTES # BLD AUTO: 1.4 X10E3/UL (ref 0.7–3.1)
LYMPHOCYTES NFR BLD AUTO: 21 %
MCH RBC QN AUTO: 29.2 PG (ref 26.6–33)
MCHC RBC AUTO-ENTMCNC: 33.1 G/DL (ref 31.5–35.7)
MCV RBC AUTO: 88 FL (ref 79–97)
MONOCYTES # BLD AUTO: 0.7 X10E3/UL (ref 0.1–0.9)
MONOCYTES NFR BLD AUTO: 11 %
NEUTROPHILS # BLD AUTO: 4.7 X10E3/UL (ref 1.4–7)
NEUTROPHILS NFR BLD AUTO: 67 %
PLATELET # BLD AUTO: 332 X10E3/UL (ref 150–379)
POTASSIUM SERPL-SCNC: 4.2 MMOL/L (ref 3.5–5.2)
PROT SERPL-MCNC: 6.4 G/DL (ref 6–8.5)
RBC # BLD AUTO: 4.18 X10E6/UL (ref 3.77–5.28)
SODIUM SERPL-SCNC: 143 MMOL/L (ref 134–144)
TIBC SERPL-MCNC: 255 UG/DL (ref 250–450)
TRIGL SERPL-MCNC: 86 MG/DL (ref 0–149)
UIBC SERPL-MCNC: 210 UG/DL (ref 118–369)
VLDLC SERPL CALC-MCNC: 17 MG/DL (ref 5–40)
WBC # BLD AUTO: 7 X10E3/UL (ref 3.4–10.8)

## 2019-05-06 ENCOUNTER — TELEPHONE (OUTPATIENT)
Dept: INTERNAL MEDICINE CLINIC | Age: 84
End: 2019-05-06

## 2019-08-20 NOTE — ED NOTES
Detail Level: Zone MD Aden at the bedside to reevaluate the patients mental status Detail Level: Detailed

## 2019-09-06 ENCOUNTER — OFFICE VISIT (OUTPATIENT)
Dept: INTERNAL MEDICINE CLINIC | Age: 84
End: 2019-09-06

## 2019-09-06 VITALS
RESPIRATION RATE: 18 BRPM | HEART RATE: 75 BPM | SYSTOLIC BLOOD PRESSURE: 145 MMHG | BODY MASS INDEX: 22.2 KG/M2 | HEIGHT: 61 IN | TEMPERATURE: 98.9 F | DIASTOLIC BLOOD PRESSURE: 68 MMHG | OXYGEN SATURATION: 98 % | WEIGHT: 117.6 LBS

## 2019-09-06 DIAGNOSIS — E55.9 VITAMIN D DEFICIENCY: ICD-10-CM

## 2019-09-06 DIAGNOSIS — I10 ESSENTIAL HYPERTENSION: Primary | ICD-10-CM

## 2019-09-06 DIAGNOSIS — Z23 ENCOUNTER FOR IMMUNIZATION: ICD-10-CM

## 2019-09-06 DIAGNOSIS — E78.00 HYPERCHOLESTEROLEMIA: ICD-10-CM

## 2019-09-06 NOTE — PROGRESS NOTES
SUBJECTIVE  Ms. Gely Mendoza presents today for complaint of fatigue; also she is about due for routine follow up. Chief Complaint   Patient presents with    Hypertension     pt here today today for 4 month f.u and discuss medication; pt states at times she feels dizzy     She is feeling \"dizzy\" today. \"Woozy\" and also \"room spinning. \"   She also notes that she is \"not hungry\", worried she is losing weight:   Wt Readings from Last 5 Encounters:   09/06/19 117 lb 9.6 oz (53.3 kg)   05/03/19 119 lb 9.6 oz (54.3 kg)   01/23/19 123 lb 12.8 oz (56.2 kg)   09/10/18 124 lb 12.8 oz (56.6 kg)   06/11/18 128 lb 3.2 oz (58.2 kg)     Left knee pain ongoing. Dr. Alba Herman following. \"I've been getting the needle. \"  Off cymbalta and celebrex. She was unable to tolerate gabapentin . The tramadol we gave her didn't seem to help the pain and caused insomnia. We noted in early 2016:  L knee swells. In the past has received corticosteroid injections from Dr. Charla Brown. She sees Dr. Alba Herman, and he has done injections. \"He says you can do the needle\"--no plans for further follow up with him. History of GI bleed and anemia: No recent melena or hematochezia. She has had problems with diclofenac (itching) and celebrex--As we noted in January 2015: \"I had to stop the pills [celebrex] because of bleeding, and had a scope. \"  Dr. Stan Gross did colonoscopy finding a cecal polyp and diverticuli. Still has the neuropathic pain in leg, as noted in prior notes. This is doing better on cymbalta. She has seen Dr. Unique Mustafa for spinal stenosis / sciatica. Asthma stable, uses inhaler \"now and then. \"    She still has arthritis pain: R neck pain is stable. Saw Dr. Eric Gray, who diagnosed cervical spine arthritis. Depression is doing fine. No suicidal ideation. For all issues addressed today, see A/P below.     PMH: She has a past medical history of Anemia NEC, Asthma, Cervical spondylarthritis, Depression, DJD (degenerative joint disease) of knee, Esophageal spasm, GERD (gastroesophageal reflux disease), Hypercholesterolemia, Hypertension, Lumbar stenosis, Neuropathy, upper extremity, Vertigo ([id number]), and Vitamin D deficiency (1/28/2011). Had pneumovax once. PSH:  has a past surgical history that includes hx hysterectomy; hx cataract removal; hx tonsillectomy; hx orthopaedic; and hx other surgical.  Colonoscopy 2014 showed cecal polyp, diverticuli, and internal hemorrhoids, Dr. Austine Bumpers. All and MED reviewed. I have taken aspirin off the list; caused N/V once, but she is on it now and tolerating. Current Outpatient Medications on File Prior to Visit   Medication Sig Dispense Refill    sertraline (ZOLOFT) 25 mg tablet Take 1 Tab by mouth daily. 90 Tab 3    diclofenac (VOLTAREN) 1 % gel Apply  to affected area four (4) times daily.  amLODIPine-benazepril (LOTREL) 5-20 mg per capsule Take 1 Cap by mouth daily. 90 Cap 3    albuterol (PROVENTIL HFA, VENTOLIN HFA, PROAIR HFA) 90 mcg/actuation inhaler Take 1 Puff by inhalation as needed for Wheezing. 1 Inhaler 5    atorvastatin (LIPITOR) 20 mg tablet Take 1 Tab by mouth daily. 90 Tab 3    ergocalciferol (ERGOCALCIFEROL) 50,000 unit capsule Take 1 Cap by mouth every seven (7) days. (Patient taking differently: Take 50,000 Units by mouth every Sunday.) 1 Cap 0    MULTIVITAMINS (MULTIVITAMIN PO) Take  by mouth daily. No current facility-administered medications on file prior to visit. FH: Her family history includes Heart Disease in her brother, brother, father, and mother; Hypertension in her mother; Stroke in her mother. SH: She is . Her son lives with her. She reports that she has never used tobacco.  She reports that she drinks alcohol. ROS: Complete review of systems was performed and is otherwise unremarkable except as noted elsewhere.      OBJECTIVE  Vitals:   Visit Vitals  /68 (BP 1 [place] Left arm, BP Patient Position: Sitting)   Pulse 75   Temp 98.9 °F (37.2 °C) (Oral)   Resp 18   Ht 5' 1\" (1.549 m)   Wt 117 lb 9.6 oz (53.3 kg)   SpO2 98%   BMI 22.22 kg/m²    Gen: Pleasant 80 y.o. AA female in NAD.   HEENT: PERRLA. EOMI. OP moist and pink. +TTP R cheek and forehead.  Neck: Supple.  No LAD.  HEART: RRR, No M/G/R.   LUNGS: CTAB No W/R.   ABDOMEN: S, NT, ND, BS+.   EXTREMITIES: Warm. No C/C/E. MUSCULOSKELETAL: Normal ROM, muscle strength 5/5 all groups. NEURO: Alert and oriented x 3.  Cranial nerves grossly intact.  No focal sensory or motor deficits noted. SKIN: Warm. Dry. No rashes or other lesions noted. Lab Results   Component Value Date/Time    Cholesterol, total 151 05/03/2019 03:59 PM    HDL Cholesterol 55 05/03/2019 03:59 PM    LDL, calculated 79 05/03/2019 03:59 PM    VLDL, calculated 17 05/03/2019 03:59 PM    Triglyceride 86 05/03/2019 03:59 PM    CHOL/HDL Ratio 1.6 04/29/2017 05:38 AM      Lab Results   Component Value Date/Time    WBC 7.0 05/03/2019 03:59 PM    WBC 6.7 05/21/2012 08:23 AM    HGB 12.2 05/03/2019 03:59 PM    HCT 36.9 05/03/2019 03:59 PM    PLATELET 959 82/50/3929 03:59 PM    MCV 88 05/03/2019 03:59 PM     Lab Results   Component Value Date/Time    Vitamin D 25-Hydroxy 13.8 (L) 04/29/2017 05:37 AM    VITAMIN D, 25-HYDROXY 36.2 05/03/2019 03:59 PM       Lab Results   Component Value Date/Time    Sodium 143 05/03/2019 03:59 PM    Potassium 4.2 05/03/2019 03:59 PM    Chloride 109 (H) 05/03/2019 03:59 PM    CO2 24 05/03/2019 03:59 PM    Anion gap 5 05/10/2017 03:50 AM    Glucose 108 (H) 05/03/2019 03:59 PM    BUN 18 05/03/2019 03:59 PM    Creatinine 0.98 05/03/2019 03:59 PM    BUN/Creatinine ratio 18 05/03/2019 03:59 PM    GFR est AA 59 (L) 05/03/2019 03:59 PM    GFR est non-AA 51 (L) 05/03/2019 03:59 PM    Calcium 10.5 (H) 05/03/2019 03:59 PM         ASSESSMENT / PLAN:    1. Hypertension: High today; better in past. It is recalled that due to med shortage, we had to stop exforge. 2. Fatigue and malaise: Ongoing. Recent labs reviewed and okay. Meds unlikely to be contributing. ?Depression. 3. ?Weight loss--magnitude of this is 7 lb in 2 years, and she has had scopes/GI workup in the past few years. For now, encourage liberalized diet. 4. Hyperglycemia on recent labs; Glc and A1C okay. 5. Knee pain: DJD. Better with cymbalta. Tylenol. On celebrex. Follow up with orthopedics as desired. 6. L leg neuropathic pain of lower extremity: Ongoing. Likely secondary to her spinal stenosis. Sees Dr. Camille Allen. Off cymbalta. 7. GI bleed: S/p colonoscopy. As per Dr. Tavo Hamilton. None recently. Off NSAIDs and ASA. 8. Cervical spine stenosis: Not wanting surgery. 9. Hypercholesterolemia: Reasonable at last check. 10. Anemia: Hb okay. 11. Depression: Stable. No SI. We'll try cutting back the zoloft to 25, and see if she tolerates this. 12. GERD (gastroesophageal reflux disease): Stable. 13. Asthma: Controlled. 14. Head and neck pain: Stable. Arthritis. 15. Vit D deficiency: Continue supplement. Recheck. Follow-up and Dispositions    · Return in about 4 months (around 1/6/2020) for HTN.

## 2019-09-06 NOTE — PROGRESS NOTES
1. Have you been to the ER, urgent care clinic since your last visit? Hospitalized since your last visit?no    2. Have you seen or consulted any other health care providers outside of the [address] [place] [address] since your last visit? Include any pap smears or colon screening.  no

## 2019-09-06 NOTE — PATIENT INSTRUCTIONS
Office Policies    Phone calls/patient messages:            Please allow up to 24 hours for someone in the office to contact you about your call or message. Be mindful your provider may be out of the office or your message may require further review. We encourage you to use [employer] for your messages as this is a faster, more efficient way to communicate with our office                         Medication Refills:            Prescription medications require 48-72 business hours to process. We encourage you to use [employer] for your refills. For controlled medications: Please allow 72 business hours to process. Certain medications may require you to  a written prescription at our office. NO narcotic/controlled medications will be prescribed after 4pm Monday through Friday or on weekends              Form/Paperwork Completion:            Please note a $25 fee may incur for all paperwork for completed by our providers. We ask that you allow 7-10 business days. Pre-payment is due prior to picking up/faxing the completed form. You may also download your forms to [employer] to have your doctor print off.

## 2019-09-20 ENCOUNTER — HOSPITAL ENCOUNTER (OUTPATIENT)
Dept: LAB | Age: 84
Discharge: HOME | End: 2019-09-20
Payer: MEDICARE

## 2019-09-20 PROCEDURE — 36415 COLL VENOUS BLD VENIPUNCTURE: CPT

## 2019-09-20 PROCEDURE — 82306 VITAMIN D 25 HYDROXY: CPT

## 2019-09-20 PROCEDURE — 80053 COMPREHEN METABOLIC PANEL: CPT

## 2019-09-20 PROCEDURE — 85025 COMPLETE CBC W/AUTO DIFF WBC: CPT

## 2019-09-20 PROCEDURE — 80061 LIPID PANEL: CPT

## 2019-09-21 LAB
25(OH)D3+25(OH)D2 SERPL-MCNC: 35 NG/ML (ref 30–100)
ALBUMIN SERPL-MCNC: 4.1 G/DL (ref 3.2–4.6)
ALBUMIN/GLOB SERPL: 1.7 {RATIO} (ref 1.2–2.2)
ALP SERPL-CCNC: 98 IU/L (ref 39–117)
ALT SERPL-CCNC: 18 IU/L (ref 0–32)
AST SERPL-CCNC: 19 IU/L (ref 0–40)
BASOPHILS # BLD AUTO: 0 X10E3/UL (ref 0–0.2)
BASOPHILS NFR BLD AUTO: 0 %
BILIRUB SERPL-MCNC: 0.3 MG/DL (ref 0–1.2)
BUN SERPL-MCNC: 15 MG/DL (ref 10–36)
BUN/CREAT SERPL: 16 (ref 12–28)
CALCIUM SERPL-MCNC: 10.6 MG/DL (ref 8.7–10.3)
CHLORIDE SERPL-SCNC: 105 MMOL/L (ref 96–106)
CHOLEST SERPL-MCNC: 173 MG/DL (ref 100–199)
CO2 SERPL-SCNC: 26 MMOL/L (ref 20–29)
CREAT SERPL-MCNC: 0.93 MG/DL (ref 0.57–1)
EOSINOPHIL # BLD AUTO: 0.1 X10E3/UL (ref 0–0.4)
EOSINOPHIL NFR BLD AUTO: 2 %
ERYTHROCYTE [DISTWIDTH] IN BLOOD BY AUTOMATED COUNT: 12 % (ref 12.3–15.4)
GLOBULIN SER CALC-MCNC: 2.4 G/DL (ref 1.5–4.5)
GLUCOSE SERPL-MCNC: 90 MG/DL (ref 65–99)
HCT VFR BLD AUTO: 37.9 % (ref 34–46.6)
HDLC SERPL-MCNC: 64 MG/DL
HGB BLD-MCNC: 13 G/DL (ref 11.1–15.9)
IMM GRANULOCYTES # BLD AUTO: 0 X10E3/UL (ref 0–0.1)
IMM GRANULOCYTES NFR BLD AUTO: 0 %
LDLC SERPL CALC-MCNC: 89 MG/DL (ref 0–99)
LYMPHOCYTES # BLD AUTO: 1.9 X10E3/UL (ref 0.7–3.1)
LYMPHOCYTES NFR BLD AUTO: 28 %
MCH RBC QN AUTO: 30 PG (ref 26.6–33)
MCHC RBC AUTO-ENTMCNC: 34.3 G/DL (ref 31.5–35.7)
MCV RBC AUTO: 88 FL (ref 79–97)
MONOCYTES # BLD AUTO: 0.7 X10E3/UL (ref 0.1–0.9)
MONOCYTES NFR BLD AUTO: 10 %
NEUTROPHILS # BLD AUTO: 4.1 X10E3/UL (ref 1.4–7)
NEUTROPHILS NFR BLD AUTO: 60 %
PLATELET # BLD AUTO: 290 X10E3/UL (ref 150–450)
POTASSIUM SERPL-SCNC: 4.7 MMOL/L (ref 3.5–5.2)
PROT SERPL-MCNC: 6.5 G/DL (ref 6–8.5)
RBC # BLD AUTO: 4.33 X10E6/UL (ref 3.77–5.28)
SODIUM SERPL-SCNC: 144 MMOL/L (ref 134–144)
TRIGL SERPL-MCNC: 98 MG/DL (ref 0–149)
VLDLC SERPL CALC-MCNC: 20 MG/DL (ref 5–40)
WBC # BLD AUTO: 6.9 X10E3/UL (ref 3.4–10.8)

## 2019-09-23 ENCOUNTER — TELEPHONE (OUTPATIENT)
Dept: INTERNAL MEDICINE CLINIC | Age: 84
End: 2019-09-23

## 2019-11-04 RX ORDER — ATORVASTATIN CALCIUM 20 MG/1
TABLET, FILM COATED ORAL
Qty: 90 TAB | Refills: 0 | Status: SHIPPED | OUTPATIENT
Start: 2019-11-04 | End: 2020-02-24

## 2020-01-13 ENCOUNTER — OFFICE VISIT (OUTPATIENT)
Dept: INTERNAL MEDICINE CLINIC | Age: 85
End: 2020-01-13

## 2020-01-13 VITALS
RESPIRATION RATE: 16 BRPM | TEMPERATURE: 99.2 F | OXYGEN SATURATION: 98 % | DIASTOLIC BLOOD PRESSURE: 65 MMHG | HEART RATE: 79 BPM | SYSTOLIC BLOOD PRESSURE: 139 MMHG | WEIGHT: 111.4 LBS | BODY MASS INDEX: 21.03 KG/M2 | HEIGHT: 61 IN

## 2020-01-13 DIAGNOSIS — D64.9 ANEMIA, UNSPECIFIED TYPE: ICD-10-CM

## 2020-01-13 DIAGNOSIS — I10 ESSENTIAL HYPERTENSION: ICD-10-CM

## 2020-01-13 DIAGNOSIS — Z00.00 MEDICARE ANNUAL WELLNESS VISIT, SUBSEQUENT: Primary | ICD-10-CM

## 2020-01-13 DIAGNOSIS — E78.00 HYPERCHOLESTEROLEMIA: ICD-10-CM

## 2020-01-13 DIAGNOSIS — R42 VERTIGO: ICD-10-CM

## 2020-01-13 DIAGNOSIS — E55.9 VITAMIN D DEFICIENCY: ICD-10-CM

## 2020-01-13 DIAGNOSIS — R63.4 UNINTENDED WEIGHT LOSS: ICD-10-CM

## 2020-01-13 NOTE — PROGRESS NOTES
This is the Subsequent Medicare Annual Wellness Exam, performed 12 months or more after the Initial AWV or the last Subsequent AWV    I have reviewed the patient's medical history in detail and updated the computerized patient record. History     Patient Active Problem List   Diagnosis Code    Hypertension I10    Hypercholesterolemia E78.00    Depression F32.9    GERD (gastroesophageal reflux disease) K21.9    Asthma J45.909    Neuropathy, upper extremity G56.90    Vitamin D deficiency E55.9    Vertigo R42    Dizziness R42    Lumbar stenosis M48.061    Anemia D64.9    Hip fracture ([address] S72.009A    UTI (urinary tract infection) N39.0     Past Medical History:   Diagnosis Date    Anemia NEC     Asthma     Cervical spondylarthritis     Depression     DJD (degenerative joint disease) of knee     Esophageal spasm     Has required esophageal dilatation; Dr. Ebony Vital GERD (gastroesophageal reflux disease)     Hypercholesterolemia     Hypertension     Lumbar stenosis     Neuropathy, upper extremity     left    Vertigo [id number]    Saw Dr. Master Patel; improved.  Vitamin D deficiency 1/28/2011      Past Surgical History:   Procedure Laterality Date    HX CATARACT REMOVAL      bilateral    HX HYSTERECTOMY      HX ORTHOPAEDIC      right bunion excision    HX OTHER SURGICAL      broken R hip    HX TONSILLECTOMY       Current Outpatient Medications   Medication Sig Dispense Refill    atorvastatin (LIPITOR) 20 mg tablet TAKE 1 TABLET BY MOUTH ONCE DAILY 90 Tab 0    sertraline (ZOLOFT) 25 mg tablet Take 1 Tab by mouth daily. 90 Tab 3    diclofenac (VOLTAREN) 1 % gel Apply  to affected area four (4) times daily.  amLODIPine-benazepril (LOTREL) 5-20 mg per capsule Take 1 Cap by mouth daily. 90 Cap 3    albuterol (PROVENTIL HFA, VENTOLIN HFA, PROAIR HFA) 90 mcg/actuation inhaler Take 1 Puff by inhalation as needed for Wheezing.  1 Inhaler 5    ergocalciferol (ERGOCALCIFEROL) 50,000 unit capsule Take 1 Cap by mouth every seven (7) days. (Patient taking differently: Take 50,000 Units by mouth every Sunday.) 1 Cap 0    MULTIVITAMINS (MULTIVITAMIN PO) Take  by mouth daily. Allergies   Allergen Reactions    Diclofenac Itching     The voltaren gel caused itching.  Gabapentin Swelling    Hydrochlorothiazide Other (comments)     dizziness    Pcn [Penicillins] Swelling       Family History   Problem Relation Age of Onset    Heart Disease Mother     Hypertension Mother     Stroke Mother     Heart Disease Brother     Heart Disease Father     Heart Disease Brother      Social History     Tobacco Use    Smoking status: Never Smoker    Smokeless tobacco: Never Used   Substance Use Topics    Alcohol use: No       Depression Risk Factor Screening:     3 most recent PHQ Screens 6/11/2018   Little interest or pleasure in doing things More than half the days   Feeling down, depressed, irritable, or hopeless More than half the days   Total Score PHQ 2 4       Alcohol Risk Factor Screening:   Do you average 1 drink per night or more than 7 drinks a week:  No    On any one occasion in the past three months have you have had more than 3 drinks containing alcohol:  No      Functional Ability and Level of Safety:   Hearing: Somewhat diminished. She lives in 1 story home, with son. Daughters are near by. Activities of Daily Living: The home contains: no safety equipment. Patient needs help with:  transportation    Ambulation: with some difficulty, uses cane at times    Fall Risk:  Fall Risk Assessment, last 12 mths 9/6/2019   Able to walk? Yes   Fall in past 12 months? Yes   Fall with injury?  No   Number of falls in past 12 months 8 or more   Fall Risk Score 8       Abuse Screen:  Patient is not abused    Cognitive Screening   Has your family/caregiver stated any concerns about your memory: \"sometimes\"  Cognitive Screening: normal    Patient Care Team   Patient Care Team:  Erin Sadler MD as PCP - General  Joshua Camargo MD as PCP - [facility] Empaneled Provider  Jene Schaumann, MD (Gastroenterology)  Lea Garcia MD (Ophthalmology)  Deana Bee MD (Orthopedic Surgery)    Assessment/Plan   Education and counseling provided:  Are appropriate based on today's review and evaluation    Diagnoses and all orders for this visit:    1. Vertigo  -     REFERRAL TO ENT-OTOLARYNGOLOGY  -     METABOLIC PANEL, COMPREHENSIVE  -     CBC WITH AUTOMATED DIFF    2. Essential hypertension  -     METABOLIC PANEL, COMPREHENSIVE  -     LIPID PANEL  -     CBC WITH AUTOMATED DIFF    3. Hypercholesterolemia  -     METABOLIC PANEL, COMPREHENSIVE  -     LIPID PANEL  -     CBC WITH AUTOMATED DIFF    4. Vitamin D deficiency  -     METABOLIC PANEL, COMPREHENSIVE  -     CBC WITH AUTOMATED DIFF  -     VITAMIN D, 25 HYDROXY    5. Unintended weight loss  -     REFERRAL TO GASTROENTEROLOGY  -     METABOLIC PANEL, COMPREHENSIVE  -     CBC WITH AUTOMATED DIFF  -     TSH 3RD GENERATION    6.  Anemia, unspecified type  -     METABOLIC PANEL, COMPREHENSIVE  -     CBC WITH AUTOMATED DIFF  -     IRON PROFILE        Health Maintenance Due   Topic Date Due    DTaP/Tdap/Td series (1 - Tdap) 09/12/1939    Shingrix Vaccine Age 50> (1 of 2) 09/12/1978    GLAUCOMA SCREENING Q2Y  09/12/1993    MEDICARE YEARLY EXAM  01/24/2020

## 2020-01-13 NOTE — PATIENT INSTRUCTIONS
Office Policies    Phone calls/patient messages:            Please allow up to 24 hours for someone in the office to contact you about your call or message. Be mindful your provider may be out of the office or your message may require further review. We encourage you to use [employer] for your messages as this is a faster, more efficient way to communicate with our office                         Medication Refills:            Prescription medications require 48-72 business hours to process. We encourage you to use [employer] for your refills. For controlled medications: Please allow 72 business hours to process. Certain medications may require you to  a written prescription at our office. NO narcotic/controlled medications will be prescribed after 4pm Monday through Friday or on weekends              Form/Paperwork Completion:            Please note a $25 fee may incur for all paperwork for completed by our providers. We ask that you allow 7-10 business days. Pre-payment is due prior to picking up/faxing the completed form. You may also download your forms to [employer] to have your doctor print off.      1. Have you been to the ER, urgent care clinic since your last visit? Hospitalized since your last visit?no    2. Have you seen or consulted any other health care providers outside of the [address] [place] [address] since your last visit? Include any pap smears or colon screening. no  Medicare Wellness Visit, Female     The best way to live healthy is to have a lifestyle where you eat a well-balanced diet, exercise regularly, limit alcohol use, and quit all forms of tobacco/nicotine, if applicable. Regular preventive services are another way to keep healthy. Preventive services (vaccines, screening tests, monitoring & exams) can help personalize your care plan, which helps you manage your own care. Screening tests can find health problems at the earliest stages, when they are easiest to treat. [facility] follows the current, evidence-based guidelines published by the [facility] ([facility]) when recommending preventive services for our patients. Because we follow these guidelines, sometimes recommendations change over time as research supports it. (For example, mammograms used to be recommended annually. Even though Medicare will still pay for an annual mammogram, the newer guidelines recommend a mammogram every two years for women of average risk). Of course, you and your doctor may decide to screen more often for some diseases, based on your risk and your co-morbidities (chronic disease you are already diagnosed with). Preventive services for you include:  - Medicare offers their members a free annual wellness visit, which is time for you and your primary care provider to discuss and plan for your preventive service needs. Take advantage of this benefit every year!  -All adults over the age of 72 should receive the recommended pneumonia vaccines. Current USPSTF guidelines recommend a series of two vaccines for the best pneumonia protection.   -All adults should have a flu vaccine yearly and a tetanus vaccine every 10 years.   -All adults age 48 and older should receive the shingles vaccines (series of two vaccines).       -All adults age 38-68 who are overweight should have a diabetes screening test once every three years.   -All adults born between [birth year] and 1965 should be screened once for Hepatitis C.  -Other screening tests and preventive services for persons with diabetes include: an eye exam to screen for diabetic retinopathy, a kidney function test, a foot exam, and stricter control over your cholesterol.   -Cardiovascular screening for adults with routine risk involves an electrocardiogram (ECG) at intervals determined by your doctor.   -Colorectal cancer screenings should be done for adults age 54-65 with no increased risk factors for colorectal cancer. There are a number of acceptable methods of screening for this type of cancer. Each test has its own benefits and drawbacks. Discuss with your doctor what is most appropriate for you during your annual wellness visit. The different tests include: colonoscopy (considered the best screening method), a fecal occult blood test, a fecal DNA test, and sigmoidoscopy.    -A bone mass density test is recommended when a woman turns 65 to screen for osteoporosis. This test is only recommended one time, as a screening. Some providers will use this same test as a disease monitoring tool if you already have osteoporosis. -Breast cancer screenings are recommended every other year for women of normal risk, age 54-69.  -Cervical cancer screenings for women over age 72 are only recommended with certain risk factors.      Here is a list of your current Health Maintenance items (your personalized list of preventive services) with a due date:  Health Maintenance Due   Topic Date Due    DTaP/Tdap/Td  (1 - Tdap) 09/12/1939    Shingles Vaccine (1 of 2) 09/12/1978    Glaucoma Screening   09/12/1993    Annual Well Visit  01/24/2020

## 2020-01-16 LAB
25(OH)D3+25(OH)D2 SERPL-MCNC: 26.5 NG/ML (ref 30–100)
ALBUMIN SERPL-MCNC: 4.1 G/DL (ref 3.2–4.6)
ALBUMIN/GLOB SERPL: 1.8 {RATIO} (ref 1.2–2.2)
ALP SERPL-CCNC: 87 IU/L (ref 39–117)
ALT SERPL-CCNC: 21 IU/L (ref 0–32)
AST SERPL-CCNC: 19 IU/L (ref 0–40)
BASOPHILS # BLD AUTO: 0 X10E3/UL (ref 0–0.2)
BASOPHILS NFR BLD AUTO: 1 %
BILIRUB SERPL-MCNC: 0.4 MG/DL (ref 0–1.2)
BUN SERPL-MCNC: 16 MG/DL (ref 10–36)
BUN/CREAT SERPL: 20 (ref 12–28)
CALCIUM SERPL-MCNC: 10.3 MG/DL (ref 8.7–10.3)
CHLORIDE SERPL-SCNC: 104 MMOL/L (ref 96–106)
CHOLEST SERPL-MCNC: 174 MG/DL (ref 100–199)
CO2 SERPL-SCNC: 25 MMOL/L (ref 20–29)
CREAT SERPL-MCNC: 0.82 MG/DL (ref 0.57–1)
EOSINOPHIL # BLD AUTO: 0.1 X10E3/UL (ref 0–0.4)
EOSINOPHIL NFR BLD AUTO: 2 %
ERYTHROCYTE [DISTWIDTH] IN BLOOD BY AUTOMATED COUNT: 12.2 % (ref 11.7–15.4)
GLOBULIN SER CALC-MCNC: 2.3 G/DL (ref 1.5–4.5)
GLUCOSE SERPL-MCNC: 87 MG/DL (ref 65–99)
HCT VFR BLD AUTO: 39.9 % (ref 34–46.6)
HDLC SERPL-MCNC: 67 MG/DL
HGB BLD-MCNC: 13.3 G/DL (ref 11.1–15.9)
IMM GRANULOCYTES # BLD AUTO: 0 X10E3/UL (ref 0–0.1)
IMM GRANULOCYTES NFR BLD AUTO: 0 %
IRON SATN MFR SERPL: 19 % (ref 15–55)
IRON SERPL-MCNC: 53 UG/DL (ref 27–139)
LDLC SERPL CALC-MCNC: 88 MG/DL (ref 0–99)
LYMPHOCYTES # BLD AUTO: 1.6 X10E3/UL (ref 0.7–3.1)
LYMPHOCYTES NFR BLD AUTO: 23 %
MCH RBC QN AUTO: 30.2 PG (ref 26.6–33)
MCHC RBC AUTO-ENTMCNC: 33.3 G/DL (ref 31.5–35.7)
MCV RBC AUTO: 91 FL (ref 79–97)
MONOCYTES # BLD AUTO: 0.6 X10E3/UL (ref 0.1–0.9)
MONOCYTES NFR BLD AUTO: 9 %
NEUTROPHILS # BLD AUTO: 4.5 X10E3/UL (ref 1.4–7)
NEUTROPHILS NFR BLD AUTO: 65 %
PLATELET # BLD AUTO: 266 X10E3/UL (ref 150–450)
POTASSIUM SERPL-SCNC: 4.7 MMOL/L (ref 3.5–5.2)
PROT SERPL-MCNC: 6.4 G/DL (ref 6–8.5)
RBC # BLD AUTO: 4.4 X10E6/UL (ref 3.77–5.28)
SODIUM SERPL-SCNC: 141 MMOL/L (ref 134–144)
TIBC SERPL-MCNC: 276 UG/DL (ref 250–450)
TRIGL SERPL-MCNC: 96 MG/DL (ref 0–149)
TSH SERPL DL<=0.005 MIU/L-ACNC: 1.4 UIU/ML (ref 0.45–4.5)
UIBC SERPL-MCNC: 223 UG/DL (ref 118–369)
VLDLC SERPL CALC-MCNC: 19 MG/DL (ref 5–40)
WBC # BLD AUTO: 7 X10E3/UL (ref 3.4–10.8)

## 2020-01-20 ENCOUNTER — TELEPHONE (OUTPATIENT)
Dept: INTERNAL MEDICINE CLINIC | Age: 85
End: 2020-01-20

## 2020-02-24 RX ORDER — AMLODIPINE AND BENAZEPRIL HYDROCHLORIDE 5; 20 MG/1; MG/1
CAPSULE ORAL
Qty: 90 CAP | Refills: 3 | Status: SHIPPED | OUTPATIENT
Start: 2020-02-24 | End: 2021-05-11 | Stop reason: DRUGHIGH

## 2020-02-24 RX ORDER — ATORVASTATIN CALCIUM 20 MG/1
TABLET, FILM COATED ORAL
Qty: 90 TAB | Refills: 0 | Status: SHIPPED | OUTPATIENT
Start: 2020-02-24 | End: 2020-06-05

## 2020-03-19 ENCOUNTER — HOSPITAL ENCOUNTER (OUTPATIENT)
Dept: MRI IMAGING | Age: 85
Discharge: HOME | End: 2020-03-19
Attending: INTERNAL MEDICINE
Payer: MEDICARE

## 2020-03-19 DIAGNOSIS — F32.A DEPRESSIVE DISORDER: ICD-10-CM

## 2020-03-19 DIAGNOSIS — R63.4 WEIGHT LOSS: ICD-10-CM

## 2020-03-19 DIAGNOSIS — I10 HYPERTENSION: ICD-10-CM

## 2020-03-19 DIAGNOSIS — K86.2 CYST OF PANCREAS: ICD-10-CM

## 2020-03-19 PROCEDURE — 74011250636 HC RX REV CODE- 250/636: Performed by: INTERNAL MEDICINE

## 2020-03-19 PROCEDURE — 74183 MRI ABD W/O CNTR FLWD CNTR: CPT

## 2020-03-19 PROCEDURE — A9585 GADOBUTROL INJECTION: HCPCS | Performed by: INTERNAL MEDICINE

## 2020-03-19 RX ADMIN — GADOBUTROL 10 ML: 604.72 INJECTION INTRAVENOUS at 11:58

## 2020-05-13 ENCOUNTER — TELEPHONE (OUTPATIENT)
Dept: INTERNAL MEDICINE CLINIC | Age: 85
End: 2020-05-13

## 2020-05-13 ENCOUNTER — VIRTUAL VISIT (OUTPATIENT)
Dept: INTERNAL MEDICINE CLINIC | Age: 85
End: 2020-05-13

## 2020-05-13 DIAGNOSIS — M25.561 ACUTE PAIN OF RIGHT KNEE: ICD-10-CM
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
## 2020-05-13 RX ORDER — SERTRALINE HYDROCHLORIDE 50 MG/1
50 TABLET, FILM COATED ORAL DAILY
Qty: 90 TAB | Refills: 3 | Status: SHIPPED | OUTPATIENT
Start: 2020-05-13 | End: 2020-10-26

## 2020-05-13 NOTE — PATIENT INSTRUCTIONS
Office Policies Phone calls/patient messages: Please allow up to 24 hours for someone in the office to contact you about your call or message. Be mindful your provider may be out of the office or your message may require further review. We encourage you to use [employer] for your messages as this is a faster, more efficient way to communicate with our office Medication Refills: 
         
Prescription medications require 48-72 business hours to process. We encourage you to use [employer] for your refills. For controlled medications: Please allow 72 business hours to process. Certain medications may require you to  a written prescription at our office. NO narcotic/controlled medications will be prescribed after 4pm Monday through Friday or on weekends Form/Paperwork Completion: 
         
Please note a $25 fee may incur for all paperwork for completed by our providers. We ask that you allow 7-10 business days. Pre-payment is due prior to picking up/faxing the completed form. You may also download your forms to [email or web address] to have your doctor print off. 
 
 
1. Have you been to the ER, urgent care clinic since your last visit? Hospitalized since your last visit?no 2. Have you seen or consulted any other health care providers outside of the [address] [place] [address] since your last visit? Include any pap smears or colon screening.  no

## 2020-05-13 NOTE — PROGRESS NOTES
Natalia Willard is a 80 y.o. female evaluated via audio only technology on 2020. Consent: She and/or her health care decision maker is aware that she may receive a bill for this audio only encounter, depending on her insurance coverage, and has provided verbal consent to proceed: Yes    I communicated with the patient and/or health care decision maker about the nature and details of the followin  Subjective:   Natalia Willard is a 80 y.o. female who was seen for Hypertension (routine f.u); Fall (pt had a fall 2 days ago; pt c.o pain in R knee; today pt rates pain 8/10 on pain scale; pt has been taking tylenol ); and Depression (see depression screening )    SUBJECTIVE  Ms. Natalia Willard presents today for complaint of fatigue; also she is about due for routine follow up. Chief Complaint   Patient presents with    Hypertension     routine f.u    Fall     pt had a fall 2 days ago; pt c.o pain in R knee; today pt rates pain 8/10 on pain scale; pt has been taking tylenol     Depression     see depression screening      She didn't trip. \"I was going to the front door to open it, and I turned around and just fell. \"   She also notes that she is \"not hungry\", worried she is losing weight:   Wt Readings from Last 5 Encounters:   20 111 lb 6.4 oz (50.5 kg)   19 117 lb 9.6 oz (53.3 kg)   19 119 lb 9.6 oz (54.3 kg)   19 123 lb 12.8 oz (56.2 kg)   09/10/18 124 lb 12.8 oz (56.6 kg)     \"I get dizzy right after I take the blood pressure medicine. \" Vertigo. He has seen ENT about this. Future appt coming up. Dr. Frank Giles. Left knee pain is ongoing but better; Dr. Galileo Dos Santos following. \"I've been getting the needle. \" Last shot was about a month ago. Off cymbalta and celebrex. She was unable to tolerate gabapentin . The tramadol we gave her didn't seem to help the pain and caused insomnia. We noted in early :  L knee swells.   In the past has received corticosteroid injections from  Josep Waddell. She sees Dr. Cresencio Medrano, and he has done injections. \"He says you can do the needle\"--no plans for further follow up with him. \"I went to that doctor at [facility] (Dr. Carleen Oviedo) and he didn't find anything wrong with my stomach. \"  History of GI bleed and anemia: No recent melena (other than once about 3 months ago) or hematochezia. She has had problems with diclofenac (itching) and celebrex--As we noted in January 2015: \"I had to stop the pills [celebrex] because of bleeding, and had a scope. \"  Dr. Carleen Oviedo did colonoscopy finding a cecal polyp and diverticuli. Still has the neuropathic pain in leg, as noted in prior notes. This is doing better on cymbalta. She has seen Dr. Jessica Howard for spinal stenosis / sciatica. Asthma stable, uses inhaler \"now and then. \"    She still has arthritis pain: R neck pain is stable. Saw Dr. Luis Olvera, who diagnosed cervical spine arthritis. Depression is doing worse. No suicidal ideation. For all issues addressed today, see A/P below. PMH: She has a past medical history of Anemia NEC, Asthma, Cervical spondylarthritis, Depression, DJD (degenerative joint disease) of knee, Esophageal spasm, GERD (gastroesophageal reflux disease), Hypercholesterolemia, Hypertension, Lumbar stenosis, Neuropathy, upper extremity, Vertigo ([id number]), and Vitamin D deficiency (1/28/2011). Had pneumovax once. PSH:  has a past surgical history that includes hx hysterectomy; hx cataract removal; hx tonsillectomy; hx orthopaedic; and hx other surgical.  Colonoscopy 2014 showed cecal polyp, diverticuli, and internal hemorrhoids, Dr. Carleen Oviedo. All and MED reviewed. I have taken aspirin off the list; caused N/V once, but she is on it now and tolerating.    Current Outpatient Medications on File Prior to Visit   Medication Sig Dispense Refill    atorvastatin (LIPITOR) 20 mg tablet TAKE 1 TABLET BY MOUTH ONCE DAILY 90 Tab 0    amLODIPine-benazepril (LOTREL) 5-20 mg per capsule TAKE 1 CAPSULE BY MOUTH ONCE DAILY 90 Cap 3    sertraline (ZOLOFT) 25 mg tablet Take 1 Tab by mouth daily. 90 Tab 3    diclofenac (VOLTAREN) 1 % gel Apply  to affected area four (4) times daily.  albuterol (PROVENTIL HFA, VENTOLIN HFA, PROAIR HFA) 90 mcg/actuation inhaler Take 1 Puff by inhalation as needed for Wheezing. 1 Inhaler 5    ergocalciferol (ERGOCALCIFEROL) 50,000 unit capsule Take 1 Cap by mouth every seven (7) days. (Patient taking differently: Take 50,000 Units by mouth every Sunday.) 1 Cap 0    MULTIVITAMINS (MULTIVITAMIN PO) Take  by mouth daily. No current facility-administered medications on file prior to visit. FH: Her family history includes Heart Disease in her brother, brother, father, and mother; Hypertension in her mother; Stroke in her mother. SH: She is . Her son lives with her. She reports that she has never used tobacco.  She reports that she drinks alcohol. ROS: Complete review of systems was performed and is otherwise unremarkable except as noted elsewhere. OBJECTIVE  Vitals: There were no vitals taken for this visit.  No exam.     Lab Results   Component Value Date/Time    Cholesterol, total 174 01/15/2020 08:49 AM    HDL Cholesterol 67 01/15/2020 08:49 AM    LDL, calculated 88 01/15/2020 08:49 AM    VLDL, calculated 19 01/15/2020 08:49 AM    Triglyceride 96 01/15/2020 08:49 AM    CHOL/HDL Ratio 1.6 04/29/2017 05:38 AM      Lab Results   Component Value Date/Time    WBC 7.0 01/15/2020 08:49 AM    WBC 6.7 05/21/2012 08:23 AM    HGB 13.3 01/15/2020 08:49 AM    HCT 39.9 01/15/2020 08:49 AM    PLATELET 535 79/17/2150 08:49 AM    MCV 91 01/15/2020 08:49 AM     Lab Results   Component Value Date/Time    Vitamin D 25-Hydroxy 13.8 (L) 04/29/2017 05:37 AM    VITAMIN D, 25-HYDROXY 26.5 (L) 01/15/2020 08:49 AM       Lab Results   Component Value Date/Time    Sodium 141 01/15/2020 08:49 AM    Potassium 4.7 01/15/2020 08:49 AM    Chloride 104 01/15/2020 08:49 AM    CO2 25 01/15/2020 08:49 AM    Anion gap 5 05/10/2017 03:50 AM    Glucose 87 01/15/2020 08:49 AM    BUN 16 01/15/2020 08:49 AM    Creatinine 0.82 01/15/2020 08:49 AM    BUN/Creatinine ratio 20 01/15/2020 08:49 AM    GFR est AA 72 01/15/2020 08:49 AM    GFR est non-AA 63 01/15/2020 08:49 AM    Calcium 10.3 01/15/2020 08:49 AM         ASSESSMENT / PLAN:    1. Hypertension: Not checked today today; better in past. It is recalled that due to med shortage, we had to stop exforge. If the medication is causing dizziness, she can try taking it at bedtime. 2. Fatigue and malaise: Ongoing. Recent labs reviewed and okay. Meds unlikely to be contributing. ?Depression. 3. Knee pain s/p fall: X-ray. Refer back to Dr. Candi Ferrari. 4. (?Weight loss--Weight not assessed today. Previously: Suggested Boost. It is noted that she has had scopes/GI workup in the past few years--however, we'll refer back to Dr. Sherlyn Curtis. For now, encourage liberalized diet. Can add Boost or similar supplement). 5. Hyperglycemia on recent labs; Glc and A1C okay. 6. Knee pain: DJD. Better with cymbalta. Tylenol. On celebrex. Follow up with orthopedics as desired. 7. L leg neuropathic pain of lower extremity: Ongoing. Likely secondary to her spinal stenosis. Sees Dr. Genna Mike. Off cymbalta. 8. GI bleed: S/p colonoscopy. As per Dr. Sherlyn Curtis. None recently. Off NSAIDs and ASA. 9. Cervical spine stenosis: Not wanting surgery. 10. Hypercholesterolemia: Reasonable at last check. 11. Anemia: Hb okay. 12. Depression: Doing worse. No SI. We'll try rasing the dose back up to 50 mg. 13. GERD (gastroesophageal reflux disease): Stable. 14. Asthma: Controlled. 15. Head and neck pain: Stable. Arthritis. 16. Vit D deficiency: Continue supplement. Recheck. Follow-up and Dispositions    · Return in about 4 months (around 9/13/2020) for HTN.                     I affirm this is a Patient-Initiated Episode with a Patient who has not had a related appointment within my department in the past 7 days or scheduled within the next 24 hours.     Total Time: minutes: 5-10 minutes    Note: not billable if this call serves to triage the patient into an appointment for the relevant concern      Carlos Dolan MD

## 2020-06-05 RX ORDER — ATORVASTATIN CALCIUM 20 MG/1
TABLET, FILM COATED ORAL
Qty: 90 TAB | Refills: 0 | Status: SHIPPED | OUTPATIENT
Start: 2020-06-05 | End: 2020-09-09

## 2020-07-15 ENCOUNTER — TELEPHONE (OUTPATIENT)
Dept: INTERNAL MEDICINE CLINIC | Age: 85
End: 2020-07-15

## 2020-07-15 NOTE — TELEPHONE ENCOUNTER
Identified patient 2 identifiers verified. Patient had a recent fall, reports dizziness. Patient accepted an appointment  On July 21, 20.

## 2020-07-15 NOTE — TELEPHONE ENCOUNTER
Caller's first and last name: n/a   Reason for call: Pt. would like further instructions regarding exercise therapy. She is inquiring if she needs to see her PCP before resuming her therapy.    Callback required yes/no and why: yes   Best contact number(s): [phone]   Details to clarify the request: n/a

## 2020-07-21 ENCOUNTER — TELEPHONE (OUTPATIENT)
Dept: INTERNAL MEDICINE CLINIC | Age: 85
End: 2020-07-21

## 2020-07-21 ENCOUNTER — VIRTUAL VISIT (OUTPATIENT)
Dept: INTERNAL MEDICINE CLINIC | Age: 85
End: 2020-07-21

## 2020-07-21 DIAGNOSIS — I95.1 ORTHOSTASIS: Primary | ICD-10-CM
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
## 2020-07-21 NOTE — PATIENT INSTRUCTIONS
Office Policies    Phone calls/patient messages:            Please allow up to 24 hours for someone in the office to contact you about your call or message. Be mindful your provider may be out of the office or your message may require further review. We encourage you to use [employer] for your messages as this is a faster, more efficient way to communicate with our office                         Medication Refills:            Prescription medications require 48-72 business hours to process. We encourage you to use [employer] for your refills. For controlled medications: Please allow 72 business hours to process. Certain medications may require you to  a written prescription at our office. NO narcotic/controlled medications will be prescribed after 4pm Monday through Friday or on weekends              Form/Paperwork Completion:            Please note a $25 fee may incur for all paperwork for completed by our providers. We ask that you allow 7-10 business days. Pre-payment is due prior to picking up/faxing the completed form. You may also download your forms to [employer] to have your doctor print off.      1. Have you been to the ER, urgent care clinic since your last visit? Hospitalized since your last visit?no    2. Have you seen or consulted any other health care providers outside of the [address] [place] [address] since your last visit? Include any pap smears or colon screening.  no

## 2020-07-21 NOTE — PROGRESS NOTES
Randell Purcell is a 80 y.o. female, evaluated via audio-only technology on 7/21/2020 for Fall (pt states that she has fallen twice ) and Dizziness  . Assessment & Plan:     Diagnoses and all orders for this visit:    1. Orthostasis / Dizziness: Would contact Dr. Gilberto Lubin to pursue the \"inner ear testing\". -     REFERRAL TO CARDIOLOGY    2. Fall, initial encounter: Continue with therapy. Follow-up and Dispositions    · Return in about 3 months (around 10/21/2020) for Vertigo, In office visit preferable. 12  Subjective:     Chief Complaint   Patient presents with    Fall     pt states that she has fallen twice     Dizziness     She has had a couple of falls. \"I don't remember how, I remember my son getting me up. \"  The falls have happened when getting up from bed or sitting. \"When I get up from sitting I feel dizzy. \"  Sometimes room spinning, other times lightheaded. She saw Dr. Gilberto Lubin, who \"said it's not from my inner ears that I just need to drink more water\", but his note indicates a desire to do further testing for inner ear problems; COVID interrupted this. Prior to Admission medications    Medication Sig Start Date End Date Taking? Authorizing Provider   atorvastatin (LIPITOR) 20 mg tablet TAKE 1 TABLET BY MOUTH EVERY DAY 6/5/20  Yes Ev Lincoln MD   sertraline (ZOLOFT) 50 mg tablet Take 1 Tab by mouth daily. 5/13/20  Yes Ev Lincoln MD   amLODIPine-benazepril (LOTREL) 5-20 mg per capsule TAKE 1 CAPSULE BY MOUTH ONCE DAILY 2/24/20  Yes Ev Lincoln MD   diclofenac (VOLTAREN) 1 % gel Apply  to affected area four (4) times daily. Yes Provider, Historical   albuterol (PROVENTIL HFA, VENTOLIN HFA, PROAIR HFA) 90 mcg/actuation inhaler Take 1 Puff by inhalation as needed for Wheezing. 9/10/18  Yes vE Lincoln MD   ergocalciferol (ERGOCALCIFEROL) 50,000 unit capsule Take 1 Cap by mouth every seven (7) days.   Patient taking differently: Take 50,000 Units by mouth every Sunday. 5/1/17  Yes Trent Garcia MD   MULTIVITAMINS (MULTIVITAMIN PO) Take  by mouth daily. Yes Provider, Historical       No flowsheet data found. Shantal Atwood, who was evaluated through a patient-initiated, synchronous (real-time) audio only encounter, and/or her healthcare decision maker, is aware that it is a billable service, with coverage as determined by her insurance carrier. She provided verbal consent to proceed: Yes. She has not had a related appointment within my department in the past 7 days or scheduled within the next 24 hours.       Total Time: minutes: 5-10 minutes    Sandhya Aguayo MD

## 2020-09-09 RX ORDER — ATORVASTATIN CALCIUM 20 MG/1
TABLET, FILM COATED ORAL
Qty: 90 TAB | Refills: 0 | Status: SHIPPED | OUTPATIENT
Start: 2020-09-09 | End: 2021-05-12 | Stop reason: SDUPTHER

## 2020-09-18 ENCOUNTER — OFFICE VISIT (OUTPATIENT)
Dept: CARDIOLOGY CLINIC | Age: 85
End: 2020-09-18
Payer: MEDICARE

## 2020-09-18 ENCOUNTER — CLINICAL SUPPORT (OUTPATIENT)
Dept: CARDIOLOGY CLINIC | Age: 85
End: 2020-09-18
Payer: MEDICARE

## 2020-09-18 VITALS
OXYGEN SATURATION: 96 % | DIASTOLIC BLOOD PRESSURE: 70 MMHG | BODY MASS INDEX: 20.75 KG/M2 | RESPIRATION RATE: 16 BRPM | WEIGHT: 105.7 LBS | SYSTOLIC BLOOD PRESSURE: 120 MMHG | HEART RATE: 78 BPM | HEIGHT: 60 IN

## 2020-09-18 DIAGNOSIS — I10 ESSENTIAL HYPERTENSION: ICD-10-CM

## 2020-09-18 DIAGNOSIS — R42 DIZZINESS: Primary | ICD-10-CM

## 2020-09-18 DIAGNOSIS — R42 DIZZINESS: ICD-10-CM

## 2020-09-18 DIAGNOSIS — E78.00 HYPERCHOLESTEROLEMIA: ICD-10-CM

## 2020-09-18 PROCEDURE — 1100F PTFALLS ASSESS-DOCD GE2>/YR: CPT | Performed by: INTERNAL MEDICINE

## 2020-09-18 PROCEDURE — 3288F FALL RISK ASSESSMENT DOCD: CPT | Performed by: INTERNAL MEDICINE

## 2020-09-18 PROCEDURE — G0463 HOSPITAL OUTPT CLINIC VISIT: HCPCS | Performed by: INTERNAL MEDICINE

## 2020-09-18 PROCEDURE — 93010 ELECTROCARDIOGRAM REPORT: CPT | Performed by: INTERNAL MEDICINE

## 2020-09-18 PROCEDURE — G9717 DOC PT DX DEP/BP F/U NT REQ: HCPCS | Performed by: INTERNAL MEDICINE

## 2020-09-18 PROCEDURE — 93005 ELECTROCARDIOGRAM TRACING: CPT | Performed by: INTERNAL MEDICINE

## 2020-09-18 PROCEDURE — G8427 DOCREV CUR MEDS BY ELIG CLIN: HCPCS | Performed by: INTERNAL MEDICINE

## 2020-09-18 PROCEDURE — 93228 REMOTE 30 DAY ECG REV/REPORT: CPT | Performed by: INTERNAL MEDICINE

## 2020-09-18 PROCEDURE — G8420 CALC BMI NORM PARAMETERS: HCPCS | Performed by: INTERNAL MEDICINE

## 2020-09-18 PROCEDURE — G8536 NO DOC ELDER MAL SCRN: HCPCS | Performed by: INTERNAL MEDICINE

## 2020-09-18 PROCEDURE — 99204 OFFICE O/P NEW MOD 45 MIN: CPT | Performed by: INTERNAL MEDICINE

## 2020-09-18 PROCEDURE — 1090F PRES/ABSN URINE INCON ASSESS: CPT | Performed by: INTERNAL MEDICINE

## 2020-09-18 RX ORDER — CYPROHEPTADINE HYDROCHLORIDE 2 MG/5ML
SOLUTION ORAL
COMMUNITY
Start: 2020-06-20 | End: 2021-12-06

## 2020-09-18 RX ORDER — MELATONIN
DAILY
COMMUNITY
End: 2020-10-26

## 2020-09-18 NOTE — PROGRESS NOTES
Chief Complaint   Patient presents with    New Patient     Orthostasis     Patient C/O fatigue and dizziness.

## 2020-09-18 NOTE — PROGRESS NOTES
Tony Johnson, [facility]    Subjective/HPI:     Ms. René Neri is a 80 y.o. female is here for new patient consultation. She has a PMHx of HTN, HLD, and GERD. She was referred for complaints of dizziness. She has had a couple of falls, patient does not recall these events. Her son normally finds her on the floor. Per PCP, falls have occurred when getting up from the bed or sitting up. Symptoms have been ongoing for the past 2 years. She says she gets dizzy and then falls to the floor. She denies LOC. She denies nausea, vomiting, chest pain or shortness of breath associated with this. She denies palpitation symptoms. Had previously seen Dr. Bella Ruiz at ENT for workup. He had recommended additional testing, but this was interrupted due to [place]. PCP Provider  Kiki Nichols MD    Patient Active Problem List   Diagnosis Code    Hypertension I10    Hypercholesterolemia E78.00    Depression F32.9    GERD (gastroesophageal reflux disease) K21.9    Asthma J45.909    Neuropathy, upper extremity G56.90    Vitamin D deficiency E55.9    Vertigo R42    Dizziness R42    Lumbar stenosis M48.061    Anemia D64.9    Hip fracture ([address] S72.009A    UTI (urinary tract infection) N39.0       Past Surgical History:   Procedure Laterality Date    HX CATARACT REMOVAL      bilateral    HX HYSTERECTOMY      HX ORTHOPAEDIC      right bunion excision    HX OTHER SURGICAL      broken R hip    HX TONSILLECTOMY         Family History   Problem Relation Age of Onset    Heart Disease Mother     Hypertension Mother     Stroke Mother     Heart Disease Brother     Heart Disease Father     Heart Disease Brother        Social History     Tobacco Use    Smoking status: Never Smoker    Smokeless tobacco: Never Used   Substance Use Topics    Alcohol use: No       Current Outpatient Medications   Medication Sig    cyproheptadine (PERIACTIN) 2 mg/5 mL syrup TK 5 ML PO BID.     cholecalciferol (Vitamin D3) (1000 Units /25 mcg) tablet Take  by mouth daily.  atorvastatin (LIPITOR) 20 mg tablet TAKE 1 TABLET BY MOUTH EVERY DAY    sertraline (ZOLOFT) 50 mg tablet Take 1 Tab by mouth daily.  amLODIPine-benazepril (LOTREL) 5-20 mg per capsule TAKE 1 CAPSULE BY MOUTH ONCE DAILY    albuterol (PROVENTIL HFA, VENTOLIN HFA, PROAIR HFA) 90 mcg/actuation inhaler Take 1 Puff by inhalation as needed for Wheezing.  ergocalciferol (ERGOCALCIFEROL) 50,000 unit capsule Take 1 Cap by mouth every seven (7) days. (Patient taking differently: Take 50,000 Units by mouth every Sunday.)    MULTIVITAMINS (MULTIVITAMIN PO) Take  by mouth daily. No current facility-administered medications for this visit. Allergies   Allergen Reactions    Diclofenac Itching     The voltaren gel caused itching.  Gabapentin Swelling    Hydrochlorothiazide Other (comments)     dizziness    Pcn [Penicillins] Swelling        I have reviewed the problem list, allergy list, medical history, family, social history and medications. Review of Symptoms:    Review of Systems   Constitutional: Negative for chills, fever and weight loss. HENT: Negative for nosebleeds. Eyes: Negative for blurred vision and double vision. Respiratory: Negative for cough, shortness of breath and wheezing. Cardiovascular: Negative for chest pain, palpitations, orthopnea, leg swelling and PND. Gastrointestinal: Negative for abdominal pain, blood in stool, diarrhea, nausea and vomiting. Musculoskeletal: Negative for joint pain. Skin: Negative for rash. Neurological: Positive for dizziness. Negative for tingling and loss of consciousness. Endo/Heme/Allergies: Does not bruise/bleed easily. Physical Exam:      General: Well developed, in no acute distress, cooperative and alert  HEENT: No carotid bruits, no JVD, trach is midline. Neck Supple, PEERL, EOM intact. Heart:  reg rate and rhythm; normal S1/S2; no murmurs, no gallops or rubs. Respiratory: Clear bilaterally x 4, no wheezing or rales  Abdomen:   Soft, non-tender, no distention, no masses. + BS. Extremities:  Normal cap refill, no cyanosis, atraumatic. No edema. Neuro: A&Ox3, speech clear, gait stable. Skin: Skin color is normal. No rashes or lesions. Non diaphoretic  Vascular: 2+ pulses symmetric in all extremities    Vitals:    09/18/20 1311 09/18/20 1312 09/18/20 1313   BP: (!) 180/80 (!) 150/80 120/70   Pulse: 80 74 78   Resp:  16    SpO2: 95% 97% 96%   Weight:  105 lb 11.2 oz (47.9 kg)    Height:  5' (1.524 m)        Cardiographics    ECG: sinus rhythm; incomplete LBBB    Cardiology Labs:    Lab Results   Component Value Date/Time    Cholesterol, total 174 01/15/2020 08:49 AM    HDL Cholesterol 67 01/15/2020 08:49 AM    VLDL, calculated 19 01/15/2020 08:49 AM    CHOL/HDL Ratio 1.6 04/29/2017 05:38 AM    LDL, calculated 88 01/15/2020 08:49 AM    LDL, calculated 89 09/20/2019 08:15 AM    LDL, calculated 79 05/03/2019 03:59 PM       Lab Results   Component Value Date/Time    Hemoglobin A1c (POC) 5.4 09/10/2018 09:40 AM       Lab Results   Component Value Date/Time    Sodium 141 01/15/2020 08:49 AM    Potassium 4.7 01/15/2020 08:49 AM    Chloride 104 01/15/2020 08:49 AM    CO2 25 01/15/2020 08:49 AM    Glucose 87 01/15/2020 08:49 AM    BUN 16 01/15/2020 08:49 AM    Creatinine 0.82 01/15/2020 08:49 AM    BUN/Creatinine ratio 20 01/15/2020 08:49 AM    GFR est AA 72 01/15/2020 08:49 AM    GFR est non-AA 63 01/15/2020 08:49 AM    Calcium 10.3 01/15/2020 08:49 AM    Anion gap 5 05/10/2017 03:50 AM    Bilirubin, total 0.4 01/15/2020 08:49 AM    ALT (SGPT) 21 01/15/2020 08:49 AM    Alk.  phosphatase 87 01/15/2020 08:49 AM    Protein, total 6.4 01/15/2020 08:49 AM    Albumin 4.1 01/15/2020 08:49 AM    Globulin 3.7 05/08/2017 06:42 PM    A-G Ratio 1.8 01/15/2020 08:49 AM       Orders Placed This Encounter    LOOP MONITOR     30-day     Standing Status:   Future     Number of Occurrences: 1     Standing Expiration Date:   3/18/2021     Order Specific Question:   Reason for Exam:     Answer:   dizziness    AMB POC EKG ROUTINE W/ 12 LEADS, INTER & REP     Order Specific Question:   Reason for Exam:     Answer:   routine    DUPLEX CAROTID BILATERAL     Standing Status:   Future     Number of Occurrences:   1     Standing Expiration Date:   3/18/2021    cyproheptadine (PERIACTIN) 2 mg/5 mL syrup     Sig: TK 5 ML PO BID.  cholecalciferol (Vitamin D3) (1000 Units /25 mcg) tablet     Sig: Take  by mouth daily. Assessment:     Assessment:       ICD-10-CM ICD-9-CM    1. Dizziness  R42 780.4 AMB POC EKG ROUTINE W/ 12 LEADS, INTER & REP      DUPLEX CAROTID BILATERAL      ECHO ADULT COMPLETE      LOOP MONITOR   2. Essential hypertension  I10 401.9 ECHO ADULT COMPLETE   3. Hypercholesterolemia  E78.00 272.0         Plan:     Dizziness  Orthostatics today positive -- almost 60 pt drop in systolic  --> 951, with some dizziness supine to sitting. Check event monitor given near syncope  Check echo given LBBB  Check carotid duplex  Discussed conservative measures -- slow positional changes, compressions stockings and plenty of fluids  Does require further workup by ENT -- encouraged to keep appt with Dr. Hatch Mock hypertension  Med changes per above    Hypercholesterolemia  Continue statin therapy and low fat, low cholesterol diet  Lipids managed by PCP    F/u after testing complete.     Rajani Nelson MD

## 2020-09-18 NOTE — PROGRESS NOTES
Patient received a 30 day event monitor. Instructions given verbally as well as an instruction sheet. Pt verbalized understanding.     [facility] Event Monitoring

## 2020-09-18 NOTE — LETTER
9/30/20 Patient: Lay Kelsey YOB: 1928 Date of Visit: 9/18/2020 Carlos Felton MD 
[address] [address] VIA In Basket Dear Carlos Felton MD, Thank you for referring Ms. Anderson Oro to [facility] for evaluation. My notes for this consultation are attached. If you have questions, please do not hesitate to call me. I look forward to following your patient along with you.  
 
 
Sincerely, 
 
Bebe Scott MD

## 2020-09-28 ENCOUNTER — TELEPHONE (OUTPATIENT)
Dept: CARDIOLOGY CLINIC | Age: 85
End: 2020-09-28

## 2020-09-28 NOTE — TELEPHONE ENCOUNTER
Spoke with patients son, Bello,.on PHI. He stated they are all set.  They were able to get some this am.

## 2020-09-28 NOTE — TELEPHONE ENCOUNTER
Meli Harrington. Pt's son   [phone]  Pt's son calling on his mother's behalf; Pt's son states that he is running out patches that goes on his mother's chest; mother wants to know if some one can send him some more for her; Please call him back       Thanks

## 2020-10-14 ENCOUNTER — ANCILLARY PROCEDURE (OUTPATIENT)
Dept: CARDIOLOGY CLINIC | Age: 85
End: 2020-10-14
Payer: MEDICARE

## 2020-10-14 VITALS
DIASTOLIC BLOOD PRESSURE: 70 MMHG | WEIGHT: 105 LBS | SYSTOLIC BLOOD PRESSURE: 120 MMHG | BODY MASS INDEX: 20.62 KG/M2 | HEIGHT: 60 IN

## 2020-10-14 PROCEDURE — 93306 TTE W/DOPPLER COMPLETE: CPT | Performed by: INTERNAL MEDICINE

## 2020-10-14 PROCEDURE — 93880 EXTRACRANIAL BILAT STUDY: CPT | Performed by: INTERNAL MEDICINE

## 2020-10-15 LAB
LEFT CCA DIST DIAS: 11 CM/S
LEFT CCA DIST SYS: 76 CM/S
LEFT CCA MID DIAS: 11 CM/S
LEFT CCA MID SYS: 77 CM/S
LEFT CCA PROX DIAS: 11 CM/S
LEFT CCA PROX SYS: 71 CM/S
LEFT ECA DIAS: 15 CM/S
LEFT ECA SYS: 98 CM/S
LEFT ICA DIST DIAS: 15 CM/S
LEFT ICA DIST SYS: 62 CM/S
LEFT ICA MID DIAS: 11 CM/S
LEFT ICA MID SYS: 74 CM/S
LEFT ICA PROX DIAS: 8 CM/S
LEFT ICA PROX SYS: 58 CM/S
LEFT ICA/CCA SYS: 0.96
LEFT SUBCLAVIAN SYS: 118 CM/S
RIGHT CCA DIST DIAS: 10 CM/S
RIGHT CCA DIST SYS: 117 CM/S
RIGHT CCA MID DIAS: 13 CM/S
RIGHT CCA MID SYS: 98 CM/S
RIGHT CCA PROX DIAS: 12 CM/S
RIGHT CCA PROX SYS: 85 CM/S
RIGHT ECA DIAS: 12 CM/S
RIGHT ECA SYS: 124 CM/S
RIGHT ICA DIST DIAS: 16 CM/S
RIGHT ICA DIST SYS: 63 CM/S
RIGHT ICA MID DIAS: 12 CM/S
RIGHT ICA MID SYS: 50 CM/S
RIGHT ICA PROX DIAS: 9 CM/S
RIGHT ICA PROX SYS: 68 CM/S
RIGHT ICA/CCA SYS: 0.58
RIGHT SUBCLAVIAN SYS: 82 CM/S
RIGHT VERTEBRAL DIAS: 11 CM/S
RIGHT VERTEBRAL SYS: 46 CM/S

## 2020-10-16 ENCOUNTER — TELEPHONE (OUTPATIENT)
Dept: CARDIOLOGY CLINIC | Age: 85
End: 2020-10-16

## 2020-10-16 LAB
ECHO AO ASC DIAM: 2.96 CM
ECHO AO ROOT DIAM: 2.84 CM
ECHO AV AREA PEAK VELOCITY: 3.14 CM2
ECHO AV AREA/BSA PEAK VELOCITY: 2.2 CM2/M2
ECHO AV PEAK GRADIENT: 9.66 MMHG
ECHO AV PEAK VELOCITY: 152.65 CM/S
ECHO LA VOL 2C: 26.62 ML (ref 22–52)
ECHO LA VOL 4C: 49.25 ML (ref 22–52)
ECHO LA VOL BP: 38 ML (ref 22–52)
ECHO LA VOL BP: 38 ML (ref 22–52)
ECHO LA VOLUME INDEX A2C: 18.75 ML/M2 (ref 16–28)
ECHO LA VOLUME INDEX A4C: 34.7 ML/M2 (ref 16–28)
ECHO LV INTERNAL DIMENSION DIASTOLIC: 3.88 CM (ref 3.9–5.3)
ECHO LV INTERNAL DIMENSION SYSTOLIC: 2.87 CM
ECHO LV IVSD: 1.27 CM (ref 0.6–0.9)
ECHO LV MASS 2D: 163 G (ref 67–162)
ECHO LV MASS INDEX 2D: 114.9 G/M2 (ref 43–95)
ECHO LV POSTERIOR WALL DIASTOLIC: 1.18 CM (ref 0.6–0.9)
ECHO LVOT DIAM: 1.95 CM
ECHO LVOT PEAK GRADIENT: 10.31 MMHG
ECHO LVOT PEAK VELOCITY: 160.51 CM/S
ECHO LVOT SV: 102.4 ML
ECHO LVOT SV: 34.7 ML
ECHO LVOT SV: 34.7 ML
ECHO LVOT VTI: 34.28 CM
ECHO MV A VELOCITY: 91.56 CENTIMETER/SECOND
ECHO MV E DECELERATION TIME (DT): 0.27 S
ECHO MV E VELOCITY: 64.13 CENTIMETER/SECOND
ECHO RA AREA 4C: 15.34 CM2
LVOT MG: 5.42 MMHG

## 2020-10-16 NOTE — PROGRESS NOTES
Mairely,    Please call the patient and inform that echocardiogram is normal, ejection fraction 55-60%. No concern for heart failure at this time. Valves working appropriately. Would f/u with Dr. Lacy Perry in about 6 months, sooner if worsening symptoms.     Thanks,  [employer]

## 2020-10-22 ENCOUNTER — TELEPHONE (OUTPATIENT)
Dept: INTERNAL MEDICINE CLINIC | Age: 85
End: 2020-10-22

## 2020-10-22 NOTE — TELEPHONE ENCOUNTER
[place], [address] [facility] Office Pool               Caller's first and last name: Pt   Reason for call: unsure if needs appt   Callback required yes/no and why: yes, discuss previous appt   Best contact number(s): [phone]   Details to clarify the request: Pt was seen by a doctor that Dr. Sana Mendez had referred her to regarding her stomach. She was told there is nothing wrong, but isn't sure if she is supposed to follow up with Dr. Sana Mendez or not.

## 2020-10-23 ENCOUNTER — TELEPHONE (OUTPATIENT)
Dept: CARDIOLOGY CLINIC | Age: 85
End: 2020-10-23

## 2020-10-23 NOTE — TELEPHONE ENCOUNTER
Excello son on PHI, advised he received a letter from us regarding a test result. Looked in cc and the letter was regarding the carotid doppler on her neck. Read over the letter with him and explained her testing was normal. Verified appt on 10/26/20 with  and they can discuss further if needed. Bello verbalized understanding.

## 2020-10-25 NOTE — PROGRESS NOTES
Please advise no arrhythmias to explain dizziness. Nothing of concern. Noted echo and carotids were okay as well. I think the main thing causing her lightheadedness is her orthostasis, reminder for compression stockings, stay well-hydrated, monitor blood pressure lying and standing periodically, and let us know if dizzy with standing blood pressure less than 100. Follow-up in a month or so with me or with Nighat Stanton on a Tuesday afternoon to reassess orthostatic hypotension.

## 2020-10-26 ENCOUNTER — OFFICE VISIT (OUTPATIENT)
Dept: CARDIOLOGY CLINIC | Age: 85
End: 2020-10-26
Payer: MEDICARE

## 2020-10-26 VITALS
SYSTOLIC BLOOD PRESSURE: 136 MMHG | WEIGHT: 101.5 LBS | BODY MASS INDEX: 19.93 KG/M2 | HEART RATE: 84 BPM | HEIGHT: 60 IN | OXYGEN SATURATION: 97 % | RESPIRATION RATE: 16 BRPM | DIASTOLIC BLOOD PRESSURE: 74 MMHG

## 2020-10-26 DIAGNOSIS — R42 DIZZINESS: Primary | ICD-10-CM

## 2020-10-26 DIAGNOSIS — E78.2 MIXED HYPERLIPIDEMIA: ICD-10-CM

## 2020-10-26 DIAGNOSIS — I10 ESSENTIAL HYPERTENSION: ICD-10-CM

## 2020-10-26 PROCEDURE — G8427 DOCREV CUR MEDS BY ELIG CLIN: HCPCS | Performed by: INTERNAL MEDICINE

## 2020-10-26 PROCEDURE — G8536 NO DOC ELDER MAL SCRN: HCPCS | Performed by: INTERNAL MEDICINE

## 2020-10-26 PROCEDURE — G8420 CALC BMI NORM PARAMETERS: HCPCS | Performed by: INTERNAL MEDICINE

## 2020-10-26 PROCEDURE — 3288F FALL RISK ASSESSMENT DOCD: CPT | Performed by: INTERNAL MEDICINE

## 2020-10-26 PROCEDURE — G0463 HOSPITAL OUTPT CLINIC VISIT: HCPCS | Performed by: INTERNAL MEDICINE

## 2020-10-26 PROCEDURE — G9717 DOC PT DX DEP/BP F/U NT REQ: HCPCS | Performed by: INTERNAL MEDICINE

## 2020-10-26 PROCEDURE — 1090F PRES/ABSN URINE INCON ASSESS: CPT | Performed by: INTERNAL MEDICINE

## 2020-10-26 PROCEDURE — 93005 ELECTROCARDIOGRAM TRACING: CPT | Performed by: INTERNAL MEDICINE

## 2020-10-26 PROCEDURE — 93010 ELECTROCARDIOGRAM REPORT: CPT | Performed by: INTERNAL MEDICINE

## 2020-10-26 PROCEDURE — 1100F PTFALLS ASSESS-DOCD GE2>/YR: CPT | Performed by: INTERNAL MEDICINE

## 2020-10-26 PROCEDURE — 99214 OFFICE O/P EST MOD 30 MIN: CPT | Performed by: INTERNAL MEDICINE

## 2020-10-26 NOTE — PROGRESS NOTES
[address] [facility], [address] [facility]  [phone]     Subjective:      Gabriela Billings is a 80 y.o. female is here for routine f/u. She has a PMHx of HTN, HLD, and GERD. Seen by us last month for new patient consultation re complaint of dizziness. Today, feels like dizziness is better. No further fall. Bp is stable. The patient denies chest pain/ shortness of breath, orthopnea, PND, LE edema, palpitations, syncope, or presyncope. Patient Active Problem List    Diagnosis Date Noted    UTI (urinary tract infection) 05/08/2017    Hip fracture ([address] 04/27/2017    Lumbar stenosis 03/27/2014    Anemia 03/27/2014    Dizziness 07/24/2013    Vertigo     Vitamin D deficiency 01/28/2011    Hypertension     Hypercholesterolemia     Depression     GERD (gastroesophageal reflux disease)     Asthma     Neuropathy, upper extremity       Neetu Jimenes MD  Past Medical History:   Diagnosis Date    Anemia NEC     Asthma     Cervical spondylarthritis     Depression     DJD (degenerative joint disease) of knee     Esophageal spasm     Has required esophageal dilatation; Dr. Terrie Martinez GERD (gastroesophageal reflux disease)     Hypercholesterolemia     Hypertension     Lumbar stenosis     Neuropathy, upper extremity     left    Vertigo [id number]    Saw Dr. Kwame Patrick; improved.  Vitamin D deficiency 1/28/2011      Past Surgical History:   Procedure Laterality Date    HX CATARACT REMOVAL      bilateral    HX HYSTERECTOMY      HX ORTHOPAEDIC      right bunion excision    HX OTHER SURGICAL      broken R hip    HX TONSILLECTOMY       Allergies   Allergen Reactions    Diclofenac Itching     The voltaren gel caused itching.      Gabapentin Swelling    Hydrochlorothiazide Other (comments)     dizziness    Pcn [Penicillins] Swelling      Family History   Problem Relation Age of Onset    Heart Disease Mother     Hypertension Mother     Stroke Mother     Heart Disease Brother     Heart Disease Father     Heart Disease Brother       Social History     Socioeconomic History    Marital status:      Spouse name: Not on file    Number of children: Not on file    Years of education: Not on file    Highest education level: Not on file   Occupational History    Not on file   Social Needs    Financial resource strain: Not on file    Food insecurity     Worry: Not on file     Inability: Not on file   [employer] needs     Medical: Not on file     Non-medical: Not on file   Tobacco Use    Smoking status: Never Smoker    Smokeless tobacco: Never Used   Substance and Sexual Activity    Alcohol use: No    Drug use: No    Sexual activity: Never   Lifestyle    Physical activity     Days per week: Not on file     Minutes per session: Not on file    Stress: Not on file   Relationships    Social connections     Talks on phone: Not on file     Gets together: Not on file     Attends Zoroastrian service: Not on file     Active member of club or organization: Not on file     Attends meetings of clubs or organizations: Not on file     Relationship status: Not on file    Intimate partner violence     Fear of current or ex partner: Not on file     Emotionally abused: Not on file     Physically abused: Not on file     Forced sexual activity: Not on file   Other Topics Concern    Not on file   Social History Narrative    Not on file      Current Outpatient Medications   Medication Sig    cyproheptadine (PERIACTIN) 2 mg/5 mL syrup TK 5 ML PO BID.  atorvastatin (LIPITOR) 20 mg tablet TAKE 1 TABLET BY MOUTH EVERY DAY    amLODIPine-benazepril (LOTREL) 5-20 mg per capsule TAKE 1 CAPSULE BY MOUTH ONCE DAILY    albuterol (PROVENTIL HFA, VENTOLIN HFA, PROAIR HFA) 90 mcg/actuation inhaler Take 1 Puff by inhalation as needed for Wheezing.  ergocalciferol (ERGOCALCIFEROL) 50,000 unit capsule Take 1 Cap by mouth every seven (7) days.  (Patient taking differently: Take 50,000 Units by mouth every Sunday.)    MULTIVITAMINS (MULTIVITAMIN PO) Take  by mouth daily. No current facility-administered medications for this visit. Review of Symptoms:  11 systems reviewed, negative other than as stated in the HPI    Physical ExamPhysical Exam:    Vitals:    10/26/20 1317 10/26/20 1327 10/26/20 1328   BP: (!) 152/84 130/82 136/74   Pulse: 78 85 84   Resp: 16     SpO2: 97%     Weight: 101 lb 8 oz (46 kg)     Height: 5' (1.524 m)       Body mass index is 19.82 kg/m². General PE  Gen:  NAD  Mental Status - Alert. General Appearance - Not in acute distress. HEENT:  PERRL, no carotid bruits or JVD  Chest and Lung Exam   Inspection: Accessory muscles - No use of accessory muscles in breathing. Auscultation:   Breath sounds: - Normal.   Cardiovascular   Inspection: Jugular vein - Bilateral - Inspection Normal.   Palpation/Percussion:   Apical Impulse: - Normal.   Auscultation: Rhythm - Regular. Heart Sounds - S1 WNL and S2 WNL. No S3 or S4. Murmurs & Other Heart Sounds: Auscultation of the heart reveals - No Murmurs. Peripheral Vascular   Upper Extremity: Inspection - Bilateral - No Cyanotic nailbeds or Digital clubbing. Lower Extremity:   Palpation: Edema - Bilateral - No edema. Abdomen:   Soft, non-tender, bowel sounds are active.   Neuro: A&O times 3, CN and motor grossly WNL    Labs:   Lab Results   Component Value Date/Time    Cholesterol, total 174 01/15/2020 08:49 AM    Cholesterol, total 173 09/20/2019 08:15 AM    Cholesterol, total 151 05/03/2019 03:59 PM    Cholesterol, total 157 07/09/2018 11:10 AM    Cholesterol, total 186 02/12/2018 10:37 AM    HDL Cholesterol 67 01/15/2020 08:49 AM    HDL Cholesterol 64 09/20/2019 08:15 AM    HDL Cholesterol 55 05/03/2019 03:59 PM    HDL Cholesterol 60 07/09/2018 11:10 AM    HDL Cholesterol 68 02/12/2018 10:37 AM    LDL, calculated 88 01/15/2020 08:49 AM    LDL, calculated 89 09/20/2019 08:15 AM    LDL, calculated 79 05/03/2019 03:59 PM    LDL, calculated 78 07/09/2018 11:10 AM    LDL, calculated 97 02/12/2018 10:37 AM    Triglyceride 96 01/15/2020 08:49 AM    Triglyceride 98 09/20/2019 08:15 AM    Triglyceride 86 05/03/2019 03:59 PM    Triglyceride 94 07/09/2018 11:10 AM    Triglyceride 104 02/12/2018 10:37 AM    CHOL/HDL Ratio 1.6 04/29/2017 05:38 AM    CHOL/HDL Ratio 2.3 07/20/2010 09:25 AM     Lab Results   Component Value Date/Time     05/08/2017 06:42 PM     Lab Results   Component Value Date/Time    Sodium 141 01/15/2020 08:49 AM    Potassium 4.7 01/15/2020 08:49 AM    Chloride 104 01/15/2020 08:49 AM    CO2 25 01/15/2020 08:49 AM    Anion gap 5 05/10/2017 03:50 AM    Glucose 87 01/15/2020 08:49 AM    BUN 16 01/15/2020 08:49 AM    Creatinine 0.82 01/15/2020 08:49 AM    BUN/Creatinine ratio 20 01/15/2020 08:49 AM    GFR est AA 72 01/15/2020 08:49 AM    GFR est non-AA 63 01/15/2020 08:49 AM    Calcium 10.3 01/15/2020 08:49 AM    Bilirubin, total 0.4 01/15/2020 08:49 AM    Alk. phosphatase 87 01/15/2020 08:49 AM    Protein, total 6.4 01/15/2020 08:49 AM    Albumin 4.1 01/15/2020 08:49 AM    Globulin 3.7 05/08/2017 06:42 PM    A-G Ratio 1.8 01/15/2020 08:49 AM    ALT (SGPT) 21 01/15/2020 08:49 AM       EKG:  SR     Assessment:     Assessment:      1. Dizziness    2. Essential hypertension    3. Mixed hyperlipidemia        Orders Placed This Encounter    AMB POC EKG ROUTINE W/ 12 LEADS, INTER & REP     Order Specific Question:   Reason for Exam:     Answer:   routine        Plan:     Dizziness  Orthostatics + last OV 9/2020 -- almost 60 pt drop in systolic  --> 728, with some dizziness supine to sitting. BP stable today 152-136 improved with standing  Event monitor 10/2020: No dysrhythmias or bradycardia to explain dizziness.    Echo 10/2020: Normal EF no significant valve issues  Carotid duplex 10/2020: Mild stenosis < 50% bilateral ICA  Discussed conservative measures -- slow positional changes, compressions stockings and plenty of fluids  Previously seen by ENT---Dr Shin Lee    Essential hypertension  Stable. HLD  1/2020 LDL 88 On statin  Lipids managed by PCP       Continue current care and f/u in 1 year, sooner as needed.       Luh Resendiz MD

## 2020-10-26 NOTE — PROGRESS NOTES
1. Have you been to the ER, urgent care clinic since your last visit? Hospitalized since your last visit? No    2. Have you seen or consulted any other health care providers outside of the [address] [place] [address] since your last visit? Include any pap smears or colon screening. No    Chief Complaint   Patient presents with    Results     for echo and carotid duplex    Dizziness     nothing new; sometimes accompanied with nausea       Pt's son reports a fall x 3 mos ago  No injuries. States she has had dizziness for a long time.

## 2020-10-27 ENCOUNTER — TELEPHONE (OUTPATIENT)
Dept: CARDIOLOGY CLINIC | Age: 85
End: 2020-10-27

## 2020-10-27 ENCOUNTER — TELEPHONE (OUTPATIENT)
Dept: INTERNAL MEDICINE CLINIC | Age: 85
End: 2020-10-27

## 2020-10-27 NOTE — TELEPHONE ENCOUNTER
----- Message from Kiki Thakur MD sent at 10/25/2020  3:01 PM EDT -----  Please advise no arrhythmias to explain dizziness. Nothing of concern. Noted echo and carotids were okay as well. I think the main thing causing her lightheadedness is her orthostasis, reminder for compression stockings, stay well-hydrated, monitor blood pressure lying and standing periodically, and let us know if dizzy with standing blood pressure less than 100. Follow-up in a month or so with me or with Shree Farooq on a Tuesday afternoon to reassess orthostatic hypotension.

## 2020-10-27 NOTE — TELEPHONE ENCOUNTER
Patient's son, Cooper Baez, states he's returning your call. [facility] states they received a call on patient needs to see Cardiologist, Dr. Young Black reports patient was just seen yesterday by Dr. Feroz Cedillo & advised to return in a Year. Please call as [facility] states message states patient needs to see cardiologist next month.  Thank you

## 2020-10-27 NOTE — TELEPHONE ENCOUNTER
Verified patient with two identifiers. Pt informed of monitor results in detail. Also informed of echo and carotid results. Discussed keeping a record of orthostatic blood pressures, compression stockings and staying hydrated. She will call back and make an appt for a follow up to see Dr Lawyer Arenas. Pt verbalized understanding.

## 2020-10-28 ENCOUNTER — TELEPHONE (OUTPATIENT)
Dept: INTERNAL MEDICINE CLINIC | Age: 85
End: 2020-10-28

## 2020-10-28 ENCOUNTER — VIRTUAL VISIT (OUTPATIENT)
Dept: INTERNAL MEDICINE CLINIC | Age: 85
End: 2020-10-28
Payer: MEDICARE

## 2020-10-28 DIAGNOSIS — D64.9 ANEMIA, UNSPECIFIED TYPE: ICD-10-CM

## 2020-10-28 DIAGNOSIS — E55.9 VITAMIN D DEFICIENCY: ICD-10-CM

## 2020-10-28 DIAGNOSIS — I10 ESSENTIAL HYPERTENSION: ICD-10-CM

## 2020-10-28 DIAGNOSIS — E78.00 HYPERCHOLESTEROLEMIA: ICD-10-CM
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
## 2020-10-28 PROCEDURE — 99442 PR PHYS/QHP TELEPHONE EVALUATION 11-20 MIN: CPT | Performed by: INTERNAL MEDICINE

## 2020-10-28 NOTE — PATIENT INSTRUCTIONS
Office Policies Phone calls/patient messages: Please allow up to 24 hours for someone in the office to contact you about your call or message. Be mindful your provider may be out of the office or your message may require further review. We encourage you to use Standardized Safety for your messages as this is a faster, more efficient way to communicate with our office Medication Refills: 
         
Prescription medications require 48-72 business hours to process. We encourage you to use Standardized Safety for your refills. For controlled medications: Please allow 72 business hours to process. Certain medications may require you to  a written prescription at our office. NO narcotic/controlled medications will be prescribed after 4pm Monday through Friday or on weekends Form/Paperwork Completion: 
         
Please note a $25 fee may incur for all paperwork for completed by our providers. We ask that you allow 7-10 business days. Pre-payment is due prior to picking up/faxing the completed form. You may also download your forms to Standardized Safety to have your doctor print off. 
 
 
1. Have you been to the ER, urgent care clinic since your last visit? Hospitalized since your last visit?no 2. Have you seen or consulted any other health care providers outside of the [address] [place] [address] since your last visit? Include any pap smears or colon screening.  cardiologist

## 2020-10-28 NOTE — PROGRESS NOTES
Mack Frausto is a 80 y.o. female, evaluated via audio-only technology on 10/28/2020 for Fall (pt has a fall on yesterday- pt states that she feels achy) and Referral Follow Up (pt following up from seeing heart doctor)  . 12  Subjective:     SUBJECTIVE  Ms. Mack Frausto presents today for complaint of dizzines; also she is about due for routine follow up. Chief Complaint   Patient presents with   [facility] Fall     pt has a fall on yesterday- pt states that she feels achy    Referral Follow Up     pt following up from seeing heart doctor       \"I turned around in my room, and I just fell. \" She denies loss of consciousness. No major injury, but she has some achiness. \"It's in my head, I just feel dizzy. \"     She has just seen Dr. Luís Bird, and had cardiac work up. Echo was normal with ER 55-60%. Duplex shows <50% stenoses. Event monitor was okay. Dr. Luís Bird recommended  slow positional changes, compressions stockings and plenty of fluids  Previously seen by ENT---Dr Pantear Jorge        Chief Complaint   Patient presents with   [facility] Fall     pt has a fall on yesterday- pt states that she feels achy    Referral Follow Up     pt following up from seeing heart doctor     \"I'm not feeling well. \" She is still experiencing \"dizziness\" (as \"room spinning\"). She also notes that she is \"not hungry\", worried she is losing weight:   Wt Readings from Last 5 Encounters:   10/26/20 101 lb 8 oz (46 kg)   10/14/20 105 lb (47.6 kg)   09/18/20 105 lb 11.2 oz (47.9 kg)   01/13/20 111 lb 6.4 oz (50.5 kg)   09/06/19 117 lb 9.6 oz (53.3 kg)     Left knee pain is ongoing but better; Dr. Priya Mahoney following. \"I've been getting the needle. \" Last shot was about a month ago. Off cymbalta and celebrex. She was unable to tolerate gabapentin . The tramadol we gave her didn't seem to help the pain and caused insomnia. We noted in early 2016:  L knee swells. In the past has received corticosteroid injections from Dr. Janae Alvarez.   She sees  Rachel, and he has done injections. \"He says you can do the needle\"--no plans for further follow up with him. History of GI bleed and anemia: No recent melena (other than once about 3 months ago) or hematochezia. She has had problems with diclofenac (itching) and celebrex--As we noted in January 2015: \"I had to stop the pills [celebrex] because of bleeding, and had a scope. \"  Dr. Fern Schirmer did colonoscopy finding a cecal polyp and diverticuli. Still has the neuropathic pain in leg, as noted in prior notes. This is doing better on cymbalta. She has seen Dr. Jimmy Phalen for spinal stenosis / sciatica. Asthma stable, uses inhaler \"now and then. \"    She still has arthritis pain: R neck pain is stable. Saw Dr. Lesly Ramirez, who diagnosed cervical spine arthritis. Depression is doing fine. No suicidal ideation. For all issues addressed today, see A/P below. PMH: She has a past medical history of Anemia NEC, Asthma, Cervical spondylarthritis, Depression, DJD (degenerative joint disease) of knee, Esophageal spasm, GERD (gastroesophageal reflux disease), Hypercholesterolemia, Hypertension, Lumbar stenosis, Neuropathy, upper extremity, Vertigo ([id number]), and Vitamin D deficiency (1/28/2011). Had pneumovax once. PSH:  has a past surgical history that includes hx hysterectomy; hx cataract removal; hx tonsillectomy; hx orthopaedic; and hx other surgical.  Colonoscopy 2014 showed cecal polyp, diverticuli, and internal hemorrhoids, Dr. Fern Schirmer. All and MED reviewed. I have taken aspirin off the list; caused N/V once, but she is on it now and tolerating. Current Outpatient Medications on File Prior to Visit   Medication Sig Dispense Refill    cyproheptadine (PERIACTIN) 2 mg/5 mL syrup TK 5 ML PO BID.       atorvastatin (LIPITOR) 20 mg tablet TAKE 1 TABLET BY MOUTH EVERY DAY 90 Tab 0    amLODIPine-benazepril (LOTREL) 5-20 mg per capsule TAKE 1 CAPSULE BY MOUTH ONCE DAILY 90 Cap 3    albuterol (PROVENTIL HFA, VENTOLIN HFA, PROAIR HFA) 90 mcg/actuation inhaler Take 1 Puff by inhalation as needed for Wheezing. 1 Inhaler 5    ergocalciferol (ERGOCALCIFEROL) 50,000 unit capsule Take 1 Cap by mouth every seven (7) days. (Patient taking differently: Take 50,000 Units by mouth every Sunday.) 1 Cap 0    MULTIVITAMINS (MULTIVITAMIN PO) Take  by mouth daily. No current facility-administered medications on file prior to visit. FH: Her family history includes Heart Disease in her brother, brother, father, and mother; Hypertension in her mother; Stroke in her mother. SH: She is . Her son lives with her. She reports that she has never used tobacco.  She reports that she drinks alcohol. ROS: Complete review of systems was performed and is otherwise unremarkable except as noted elsewhere. OBJECTIVE  Vitals: Weight \"still dropping. \" 111 at last check.    Wt Readings from Last 3 Encounters:   10/26/20 101 lb 8 oz (46 kg)   10/14/20 105 lb (47.6 kg)   09/18/20 105 lb 11.2 oz (47.9 kg)       Lab Results   Component Value Date/Time    Cholesterol, total 174 01/15/2020 08:49 AM    HDL Cholesterol 67 01/15/2020 08:49 AM    LDL, calculated 88 01/15/2020 08:49 AM    VLDL, calculated 19 01/15/2020 08:49 AM    Triglyceride 96 01/15/2020 08:49 AM    CHOL/HDL Ratio 1.6 04/29/2017 05:38 AM      Lab Results   Component Value Date/Time    WBC 7.0 01/15/2020 08:49 AM    WBC 6.7 05/21/2012 08:23 AM    HGB 13.3 01/15/2020 08:49 AM    HCT 39.9 01/15/2020 08:49 AM    PLATELET 009 44/45/8022 08:49 AM    MCV 91 01/15/2020 08:49 AM     Lab Results   Component Value Date/Time    Vitamin D 25-Hydroxy 13.8 (L) 04/29/2017 05:37 AM    VITAMIN D, 25-HYDROXY 26.5 (L) 01/15/2020 08:49 AM       Lab Results   Component Value Date/Time    Sodium 141 01/15/2020 08:49 AM    Potassium 4.7 01/15/2020 08:49 AM    Chloride 104 01/15/2020 08:49 AM    CO2 25 01/15/2020 08:49 AM    Anion gap 5 05/10/2017 03:50 AM    Glucose 87 01/15/2020 08:49 AM    BUN 16 01/15/2020 08:49 AM    Creatinine 0.82 01/15/2020 08:49 AM    BUN/Creatinine ratio 20 01/15/2020 08:49 AM    GFR est AA 72 01/15/2020 08:49 AM    GFR est non-AA 63 01/15/2020 08:49 AM    Calcium 10.3 01/15/2020 08:49 AM         ASSESSMENT / PLAN:    1. Hypertension: Borderline at last check. 2. Dizziness: Not orthostatic in Dr. Saji Epperson office. Cardiac work up negative. Duplex carotid okay. Suggested she follow the advice about compression and drinking plenty of water. Can also resume follow up with Dr. Rhett Lemon. 3. ?Weight loss--Seems to have stabilized. It is noted that she has had scopes/GI workup in the past few years--however, we'll refer back to Dr. Destiney Gamino. For now, encourage liberalized diet. Can add Boost or similar supplement. 4. Hyperglycemia on recent labs; Glc and A1C okay. 5. Knee pain: DJD. Better with cymbalta. Tylenol. On celebrex. Follow up with orthopedics as desired. 6. L leg neuropathic pain of lower extremity: Ongoing. Likely secondary to her spinal stenosis. Sees Dr. Dominique Driver. Off cymbalta. 7. GI bleed: S/p colonoscopy. As per Dr. Destiney Gamino. None recently. Off NSAIDs and ASA. 8. Cervical spine stenosis: Not wanting surgery. 9. Hypercholesterolemia: Reasonable at last check. 10. Anemia: Hb okay. 11. Depression: Stable. No SI. We'll try cutting back the zoloft to 25, and see if she tolerates this. 12. GERD (gastroesophageal reflux disease): Stable. 13. Asthma: Controlled. 14. Head and neck pain: Stable. Arthritis. 15. Vit D deficiency: Continue supplement. Recheck. Follow-up and Dispositions    · Return in about 4 months (around 2/28/2021) for dizziness, follow up. No flowsheet data found. Jaswinder Goodman, who was evaluated through a patient-initiated, synchronous (real-time) audio only encounter, and/or her healthcare decision maker, is aware that it is a billable service, with coverage as determined by her insurance carrier.  She provided verbal consent to proceed: Yes. She has not had a related appointment within my department in the past 7 days or scheduled within the next 24 hours.       Total Time: minutes: 11-20 minutes    Valerie Matthew MD

## 2020-11-02 NOTE — TELEPHONE ENCOUNTER
HIPAA verified with patient's son General Cherry. Patient received a letter with an appointment to see cardiology again this month and was wandering  was this needed?

## 2020-11-05 NOTE — TELEPHONE ENCOUNTER
HIPAA verified with patient's  Wesley Loyd notified per Dr. Lobo Dates that visit not need to contact the Cardiology office.

## 2020-11-05 NOTE — TELEPHONE ENCOUNTER
The suggestion to see Dr. Kathy Mera in a month is probably erroneous, but they might call his office to verify this.    BJF

## 2020-11-10 ENCOUNTER — HOSPITAL ENCOUNTER (OUTPATIENT)
Dept: LAB | Age: 85
Discharge: HOME | End: 2020-11-10
Payer: MEDICARE

## 2020-11-10 PROCEDURE — 82306 VITAMIN D 25 HYDROXY: CPT

## 2020-11-10 PROCEDURE — 36415 COLL VENOUS BLD VENIPUNCTURE: CPT

## 2020-11-10 PROCEDURE — 83550 IRON BINDING TEST: CPT

## 2020-11-10 PROCEDURE — 80061 LIPID PANEL: CPT

## 2020-11-10 PROCEDURE — 85025 COMPLETE CBC W/AUTO DIFF WBC: CPT

## 2020-11-10 PROCEDURE — 80053 COMPREHEN METABOLIC PANEL: CPT

## 2020-11-11 LAB
25(OH)D3+25(OH)D2 SERPL-MCNC: 33.7 NG/ML (ref 30–100)
ALBUMIN SERPL-MCNC: 3.7 G/DL (ref 3.5–4.6)
ALBUMIN/GLOB SERPL: 1.4 {RATIO} (ref 1.2–2.2)
ALP SERPL-CCNC: 86 IU/L (ref 39–117)
ALT SERPL-CCNC: 16 IU/L (ref 0–32)
AST SERPL-CCNC: 18 IU/L (ref 0–40)
BASOPHILS # BLD AUTO: 0 X10E3/UL (ref 0–0.2)
BASOPHILS NFR BLD AUTO: 1 %
BILIRUB SERPL-MCNC: 0.4 MG/DL (ref 0–1.2)
BUN SERPL-MCNC: 20 MG/DL (ref 10–36)
BUN/CREAT SERPL: 21 (ref 12–28)
CALCIUM SERPL-MCNC: 10.4 MG/DL (ref 8.7–10.3)
CHLORIDE SERPL-SCNC: 108 MMOL/L (ref 96–106)
CHOLEST SERPL-MCNC: 167 MG/DL (ref 100–199)
CO2 SERPL-SCNC: 25 MMOL/L (ref 20–29)
CREAT SERPL-MCNC: 0.97 MG/DL (ref 0.57–1)
EOSINOPHIL # BLD AUTO: 0.1 X10E3/UL (ref 0–0.4)
EOSINOPHIL NFR BLD AUTO: 2 %
ERYTHROCYTE [DISTWIDTH] IN BLOOD BY AUTOMATED COUNT: 11.8 % (ref 11.7–15.4)
GLOBULIN SER CALC-MCNC: 2.6 G/DL (ref 1.5–4.5)
GLUCOSE SERPL-MCNC: 120 MG/DL (ref 65–99)
HCT VFR BLD AUTO: 40.3 % (ref 34–46.6)
HDLC SERPL-MCNC: 61 MG/DL
HGB BLD-MCNC: 12.9 G/DL (ref 11.1–15.9)
IMM GRANULOCYTES # BLD AUTO: 0 X10E3/UL (ref 0–0.1)
IMM GRANULOCYTES NFR BLD AUTO: 0 %
IRON SATN MFR SERPL: 37 % (ref 15–55)
IRON SERPL-MCNC: 84 UG/DL (ref 27–139)
LDLC SERPL CALC-MCNC: 89 MG/DL (ref 0–99)
LYMPHOCYTES # BLD AUTO: 1.6 X10E3/UL (ref 0.7–3.1)
LYMPHOCYTES NFR BLD AUTO: 27 %
MCH RBC QN AUTO: 29.2 PG (ref 26.6–33)
MCHC RBC AUTO-ENTMCNC: 32 G/DL (ref 31.5–35.7)
MCV RBC AUTO: 91 FL (ref 79–97)
MONOCYTES # BLD AUTO: 0.5 X10E3/UL (ref 0.1–0.9)
MONOCYTES NFR BLD AUTO: 8 %
NEUTROPHILS # BLD AUTO: 3.7 X10E3/UL (ref 1.4–7)
NEUTROPHILS NFR BLD AUTO: 62 %
PLATELET # BLD AUTO: 246 X10E3/UL (ref 150–450)
POTASSIUM SERPL-SCNC: 4.4 MMOL/L (ref 3.5–5.2)
PROT SERPL-MCNC: 6.3 G/DL (ref 6–8.5)
RBC # BLD AUTO: 4.42 X10E6/UL (ref 3.77–5.28)
SODIUM SERPL-SCNC: 144 MMOL/L (ref 134–144)
TIBC SERPL-MCNC: 225 UG/DL (ref 250–450)
TRIGL SERPL-MCNC: 90 MG/DL (ref 0–149)
UIBC SERPL-MCNC: 141 UG/DL (ref 118–369)
VLDLC SERPL CALC-MCNC: 17 MG/DL (ref 5–40)
WBC # BLD AUTO: 5.9 X10E3/UL (ref 3.4–10.8)

## 2020-11-23 ENCOUNTER — TELEPHONE (OUTPATIENT)
Dept: INTERNAL MEDICINE CLINIC | Age: 85
End: 2020-11-23

## 2020-11-23 NOTE — TELEPHONE ENCOUNTER
----- Message from [employer] sent at 11/23/2020  1:05 PM EST -----  Regarding: Dr. Nathan Edwards: [phone]  General Message/Vendor Calls    Caller's first and last name: Nader Garcia - son      Reason for call: Vitamin D Deficiency      Callback required yes/no and why: Yes/Confirm      Best contact number(s):[phone]      Details to clarify the request: Patient had blood work done last week and it came back saying she has vitamin D Deficiency.   Patient would like to talk with the doctor about the results    Message from [facility]

## 2020-11-24 NOTE — TELEPHONE ENCOUNTER
Identified patient 2 identifiers verified. Patient reports that she had a fall on yesterday. Denies injury. patient wants to know what labs were low that she should be concerned about.

## 2021-01-07 ENCOUNTER — CLINICAL SUPPORT (OUTPATIENT)
Dept: INTERNAL MEDICINE CLINIC | Age: 86
End: 2021-01-07
Payer: MEDICARE

## 2021-01-07 VITALS
SYSTOLIC BLOOD PRESSURE: 172 MMHG | TEMPERATURE: 96.4 F | HEIGHT: 60 IN | WEIGHT: 103.2 LBS | RESPIRATION RATE: 16 BRPM | OXYGEN SATURATION: 98 % | HEART RATE: 82 BPM | DIASTOLIC BLOOD PRESSURE: 82 MMHG | BODY MASS INDEX: 20.26 KG/M2

## 2021-01-07 DIAGNOSIS — Z23 NEEDS FLU SHOT: Primary | ICD-10-CM

## 2021-01-07 PROCEDURE — 90694 VACC AIIV4 NO PRSRV 0.5ML IM: CPT | Performed by: INTERNAL MEDICINE

## 2021-01-07 PROCEDURE — G0008 ADMIN INFLUENZA VIRUS VAC: HCPCS | Performed by: INTERNAL MEDICINE

## 2021-02-04 ENCOUNTER — TELEPHONE (OUTPATIENT)
Dept: INTERNAL MEDICINE CLINIC | Age: 86
End: 2021-02-04

## 2021-02-04 ENCOUNTER — VIRTUAL VISIT (OUTPATIENT)
Dept: INTERNAL MEDICINE CLINIC | Age: 86
End: 2021-02-04
Payer: MEDICARE

## 2021-02-04 DIAGNOSIS — E78.00 HYPERCHOLESTEROLEMIA: ICD-10-CM

## 2021-02-04 DIAGNOSIS — E55.9 VITAMIN D DEFICIENCY: ICD-10-CM

## 2021-02-04 DIAGNOSIS — I10 ESSENTIAL HYPERTENSION: ICD-10-CM

## 2021-02-04 DIAGNOSIS — D64.9 ANEMIA, UNSPECIFIED TYPE: ICD-10-CM

## 2021-02-04 DIAGNOSIS — Z00.00 MEDICARE ANNUAL WELLNESS VISIT, SUBSEQUENT: ICD-10-CM

## 2021-02-04 DIAGNOSIS — R42 VERTIGO: Primary | ICD-10-CM

## 2021-02-04 PROCEDURE — G9717 DOC PT DX DEP/BP F/U NT REQ: HCPCS | Performed by: INTERNAL MEDICINE

## 2021-02-04 PROCEDURE — G8427 DOCREV CUR MEDS BY ELIG CLIN: HCPCS | Performed by: INTERNAL MEDICINE

## 2021-02-04 PROCEDURE — G0439 PPPS, SUBSEQ VISIT: HCPCS | Performed by: INTERNAL MEDICINE

## 2021-02-04 PROCEDURE — 1100F PTFALLS ASSESS-DOCD GE2>/YR: CPT | Performed by: INTERNAL MEDICINE

## 2021-02-04 PROCEDURE — G8420 CALC BMI NORM PARAMETERS: HCPCS | Performed by: INTERNAL MEDICINE

## 2021-02-04 PROCEDURE — 3288F FALL RISK ASSESSMENT DOCD: CPT | Performed by: INTERNAL MEDICINE

## 2021-02-04 PROCEDURE — G8536 NO DOC ELDER MAL SCRN: HCPCS | Performed by: INTERNAL MEDICINE

## 2021-02-04 NOTE — PROGRESS NOTES
Sally Mehta is a 89 y.o. female, evaluated via audio-only technology on 2/4/2021 for Hypertension (4 month f/u), Medication Evaluation (pt had some question about sertraline & if she should be taking the medication ), and Dizziness (pt c.o feeling dizzy when she wakes up in the morning and at bedtime)  .    12  Subjective:     SUBJECTIVE  Ms. Sally Mehta presents today for complaint of dizzines; also she is about due for routine follow up.      Chief Complaint   Patient presents with   • Hypertension     4 month f/u   • Medication Evaluation     pt had some question about sertraline & if she should be taking the medication    • Dizziness     pt c.o feeling dizzy when she wakes up in the morning and at bedtime       \"I'm not feeling well.\" She is still experiencing \"dizziness\" (as \"room spinning\").   Still has dizziness, \"from the time I get up to the time I go to bed.\"  She saw Dr. London, in Feb 2020, who was planning more tests.     She saw Dr. Snider in early 2020, and had cardiac work up.  Echo was normal with ER 55-60%.  Duplex shows <50% stenoses. Event monitor was okay.       She also notes that she is \"not hungry\", worried she is losing weight:   Wt Readings from Last 5 Encounters:   01/07/21 103 lb 3.2 oz (46.8 kg)   10/26/20 101 lb 8 oz (46 kg)   10/14/20 105 lb (47.6 kg)   09/18/20 105 lb 11.2 oz (47.9 kg)   01/13/20 111 lb 6.4 oz (50.5 kg)     Left knee pain is ongoing but better; Dr. Rachel following.  Has had steroid injections.  Off cymbalta and celebrex.  She was unable to tolerate gabapentin .  The tramadol we gave her didn't seem to help the pain and caused insomnia. We noted in early 2016:  L knee swells.  In the past has received corticosteroid injections from Dr. Fair.  She sees Dr. Rachel, and he has done injections.  \"He says you can do the needle\"--no plans for further follow up with him.    History of GI bleed and anemia: Dr. Arora saw her most recently in 2020. No recent melena (other  than once about 3 months ago) or hematochezia. She has had problems with diclofenac (itching) and celebrex--As we noted in January 2015: \"I had to stop the pills [celebrex] because of bleeding, and had a scope. \"  Dr. Markel Alaniz did colonoscopy finding a cecal polyp and diverticuli. Still has the neuropathic pain in leg, as noted in prior notes. This is doing better on cymbalta. She has seen Dr. Sebastian Hernandez for spinal stenosis / sciatica. Asthma stable, uses inhaler \"now and then. \"    She still has arthritis pain: R neck pain is stable. Saw Dr. Klaus Perdue, who diagnosed cervical spine arthritis. Depression is doing fine. No suicidal ideation. For all issues addressed today, see A/P below. PMH: She has a past medical history of Anemia NEC, Asthma, Cervical spondylarthritis, Depression, DJD (degenerative joint disease) of knee, Esophageal spasm, GERD (gastroesophageal reflux disease), Hypercholesterolemia, Hypertension, Lumbar stenosis, Neuropathy, upper extremity, Vertigo ([id number]), and Vitamin D deficiency (1/28/2011). Had pneumovax once. PSH:  has a past surgical history that includes hx hysterectomy; hx cataract removal; hx tonsillectomy; hx orthopaedic; and hx other surgical.  Colonoscopy 2014 showed cecal polyp, diverticuli, and internal hemorrhoids, Dr. Markel Alaniz. All and MED reviewed. I have taken aspirin off the list; caused N/V once, but she is on it now and tolerating. Current Outpatient Medications on File Prior to Visit   Medication Sig Dispense Refill    atorvastatin (LIPITOR) 20 mg tablet TAKE 1 TABLET BY MOUTH EVERY DAY 90 Tab 0    amLODIPine-benazepril (LOTREL) 5-20 mg per capsule TAKE 1 CAPSULE BY MOUTH ONCE DAILY 90 Cap 3    albuterol (PROVENTIL HFA, VENTOLIN HFA, PROAIR HFA) 90 mcg/actuation inhaler Take 1 Puff by inhalation as needed for Wheezing. 1 Inhaler 5    ergocalciferol (ERGOCALCIFEROL) 50,000 unit capsule Take 1 Cap by mouth every seven (7) days.  (Patient taking differently: Take 50,000 Units by mouth every Sunday.) 1 Cap 0    MULTIVITAMINS (MULTIVITAMIN PO) Take  by mouth daily.  cyproheptadine (PERIACTIN) 2 mg/5 mL syrup TK 5 ML PO BID. No current facility-administered medications on file prior to visit. FH: Her family history includes Heart Disease in her brother, brother, father, and mother; Hypertension in her mother; Stroke in her mother. SH: She is . Her son lives with her. She reports that she has never used tobacco.  She reports that she drinks alcohol. ROS: Complete review of systems was performed and is otherwise unremarkable except as noted elsewhere. OBJECTIVE  Vitals: Weight \"still dropping. \" 111 at last check.    Wt Readings from Last 3 Encounters:   01/07/21 103 lb 3.2 oz (46.8 kg)   10/26/20 101 lb 8 oz (46 kg)   10/14/20 105 lb (47.6 kg)       Lab Results   Component Value Date/Time    Cholesterol, total 167 11/10/2020 09:45 AM    HDL Cholesterol 61 11/10/2020 09:45 AM    LDL, calculated 89 11/10/2020 09:45 AM    LDL, calculated 88 01/15/2020 08:49 AM    VLDL, calculated 17 11/10/2020 09:45 AM    VLDL, calculated 19 01/15/2020 08:49 AM    Triglyceride 90 11/10/2020 09:45 AM    CHOL/HDL Ratio 1.6 04/29/2017 05:38 AM      Lab Results   Component Value Date/Time    WBC 5.9 11/10/2020 09:45 AM    WBC 6.7 05/21/2012 08:23 AM    HGB 12.9 11/10/2020 09:45 AM    HCT 40.3 11/10/2020 09:45 AM    PLATELET 727 70/60/3671 09:45 AM    MCV 91 11/10/2020 09:45 AM     Lab Results   Component Value Date/Time    Vitamin D 25-Hydroxy 13.8 (L) 04/29/2017 05:37 AM    VITAMIN D, 25-HYDROXY 33.7 11/10/2020 09:45 AM       Lab Results   Component Value Date/Time    Sodium 144 11/10/2020 09:45 AM    Potassium 4.4 11/10/2020 09:45 AM    Chloride 108 (H) 11/10/2020 09:45 AM    CO2 25 11/10/2020 09:45 AM    Anion gap 5 05/10/2017 03:50 AM    Glucose 120 (H) 11/10/2020 09:45 AM    BUN 20 11/10/2020 09:45 AM    Creatinine 0.97 11/10/2020 09:45 AM    BUN/Creatinine ratio 21 11/10/2020 09:45 AM    GFR est AA 59 (L) 11/10/2020 09:45 AM    GFR est non-AA 51 (L) 11/10/2020 09:45 AM    Calcium 10.4 (H) 11/10/2020 09:45 AM         ASSESSMENT / PLAN:      1. Dizziness: Not orthostatic in Dr. Herve Calderón office. Cardiac work up negative. Duplex carotid okay. Refer back to Dr. Chano Tejeda. Refer to Neurology. 2. Hypertension: Not checked today. 3. Hyperglycemia on recent labs; Glc and A1C okay. 4. Knee pain: DJD. Better with cymbalta. Tylenol. On celebrex. Follow up with orthopedics as desired. 5. L leg neuropathic pain of lower extremity: Ongoing. Likely secondary to her spinal stenosis. Sees Dr. Juanita Pope. Off cymbalta. 6. GI bleed: S/p colonoscopy. As per Dr. Fan Miranda. No problem recently. Off NSAIDs and ASA. 7. Cervical spine stenosis: Not wanting surgery. 8. Hypercholesterolemia: Reasonable at last check. 9. Anemia: Hb okay. 10. Depression:  Improved. No SI. Off SSRI. 11. GERD (gastroesophageal reflux disease): Stable. 12. Asthma: Controlled. 13. Head and neck pain: Stable. Arthritis. 14. Vit D deficiency: Continue supplement. Recheck. RTC 4 months ago. No flowsheet data found. Gianfranco Santos, who was evaluated through a patient-initiated, synchronous (real-time) audio only encounter, and/or her healthcare decision maker, is aware that it is a billable service, with coverage as determined by her insurance carrier. She provided verbal consent to proceed: Yes. She has not had a related appointment within my department in the past 7 days or scheduled within the next 24 hours.       Total Time: minutes: 11-20 minutes    Una Garcia MD

## 2021-02-04 NOTE — PATIENT INSTRUCTIONS
Medicare Wellness Visit, Female The best way to live healthy is to have a lifestyle where you eat a well-balanced diet, exercise regularly, limit alcohol use, and quit all forms of tobacco/nicotine, if applicable. Regular preventive services are another way to keep healthy. Preventive services (vaccines, screening tests, monitoring & exams) can help personalize your care plan, which helps you manage your own care. Screening tests can find health problems at the earliest stages, when they are easiest to treat. [facility] follows the current, evidence-based guidelines published by the [facility] ([facility]) when recommending preventive services for our patients. Because we follow these guidelines, sometimes recommendations change over time as research supports it. (For example, mammograms used to be recommended annually. Even though Medicare will still pay for an annual mammogram, the newer guidelines recommend a mammogram every two years for women of average risk). Of course, you and your doctor may decide to screen more often for some diseases, based on your risk and your co-morbidities (chronic disease you are already diagnosed with). Preventive services for you include: - Medicare offers their members a free annual wellness visit, which is time for you and your primary care provider to discuss and plan for your preventive service needs. Take advantage of this benefit every year! 
-All adults over the age of 72 should receive the recommended pneumonia vaccines. Current USPSTF guidelines recommend a series of two vaccines for the best pneumonia protection.  
-All adults should have a flu vaccine yearly and a tetanus vaccine every 10 years.  
-All adults age 48 and older should receive the shingles vaccines (series of two vaccines). -All adults age 38-68 who are overweight should have a diabetes screening test once every three years. -All adults born between [birth year] and 1965 should be screened once for Hepatitis C. 
-Other screening tests and preventive services for persons with diabetes include: an eye exam to screen for diabetic retinopathy, a kidney function test, a foot exam, and stricter control over your cholesterol.  
-Cardiovascular screening for adults with routine risk involves an electrocardiogram (ECG) at intervals determined by your doctor.  
-Colorectal cancer screenings should be done for adults age 54-65 with no increased risk factors for colorectal cancer. There are a number of acceptable methods of screening for this type of cancer. Each test has its own benefits and drawbacks. Discuss with your doctor what is most appropriate for you during your annual wellness visit. The different tests include: colonoscopy (considered the best screening method), a fecal occult blood test, a fecal DNA test, and sigmoidoscopy. 
 
-A bone mass density test is recommended when a woman turns 65 to screen for osteoporosis. This test is only recommended one time, as a screening. Some providers will use this same test as a disease monitoring tool if you already have osteoporosis. -Breast cancer screenings are recommended every other year for women of normal risk, age 54-69. 
-Cervical cancer screenings for women over age 72 are only recommended with certain risk factors. Here is a list of your current Health Maintenance items (your personalized list of preventive services) with a due date: 
Health Maintenance Due Topic Date Due  
 COVID-19 Vaccine (1 of 2) 09/12/1944  
 DTaP/Tdap/Td  (1 - Tdap) 09/12/1949  Shingles Vaccine (1 of 2) 09/12/1978  Pneumococcal Vaccine (2 of 2 - PPSV23) 01/23/2020

## 2021-02-04 NOTE — PROGRESS NOTES
This is the Subsequent Medicare Annual Wellness Exam, performed 12 months or more after the Initial AWV or the last Subsequent AWV    I have reviewed the patient's medical history in detail and updated the computerized patient record. History     Patient Active Problem List   Diagnosis Code    Hypertension I10    Hypercholesterolemia E78.00    Depression F32.9    GERD (gastroesophageal reflux disease) K21.9    Asthma J45.909    Neuropathy, upper extremity G56.90    Vitamin D deficiency E55.9    Vertigo R42    Dizziness R42    Lumbar stenosis M48.061    Anemia D64.9    Hip fracture ([address] S72.009A    UTI (urinary tract infection) N39.0     Past Medical History:   Diagnosis Date    Anemia NEC     Asthma     Cervical spondylarthritis     Depression     DJD (degenerative joint disease) of knee     Esophageal spasm     Has required esophageal dilatation; Dr. Shea Calvillo GERD (gastroesophageal reflux disease)     Hypercholesterolemia     Hypertension     Lumbar stenosis     Neuropathy, upper extremity     left    Vertigo 8592-6533    Saw Dr. Shara Torres; improved.  Vitamin D deficiency 1/28/2011      Past Surgical History:   Procedure Laterality Date    HX CATARACT REMOVAL      bilateral    HX HYSTERECTOMY      HX ORTHOPAEDIC      right bunion excision    HX OTHER SURGICAL      broken R hip    HX TONSILLECTOMY       Current Outpatient Medications   Medication Sig Dispense Refill    atorvastatin (LIPITOR) 20 mg tablet TAKE 1 TABLET BY MOUTH EVERY DAY 90 Tab 0    amLODIPine-benazepril (LOTREL) 5-20 mg per capsule TAKE 1 CAPSULE BY MOUTH ONCE DAILY 90 Cap 3    albuterol (PROVENTIL HFA, VENTOLIN HFA, PROAIR HFA) 90 mcg/actuation inhaler Take 1 Puff by inhalation as needed for Wheezing. 1 Inhaler 5    ergocalciferol (ERGOCALCIFEROL) 50,000 unit capsule Take 1 Cap by mouth every seven (7) days.  (Patient taking differently: Take 50,000 Units by mouth every Sunday.) 1 Cap 0    MULTIVITAMINS (MULTIVITAMIN PO) Take  by mouth daily.  cyproheptadine (PERIACTIN) 2 mg/5 mL syrup TK 5 ML PO BID. Allergies   Allergen Reactions    Diclofenac Itching     The voltaren gel caused itching.  Gabapentin Swelling    Hydrochlorothiazide Other (comments)     dizziness    Pcn [Penicillins] Swelling       Family History   Problem Relation Age of Onset    Heart Disease Mother     Hypertension Mother     Stroke Mother     Heart Disease Brother     Heart Disease Father     Heart Disease Brother      Social History     Tobacco Use    Smoking status: Never Smoker    Smokeless tobacco: Never Used   Substance Use Topics    Alcohol use: No       Depression Risk Factor Screening:     3 most recent PHQ Screens 2/4/2021   Little interest or pleasure in doing things Not at all   Feeling down, depressed, irritable, or hopeless Not at all   Total Score PHQ 2 0       Alcohol Risk Factor Screening:   Do you average 1 drink per night or more than 7 drinks a week:  No    On any one occasion in the past three months have you have had more than 3 drinks containing alcohol:  No      Functional Ability and Level of Safety:   Hearing: Somewhat diminished. She lives in 1 story home, with son. Daughters are near by. Activities of Daily Living: The home contains: no safety equipment. Patient needs help with:  transportation    Ambulation: with some difficulty, uses cane at times. Has wheelchair and walker as needed. Fall Risk:  Fall Risk Assessment, last 12 mths 2/4/2021   Able to walk? Yes   Fall in past 12 months? 0   Do you feel unsteady? 1   Number of falls in past 12 months -   Fall with injury? -       Abuse Screen:  Patient is not abused    Cognitive Screening   Has your family/caregiver stated any concerns about your memory: \"Yes, I'm forgetful. \"  Cognitive Screening: normal    Patient Care Team   Patient Care Team:  Brooke Parikh MD as PCP - Miller Glynn MD as PCP - [facility]  Lyly Kumar MD (Gastroenterology)  Oliva Van MD (Ophthalmology)  Ivana Dale MD (Orthopedic Surgery)  Mayford Shone, MD as Physician (Cardiology)    Assessment/Plan   Education and counseling provided:  Are appropriate based on today's review and evaluation    Diagnoses and all orders for this visit:    1. Vertigo  -     REFERRAL TO ENT-OTOLARYNGOLOGY  -     REFERRAL TO NEUROLOGY    2. Essential hypertension    3. Hypercholesterolemia    4. Vitamin D deficiency    5.  Anemia, unspecified type        Health Maintenance Due   Topic Date Due    COVID-19 Vaccine (1 of 2) 09[birth year]1944    DTaP/Tdap/Td series (1 - Tdap) 09/12/1949    Shingrix Vaccine Age 50> (1 of 2) 09/12/1978    Pneumococcal 65+ years (2 of 2 - PPSV23) 01/23/2020    Medicare Yearly Exam  01/13/2021

## 2021-03-16 ENCOUNTER — OFFICE VISIT (OUTPATIENT)
Dept: NEUROLOGY | Age: 86
End: 2021-03-16
Payer: MEDICARE

## 2021-03-16 VITALS
HEIGHT: 60 IN | TEMPERATURE: 97 F | BODY MASS INDEX: 19.24 KG/M2 | OXYGEN SATURATION: 97 % | SYSTOLIC BLOOD PRESSURE: 166 MMHG | DIASTOLIC BLOOD PRESSURE: 95 MMHG | WEIGHT: 98 LBS | HEART RATE: 76 BPM

## 2021-03-16 DIAGNOSIS — R42 VERTIGO: Primary | ICD-10-CM

## 2021-03-16 PROCEDURE — G8420 CALC BMI NORM PARAMETERS: HCPCS | Performed by: PSYCHIATRY & NEUROLOGY

## 2021-03-16 PROCEDURE — 1090F PRES/ABSN URINE INCON ASSESS: CPT | Performed by: PSYCHIATRY & NEUROLOGY

## 2021-03-16 PROCEDURE — G9717 DOC PT DX DEP/BP F/U NT REQ: HCPCS | Performed by: PSYCHIATRY & NEUROLOGY

## 2021-03-16 PROCEDURE — 1100F PTFALLS ASSESS-DOCD GE2>/YR: CPT | Performed by: PSYCHIATRY & NEUROLOGY

## 2021-03-16 PROCEDURE — G8427 DOCREV CUR MEDS BY ELIG CLIN: HCPCS | Performed by: PSYCHIATRY & NEUROLOGY

## 2021-03-16 PROCEDURE — 99204 OFFICE O/P NEW MOD 45 MIN: CPT | Performed by: PSYCHIATRY & NEUROLOGY

## 2021-03-16 PROCEDURE — 3288F FALL RISK ASSESSMENT DOCD: CPT | Performed by: PSYCHIATRY & NEUROLOGY

## 2021-03-16 PROCEDURE — G8536 NO DOC ELDER MAL SCRN: HCPCS | Performed by: PSYCHIATRY & NEUROLOGY

## 2021-03-16 RX ORDER — MECLIZINE HCL 12.5 MG 12.5 MG/1
12.5 TABLET ORAL
Qty: 90 TAB | Refills: 0 | Status: SHIPPED | OUTPATIENT
Start: 2021-03-16 | End: 2021-03-22 | Stop reason: SDUPTHER

## 2021-03-16 NOTE — PROGRESS NOTES
Neurology Note    Chief Complaint   Patient presents with    New Patient       HPI/Subjective  Cruz Grant is a 80 y.o. female who presented with headache and dizziness. It started in 2020. It is like a room spinning sensation along with recurrent falls. It fells like someone is pushing her to the floor. It is gradually worsening. She did see ENT and was diagnosed with \"labrynthine dysfunction\". Pt has been sent to neuro for further darden. Current Outpatient Medications   Medication Sig    meclizine (ANTIVERT) 12.5 mg tablet Take 1 Tab by mouth three (3) times daily as needed for Dizziness for up to 10 days.  cyproheptadine (PERIACTIN) 2 mg/5 mL syrup TK 5 ML PO BID.  atorvastatin (LIPITOR) 20 mg tablet TAKE 1 TABLET BY MOUTH EVERY DAY    amLODIPine-benazepril (LOTREL) 5-20 mg per capsule TAKE 1 CAPSULE BY MOUTH ONCE DAILY    albuterol (PROVENTIL HFA, VENTOLIN HFA, PROAIR HFA) 90 mcg/actuation inhaler Take 1 Puff by inhalation as needed for Wheezing.  ergocalciferol (ERGOCALCIFEROL) 50,000 unit capsule Take 1 Cap by mouth every seven (7) days. (Patient taking differently: Take 50,000 Units by mouth every Sunday.)    MULTIVITAMINS (MULTIVITAMIN PO) Take  by mouth daily. No current facility-administered medications for this visit. EXAMINATION:   Visit Vitals  BP (!) 166/95   Pulse 76   Temp 97 °F (36.1 °C)   Ht 5' (1.524 m)   Wt 98 lb (44.5 kg)   SpO2 97%   BMI 19.14 kg/m²        General:   General appearance: Pt is in no acute distress   Distal pulses are preserved    Neurological Examination:   Mental Status: AAO x3. Speech is fluent. Follows commands, has normal fund of knowledge, attention, short term recall, comprehension and insight. Cranial Nerves: Visual fields are full. PERRL, Extraocular movements are full. Facial sensation intact. Facial movement intact. Hearing intact to conversation. Palate elevates symmetrically. Shoulder shrug symmetric. Tongue midline.      Motor: Strength is 5/5 in all 4 ext. No atrophy. Tone: Normal    Sensation: Light touch - Normal    Reflexes: DTRs 2+ in UE and absent in LE. Coordination/Cerebellar: Intact to finger-nose-finger     Gait: Casual gait is slow and cautious. Skin: No significant bruising or lacerations. Laboratory review:   Results for orders placed or performed in visit on 10/28/20   LIPID PANEL   Result Value Ref Range    Cholesterol, total 167 100 - 199 mg/dL    Triglyceride 90 0 - 149 mg/dL    HDL Cholesterol 61 >39 mg/dL    VLDL, calculated 17 5 - 40 mg/dL    LDL, calculated 89 0 - 99 mg/dL   METABOLIC PANEL, COMPREHENSIVE   Result Value Ref Range    Glucose 120 (H) 65 - 99 mg/dL    BUN 20 10 - 36 mg/dL    Creatinine 0.97 0.57 - 1.00 mg/dL    GFR est non-AA 51 (L) >59 mL/min/1.73    GFR est AA 59 (L) >59 mL/min/1.73    BUN/Creatinine ratio 21 12 - 28    Sodium 144 134 - 144 mmol/L    Potassium 4.4 3.5 - 5.2 mmol/L    Chloride 108 (H) 96 - 106 mmol/L    CO2 25 20 - 29 mmol/L    Calcium 10.4 (H) 8.7 - 10.3 mg/dL    Protein, total 6.3 6.0 - 8.5 g/dL    Albumin 3.7 3.5 - 4.6 g/dL    GLOBULIN, TOTAL 2.6 1.5 - 4.5 g/dL    A-G Ratio 1.4 1.2 - 2.2    Bilirubin, total 0.4 0.0 - 1.2 mg/dL    Alk. phosphatase 86 39 - 117 IU/L    AST (SGOT) 18 0 - 40 IU/L    ALT (SGPT) 16 0 - 32 IU/L   CBC WITH AUTOMATED DIFF   Result Value Ref Range    WBC 5.9 3.4 - 10.8 x10E3/uL    RBC 4.42 3.77 - 5.28 x10E6/uL    HGB 12.9 11.1 - 15.9 g/dL    HCT 40.3 34.0 - 46.6 %    MCV 91 79 - 97 fL    MCH 29.2 26.6 - 33.0 pg    MCHC 32.0 31.5 - 35.7 g/dL    RDW 11.8 11.7 - 15.4 %    PLATELET 974 924 - 547 x10E3/uL    NEUTROPHILS 62 Not Estab. %    Lymphocytes 27 Not Estab. %    MONOCYTES 8 Not Estab. %    EOSINOPHILS 2 Not Estab. %    BASOPHILS 1 Not Estab. %    ABS. NEUTROPHILS 3.7 1.4 - 7.0 x10E3/uL    Abs Lymphocytes 1.6 0.7 - 3.1 x10E3/uL    ABS. MONOCYTES 0.5 0.1 - 0.9 x10E3/uL    ABS. EOSINOPHILS 0.1 0.0 - 0.4 x10E3/uL    ABS.  BASOPHILS 0.0 0.0 - 0.2 x10E3/uL    IMMATURE GRANULOCYTES 0 Not Estab. %    ABS. IMM. GRANS. 0.0 0.0 - 0.1 x10E3/uL   VITAMIN D, 25 HYDROXY   Result Value Ref Range    VITAMIN D, 25-HYDROXY 33.7 30.0 - 100.0 ng/mL   IRON PROFILE   Result Value Ref Range    TIBC 225 (L) 250 - 450 ug/dL    UIBC 141 118 - 369 ug/dL    Iron 84 27 - 139 ug/dL    Iron % saturation 37 15 - 55 %       Imaging review:  None    Documentation review:  None    Assessment/Plan:   Angeles Navarro is a 80 y.o. female who presented with dizziness. Patient has been having vertigo for the last 1 year. Has been gradually worsening. No weakness on examination. 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 I am going to start the patient at low dosage meclizine to be taken as needed. I also going to send the patient for vestibular rehabilitation. Follow-up in 3 months      ICD-10-CM ICD-9-CM    1. Vertigo  R42 780.4 REFERRAL TO PHYSICAL THERAPY      meclizine (ANTIVERT) 12.5 mg tablet      Thank you for allowing me to participate in the care of Ms. Mehta. Please feel free to contact me if you have any questions. Electronically signed. Don Parr MD  Neurologist    CC: Te Terrell MD  Fax: [phone]    This note was created using voice recognition software. Despite editing, there may be syntax errors.

## 2021-03-18 ENCOUNTER — TELEPHONE (OUTPATIENT)
Dept: NEUROLOGY | Age: 86
End: 2021-03-18

## 2021-03-19 ENCOUNTER — TELEPHONE (OUTPATIENT)
Dept: NEUROLOGY | Age: 86
End: 2021-03-19

## 2021-03-22 DIAGNOSIS — R42 VERTIGO: ICD-10-CM

## 2021-03-22 RX ORDER — MECLIZINE HCL 12.5 MG 12.5 MG/1
12.5 TABLET ORAL
Qty: 90 TAB | Refills: 0 | Status: SHIPPED | OUTPATIENT
Start: 2021-03-22 | End: 2021-04-01

## 2021-05-07 ENCOUNTER — TELEPHONE (OUTPATIENT)
Dept: INTERNAL MEDICINE CLINIC | Age: 86
End: 2021-05-07

## 2021-05-07 NOTE — TELEPHONE ENCOUNTER
#[phone] [facility] is seeing pt who was referred by another physician for dizziness. Yakov Moser states that there is an orthostatic drop in top/bottom numbers significantly she states.

## 2021-05-11 ENCOUNTER — TELEPHONE (OUTPATIENT)
Dept: INTERNAL MEDICINE CLINIC | Age: 86
End: 2021-05-11

## 2021-05-11 RX ORDER — AMLODIPINE BESYLATE 2.5 MG/1
2.5 TABLET ORAL DAILY
Qty: 90 TAB | Refills: 5 | Status: SHIPPED | OUTPATIENT
Start: 2021-05-11 | End: 2022-07-06 | Stop reason: SDUPTHER

## 2021-05-11 RX ORDER — LISINOPRIL 10 MG/1
10 TABLET ORAL DAILY
Qty: 90 TAB | Refills: 3 | Status: SHIPPED | OUTPATIENT
Start: 2021-05-11 | End: 2022-07-25 | Stop reason: SDUPTHER

## 2021-05-11 NOTE — TELEPHONE ENCOUNTER
Orders Placed This Encounter    amLODIPine (NORVASC) 2.5 mg tablet     Sig: Take 1 Tab by mouth daily. Dispense:  90 Tab     Refill:  5    lisinopriL (PRINIVIL, ZESTRIL) 10 mg tablet     Sig: Take 1 Tab by mouth daily. Dispense:  90 Tab     Refill:  3     D/C lotrel.

## 2021-05-11 NOTE — TELEPHONE ENCOUNTER
[facility] Nando Butler [facility]. Patient is being seen for Vertigo. Nando Butler reports that patient gets dizzy when sitting up or standing. BP in supine position 156/77 and Standing 115/66. Nando Butler thinks that the low BP when standing may be the cause of dizziness. Patient placed on Dr. Luis Angel Johnson wait list for a sooner appointment.

## 2021-05-11 NOTE — TELEPHONE ENCOUNTER
Identified patient 2 identifiers verified. Spoke with patient informed of new orders. Patient will have her  elaine the office about the medication changes due to her memory.

## 2021-05-11 NOTE — TELEPHONE ENCOUNTER
----- Message from Hla Mares sent at 5/11/2021 12:26 PM EDT -----  Regarding: Dr. Mercedes Gandhi  General Message/Vendor Calls    Caller's first and last name: Pt       Reason for call: Wants to speak with Hilda to discuss some medications.        Callback required yes/no and why: yes, to discuss       Best contact number(s): [phone]      Message from [facility]

## 2021-05-12 RX ORDER — AMLODIPINE BESYLATE 2.5 MG/1
2.5 TABLET ORAL DAILY
Qty: 90 TAB | Refills: 5 | Status: CANCELLED | OUTPATIENT
Start: 2021-05-12

## 2021-05-12 NOTE — TELEPHONE ENCOUNTER
#[phone]  Daughter states that pt is out of her htn medication and will need yet today. Please call both in for 90 day refills.

## 2021-05-12 NOTE — TELEPHONE ENCOUNTER
Patient's daughter, David Sesay, states she needs a call back as patient received 2 Blood Pressure medications at the Pharmacy & the Atorvastatin requested was not done. Please call to discuss.  Thank you

## 2021-05-12 NOTE — TELEPHONE ENCOUNTER
#[phone]  Pt states that Dr. Lucy Hunt wanted her to refill a medication, but pt isn't sure what medication that is. Pt states she needs help remembering what the medication is. Pt states he wants her to have this as she is running out?        Please call to help pt

## 2021-05-14 RX ORDER — ATORVASTATIN CALCIUM 20 MG/1
TABLET, FILM COATED ORAL
Qty: 90 TAB | Refills: 0 | Status: SHIPPED | OUTPATIENT
Start: 2021-05-14 | End: 2022-07-06 | Stop reason: SDUPTHER

## 2021-05-28 ENCOUNTER — APPOINTMENT (OUTPATIENT)
Dept: GENERAL RADIOLOGY | Age: 86
DRG: 480 | End: 2021-05-28
Attending: EMERGENCY MEDICINE
Payer: MEDICARE

## 2021-05-28 ENCOUNTER — APPOINTMENT (OUTPATIENT)
Dept: CT IMAGING | Age: 86
DRG: 480 | End: 2021-05-28
Attending: EMERGENCY MEDICINE
Payer: MEDICARE

## 2021-05-28 ENCOUNTER — ANESTHESIA EVENT (OUTPATIENT)
Dept: SURGERY | Age: 86
DRG: 480 | End: 2021-05-28
Payer: MEDICARE

## 2021-05-28 ENCOUNTER — HOSPITAL ENCOUNTER (INPATIENT)
Age: 86
LOS: 6 days | Discharge: NURSING, REHAB OR LONG-TERM CARE | DRG: 480 | End: 2021-06-03
Attending: EMERGENCY MEDICINE | Admitting: INTERNAL MEDICINE
Payer: MEDICARE
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
## 2021-05-28 DIAGNOSIS — R41.0 CONFUSION: ICD-10-CM
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
## 2021-05-28 DIAGNOSIS — E78.00 HYPERCHOLESTEROLEMIA: ICD-10-CM

## 2021-05-28 DIAGNOSIS — S72.002A CLOSED FRACTURE OF LEFT HIP, INITIAL ENCOUNTER (HCC): Primary | ICD-10-CM

## 2021-05-28 DIAGNOSIS — S09.90XA CLOSED HEAD INJURY, INITIAL ENCOUNTER: ICD-10-CM

## 2021-05-28 DIAGNOSIS — D64.9 ANEMIA, UNSPECIFIED TYPE: ICD-10-CM

## 2021-05-28 DIAGNOSIS — I10 ESSENTIAL HYPERTENSION: ICD-10-CM

## 2021-05-28 PROBLEM — S72.142A CLOSED 2-PART INTERTROCHANTERIC FRACTURE OF LEFT FEMUR (HCC): Status: ACTIVE | Noted: 2021-05-28
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
## 2021-05-28 LAB
ALBUMIN SERPL-MCNC: 3.1 G/DL (ref 3.5–5)
ALBUMIN/GLOB SERPL: 0.9 {RATIO} (ref 1.1–2.2)
ALP SERPL-CCNC: 71 U/L (ref 45–117)
ALT SERPL-CCNC: 28 U/L (ref 12–78)
ANION GAP SERPL CALC-SCNC: 4 MMOL/L (ref 5–15)
AST SERPL-CCNC: 24 U/L (ref 15–37)
BASOPHILS # BLD: 0 K/UL (ref 0–0.1)
BASOPHILS NFR BLD: 0 % (ref 0–1)
BILIRUB SERPL-MCNC: 0.6 MG/DL (ref 0.2–1)
BUN SERPL-MCNC: 19 MG/DL (ref 6–20)
BUN/CREAT SERPL: 26 (ref 12–20)
CALCIUM SERPL-MCNC: 9.7 MG/DL (ref 8.5–10.1)
CHLORIDE SERPL-SCNC: 108 MMOL/L (ref 97–108)
CO2 SERPL-SCNC: 25 MMOL/L (ref 21–32)
CREAT SERPL-MCNC: 0.73 MG/DL (ref 0.55–1.02)
DIFFERENTIAL METHOD BLD: ABNORMAL
EOSINOPHIL # BLD: 0 K/UL (ref 0–0.4)
EOSINOPHIL NFR BLD: 0 % (ref 0–7)
ERYTHROCYTE [DISTWIDTH] IN BLOOD BY AUTOMATED COUNT: 11.8 % (ref 11.5–14.5)
GLOBULIN SER CALC-MCNC: 3.3 G/DL (ref 2–4)
GLUCOSE SERPL-MCNC: 105 MG/DL (ref 65–100)
HCT VFR BLD AUTO: 38.1 % (ref 35–47)
HGB BLD-MCNC: 12.1 G/DL (ref 11.5–16)
IMM GRANULOCYTES # BLD AUTO: 0 K/UL (ref 0–0.04)
IMM GRANULOCYTES NFR BLD AUTO: 0 % (ref 0–0.5)
LYMPHOCYTES # BLD: 0.6 K/UL (ref 0.8–3.5)
LYMPHOCYTES NFR BLD: 5 % (ref 12–49)
MCH RBC QN AUTO: 30.2 PG (ref 26–34)
MCHC RBC AUTO-ENTMCNC: 31.8 G/DL (ref 30–36.5)
MCV RBC AUTO: 95 FL (ref 80–99)
MONOCYTES # BLD: 0.7 K/UL (ref 0–1)
MONOCYTES NFR BLD: 6 % (ref 5–13)
NEUTS SEG # BLD: 10.5 K/UL (ref 1.8–8)
NEUTS SEG NFR BLD: 89 % (ref 32–75)
NRBC # BLD: 0 K/UL (ref 0–0.01)
NRBC BLD-RTO: 0 PER 100 WBC
PLATELET # BLD AUTO: 209 K/UL (ref 150–400)
PMV BLD AUTO: 11.7 FL (ref 8.9–12.9)
POTASSIUM SERPL-SCNC: 4.2 MMOL/L (ref 3.5–5.1)
PROT SERPL-MCNC: 6.4 G/DL (ref 6.4–8.2)
RBC # BLD AUTO: 4.01 M/UL (ref 3.8–5.2)
RBC MORPH BLD: ABNORMAL
SODIUM SERPL-SCNC: 137 MMOL/L (ref 136–145)
WBC # BLD AUTO: 11.8 K/UL (ref 3.6–11)
WBC MORPH BLD: ABNORMAL

## 2021-05-28 PROCEDURE — 74011250636 HC RX REV CODE- 250/636: Performed by: EMERGENCY MEDICINE

## 2021-05-28 PROCEDURE — 99285 EMERGENCY DEPT VISIT HI MDM: CPT

## 2021-05-28 PROCEDURE — 93005 ELECTROCARDIOGRAM TRACING: CPT

## 2021-05-28 PROCEDURE — 85025 COMPLETE CBC W/AUTO DIFF WBC: CPT

## 2021-05-28 PROCEDURE — 36415 COLL VENOUS BLD VENIPUNCTURE: CPT

## 2021-05-28 PROCEDURE — 74011250636 HC RX REV CODE- 250/636: Performed by: INTERNAL MEDICINE

## 2021-05-28 PROCEDURE — 65270000029 HC RM PRIVATE

## 2021-05-28 PROCEDURE — 74011250637 HC RX REV CODE- 250/637: Performed by: INTERNAL MEDICINE

## 2021-05-28 PROCEDURE — 96374 THER/PROPH/DIAG INJ IV PUSH: CPT

## 2021-05-28 PROCEDURE — 70450 CT HEAD/BRAIN W/O DYE: CPT

## 2021-05-28 PROCEDURE — 73502 X-RAY EXAM HIP UNI 2-3 VIEWS: CPT

## 2021-05-28 PROCEDURE — 73562 X-RAY EXAM OF KNEE 3: CPT

## 2021-05-28 PROCEDURE — 80053 COMPREHEN METABOLIC PANEL: CPT

## 2021-05-28 RX ORDER — ACETAMINOPHEN 325 MG/1
650 TABLET ORAL
Status: DISCONTINUED | OUTPATIENT
Start: 2021-05-28 | End: 2021-06-03 | Stop reason: HOSPADM

## 2021-05-28 RX ORDER — AMLODIPINE BESYLATE 2.5 MG/1
2.5 TABLET ORAL DAILY
Status: DISCONTINUED | OUTPATIENT
Start: 2021-05-29 | End: 2021-06-03 | Stop reason: HOSPADM

## 2021-05-28 RX ORDER — ACETAMINOPHEN 650 MG/1
650 SUPPOSITORY RECTAL
Status: DISCONTINUED | OUTPATIENT
Start: 2021-05-28 | End: 2021-06-03 | Stop reason: HOSPADM

## 2021-05-28 RX ORDER — ALBUTEROL SULFATE 90 UG/1
1 AEROSOL, METERED RESPIRATORY (INHALATION)
Status: DISCONTINUED | OUTPATIENT
Start: 2021-05-28 | End: 2021-06-03 | Stop reason: HOSPADM

## 2021-05-28 RX ORDER — SODIUM CHLORIDE 9 MG/ML
75 INJECTION, SOLUTION INTRAVENOUS CONTINUOUS
Status: DISCONTINUED | OUTPATIENT
Start: 2021-05-28 | End: 2021-06-03 | Stop reason: HOSPADM

## 2021-05-28 RX ORDER — ONDANSETRON 2 MG/ML
4 INJECTION INTRAMUSCULAR; INTRAVENOUS
Status: DISCONTINUED | OUTPATIENT
Start: 2021-05-28 | End: 2021-05-29

## 2021-05-28 RX ORDER — ONDANSETRON 4 MG/1
4 TABLET, ORALLY DISINTEGRATING ORAL
Status: DISCONTINUED | OUTPATIENT
Start: 2021-05-28 | End: 2021-06-03 | Stop reason: HOSPADM

## 2021-05-28 RX ORDER — MORPHINE SULFATE 2 MG/ML
2 INJECTION, SOLUTION INTRAMUSCULAR; INTRAVENOUS
Status: DISCONTINUED | OUTPATIENT
Start: 2021-05-28 | End: 2021-06-03 | Stop reason: HOSPADM

## 2021-05-28 RX ORDER — SODIUM CHLORIDE 0.9 % (FLUSH) 0.9 %
5-40 SYRINGE (ML) INJECTION EVERY 8 HOURS
Status: DISCONTINUED | OUTPATIENT
Start: 2021-05-28 | End: 2021-06-03 | Stop reason: HOSPADM

## 2021-05-28 RX ORDER — HEPARIN SODIUM 5000 [USP'U]/ML
5000 INJECTION, SOLUTION INTRAVENOUS; SUBCUTANEOUS EVERY 12 HOURS
Status: DISCONTINUED | OUTPATIENT
Start: 2021-05-28 | End: 2021-05-29

## 2021-05-28 RX ORDER — MORPHINE SULFATE 2 MG/ML
4 INJECTION, SOLUTION INTRAMUSCULAR; INTRAVENOUS
Status: ACTIVE | OUTPATIENT
Start: 2021-05-28 | End: 2021-05-29

## 2021-05-28 RX ORDER — ATORVASTATIN CALCIUM 20 MG/1
20 TABLET, FILM COATED ORAL
Status: DISCONTINUED | OUTPATIENT
Start: 2021-05-28 | End: 2021-06-03 | Stop reason: HOSPADM

## 2021-05-28 RX ORDER — ENOXAPARIN SODIUM 100 MG/ML
40 INJECTION SUBCUTANEOUS DAILY
Status: DISCONTINUED | OUTPATIENT
Start: 2021-05-29 | End: 2021-05-28

## 2021-05-28 RX ORDER — SODIUM CHLORIDE 0.9 % (FLUSH) 0.9 %
5-40 SYRINGE (ML) INJECTION AS NEEDED
Status: DISCONTINUED | OUTPATIENT
Start: 2021-05-28 | End: 2021-06-01

## 2021-05-28 RX ORDER — POLYETHYLENE GLYCOL 3350 17 G/17G
17 POWDER, FOR SOLUTION ORAL DAILY PRN
Status: DISCONTINUED | OUTPATIENT
Start: 2021-05-28 | End: 2021-06-03 | Stop reason: HOSPADM

## 2021-05-28 RX ADMIN — ACETAMINOPHEN 650 MG: 325 TABLET ORAL at 21:32

## 2021-05-28 RX ADMIN — ATORVASTATIN CALCIUM 20 MG: 20 TABLET, FILM COATED ORAL at 21:32

## 2021-05-28 RX ADMIN — SODIUM CHLORIDE 75 ML/HR: 9 INJECTION, SOLUTION INTRAVENOUS at 14:10

## 2021-05-28 RX ADMIN — MORPHINE SULFATE 2 MG: 2 INJECTION, SOLUTION INTRAMUSCULAR; INTRAVENOUS at 11:16

## 2021-05-28 RX ADMIN — ACETAMINOPHEN 650 MG: 325 TABLET ORAL at 15:22

## 2021-05-28 NOTE — ED NOTES
Pt arrived to ED via EMS after falling at home. Pt reports having pain on her left leg from her hip down to just below her knee. Pt is AOX4 and monitored X3. Pt reports a hx of HTN and states that she hasn't taken her medication today. Pt states that she fell because she thought the door was closed and she leaned against it and it wasn't there.

## 2021-05-28 NOTE — ED NOTES
Bedside and Verbal shift change report given to [address] [place] [address] [place] (oncoming nurse) by Efrain Monteiro RN (offgoing nurse). Report included the following information SBAR, Kardex, Intake/Output and MAR.

## 2021-05-28 NOTE — ANESTHESIA PREPROCEDURE EVALUATION
Relevant Problems   RESPIRATORY SYSTEM   (+) Asthma      NEUROLOGY   (+) Depression      CARDIOVASCULAR   (+) Hypertension      GASTROINTESTINAL   (+) GERD (gastroesophageal reflux disease)      HEMATOLOGY   (+) Anemia   Anesthetic History   No history of anesthetic complications            Review of Systems / Medical History  Patient summary reviewed, nursing notes reviewed and pertinent labs reviewed    Pulmonary            Asthma        Neuro/Psych         Psychiatric history    Comments: Depression  Cervical spine arthritis  Neuropathy, upper extremity  Vertigo  Lumbar Stenosis Cardiovascular    Hypertension              Exercise tolerance: <4 METS  Comments: BB at 8:05 this am.  Known LBB   GI/Hepatic/Renal     GERD: well controlled          Comments: Rectal Bleeding Endo/Other        Arthritis and anemia     Other Findings   Comments: Cervical spondylarthritis          Physical Exam    Airway  Mallampati: II  TM Distance: > 6 cm  Neck ROM: normal range of motion   Mouth opening: Normal     Cardiovascular  Regular rate and rhythm,  S1 and S2 normal,  no murmur, click, rub, or gallop             Dental    Dentition: Full lower dentures, Edentulous and Full upper dentures     Pulmonary  Breath sounds clear to auscultation               Abdominal  GI exam deferred       Other Findings            Anesthetic Plan    ASA: 2  Anesthesia type: general          Induction: Intravenous  Anesthetic plan and risks discussed with: Patient            Anesthetic History   No history of anesthetic complications            Review of Systems / Medical History  Patient summary reviewed, nursing notes reviewed and pertinent labs reviewed    Pulmonary            Asthma        Neuro/Psych         Psychiatric history    Comments: Depression  Cervical spine arthritis  Neuropathy, upper extremity  Vertigo  Lumbar Stenosis Cardiovascular    Hypertension              Exercise tolerance: <4 METS  Comments: BB at 8:05 this am.  Known LBB GI/Hepatic/Renal     GERD: well controlled          Comments: Rectal Bleeding Endo/Other        Arthritis and anemia     Other Findings   Comments: Cervical spondylarthritis          Physical Exam    Airway  Mallampati: II  TM Distance: > 6 cm  Neck ROM: normal range of motion   Mouth opening: Normal     Cardiovascular  Regular rate and rhythm,  S1 and S2 normal,  no murmur, click, rub, or gallop             Dental    Dentition: Full lower dentures, Edentulous and Full upper dentures     Pulmonary  Breath sounds clear to auscultation               Abdominal  GI exam deferred       Other Findings            Anesthetic Plan    ASA: 2  Anesthesia type: general          Induction: Intravenous  Anesthetic plan and risks discussed with: Patient                Anesthetic History     Other anesthesia complications     Comments: Severe postoperative delirium      Review of Systems / Medical History  Patient summary reviewed, nursing notes reviewed and pertinent labs reviewed    Pulmonary            Asthma        Neuro/Psych         Psychiatric history    Comments: Neuropathy  Depression   Cervical spondylarthritis   Vertigo  Lumbar spinal stenosis  Cardiovascular    Hypertension          Hyperlipidemia    Exercise tolerance: <4 METS  Comments: ECG (5/28/21):    Sinus rhythm with premature supraventricular complexes   Left axis deviation   Incomplete left bundle branch block   GI/Hepatic/Renal     GERD          Comments: H/O Esophageal spasm Endo/Other        Arthritis    Comments: S/P Right Hip Hemiarthroplasty (4/28/17) Other Findings   Comments: Closed 2-part intertrochanteric fracture of left femur (5/28/21)    Vitamin D deficiency   DJD         Physical Exam    Airway  Mallampati: II    Neck ROM: normal range of motion   Mouth opening: Normal     Cardiovascular  Regular rate and rhythm,  S1 and S2 normal,  no murmur, click, rub, or gallop             Dental    Dentition: Full lower dentures and Full upper dentures Pulmonary  Breath sounds clear to auscultation               Abdominal  GI exam deferred       Other Findings            Anesthetic Plan    ASA: 3  Anesthesia type: general and total IV anesthesia    Monitoring Plan: BIS      Induction: Intravenous  Anesthetic plan and risks discussed with: Patient      TIVA  No preoperative sedation to avoid postoperative delirium

## 2021-05-28 NOTE — CONSULTS
Orthopedic Consult    Subjective:     Myles Foley is a 80 y.o.  female who is being seen for left hip fracture. Onset of symptoms was abrupt with rapidly worsening course since that time. The pain is located in the left groin. Patient describes the pain as severe. She cannot ambulate. Past Medical History:   Diagnosis Date    Anemia NEC     Asthma     Cervical spondylarthritis     Depression     DJD (degenerative joint disease) of knee     Esophageal spasm     Has required esophageal dilatation; Dr. Madi Dotson GERD (gastroesophageal reflux disease)     Hypercholesterolemia     Hypertension     Lumbar stenosis     Neuropathy, upper extremity     left    Vertigo [id number]    Saw Dr. Milady Neal; improved.      Vitamin D deficiency 1/28/2011      Past Surgical History:   Procedure Laterality Date    HX CATARACT REMOVAL      bilateral    HX HYSTERECTOMY      HX ORTHOPAEDIC      right bunion excision    HX OTHER SURGICAL      broken R hip    HX TONSILLECTOMY       Family History   Problem Relation Age of Onset    Heart Disease Mother     Hypertension Mother     Stroke Mother     Heart Disease Brother     Heart Disease Father     Heart Disease Brother       Social History     Tobacco Use    Smoking status: Never Smoker    Smokeless tobacco: Never Used   Substance Use Topics    Alcohol use: No       Current Facility-Administered Medications   Medication Dose Route Frequency Provider Last Rate Last Admin    morphine injection 2 mg  2 mg IntraVENous Q1H PRN Renzo Knowles MD   2 mg at 05/28/21 1116    morphine injection 4 mg  4 mg IntraVENous ONCE PRN Renzo Knowles MD        albuterol (PROVENTIL HFA, VENTOLIN HFA, PROAIR HFA) inhaler 1 Puff  1 Puff Inhalation Q6H PRN Nathaniel NAILS MD        [START ON 5/29/2021] amLODIPine (NORVASC) tablet 2.5 mg  2.5 mg Oral DAILY Nathaniel NAILS MD        atorvastatin (LIPITOR) tablet 20 mg  20 mg Oral QHS Paola Stephens MD  sodium chloride (NS) flush 5-40 mL  5-40 mL IntraVENous Q8H Dallin Barnett MD        sodium chloride (NS) flush 5-40 mL  5-40 mL IntraVENous PRN Michelle Fischer MD        acetaminophen (TYLENOL) tablet 650 mg  650 mg Oral Q6H PRN Elena NAILS MD   650 mg at 05/28/21 1522    Or    acetaminophen (TYLENOL) suppository 650 mg  650 mg Rectal Q6H PRN Michelle Fischer MD        polyethylene glycol (MIRALAX) packet 17 g  17 g Oral DAILY PRN Michelle Fischer MD        ondansetron (ZOFRAN ODT) tablet 4 mg  4 mg Oral Q8H PRN Elena NAILS MD        Or    ondansetron (ZOFRAN) injection 4 mg  4 mg IntraVENous Q6H PRN Elena NAILS MD        heparin (porcine) injection 5,000 Units  5,000 Units SubCUTAneous Q12H Dallin Barnett MD        0.9% sodium chloride infusion  75 mL/hr IntraVENous CONTINUOUS Elena NAILS MD 75 mL/hr at 05/28/21 1410 75 mL/hr at 05/28/21 1410        Allergies   Allergen Reactions    Diclofenac Itching     The voltaren gel caused itching.  Gabapentin Swelling    Hydrochlorothiazide Other (comments)     dizziness    Pcn [Penicillins] Swelling        Review of Systems:  Review of systems negative except for those items cited in the HPI and past medical history. Objective:         Physical Exam: Patient is a 80y.o. year old Female in no distress. The left lower extremity is shortened and externally rotated. Toes are warm and mobile. Motion of the left hip is painful. Data/Lab/X-ray Review: X-rays reveal a displaced intertrochanteric fracture of the left hip.     Recent Results (from the past 24 hour(s))   EKG, 12 LEAD, INITIAL    Collection Time: 05/28/21 10:09 AM   Result Value Ref Range    Ventricular Rate 77 BPM    Atrial Rate 77 BPM    P-R Interval 194 ms    QRS Duration 120 ms    Q-T Interval 428 ms    QTC Calculation (Bezet) 484 ms    Calculated P Axis 55 degrees    Calculated R Axis -55 degrees    Calculated T Axis 87 degrees Diagnosis       Sinus rhythm with premature supraventricular complexes  Left axis deviation  Incomplete left bundle branch block  When compared with ECG of 28-MAY-2021 09:52,  MANUAL COMPARISON REQUIRED, DATA IS UNCONFIRMED     CBC WITH AUTOMATED DIFF    Collection Time: 05/28/21 11:18 AM   Result Value Ref Range    WBC 11.8 (H) 3.6 - 11.0 K/uL    RBC 4.01 3.80 - 5.20 M/uL    HGB 12.1 11.5 - 16.0 g/dL    HCT 38.1 35.0 - 47.0 %    MCV 95.0 80.0 - 99.0 FL    MCH 30.2 26.0 - 34.0 PG    MCHC 31.8 30.0 - 36.5 g/dL    RDW 11.8 11.5 - 14.5 %    PLATELET 195 185 - 399 K/uL    MPV 11.7 8.9 - 12.9 FL    NRBC 0.0 0  WBC    ABSOLUTE NRBC 0.00 0.00 - 0.01 K/uL    NEUTROPHILS 89 (H) 32 - 75 %    LYMPHOCYTES 5 (L) 12 - 49 %    MONOCYTES 6 5 - 13 %    EOSINOPHILS 0 0 - 7 %    BASOPHILS 0 0 - 1 %    IMMATURE GRANULOCYTES 0 0.0 - 0.5 %    ABS. NEUTROPHILS 10.5 (H) 1.8 - 8.0 K/UL    ABS. LYMPHOCYTES 0.6 (L) 0.8 - 3.5 K/UL    ABS. MONOCYTES 0.7 0.0 - 1.0 K/UL    ABS. EOSINOPHILS 0.0 0.0 - 0.4 K/UL    ABS. BASOPHILS 0.0 0.0 - 0.1 K/UL    ABS. IMM. GRANS. 0.0 0.00 - 0.04 K/UL    DF SMEAR SCANNED      RBC COMMENTS NORMOCYTIC, NORMOCHROMIC      WBC COMMENTS TOXIC GRANULATION     METABOLIC PANEL, COMPREHENSIVE    Collection Time: 05/28/21 11:18 AM   Result Value Ref Range    Sodium 137 136 - 145 mmol/L    Potassium 4.2 3.5 - 5.1 mmol/L    Chloride 108 97 - 108 mmol/L    CO2 25 21 - 32 mmol/L    Anion gap 4 (L) 5 - 15 mmol/L    Glucose 105 (H) 65 - 100 mg/dL    BUN 19 6 - 20 MG/DL    Creatinine 0.73 0.55 - 1.02 MG/DL    BUN/Creatinine ratio 26 (H) 12 - 20      GFR est AA >60 >60 ml/min/1.73m2    GFR est non-AA >60 >60 ml/min/1.73m2    Calcium 9.7 8.5 - 10.1 MG/DL    Bilirubin, total 0.6 0.2 - 1.0 MG/DL    ALT (SGPT) 28 12 - 78 U/L    AST (SGOT) 24 15 - 37 U/L    Alk.  phosphatase 71 45 - 117 U/L    Protein, total 6.4 6.4 - 8.2 g/dL    Albumin 3.1 (L) 3.5 - 5.0 g/dL    Globulin 3.3 2.0 - 4.0 g/dL    A-G Ratio 0.9 (L) 1.1 - 2.2 Assessment:     Active Problems:    Fall (5/28/2021)      Closed 2-part intertrochanteric fracture of left femur ([address] (5/28/2021)            Plan:   The surgical procedure, expected postoperative course, risks and benefits discussed with patient. They express understanding and wish to proceed with the proposed procedure. I also spoke with her oldest daughter. She is on the schedule for tomorrow morning.   Thank you for the courtesy of this consultation,  Signed By: Henrik Martinez MD     May 28, 2021

## 2021-05-28 NOTE — PROGRESS NOTES
End of Shift Note    Bedside shift change report given to  (oncoming nurse) by Alena Champion (offgoing nurse). Report included the following information SBAR, Kardex, Intake/Output, MAR, Recent Results and Med Rec Status    Shift worked:  0700 - 1930     Shift summary and any significant changes:     pt has a ladd in place, tried to do consent she said she wanted to wait to decide if she wanted to sign for blood products or no blood she wanted daughters opinion and I couldn't get in touch with her. Concerns for physician to address: none     Zone phone for oncoming shift:  none     Activity:  Activity Level: Bed Rest  Number times ambulated in hallways past shift: 0  Number of times OOB to chair past shift: 0    Cardiac:   Cardiac Monitoring: Yes           Access:   Current line(s): PIV     Genitourinary:   Urinary status: ladd    Respiratory:   O2 Device: None (Room air)  Chronic home O2 use?: NO  Incentive spirometer at bedside: NO     GI:     Current diet:  DIET NPO  DIET CARDIAC Regular  DIET NPO  Passing flatus: YES  Tolerating current diet: YES       Pain Management:   Patient states pain is manageable on current regimen: YES    Skin:  Victorino Score: 15  Interventions: increase time out of bed    Patient Safety:  Fall Score:  Total Score: 4  Interventions: pt to call before getting OOB  High Fall Risk: Yes    Length of Stay:  Expected LOS: - - -  Actual LOS: 0      Alena Champion

## 2021-05-28 NOTE — H&P
Hospitalist Admission Note    NAME: Dianne Kelly   :  1928   MRN:  [id number]     Date/Time:  2021 1:40 PM    Patient PCP: Farrah Gonzales MD  ______________________________________________________________________  Given the patient's current clinical presentation, I have a high level of concern for decompensation if discharged from the emergency department. Complex decision making was performed, which includes reviewing the patient's available past medical records, laboratory results, and x-ray films. My assessment of this patient's clinical condition and my plan of care is as follows. Assessment / Plan:  L intertrochanteric Hip fracture POA  S/p Fall at home (mechanical)  Preop Medical Clearance  Ct head neg acute  Xray L Hip= comminuted L IT fracture    Admit to Orthopedic bed  NPO p MN, cardiac diet for now  Pain management  SQ lovenox for DVT prophylaxis  IP ortho consulted in ER=awaiting OR plans  Pre-op cardiac risk assessment:  Pt evaluated using revised cardiac risk index and is felt to be low cardiovascular risk for intermediate risk surgery with a 0.4% risk for major complications based on these criteria. This risk has been discussed with the patient and and pt wishes to proceed. Plan for surgery without further cardiac testing if this risk is acceptable per surgery and anesthesia. Further risk reduction will involve medical management of other comorbid conditions in the perioperative period.   IVF  UA when sample available    HTN  Hyperlipidemia  Vit D def  GERD/esophageal spasm  DJD  Cont home BP meds except Lisinoipril  Cont statin  Holding Vit D          Code Status: DNR per pt's wishes in ER  Surrogate Decision Maker: daughter    DVT Prophylaxis: SQ lovenox  GI Prophylaxis: not indicated    Baseline: Pt is ambulates with walker at baseline at home with family      Subjective:   CHIEF COMPLAINT: L hip pain s/p mechanical fall at home    [address] ILLNESS:     Tonie Knutson is a 80 y.o.  female who presents with above complains via EMS. Pt presents with CC of L hip pain s/p fall at home. H/o not using her walker when pt fell . H/o hitting head to the counter but denies LOC  Denies any chest pain, SOB, palpitations, Syncope. Pt was was found to have L Hip IT fracture with mild displacement on imaging in ER with otherwise unremarkable workup in ER including CT head. We were asked to admit for work up and evaluation of the above problems. Past Medical History:   Diagnosis Date    Anemia NEC     Asthma     Cervical spondylarthritis     Depression     DJD (degenerative joint disease) of knee     Esophageal spasm     Has required esophageal dilatation; Dr. Anastacio Molina GERD (gastroesophageal reflux disease)     Hypercholesterolemia     Hypertension     Lumbar stenosis     Neuropathy, upper extremity     left    Vertigo [id number]    Saw Dr. Olivia Louise; improved.  Vitamin D deficiency 1/28/2011        Past Surgical History:   Procedure Laterality Date    HX CATARACT REMOVAL      bilateral    HX HYSTERECTOMY      HX ORTHOPAEDIC      right bunion excision    HX OTHER SURGICAL      broken R hip    HX TONSILLECTOMY         Social History     Tobacco Use    Smoking status: Never Smoker    Smokeless tobacco: Never Used   Substance Use Topics    Alcohol use: No        Family History   Problem Relation Age of Onset    Heart Disease Mother     Hypertension Mother     Stroke Mother     Heart Disease Brother     Heart Disease Father     Heart Disease Brother      Allergies   Allergen Reactions    Diclofenac Itching     The voltaren gel caused itching.  Gabapentin Swelling    Hydrochlorothiazide Other (comments)     dizziness    Pcn [Penicillins] Swelling        Prior to Admission medications    Medication Sig Start Date End Date Taking?  Authorizing Provider   atorvastatin (LIPITOR) 20 mg tablet TAKE 1 TABLET BY MOUTH EVERY DAY 5/14/21   Lolita Moore MD   amLODIPine (NORVASC) 2.5 mg tablet Take 1 Tab by mouth daily. 5/11/21   Lolita Moore MD   lisinopriL (PRINIVIL, ZESTRIL) 10 mg tablet Take 1 Tab by mouth daily. 5/11/21   Lolita Moore MD   cyproheptadine (PERIACTIN) 2 mg/5 mL syrup TK 5 ML PO BID. 6/20/20   Provider, Historical   albuterol (PROVENTIL HFA, VENTOLIN HFA, PROAIR HFA) 90 mcg/actuation inhaler Take 1 Puff by inhalation as needed for Wheezing. 9/10/18   Lolita Moore MD   ergocalciferol (ERGOCALCIFEROL) 50,000 unit capsule Take 1 Cap by mouth every seven (7) days. Patient taking differently: Take 50,000 Units by mouth every Sunday. 5/1/17   Lolita Moore MD   MULTIVITAMINS (MULTIVITAMIN PO) Take  by mouth daily.     Provider, Historical       REVIEW OF SYSTEMS:         Total of 12 systems reviewed as follows:       POSITIVE= underlined text  Negative = text not underlined  General:  fever, chills, sweats, generalized weakness, weight loss/gain,      loss of appetite   Eyes:    blurred vision, eye pain, loss of vision, double vision  ENT:    rhinorrhea, pharyngitis   Respiratory:   cough, sputum production, SOB, [place], wheezing, pleuritic pain   Cardiology:   chest pain, palpitations, orthopnea, PND, edema, syncope   Gastrointestinal:  abdominal pain , N/V, diarrhea, dysphagia, constipation, bleeding   Genitourinary:  frequency, urgency, dysuria, hematuria, incontinence   Muskuloskeletal :  Arthralgia ( L hip pain), myalgia, back pain  Hematology:  easy bruising, nose or gum bleeding, lymphadenopathy   Dermatological: rash, ulceration, pruritis, color change / jaundice  Endocrine:   hot flashes or polydipsia   Neurological:  headache, dizziness, confusion, focal weakness, paresthesia,     Speech difficulties, memory loss, gait difficulty  Psychological: Feelings of anxiety, depression, agitation    Objective:   VITALS:    Visit Vitals  /62   Pulse 76   Temp 98.7 °F (37.1 °C)   Resp 21   Ht 5' (1.524 m) Wt 44.5 kg (98 lb)   SpO2 98%   BMI 19.14 kg/m²       PHYSICAL EXAM:    General:    Alert, cooperative, no distress, appears stated age. HEENT: Atraumatic, anicteric sclerae, pink conjunctivae     No oral ulcers, mucosa dry+ , throat clear, dentition fair  Neck:  Supple, symmetrical,  thyroid: non tender  Lungs:   Clear to auscultation bilaterally. No Wheezing or Rhonchi. No rales. Chest wall:  No tenderness  No Accessory muscle use. Heart:   Regular  rhythm,  No  murmur   No edema  Abdomen:   Soft, non-tender. Not distended. Bowel sounds normal  Extremities: No cyanosis. No clubbing, L LE shortening noted +    Skin turgor normal, Capillary refill normal, Radial dial pulse 2+  Skin:     Not pale. Not Jaundiced  No rashes   Psych:  Good insight. Not depressed. Not anxious or agitated. Neurologic: EOMs intact. No facial asymmetry. No aphasia or slurred speech. Symmetrical strength, Sensation grossly intact. Alert and oriented X 4.     _______________________________________________________________________  Care Plan discussed with:    Comments   Patient x    Family      RN x    Care Manager                    Consultant:      _______________________________________________________________________  Expected  Disposition:   Home with Family    HH/PT/OT/RN    SNF/LTC    DALJIT    ________________________________________________________________________  TOTAL TIME:  46 Minutes    Critical Care Provided     Minutes non procedure based      Comments    x Reviewed previous records   >50% of visit spent in counseling and coordination of care x Discussion with patient and questions answered       ________________________________________________________________________  Signed: Nicol Ewing MD    Procedures: see electronic medical records for all procedures/Xrays and details which were not copied into this note but were reviewed prior to creation of Plan.     LAB DATA REVIEWED:    Recent Results (from the past 24 hour(s))   CBC WITH AUTOMATED DIFF    Collection Time: 05/28/21 11:18 AM   Result Value Ref Range    WBC 11.8 (H) 3.6 - 11.0 K/uL    RBC 4.01 3.80 - 5.20 M/uL    HGB 12.1 11.5 - 16.0 g/dL    HCT 38.1 35.0 - 47.0 %    MCV 95.0 80.0 - 99.0 FL    MCH 30.2 26.0 - 34.0 PG    MCHC 31.8 30.0 - 36.5 g/dL    RDW 11.8 11.5 - 14.5 %    PLATELET 520 866 - 902 K/uL    MPV 11.7 8.9 - 12.9 FL    NRBC 0.0 0  WBC    ABSOLUTE NRBC 0.00 0.00 - 0.01 K/uL    NEUTROPHILS 89 (H) 32 - 75 %    LYMPHOCYTES 5 (L) 12 - 49 %    MONOCYTES 6 5 - 13 %    EOSINOPHILS 0 0 - 7 %    BASOPHILS 0 0 - 1 %    IMMATURE GRANULOCYTES 0 0.0 - 0.5 %    ABS. NEUTROPHILS 10.5 (H) 1.8 - 8.0 K/UL    ABS. LYMPHOCYTES 0.6 (L) 0.8 - 3.5 K/UL    ABS. MONOCYTES 0.7 0.0 - 1.0 K/UL    ABS. EOSINOPHILS 0.0 0.0 - 0.4 K/UL    ABS. BASOPHILS 0.0 0.0 - 0.1 K/UL    ABS. IMM. GRANS. 0.0 0.00 - 0.04 K/UL    DF SMEAR SCANNED      RBC COMMENTS NORMOCYTIC, NORMOCHROMIC      WBC COMMENTS TOXIC GRANULATION     METABOLIC PANEL, COMPREHENSIVE    Collection Time: 05/28/21 11:18 AM   Result Value Ref Range    Sodium 137 136 - 145 mmol/L    Potassium 4.2 3.5 - 5.1 mmol/L    Chloride 108 97 - 108 mmol/L    CO2 25 21 - 32 mmol/L    Anion gap 4 (L) 5 - 15 mmol/L    Glucose 105 (H) 65 - 100 mg/dL    BUN 19 6 - 20 MG/DL    Creatinine 0.73 0.55 - 1.02 MG/DL    BUN/Creatinine ratio 26 (H) 12 - 20      GFR est AA >60 >60 ml/min/1.73m2    GFR est non-AA >60 >60 ml/min/1.73m2    Calcium 9.7 8.5 - 10.1 MG/DL    Bilirubin, total 0.6 0.2 - 1.0 MG/DL    ALT (SGPT) 28 12 - 78 U/L    AST (SGOT) 24 15 - 37 U/L    Alk.  phosphatase 71 45 - 117 U/L    Protein, total 6.4 6.4 - 8.2 g/dL    Albumin 3.1 (L) 3.5 - 5.0 g/dL    Globulin 3.3 2.0 - 4.0 g/dL    A-G Ratio 0.9 (L) 1.1 - 2.2

## 2021-05-28 NOTE — ED NOTES
Bladder scanned patient showing 40cc urine. Perfect served dr. Allison Luna to inform, patient has not urinated today and has 40 cc urine in bladder. Dr. Sterling Jansen responded.

## 2021-05-28 NOTE — ED PROVIDER NOTES
EMERGENCY DEPARTMENT HISTORY AND PHYSICAL EXAM      Date: 5/28/2021  Patient Name: Berenice Bee    History of Presenting Illness     Chief Complaint   Patient presents with    Fall       History Provided By: Patient    HPI: Berenice Bee, 80 y.o. female with history of hypertension, hypercholesterolemia presents to the ED with cc of left leg pain after fall. Patient was walking into the bathroom when she fell striking her head against the counter, she did not sustain LOC. She fell onto the left side injuring her left leg. She was unable to get up by herself and she called for help and her son helped her. She lives at home with her son, states that she normally ambulates with use of a walker but does not always use a walker, today she was not using a walker. She denies any weakness or numbness. She does endorse prior right hip fracture but has never hurt her left hip. She complains of severe pain that is constant and radiates from left hip down to the left knee worsened with positional changes. She denies any chest pain, shortness of breath, abdominal pain, nausea, vomiting. There are no other complaints, changes, or physical findings at this time. PCP: Andrew Herrera MD    No current facility-administered medications on file prior to encounter. Current Outpatient Medications on File Prior to Encounter   Medication Sig Dispense Refill    atorvastatin (LIPITOR) 20 mg tablet TAKE 1 TABLET BY MOUTH EVERY DAY 90 Tab 0    amLODIPine (NORVASC) 2.5 mg tablet Take 1 Tab by mouth daily. 90 Tab 5    lisinopriL (PRINIVIL, ZESTRIL) 10 mg tablet Take 1 Tab by mouth daily. 90 Tab 3    cyproheptadine (PERIACTIN) 2 mg/5 mL syrup TK 5 ML PO BID.  albuterol (PROVENTIL HFA, VENTOLIN HFA, PROAIR HFA) 90 mcg/actuation inhaler Take 1 Puff by inhalation as needed for Wheezing. 1 Inhaler 5    ergocalciferol (ERGOCALCIFEROL) 50,000 unit capsule Take 1 Cap by mouth every seven (7) days.  (Patient taking differently: Take 50,000 Units by mouth every Sunday.) 1 Cap 0    MULTIVITAMINS (MULTIVITAMIN PO) Take  by mouth daily. Past History     Past Medical History:  Past Medical History:   Diagnosis Date    Anemia NEC     Asthma     Cervical spondylarthritis     Depression     DJD (degenerative joint disease) of knee     Esophageal spasm     Has required esophageal dilatation; Dr. Janny Lawrence GERD (gastroesophageal reflux disease)     Hypercholesterolemia     Hypertension     Lumbar stenosis     Neuropathy, upper extremity     left    Vertigo [id number]    Saw Dr. Fab Segal; improved.  Vitamin D deficiency 1/28/2011       Past Surgical History:  Past Surgical History:   Procedure Laterality Date    HX CATARACT REMOVAL      bilateral    HX HYSTERECTOMY      HX ORTHOPAEDIC      right bunion excision    HX OTHER SURGICAL      broken R hip    HX TONSILLECTOMY         Family History:  Family History   Problem Relation Age of Onset    Heart Disease Mother     Hypertension Mother     Stroke Mother     Heart Disease Brother     Heart Disease Father     Heart Disease Brother        Social History:  Social History     Tobacco Use    Smoking status: Never Smoker    Smokeless tobacco: Never Used   Vaping Use    Vaping Use: Never used   Substance Use Topics    Alcohol use: No    Drug use: No       Allergies: Allergies   Allergen Reactions    Diclofenac Itching     The voltaren gel caused itching.  Gabapentin Swelling    Hydrochlorothiazide Other (comments)     dizziness    Pcn [Penicillins] Swelling         Review of Systems   Review of Systems   Constitutional: Negative for chills and fever. Respiratory: Negative for cough and shortness of breath. Cardiovascular: Negative for chest pain and leg swelling. Gastrointestinal: Negative for abdominal pain, nausea and vomiting. Genitourinary: Negative. Musculoskeletal: Positive for arthralgias and gait problem.  Negative for back pain and neck pain. Skin: Negative for color change and rash. Neurological: Negative for dizziness, weakness, light-headedness and headaches. Hematological: Does not bruise/bleed easily. All other systems reviewed and are negative. Physical Exam   Physical Exam  Vitals and nursing note reviewed. Constitutional:       General: She is not in acute distress. Appearance: Normal appearance. She is not ill-appearing or toxic-appearing. Comments: Thin body habitus, laying in bed in no acute distress. HENT:      Head: Normocephalic and atraumatic. Nose: Nose normal.      Mouth/Throat:      Mouth: Mucous membranes are moist.   Eyes:      Extraocular Movements: Extraocular movements intact. Pupils: Pupils are equal, round, and reactive to light. Neck:      Comments: No C-spine midline TTP, step-off, or deformity. Cardiovascular:      Rate and Rhythm: Normal rate and regular rhythm. Heart sounds: No murmur heard. Pulmonary:      Effort: Pulmonary effort is normal. No respiratory distress. Breath sounds: Normal breath sounds. No wheezing. Abdominal:      General: There is no distension. Palpations: Abdomen is soft. Tenderness: There is no abdominal tenderness. There is no guarding or rebound. Musculoskeletal:         General: Tenderness and deformity present. Cervical back: Normal range of motion and neck supple. No tenderness. Right lower leg: No edema. Left lower leg: No edema. Comments: Left lower extremity is shortened and externally rotated. There is reproducible TTP to the left hip as well as left femur and left knee. Limited range of motion secondary to pain. Neurovascular intact distally with 2+ DP pulse. Patient does not tolerate internal/external range of motion as well as any sort of range of motion of the left hip. Skin:     General: Skin is warm and dry. Coloration: Skin is not pale. Findings: No erythema.    Neurological: General: No focal deficit present. Mental Status: She is alert and oriented to person, place, and time. Diagnostic Study Results     Labs -     Recent Results (from the past 12 hour(s))   CBC WITH AUTOMATED DIFF    Collection Time: 05/28/21 11:18 AM   Result Value Ref Range    WBC 11.8 (H) 3.6 - 11.0 K/uL    RBC 4.01 3.80 - 5.20 M/uL    HGB 12.1 11.5 - 16.0 g/dL    HCT 38.1 35.0 - 47.0 %    MCV 95.0 80.0 - 99.0 FL    MCH 30.2 26.0 - 34.0 PG    MCHC 31.8 30.0 - 36.5 g/dL    RDW 11.8 11.5 - 14.5 %    PLATELET 411 912 - 657 K/uL    MPV 11.7 8.9 - 12.9 FL    NRBC 0.0 0  WBC    ABSOLUTE NRBC 0.00 0.00 - 0.01 K/uL    NEUTROPHILS 89 (H) 32 - 75 %    LYMPHOCYTES 5 (L) 12 - 49 %    MONOCYTES 6 5 - 13 %    EOSINOPHILS 0 0 - 7 %    BASOPHILS 0 0 - 1 %    IMMATURE GRANULOCYTES 0 0.0 - 0.5 %    ABS. NEUTROPHILS 10.5 (H) 1.8 - 8.0 K/UL    ABS. LYMPHOCYTES 0.6 (L) 0.8 - 3.5 K/UL    ABS. MONOCYTES 0.7 0.0 - 1.0 K/UL    ABS. EOSINOPHILS 0.0 0.0 - 0.4 K/UL    ABS. BASOPHILS 0.0 0.0 - 0.1 K/UL    ABS. IMM. GRANS. 0.0 0.00 - 0.04 K/UL    DF SMEAR SCANNED      RBC COMMENTS NORMOCYTIC, NORMOCHROMIC      WBC COMMENTS TOXIC GRANULATION     METABOLIC PANEL, COMPREHENSIVE    Collection Time: 05/28/21 11:18 AM   Result Value Ref Range    Sodium 137 136 - 145 mmol/L    Potassium 4.2 3.5 - 5.1 mmol/L    Chloride 108 97 - 108 mmol/L    CO2 25 21 - 32 mmol/L    Anion gap 4 (L) 5 - 15 mmol/L    Glucose 105 (H) 65 - 100 mg/dL    BUN 19 6 - 20 MG/DL    Creatinine 0.73 0.55 - 1.02 MG/DL    BUN/Creatinine ratio 26 (H) 12 - 20      GFR est AA >60 >60 ml/min/1.73m2    GFR est non-AA >60 >60 ml/min/1.73m2    Calcium 9.7 8.5 - 10.1 MG/DL    Bilirubin, total 0.6 0.2 - 1.0 MG/DL    ALT (SGPT) 28 12 - 78 U/L    AST (SGOT) 24 15 - 37 U/L    Alk.  phosphatase 71 45 - 117 U/L    Protein, total 6.4 6.4 - 8.2 g/dL    Albumin 3.1 (L) 3.5 - 5.0 g/dL    Globulin 3.3 2.0 - 4.0 g/dL    A-G Ratio 0.9 (L) 1.1 - 2.2         Radiologic Studies - XR HIP LT W OR WO PELV 2-3 VWS   Final Result   Acute, comminuted left intertrochanteric fracture with moderate   varus angulation. CT HEAD WO CONT   Final Result   No acute abnormality identified. XR FEMUR LT 2 V    (Results Pending)   XR KNEE LT 3 V    (Results Pending)   XR TIB/FIB LT    (Results Pending)     CT Results  (Last 48 hours)               05/28/21 1137  CT HEAD WO CONT Final result    Impression:  No acute abnormality identified. Narrative:  EXAM: CT HEAD WO CONT       INDICATION: fall, head injury       COMPARISON: 2017. CONTRAST: None. TECHNIQUE: Unenhanced CT of the head was performed using 5 mm images. Brain and   bone windows were generated. Coronal and sagittal reformats. CT dose reduction   was achieved through use of a standardized protocol tailored for this   examination and automatic exposure control for dose modulation. FINDINGS:   There is slight prominence of the ventricles and sulci. There are mild changes   small vessel disease periventricular white matter. No hemorrhage mass or acute   infarction is identified. The bone windows demonstrate no abnormalities. The visualized portions of the   paranasal sinuses and mastoid air cells are clear. CXR Results  (Last 48 hours)    None          Medical Decision Making   I am the first provider for this patient. I reviewed the vital signs, available nursing notes, past medical history, past surgical history, family history and social history. Vital Signs-Reviewed the patient's vital signs.   Patient Vitals for the past 12 hrs:   Temp Pulse Resp BP SpO2   05/28/21 1507 98.2 °F (36.8 °C) 76 17 (!) 145/64 100 %   05/28/21 1400 98 °F (36.7 °C) 77 18 (!) 119/51 97 %   05/28/21 1330 98 °F (36.7 °C) 82 15 (!) 114/43 97 %   05/28/21 1232  76 21  98 %   05/28/21 1045  81 16 123/62 99 %   05/28/21 1015  81 18 (!) 136/57 100 %   05/28/21 0945  76 19 137/61 97 %   05/28/21 0937 98.7 °F (37.1 °C) 74 17 (!) 153/67 98 %       Records Reviewed: Nursing Notes    Provider Notes (Medical Decision Making):   80-year-old female here with left leg pain, deformity after fall. She did sustain close head injury during this fall. She is afebrile and vital signs are stable. She denies syncope, states that this was a mechanical fall because she was not using her walker. I have concern for left hip fracture. Will perform plain film imaging of left hip, femur, knee, tib-fib, CT head, blood work, urinalysis, and reassess. Plain films with left intertrochanteric fracture. CT head negative. Will discuss with orthopedics and hospitalist for admission. ED Course:   Initial assessment performed. The patients presenting problems have been discussed, and they are in agreement with the care plan formulated and outlined with them. I have encouraged them to ask questions as they arise throughout their visit. ED Course as of May 28 1535   Fri May 28, 2021   1054 EKG per my interpretation normal sinus rhythm, rate 75 bpm, occasional PAC, leftward axis, incomplete left bundle branch block, no acute ischemic changes, no interval changes. [AK]      ED Course User Index  [AK] Kylee Schmidt MD       Admission Note:  Patient is being admitted to the hospital by Dr. Roosevelt Spaulding, Service: Hospitalist.  The results of their tests and reasons for their admission have been discussed with them and available family. They convey agreement and understanding for the need to be admitted and for their admission diagnosis. Disposition:  Admit    Diagnosis     Clinical Impression:   1. Closed fracture of left hip, initial encounter ([facility] [address]    2. Fall from ground level    3. Closed head injury, initial encounter        Attestations:  I am the first and primary provider of record for this patient's ED encounter. I personally performed the services described above in this documentation.   Vicki Foley, MD    Please note that this dictation was completed with [employer], the computer voice recognition software. Quite often unanticipated grammatical, syntax, homophones, and other interpretive errors are inadvertently transcribed by the computer software. Please disregard these errors. Please excuse any errors that have escaped final proofreading. Thank you.

## 2021-05-28 NOTE — ED NOTES
Patient is being transferred to [facility], Room # 1858. Report given to Autumn Caballero on Khurram Mcdonald for routine progression of care. Report consisted of the following information SBAR, ED Summary, MAR and Recent Results. Outstanding consults needed: No     Next labs due: yes, urine    The following personal items will be sent with the patient during transfer to the floor: All valuables:    Cardiac monitoring ordered: Yes    The following CURRENT information was reported to the receiving RN:    Code status: Full Code at time of transfer    Last set of vital signs:  Vital Signs  Level of Consciousness: Alert (0) (05/28/21 1330)  Temp: 98 °F (36.7 °C) (05/28/21 1330)  Temp Source: Oral (05/28/21 1330)  Pulse (Heart Rate): 82 (05/28/21 1330)  Heart Rate Source: Monitor (05/28/21 0937)  Resp Rate: 15 (05/28/21 1330)  BP: (!) 114/43 (05/28/21 1330)  MAP (Monitor): (!) 63 (05/28/21 1330)  MAP (Calculated): 67 (05/28/21 1330)  BP 1 [place] Right upper arm (05/28/21 0937)  BP 1 Method: Automatic (05/28/21 0937)  BP Patient Position: At rest (05/28/21 0937)  MEWS Score: 1 (05/28/21 1330)         Oxygen Therapy  O2 Sat (%): 97 % (05/28/21 1330)  Pulse via Oximetry: 80 beats per minute (05/28/21 1330)  O2 Device: None (Room air) (05/28/21 1330)      Last pain assessment:  Pain 1  Pain Scale 1: Numeric (0 - 10)  Pain Intensity 1: 9  Patient Stated Pain Goal: 2  Pain Onset 1: today  Pain Location 1: Hip  Pain Orientation 1: Left  Pain Description 1: Constant  Pain Intervention(s) 1: Medication (see MAR)          Urinary catheter: ladd  Is there a ladd order: Yes    LDAs:       Peripheral IV 05/28/21 Right Antecubital (Active)         Opportunity for questions and clarification was provided.     Radha Singh RN

## 2021-05-29 ENCOUNTER — APPOINTMENT (OUTPATIENT)
Dept: GENERAL RADIOLOGY | Age: 86
DRG: 480 | End: 2021-05-29
Attending: ORTHOPAEDIC SURGERY
Payer: MEDICARE

## 2021-05-29 ENCOUNTER — ANESTHESIA (OUTPATIENT)
Dept: SURGERY | Age: 86
DRG: 480 | End: 2021-05-29
Payer: MEDICARE

## 2021-05-29 LAB
ATRIAL RATE: 77 BPM
CALCULATED P AXIS, ECG09: 55 DEGREES
CALCULATED R AXIS, ECG10: -55 DEGREES
CALCULATED T AXIS, ECG11: 87 DEGREES
DIAGNOSIS, 93000: NORMAL
P-R INTERVAL, ECG05: 194 MS
Q-T INTERVAL, ECG07: 428 MS
QRS DURATION, ECG06: 120 MS
QTC CALCULATION (BEZET), ECG08: 484 MS
VENTRICULAR RATE, ECG03: 77 BPM

## 2021-05-29 PROCEDURE — 76060000035 HC ANESTHESIA 2 TO 2.5 HR: Performed by: ORTHOPAEDIC SURGERY

## 2021-05-29 PROCEDURE — 74011250637 HC RX REV CODE- 250/637: Performed by: ORTHOPAEDIC SURGERY

## 2021-05-29 PROCEDURE — C1713 ANCHOR/SCREW BN/BN,TIS/BN: HCPCS | Performed by: ORTHOPAEDIC SURGERY

## 2021-05-29 PROCEDURE — 74011250636 HC RX REV CODE- 250/636: Performed by: INTERNAL MEDICINE

## 2021-05-29 PROCEDURE — 65270000029 HC RM PRIVATE

## 2021-05-29 PROCEDURE — 77030040361 HC SLV COMPR DVT MDII -B: Performed by: ORTHOPAEDIC SURGERY

## 2021-05-29 PROCEDURE — 77030016474 HC BIT DRL QC3 SYNT -C: Performed by: ORTHOPAEDIC SURGERY

## 2021-05-29 PROCEDURE — 77030019908 HC STETH ESOPH SIMS -A: Performed by: ANESTHESIOLOGY

## 2021-05-29 PROCEDURE — C1769 GUIDE WIRE: HCPCS | Performed by: ORTHOPAEDIC SURGERY

## 2021-05-29 PROCEDURE — 77030013079 HC BLNKT BAIR HGGR 3M -A: Performed by: ANESTHESIOLOGY

## 2021-05-29 PROCEDURE — 76210000006 HC OR PH I REC 0.5 TO 1 HR: Performed by: ORTHOPAEDIC SURGERY

## 2021-05-29 PROCEDURE — 77030031139 HC SUT VCRL2 J&J -A: Performed by: ORTHOPAEDIC SURGERY

## 2021-05-29 PROCEDURE — 2709999900 HC NON-CHARGEABLE SUPPLY

## 2021-05-29 PROCEDURE — 74011250636 HC RX REV CODE- 250/636: Performed by: NURSE ANESTHETIST, CERTIFIED REGISTERED

## 2021-05-29 PROCEDURE — 74011000250 HC RX REV CODE- 250: Performed by: NURSE ANESTHETIST, CERTIFIED REGISTERED

## 2021-05-29 PROCEDURE — 76010000131 HC OR TIME 2 TO 2.5 HR: Performed by: ORTHOPAEDIC SURGERY

## 2021-05-29 PROCEDURE — 77030020788: Performed by: ORTHOPAEDIC SURGERY

## 2021-05-29 PROCEDURE — 99232 SBSQ HOSP IP/OBS MODERATE 35: CPT | Performed by: FAMILY MEDICINE

## 2021-05-29 PROCEDURE — 77030026438 HC STYL ET INTUB CARD -A: Performed by: ANESTHESIOLOGY

## 2021-05-29 PROCEDURE — 74011250636 HC RX REV CODE- 250/636: Performed by: ORTHOPAEDIC SURGERY

## 2021-05-29 PROCEDURE — 73501 X-RAY EXAM HIP UNI 1 VIEW: CPT

## 2021-05-29 PROCEDURE — 2709999900 HC NON-CHARGEABLE SUPPLY: Performed by: ORTHOPAEDIC SURGERY

## 2021-05-29 PROCEDURE — 76000 FLUOROSCOPY <1 HR PHYS/QHP: CPT

## 2021-05-29 PROCEDURE — 0QSC06Z REPOSITION LEFT LOWER FEMUR WITH INTRAMEDULLARY INTERNAL FIXATION DEVICE, OPEN APPROACH: ICD-10-PCS | Performed by: ORTHOPAEDIC SURGERY

## 2021-05-29 PROCEDURE — 77030008462 HC STPLR SKN PROX J&J -A: Performed by: ORTHOPAEDIC SURGERY

## 2021-05-29 PROCEDURE — 77030008684 HC TU ET CUF COVD -B: Performed by: ANESTHESIOLOGY

## 2021-05-29 PROCEDURE — 77030040922 HC BLNKT HYPOTHRM STRY -A

## 2021-05-29 DEVICE — NAIL IM L235MM DIA11MM 130DEG SHT L PROX FEM GRN TI CANN: Type: IMPLANTABLE DEVICE | Site: HIP | Status: FUNCTIONAL

## 2021-05-29 DEVICE — SCREW BNE L34MM DIA5MM NONSTERILE TIB LT GRN TI ST CANN LOK: Type: IMPLANTABLE DEVICE | Site: HIP | Status: FUNCTIONAL

## 2021-05-29 DEVICE — SCREW BNE L95MM DIA10.35MM PROX FEM G TI CANN FOR TFN ADV: Type: IMPLANTABLE DEVICE | Site: HIP | Status: FUNCTIONAL

## 2021-05-29 RX ORDER — FENTANYL CITRATE 50 UG/ML
25 INJECTION, SOLUTION INTRAMUSCULAR; INTRAVENOUS
Status: DISCONTINUED | OUTPATIENT
Start: 2021-05-29 | End: 2021-05-29 | Stop reason: HOSPADM

## 2021-05-29 RX ORDER — SODIUM CHLORIDE 0.9 % (FLUSH) 0.9 %
5-40 SYRINGE (ML) INJECTION AS NEEDED
Status: DISCONTINUED | OUTPATIENT
Start: 2021-05-29 | End: 2021-05-29 | Stop reason: HOSPADM

## 2021-05-29 RX ORDER — LIDOCAINE HYDROCHLORIDE 20 MG/ML
INJECTION, SOLUTION EPIDURAL; INFILTRATION; INTRACAUDAL; PERINEURAL AS NEEDED
Status: DISCONTINUED | OUTPATIENT
Start: 2021-05-29 | End: 2021-05-29 | Stop reason: HOSPADM

## 2021-05-29 RX ORDER — HALOPERIDOL 5 MG/ML
2 INJECTION INTRAMUSCULAR ONCE
Status: DISCONTINUED | OUTPATIENT
Start: 2021-05-29 | End: 2021-05-29

## 2021-05-29 RX ORDER — OXYCODONE HYDROCHLORIDE 5 MG/1
2.5 TABLET ORAL
Status: DISCONTINUED | OUTPATIENT
Start: 2021-05-29 | End: 2021-06-03 | Stop reason: HOSPADM

## 2021-05-29 RX ORDER — FERROUS SULFATE, DRIED 160(50) MG
1 TABLET, EXTENDED RELEASE ORAL
Status: DISCONTINUED | OUTPATIENT
Start: 2021-05-30 | End: 2021-06-03 | Stop reason: HOSPADM

## 2021-05-29 RX ORDER — PROPOFOL 10 MG/ML
INJECTION, EMULSION INTRAVENOUS AS NEEDED
Status: DISCONTINUED | OUTPATIENT
Start: 2021-05-29 | End: 2021-05-29 | Stop reason: HOSPADM

## 2021-05-29 RX ORDER — GLYCOPYRROLATE 0.2 MG/ML
INJECTION INTRAMUSCULAR; INTRAVENOUS AS NEEDED
Status: DISCONTINUED | OUTPATIENT
Start: 2021-05-29 | End: 2021-05-29 | Stop reason: HOSPADM

## 2021-05-29 RX ORDER — ACETAMINOPHEN 325 MG/1
650 TABLET ORAL EVERY 6 HOURS
Status: DISCONTINUED | OUTPATIENT
Start: 2021-05-29 | End: 2021-06-03 | Stop reason: HOSPADM

## 2021-05-29 RX ORDER — PHENYLEPHRINE HCL IN 0.9% NACL 0.4MG/10ML
SYRINGE (ML) INTRAVENOUS AS NEEDED
Status: DISCONTINUED | OUTPATIENT
Start: 2021-05-29 | End: 2021-05-29 | Stop reason: HOSPADM

## 2021-05-29 RX ORDER — LIDOCAINE HYDROCHLORIDE 10 MG/ML
0.1 INJECTION, SOLUTION EPIDURAL; INFILTRATION; INTRACAUDAL; PERINEURAL AS NEEDED
Status: DISCONTINUED | OUTPATIENT
Start: 2021-05-29 | End: 2021-05-29 | Stop reason: HOSPADM

## 2021-05-29 RX ORDER — HYDROMORPHONE HYDROCHLORIDE 1 MG/ML
0.2 INJECTION, SOLUTION INTRAMUSCULAR; INTRAVENOUS; SUBCUTANEOUS
Status: DISCONTINUED | OUTPATIENT
Start: 2021-05-29 | End: 2021-05-29 | Stop reason: HOSPADM

## 2021-05-29 RX ORDER — POLYETHYLENE GLYCOL 3350 17 G/17G
17 POWDER, FOR SOLUTION ORAL DAILY
Status: DISCONTINUED | OUTPATIENT
Start: 2021-05-30 | End: 2021-06-03 | Stop reason: HOSPADM

## 2021-05-29 RX ORDER — ETOMIDATE 2 MG/ML
INJECTION INTRAVENOUS AS NEEDED
Status: DISCONTINUED | OUTPATIENT
Start: 2021-05-29 | End: 2021-05-29 | Stop reason: HOSPADM

## 2021-05-29 RX ORDER — SODIUM CHLORIDE 0.9 % (FLUSH) 0.9 %
5-40 SYRINGE (ML) INJECTION AS NEEDED
Status: DISCONTINUED | OUTPATIENT
Start: 2021-05-29 | End: 2021-06-01

## 2021-05-29 RX ORDER — DIPHENHYDRAMINE HYDROCHLORIDE 50 MG/ML
12.5 INJECTION, SOLUTION INTRAMUSCULAR; INTRAVENOUS AS NEEDED
Status: DISCONTINUED | OUTPATIENT
Start: 2021-05-29 | End: 2021-05-29 | Stop reason: HOSPADM

## 2021-05-29 RX ORDER — SUCCINYLCHOLINE CHLORIDE 20 MG/ML
INJECTION INTRAMUSCULAR; INTRAVENOUS AS NEEDED
Status: DISCONTINUED | OUTPATIENT
Start: 2021-05-29 | End: 2021-05-29 | Stop reason: HOSPADM

## 2021-05-29 RX ORDER — SODIUM CHLORIDE 0.9 % (FLUSH) 0.9 %
5-40 SYRINGE (ML) INJECTION EVERY 8 HOURS
Status: DISCONTINUED | OUTPATIENT
Start: 2021-05-29 | End: 2021-05-29 | Stop reason: HOSPADM

## 2021-05-29 RX ORDER — SODIUM CHLORIDE 9 MG/ML
125 INJECTION, SOLUTION INTRAVENOUS CONTINUOUS
Status: DISPENSED | OUTPATIENT
Start: 2021-05-29 | End: 2021-05-30

## 2021-05-29 RX ORDER — HEPARIN SODIUM 5000 [USP'U]/ML
5000 INJECTION, SOLUTION INTRAVENOUS; SUBCUTANEOUS EVERY 12 HOURS
Status: DISCONTINUED | OUTPATIENT
Start: 2021-05-29 | End: 2021-06-03 | Stop reason: HOSPADM

## 2021-05-29 RX ORDER — HALOPERIDOL 5 MG/ML
2 INJECTION INTRAMUSCULAR ONCE
Status: DISCONTINUED | OUTPATIENT
Start: 2021-05-29 | End: 2021-05-30

## 2021-05-29 RX ORDER — NALOXONE HYDROCHLORIDE 0.4 MG/ML
0.4 INJECTION, SOLUTION INTRAMUSCULAR; INTRAVENOUS; SUBCUTANEOUS AS NEEDED
Status: DISCONTINUED | OUTPATIENT
Start: 2021-05-29 | End: 2021-06-03 | Stop reason: HOSPADM

## 2021-05-29 RX ORDER — ROCURONIUM BROMIDE 10 MG/ML
INJECTION, SOLUTION INTRAVENOUS AS NEEDED
Status: DISCONTINUED | OUTPATIENT
Start: 2021-05-29 | End: 2021-05-29 | Stop reason: HOSPADM

## 2021-05-29 RX ORDER — FACIAL-BODY WIPES
10 EACH TOPICAL DAILY PRN
Status: DISCONTINUED | OUTPATIENT
Start: 2021-05-31 | End: 2021-06-03 | Stop reason: HOSPADM

## 2021-05-29 RX ORDER — PROPOFOL 10 MG/ML
INJECTION, EMULSION INTRAVENOUS
Status: DISCONTINUED | OUTPATIENT
Start: 2021-05-29 | End: 2021-05-29 | Stop reason: HOSPADM

## 2021-05-29 RX ORDER — ONDANSETRON 2 MG/ML
INJECTION INTRAMUSCULAR; INTRAVENOUS AS NEEDED
Status: DISCONTINUED | OUTPATIENT
Start: 2021-05-29 | End: 2021-05-29 | Stop reason: HOSPADM

## 2021-05-29 RX ORDER — NEOSTIGMINE METHYLSULFATE 1 MG/ML
INJECTION, SOLUTION INTRAVENOUS AS NEEDED
Status: DISCONTINUED | OUTPATIENT
Start: 2021-05-29 | End: 2021-05-29 | Stop reason: HOSPADM

## 2021-05-29 RX ORDER — SODIUM CHLORIDE, SODIUM LACTATE, POTASSIUM CHLORIDE, CALCIUM CHLORIDE 600; 310; 30; 20 MG/100ML; MG/100ML; MG/100ML; MG/100ML
INJECTION, SOLUTION INTRAVENOUS
Status: DISCONTINUED | OUTPATIENT
Start: 2021-05-29 | End: 2021-05-29 | Stop reason: HOSPADM

## 2021-05-29 RX ORDER — SODIUM CHLORIDE, SODIUM LACTATE, POTASSIUM CHLORIDE, CALCIUM CHLORIDE 600; 310; 30; 20 MG/100ML; MG/100ML; MG/100ML; MG/100ML
25 INJECTION, SOLUTION INTRAVENOUS CONTINUOUS
Status: DISCONTINUED | OUTPATIENT
Start: 2021-05-29 | End: 2021-05-29 | Stop reason: HOSPADM

## 2021-05-29 RX ORDER — FENTANYL CITRATE 50 UG/ML
INJECTION, SOLUTION INTRAMUSCULAR; INTRAVENOUS AS NEEDED
Status: DISCONTINUED | OUTPATIENT
Start: 2021-05-29 | End: 2021-05-29 | Stop reason: HOSPADM

## 2021-05-29 RX ORDER — SODIUM CHLORIDE 0.9 % (FLUSH) 0.9 %
5-40 SYRINGE (ML) INJECTION EVERY 8 HOURS
Status: DISCONTINUED | OUTPATIENT
Start: 2021-05-29 | End: 2021-06-03 | Stop reason: HOSPADM

## 2021-05-29 RX ORDER — EPHEDRINE SULFATE/0.9% NACL/PF 50 MG/5 ML
SYRINGE (ML) INTRAVENOUS AS NEEDED
Status: DISCONTINUED | OUTPATIENT
Start: 2021-05-29 | End: 2021-05-29 | Stop reason: HOSPADM

## 2021-05-29 RX ORDER — ENOXAPARIN SODIUM 100 MG/ML
40 INJECTION SUBCUTANEOUS DAILY
Status: DISCONTINUED | OUTPATIENT
Start: 2021-05-30 | End: 2021-05-29

## 2021-05-29 RX ORDER — AMOXICILLIN 250 MG
1 CAPSULE ORAL 2 TIMES DAILY
Status: DISCONTINUED | OUTPATIENT
Start: 2021-05-29 | End: 2021-06-03 | Stop reason: HOSPADM

## 2021-05-29 RX ADMIN — ETOMIDATE 8 MG: 2 INJECTION, SOLUTION INTRAVENOUS at 09:41

## 2021-05-29 RX ADMIN — Medication 80 MCG: at 10:45

## 2021-05-29 RX ADMIN — Medication 80 MCG: at 09:47

## 2021-05-29 RX ADMIN — SODIUM CHLORIDE, POTASSIUM CHLORIDE, SODIUM LACTATE AND CALCIUM CHLORIDE: 600; 310; 30; 20 INJECTION, SOLUTION INTRAVENOUS at 09:31

## 2021-05-29 RX ADMIN — ATORVASTATIN CALCIUM 20 MG: 20 TABLET, FILM COATED ORAL at 21:27

## 2021-05-29 RX ADMIN — PROPOFOL 30 MG: 10 INJECTION, EMULSION INTRAVENOUS at 10:58

## 2021-05-29 RX ADMIN — VANCOMYCIN HYDROCHLORIDE 1000 MG: 1 INJECTION, POWDER, LYOPHILIZED, FOR SOLUTION INTRAVENOUS at 09:23

## 2021-05-29 RX ADMIN — GLYCOPYRROLATE 0.4 MG: 0.2 INJECTION, SOLUTION INTRAMUSCULAR; INTRAVENOUS at 11:29

## 2021-05-29 RX ADMIN — LIDOCAINE HYDROCHLORIDE 60 MG: 20 INJECTION, SOLUTION EPIDURAL; INFILTRATION; INTRACAUDAL; PERINEURAL at 09:41

## 2021-05-29 RX ADMIN — ACETAMINOPHEN 650 MG: 325 TABLET ORAL at 13:43

## 2021-05-29 RX ADMIN — ROCURONIUM BROMIDE 10 MG: 10 INJECTION INTRAVENOUS at 10:10

## 2021-05-29 RX ADMIN — FENTANYL CITRATE 50 MCG: 50 INJECTION, SOLUTION INTRAMUSCULAR; INTRAVENOUS at 09:41

## 2021-05-29 RX ADMIN — PROPOFOL 75 MCG/KG/MIN: 10 INJECTION, EMULSION INTRAVENOUS at 09:50

## 2021-05-29 RX ADMIN — ONDANSETRON HYDROCHLORIDE 4 MG: 2 INJECTION, SOLUTION INTRAMUSCULAR; INTRAVENOUS at 10:52

## 2021-05-29 RX ADMIN — Medication 10 ML: at 09:24

## 2021-05-29 RX ADMIN — Medication 10 MG: at 11:12

## 2021-05-29 RX ADMIN — SUCCINYLCHOLINE CHLORIDE 120 MG: 20 INJECTION, SOLUTION INTRAMUSCULAR; INTRAVENOUS at 09:41

## 2021-05-29 RX ADMIN — SODIUM CHLORIDE 125 ML/HR: 9 INJECTION, SOLUTION INTRAVENOUS at 13:42

## 2021-05-29 RX ADMIN — Medication 80 MCG: at 10:15

## 2021-05-29 RX ADMIN — Medication 80 MCG: at 10:36

## 2021-05-29 RX ADMIN — ROCURONIUM BROMIDE 10 MG: 10 INJECTION INTRAVENOUS at 10:58

## 2021-05-29 RX ADMIN — NEOSTIGMINE METHYLSULFATE 3 MG: 1 INJECTION, SOLUTION INTRAVENOUS at 11:29

## 2021-05-29 RX ADMIN — ROCURONIUM BROMIDE 10 MG: 10 INJECTION INTRAVENOUS at 09:41

## 2021-05-29 RX ADMIN — Medication 40 MCG: at 09:53

## 2021-05-29 RX ADMIN — Medication 10 ML: at 13:45

## 2021-05-29 RX ADMIN — PROPOFOL 50 MG: 10 INJECTION, EMULSION INTRAVENOUS at 09:41

## 2021-05-29 RX ADMIN — Medication 40 MCG: at 11:01

## 2021-05-29 RX ADMIN — HEPARIN SODIUM 5000 UNITS: 5000 INJECTION INTRAVENOUS; SUBCUTANEOUS at 13:43

## 2021-05-29 RX ADMIN — Medication 1 AMPULE: at 21:29

## 2021-05-29 RX ADMIN — Medication 3 AMPULE: at 09:19

## 2021-05-29 RX ADMIN — FENTANYL CITRATE 50 MCG: 50 INJECTION, SOLUTION INTRAMUSCULAR; INTRAVENOUS at 10:52

## 2021-05-29 RX ADMIN — Medication 10 ML: at 13:44

## 2021-05-29 RX ADMIN — DOCUSATE SODIUM 50MG AND SENNOSIDES 8.6MG 1 TABLET: 8.6; 5 TABLET, FILM COATED ORAL at 13:43

## 2021-05-29 NOTE — PROGRESS NOTES
Comprehensive Nutrition Assessment    Type and Reason for Visit: Initial, Consult    Nutrition Recommendations/Plan:   · Continue Cardiac diet. Enc po. · RD ordered Ensure Compact 4oz TID with meals to assist with meeting estimated nutritional needs. · Please document % meals and supplements consumed in flowsheet I/O's under intake. Nutrition Assessment:      5/29: Chart reviewed; med noted for fall with femur fx s/p repair. RD received nutrition consult per post-op/elderly. RD visited with pt at bedside who appeared to be confused. Stated she \"had [employer] earlier today before coming here\". Also, states \"they have been marching all morning\". RN in with pt trying to help situate pt in the bed. Pt commented that she has been trying to get up. Per NFPE, pt has severe muscle wasting and fat loss of upper and lower extremities, BMI 19.14. Unable to determine actual weight loss. Patient Vitals for the past 168 hrs:   % Diet Eaten   05/29/21 1414 26 - 50%     Last Weight Metric  Weight Loss Metrics 5/28/2021 3/16/2021 1/7/2021 10/26/2020 10/14/2020 9/18/2020 1/13/2020   Today's Wt 98 lb 98 lb 103 lb 3.2 oz 101 lb 8 oz 105 lb 105 lb 11.2 oz 111 lb 6.4 oz   BMI 19.14 kg/m2 19.14 kg/m2 20.15 kg/m2 19.82 kg/m2 20.51 kg/m2 20.64 kg/m2 21.05 kg/m2     Malnutrition Assessment:  Malnutrition Status:  Severe malnutrition    Context:  Chronic illness     Findings of the 6 clinical characteristics of malnutrition:   Energy Intake:  7 - 75% or less est energy requirements for 1 month or longer  Weight Loss:  Unable to assess     Body Fat Loss:  7 - Severe body fat loss, Triceps, Orbital, Fat overlying ribs   Muscle Mass Loss:  7 - Severe muscle mass loss, Hand (interosseous), Clavicles (pectoralis &deltoids), Scapula (trapezius), Temples (temporalis)  Fluid Accumulation:  Unable to assess,     Strength:  Not performed     Estimated Daily Nutrient Needs:  Energy (kcal): 1260 ( x 1. 3AF) + 250 kcals; Weight Used for Energy Requirements: Current  Protein (g): 53 (1.2 g/kg bw); Weight Used for Protein Requirements: Current  Fluid (ml/day): 1200 ml/day; Method Used for Fluid Requirements: 1 ml/kcal    Nutrition Related Findings:  BM: none documented; Labs: reviewed; Meds: Os-elaine, pericolace, lipitor, IVF      Wounds:    Surgical incision       Current Nutrition Therapies:  DIET CARDIAC Regular  DIET NUTRITIONAL SUPPLEMENTS Breakfast, Lunch, Dinner; Ensure [place]    Anthropometric Measures:  · Height:  5' (152.4 cm)  · Current Body Wt:  44.5 kg (98 lb 1.7 oz)    · Ideal Body Wt:  100 lbs:  98.1 %   · BMI Category:  Underweight (BMI less than 22) age over 72       Nutrition Diagnosis:   · Severe malnutrition related to  (advanced age) as evidenced by intake 0-25%, severe muscle loss, severe loss of subcutaneous fat    Nutrition Interventions:   Food and/or Nutrient Delivery: Continue current diet, Start oral nutrition supplement  Nutrition Education and Counseling: No recommendations at this time  Coordination of Nutrition Care: No recommendation at this time    Goals:  Trend PO intake at least 50% of meals + consume 240 ml ONS next 3-5 days       Nutrition Monitoring and Evaluation:   Behavioral-Environmental Outcomes: None identified  Food/Nutrient Intake Outcomes: Food and nutrient intake, Supplement intake, Diet advancement/tolerance  Physical Signs/Symptoms Outcomes: Biochemical data, Weight, Skin, Nutrition focused physical findings    Discharge Planning:    Continue current diet, Continue oral nutrition supplement     Electronically signed by Mitra Mclain RD on 5/29/2021 at 4:01 PM

## 2021-05-29 NOTE — BRIEF OP NOTE
Brief Postoperative Note    Patient: Patrice Keller  YOB: 1928  MRN: [id number]    Date of Procedure: 5/29/2021     Pre-Op Diagnosis: LEFT INTERTROCHANTERIC HIP FRACTURE    Post-Op Diagnosis: Same as preoperative diagnosis. Procedure(s):  LEFT FEMORAL INTERTROCHANTERIC NAIL INSERTION (URGENT)    Surgeon(s):  Caridad Crowley MD    Surgical Assistant: None    Anesthesia: General     Estimated Blood Loss (mL): less than 494     Complications: None    Specimens: * No specimens in log *     Implants:   Implant Name Type Inv. Item Serial No.  Lot No. LRB No. Used Action   NAIL IM [id number] 130DEG SHT L PROX FEM GRN TI GISELL - SNA  NAIL IM [id number] [id number] 130DEG SHT L PROX FEM GRN TI GISELL NA [employer] [place]_[id number] Left 1 Implanted   SCREW BNE L95MM DIA10.35MM PROX FEM G TI GISELL FOR TFN ADV - SNA  SCREW BNE L95MM DIA10.35MM PROX FEM G TI GISELL FOR TFN ADV NA DEPUY [employer] [place]_[id number] Left 1 Implanted       Drains: * No LDAs found *    Findings: As above.     Electronically Signed by Ritika Marcos MD on 5/29/2021 at 11:47 AM

## 2021-05-29 NOTE — PROGRESS NOTES
General Daily Progress Note    Admit Date: 5/28/2021  Hospital day 2    Subjective:     Patient has no complaint of chest pain or dyspnea. Somewhat confused last night and trying to get out of bed. Awake and alert this am. ..   Medication side effects: none    Current Facility-Administered Medications   Medication Dose Route Frequency    morphine injection 2 mg  2 mg IntraVENous Q1H PRN    morphine injection 4 mg  4 mg IntraVENous ONCE PRN    albuterol (PROVENTIL HFA, VENTOLIN HFA, PROAIR HFA) inhaler 1 Puff  1 Puff Inhalation Q6H PRN    amLODIPine (NORVASC) tablet 2.5 mg  2.5 mg Oral DAILY    atorvastatin (LIPITOR) tablet 20 mg  20 mg Oral QHS    sodium chloride (NS) flush 5-40 mL  5-40 mL IntraVENous Q8H    sodium chloride (NS) flush 5-40 mL  5-40 mL IntraVENous PRN    acetaminophen (TYLENOL) tablet 650 mg  650 mg Oral Q6H PRN    Or    acetaminophen (TYLENOL) suppository 650 mg  650 mg Rectal Q6H PRN    polyethylene glycol (MIRALAX) packet 17 g  17 g Oral DAILY PRN    ondansetron (ZOFRAN ODT) tablet 4 mg  4 mg Oral Q8H PRN    Or    ondansetron (ZOFRAN) injection 4 mg  4 mg IntraVENous Q6H PRN    heparin (porcine) injection 5,000 Units  5,000 Units SubCUTAneous Q12H    0.9% sodium chloride infusion  75 mL/hr IntraVENous CONTINUOUS        Review of Systems  Pertinent items are noted in HPI. Objective:     Patient Vitals for the past 8 hrs:   BP Temp Pulse Resp SpO2   05/28/21 2239 (!) 151/53 97.9 °F (36.6 °C) 72 18 100 %     No intake/output data recorded. No intake/output data recorded. Physical Exam:   Visit Vitals  BP (!) 151/53   Pulse 72   Temp 97.9 °F (36.6 °C)   Resp 18   Ht 5' (1.524 m)   Wt 98 lb (44.5 kg)   SpO2 100%   BMI 19.14 kg/m²     Lungs: clear to auscultation bilaterally  Heart: regular rate and rhythm, S1, S2 normal, no murmur, click, rub or gallop  Abdomen: soft, non-tender.  Bowel sounds normal. No masses,  no organomegaly  Extremities: extremities normal, atraumatic, no cyanosis or edema      ECG: normal sinus rhythm     Data Review   Recent Results (from the past 24 hour(s))   EKG, 12 LEAD, INITIAL    Collection Time: 05/28/21 10:09 AM   Result Value Ref Range    Ventricular Rate 77 BPM    Atrial Rate 77 BPM    P-R Interval 194 ms    QRS Duration 120 ms    Q-T Interval 428 ms    QTC Calculation (Bezet) 484 ms    Calculated P Axis 55 degrees    Calculated R Axis -55 degrees    Calculated T Axis 87 degrees    Diagnosis       Sinus rhythm with premature supraventricular complexes  Left axis deviation  Incomplete left bundle branch block  When compared with ECG of 28-MAY-2021 09:52,  MANUAL COMPARISON REQUIRED, DATA IS UNCONFIRMED     CBC WITH AUTOMATED DIFF    Collection Time: 05/28/21 11:18 AM   Result Value Ref Range    WBC 11.8 (H) 3.6 - 11.0 K/uL    RBC 4.01 3.80 - 5.20 M/uL    HGB 12.1 11.5 - 16.0 g/dL    HCT 38.1 35.0 - 47.0 %    MCV 95.0 80.0 - 99.0 FL    MCH 30.2 26.0 - 34.0 PG    MCHC 31.8 30.0 - 36.5 g/dL    RDW 11.8 11.5 - 14.5 %    PLATELET 378 339 - 662 K/uL    MPV 11.7 8.9 - 12.9 FL    NRBC 0.0 0  WBC    ABSOLUTE NRBC 0.00 0.00 - 0.01 K/uL    NEUTROPHILS 89 (H) 32 - 75 %    LYMPHOCYTES 5 (L) 12 - 49 %    MONOCYTES 6 5 - 13 %    EOSINOPHILS 0 0 - 7 %    BASOPHILS 0 0 - 1 %    IMMATURE GRANULOCYTES 0 0.0 - 0.5 %    ABS. NEUTROPHILS 10.5 (H) 1.8 - 8.0 K/UL    ABS. LYMPHOCYTES 0.6 (L) 0.8 - 3.5 K/UL    ABS. MONOCYTES 0.7 0.0 - 1.0 K/UL    ABS. EOSINOPHILS 0.0 0.0 - 0.4 K/UL    ABS. BASOPHILS 0.0 0.0 - 0.1 K/UL    ABS. IMM.  GRANS. 0.0 0.00 - 0.04 K/UL    DF SMEAR SCANNED      RBC COMMENTS NORMOCYTIC, NORMOCHROMIC      WBC COMMENTS TOXIC GRANULATION     METABOLIC PANEL, COMPREHENSIVE    Collection Time: 05/28/21 11:18 AM   Result Value Ref Range    Sodium 137 136 - 145 mmol/L    Potassium 4.2 3.5 - 5.1 mmol/L    Chloride 108 97 - 108 mmol/L    CO2 25 21 - 32 mmol/L    Anion gap 4 (L) 5 - 15 mmol/L    Glucose 105 (H) 65 - 100 mg/dL    BUN 19 6 - 20 MG/DL    Creatinine 0. 73 0.55 - 1.02 MG/DL    BUN/Creatinine ratio 26 (H) 12 - 20      GFR est AA >60 >60 ml/min/1.73m2    GFR est non-AA >60 >60 ml/min/1.73m2    Calcium 9.7 8.5 - 10.1 MG/DL    Bilirubin, total 0.6 0.2 - 1.0 MG/DL    ALT (SGPT) 28 12 - 78 U/L    AST (SGOT) 24 15 - 37 U/L    Alk. phosphatase 71 45 - 117 U/L    Protein, total 6.4 6.4 - 8.2 g/dL    Albumin 3.1 (L) 3.5 - 5.0 g/dL    Globulin 3.3 2.0 - 4.0 g/dL    A-G Ratio 0.9 (L) 1.1 - 2.2             Assessment:     Active Problems:    Fall (5/28/2021)      Closed 2-part intertrochanteric fracture of left femur ([address] (5/28/2021)        Plan:     1. L hip fracture- for surgery this am.   2. Mild confusion  3. Hypertension- stable. 4. Hypercholesterolemia  5.  On sc heparin for DVT prophylaxis

## 2021-05-29 NOTE — OP NOTES
[facility]  OPERATIVE REPORT    Name:  Layne Johnson  MR#:  [id number]  :  1928  ACCOUNT #:  [de-identified]  DATE OF SERVICE:  2021    PREOPERATIVE DIAGNOSIS:  Intertrochanteric fracture, left hip. POSTOPERATIVE DIAGNOSIS:  Intertrochanteric fracture, left hip. PROCEDURE PERFORMED:  Insertion of left femoral intertrochanteric nail. SURGEON:  Saumya Blanchard MD    ASSISTANT:  None. ANESTHESIA:  General.    COMPLICATIONS:  None. SPECIMENS REMOVED:  None. IMPLANTS:  A 235-mm Synthes trochanteric nail with a 130-degree angle and a 95-mm lag screw and a 34-mm locking screw. ESTIMATED BLOOD LOSS:  Less than 100 mL. DRAINS:  No drains. PROCEDURE:  The patient was brought into the operating room and general anesthetic administered in the bed. She was then transferred over to the fracture table and positioned appropriately for a left intertrochanteric nail. Note that the operative site had been identified. Appropriate preoperative antibiotic prophylaxis administered in preop holding. Note that after she was set up on the fracture table, AP and lateral fluoroscopic images were taken of the hip and reduction performed. It was noted there was significant posterior sag of the fracture, so a crutch was obtained and used to hold the femoral shaft more anteriorly, thus providing an excellent reduction in the AP and lateral planes. Now, the hip was prepped and draped in usual manner. After time-out, a longitudinal incision was made just proximal to the lateral aspect of the greater trochanter. Subcutaneous tissues and fascia were divided. The guidewire was obtained, its position confirmed fluoroscopically, and it was driven down the femoral shaft. Then, the trochanteric reamer was used. Next, the nail was obtained. It was placed in the medullary canal and driven into position with a mallet.   Its position was confirmed fluoroscopically in the AP and lateral planes. Next, the jig for the lag screw was placed. Then, we drilled the initial wire. It ended up going anterior to the femoral neck. At this point, the crutch was removed and the guidewire redirected more posteriorly. Excellent positioning was obtained, which was confirmed fluoroscopically. Now, this was measured at an 80 and then drilled. We decided that 95 would be a better length and 95 lag screw was obtained and placed. Next, the green guides were placed and the locking screw placed through the femoral shaft and the wilman. A 34 was chosen. This was placed and its position confirmed fluoroscopically in the AP and lateral planes. Final fluoroscopic films were taken showing excellent position of the fracture and internal fixation in both the AP and lateral planes. Wounds were thoroughly irrigated. Closure of the fascial layer was performed with 0 Vicryl. The subcutaneous tissue was closed with 2-0 Vicryl. The skin was closed with staples. A dry sterile dressing was placed. The patient was brought back to the PACU in stable condition.         Dean Giles MD      [id number]  D:  05/29/2021 11:55  T:  05/29/2021 14:21  JOB #:  [id number]

## 2021-05-29 NOTE — PERIOP NOTES
TRANSFER - IN REPORT:    [facility] JOSESIT[facility](name) on Kit Cue  being received from 3234(unit) for ordered procedure      Report consisted of patients Situation, Background, Assessment and   Recommendations(SBAR). Information from the following report(s) SBAR, Kardex, ED Summary, OR Summary, Procedure Summary, Intake/Output, MAR, Accordion, Recent Results, Med Rec Status, Cardiac Rhythm Patient refusing to wear, Alarm Parameters , Pre Procedure Checklist, Procedure Verification and Quality Measures was reviewed with the receiving nurse. Opportunity for questions and clarification was provided. Assessment completed upon patients arrival to unit and care assumed.

## 2021-05-29 NOTE — PERIOP NOTES
Handoff Report from Operating Room to PACU    Report received from KELLEY Ortiz RN and Joon Low CRNA regarding Rosendo Posey. Surgeon(s):  Merry Chan MD  And Procedure(s) (LRB):  LEFT FEMORAL INTERTROCHANTERIC NAIL INSERTION (URGENT) (Left)  confirmed   with allergies and dressings discussed. Anesthesia type, drugs, patient history, complications, estimated blood loss, vital signs, intake and output, and last pain medication, lines, reversal medications and temperature were reviewed.

## 2021-05-29 NOTE — PROGRESS NOTES
End of Shift Note    Bedside shift change report given to [facility], RN (oncoming nurse) by Joslyn Solitario RN (offgoing nurse). Report included the following information SBAR, Kardex, Procedure Summary, Intake/Output and MAR    Shift worked:  night shift     Shift summary and any significant changes:    Patient was very confused this evening. She pulled out her ladd, IV and removed her tele leads. She was uncooperative when needing to place a new IV, obtain vital signs and became mildly aggressive towards staff. Patient continually tried to get out of bed. Concerns for physician to address:      Zone phone for oncoming shift:  [phone]     Activity:  Activity Level: Bed Rest  Number times ambulated in hallways past shift: 0  Number of times OOB to chair past shift: 0    Cardiac:   Cardiac Monitoring: Yes      Cardiac Rhythm:  (Patient refusing to keep leads on. Put on standby)    Access:   Current line(s): no access     Genitourinary:   Urinary status: voiding    Respiratory:   O2 Device: None (Room air)  Chronic home O2 use?: NO  Incentive spirometer at bedside: NO     GI:     Current diet:  DIET NPO  Passing flatus: NO  Tolerating current diet: YES       Pain Management:   Patient states pain is manageable on current regimen: YES    Skin:  Victorino Score: 15  Interventions: float heels    Patient Safety:  Fall Score:  Total Score: 4  Interventions: bed/chair alarm  High Fall Risk: Yes    Length of Stay:  Expected LOS: - - -  Actual LOS: 1      Joslyn Solitario RN

## 2021-05-29 NOTE — PROGRESS NOTES
End of Shift Note    Bedside shift change report given to Von Payan (oncoming nurse) by Nicholas Caban (offgoing nurse). Report included the following information SBAR, Kardex, OR Summary, Procedure Summary, Intake/Output, MAR, Recent Results and Cardiac Rhythm NSR    Shift worked:  Day     Shift summary and any significant changes:     Patient confused during shift can not answer questions appropriately. Restless but not able to make relaxed with any medication or intervention. Patient not complaining of pain but is not tolerating the ice pack. Patient voided. Patient pulled out IV MD aware and said do not place another. Concerns for physician to address:  PT/OT     Zone phone for oncoming shift:   [phone]       Activity:  Activity Level: Bed Rest  Number times ambulated in hallways past shift: 0  Number of times OOB to chair past shift: 0    Cardiac:   Cardiac Monitoring: Yes      Cardiac Rhythm: Sinus Rhythm    Access:   Current line(s): no access MD approved     Genitourinary:   Urinary status: voided    Respiratory:   O2 Device: None (Room air)  Chronic home O2 use?: NO  Incentive spirometer at bedside: YES     GI:     Current diet:  DIET CARDIAC Regular  Passing flatus: YES  Tolerating current diet: YES       Pain Management:   Patient states pain is manageable on current regimen: YES    Skin:  Victorino Score: 15  Interventions: increase time out of bed, PT/OT consult and limit briefs    Patient Safety:  Fall Score:  Total Score: 4  Interventions: gripper socks, pt to call before getting OOB and stay with me (per policy)  High Fall Risk: Yes    Length of Stay:  Expected LOS: - - -  Actual LOS: [facility]

## 2021-05-29 NOTE — PERIOP NOTES
TRANSFER - OUT REPORT:    Verbal report given to [facility], RN(name) on Tawanna Gloria  being transferred to [facility](unit) for routine post - op       Report consisted of patients Situation, Background, Assessment and   Recommendations(SBAR). Information from the following report(s) SBAR, Kardex, ED Summary, OR Summary, Procedure Summary, Intake/Output, MAR, Accordion, Recent Results, Med Rec Status, Cardiac Rhythm NSR, BBB, first degree AVB, Alarm Parameters , Pre Procedure Checklist, Procedure Verification and Quality Measures was reviewed with the receiving nurse. Opportunity for questions and clarification was provided.       Patient transported with:   Monitor  Registered Nurse

## 2021-05-29 NOTE — PROGRESS NOTES
Spoke to Dr. Macy Valdez on the phone who is covering for Dr. Mumtaz Tipton. He is aware patient removed IV and said not to place a new line since patient just keeps removing them.

## 2021-05-29 NOTE — PROGRESS NOTES
0218: Patient has pulled out her ladd as well as IV, and will not keep tele leads on. She refuses the placement of new IV access and is aggressive towards staff when assessing location for new placement. 0310Adolfo Carvalho NP has seen the patient and has determined that no intervention is needed at this time. 5944: Patient refuses to be touched and will not allow the tech to get vital signs, she will not maintain her heels being elevated via foam pillow, nor will she allow labs to be drawn. 3842: Patient is increasingly confused. She continues to yell out asking for help, moaning \"help me, someone please help\", but when entering the room asking her what we can do to help and/or make her more comfortable she states, \" I don't want your help. I'm not calling out for you, I'm calling out to the lord\". She also states that she does not want to have surgery today. 0730: Patients daughter has arrived to help facilitate her mothers transport to surgery.

## 2021-05-29 NOTE — ANESTHESIA POSTPROCEDURE EVALUATION
Procedure(s):  LEFT FEMORAL INTERTROCHANTERIC NAIL INSERTION (URGENT). general, total IV anesthesia    Anesthesia Post Evaluation        Patient location during evaluation: PACU  Note status: Adequate. Level of consciousness: responsive to verbal stimuli and sleepy but conscious  Pain management: satisfactory to patient  Airway patency: patent  Anesthetic complications: no  Cardiovascular status: acceptable  Respiratory status: acceptable  Hydration status: acceptable  Comments: +Post-Anesthesia Evaluation and Assessment    Patient: Patrice Keller MRN: [id number]  SSN: [id number]   YOB: 1928  Age: 80 y.o. Sex: female      Cardiovascular Function/Vital Signs    BP (!) 125/56 (BP 1 [place] Left upper arm)   Pulse 81   Temp 37 °C (98.6 °F)   Resp 16   Ht 5' (1.524 m)   Wt 44.5 kg (98 lb)   SpO2 100%   BMI 19.14 kg/m²     Patient is status post Procedure(s):  LEFT FEMORAL INTERTROCHANTERIC NAIL INSERTION (URGENT). Nausea/Vomiting: Controlled. Postoperative hydration reviewed and adequate. Pain:  Pain Scale 1: Numeric (0 - 10) (05/29/21 1200)  Pain Intensity 1: 0 (05/29/21 1200)   Managed. Neurological Status:   Neuro (WDL): Exceptions to WDL (05/29/21 1145)   At baseline. Mental Status and Level of Consciousness: Arousable. Pulmonary Status:   O2 Device: Nasal cannula (05/29/21 1200)   Adequate oxygenation and airway patent. Complications related to anesthesia: None    Post-anesthesia assessment completed. No concerns. Signed By: Rodger Hollis MD    5/29/2021  Post anesthesia nausea and vomiting:  controlled      INITIAL Post-op Vital signs:   Vitals Value Taken Time   /85 05/29/21 1205   Temp 37 °C (98.6 °F) 05/29/21 1148   Pulse 71 05/29/21 1214   Resp 16 05/29/21 1214   SpO2 100 % 05/29/21 1214   Vitals shown include unvalidated device data.

## 2021-05-30 LAB
ANION GAP SERPL CALC-SCNC: 3 MMOL/L (ref 5–15)
BUN SERPL-MCNC: 14 MG/DL (ref 6–20)
BUN/CREAT SERPL: 20 (ref 12–20)
CALCIUM SERPL-MCNC: 9.8 MG/DL (ref 8.5–10.1)
CHLORIDE SERPL-SCNC: 109 MMOL/L (ref 97–108)
CO2 SERPL-SCNC: 30 MMOL/L (ref 21–32)
CREAT SERPL-MCNC: 0.69 MG/DL (ref 0.55–1.02)
GLUCOSE SERPL-MCNC: 105 MG/DL (ref 65–100)
HGB BLD-MCNC: 9.5 G/DL (ref 11.5–16)
POTASSIUM SERPL-SCNC: 3.8 MMOL/L (ref 3.5–5.1)
SODIUM SERPL-SCNC: 142 MMOL/L (ref 136–145)

## 2021-05-30 PROCEDURE — 97535 SELF CARE MNGMENT TRAINING: CPT

## 2021-05-30 PROCEDURE — 80048 BASIC METABOLIC PNL TOTAL CA: CPT

## 2021-05-30 PROCEDURE — 65270000029 HC RM PRIVATE

## 2021-05-30 PROCEDURE — 85018 HEMOGLOBIN: CPT

## 2021-05-30 PROCEDURE — 74011250636 HC RX REV CODE- 250/636: Performed by: INTERNAL MEDICINE

## 2021-05-30 PROCEDURE — 97165 OT EVAL LOW COMPLEX 30 MIN: CPT

## 2021-05-30 PROCEDURE — 97162 PT EVAL MOD COMPLEX 30 MIN: CPT

## 2021-05-30 PROCEDURE — 99232 SBSQ HOSP IP/OBS MODERATE 35: CPT | Performed by: FAMILY MEDICINE

## 2021-05-30 PROCEDURE — 97161 PT EVAL LOW COMPLEX 20 MIN: CPT

## 2021-05-30 PROCEDURE — 97530 THERAPEUTIC ACTIVITIES: CPT

## 2021-05-30 PROCEDURE — 74011250637 HC RX REV CODE- 250/637: Performed by: ORTHOPAEDIC SURGERY

## 2021-05-30 PROCEDURE — 36415 COLL VENOUS BLD VENIPUNCTURE: CPT

## 2021-05-30 RX ORDER — LORAZEPAM 1 MG/1
1 TABLET ORAL
Status: DISCONTINUED | OUTPATIENT
Start: 2021-05-30 | End: 2021-06-03 | Stop reason: HOSPADM

## 2021-05-30 RX ADMIN — Medication 1 TABLET: at 17:06

## 2021-05-30 RX ADMIN — AMLODIPINE BESYLATE 2.5 MG: 2.5 TABLET ORAL at 09:17

## 2021-05-30 RX ADMIN — DOCUSATE SODIUM 50MG AND SENNOSIDES 8.6MG 1 TABLET: 8.6; 5 TABLET, FILM COATED ORAL at 17:06

## 2021-05-30 RX ADMIN — HEPARIN SODIUM 5000 UNITS: 5000 INJECTION INTRAVENOUS; SUBCUTANEOUS at 13:03

## 2021-05-30 RX ADMIN — ACETAMINOPHEN 650 MG: 325 TABLET ORAL at 05:15

## 2021-05-30 RX ADMIN — ACETAMINOPHEN 650 MG: 325 TABLET ORAL at 17:06

## 2021-05-30 RX ADMIN — Medication 1 TABLET: at 12:58

## 2021-05-30 RX ADMIN — DOCUSATE SODIUM 50MG AND SENNOSIDES 8.6MG 1 TABLET: 8.6; 5 TABLET, FILM COATED ORAL at 09:16

## 2021-05-30 RX ADMIN — HEPARIN SODIUM 5000 UNITS: 5000 INJECTION INTRAVENOUS; SUBCUTANEOUS at 02:29

## 2021-05-30 RX ADMIN — ACETAMINOPHEN 650 MG: 325 TABLET ORAL at 00:38

## 2021-05-30 RX ADMIN — Medication 1 TABLET: at 09:16

## 2021-05-30 RX ADMIN — ACETAMINOPHEN 650 MG: 325 TABLET ORAL at 12:58

## 2021-05-30 NOTE — PROGRESS NOTES
ORTHO - Progress Note  Post Op day: 1 Day Post-Op    Cuauhtemoc Francisco     [id number]  female    80 y.o.    1928    Admit date:2021  Date of Surgery:2021   Procedures:Procedure(s):LEFT FEMORAL INTERTROCHANTERIC NAIL INSERTION (URGENT)  Surgeon:Surgeon(s) and Role:   Anitra Pinon MD - Primary        SUBJECTIVE:     Cuauhtemoc Francisco is a 80 y.o. female resting in the bed  Patient has complaints of appropriate post-op pain. Baseline confusion per son at bedside. Never knows day of the week, often talks to her  mother. Confuses family members(referred to her son as her brother)    OBJECTIVE:       Physical Exam:  General: alert, cooperative, no distress. Gastrointestinal:  non-distended . Cardiovascular: equal pulses in the lower extremities,  Brisk cap refill in all distal extremities   Genitourinary: incontinent- Voiding independently   Respiratory: No respiratory distress   Neurological:Neurovascular exam within normal limits. Senstion intact: LE bilat. Motor: + DF/PF/EHL. Musculoskeletal: Clint's sign negative in bilateral lower extremities. Calves soft, supple, non-tender upon palpation or with passive stretch. Dressing/Wound:  Clean, dry and intact. No significant erythema or swelling.     Vital Signs:         Patient Vitals for the past 8 hrs:   BP Temp Pulse Resp SpO2   21 1225 (!) 117/56 97.8 °F (36.6 °C) 77 18 99 %   21 0850 (!) 142/55 98 °F (36.7 °C) 70 18 100 %                                          Temp (24hrs), Av.2 °F (36.8 °C), Min:97.4 °F (36.3 °C), Max:99.5 °F (37.5 °C)    Date 21 0700 - 21 0659   Shift [id number] [id number] 3748-1832 24 Hour Total   INTAKE   P.O. 360   360   Shift Total(mL/kg) 360(8.1)   360(8.1)   OUTPUT   Shift Total(mL/kg)       Weight (kg) 44.5 44.5 44.5 44.5     Labs:        Recent Labs     21  1118   HCT 38.1   HGB 12.1     PT/OT:              ASSESSMENT / PLAN:   Active Problems:    Fall (2021) Closed 2-part intertrochanteric fracture of left femur ([address] (5/28/2021)      -  Needs labs---declined by pt this morning  -  Pt removed her IV yesterday  -  Continue PT/OT WBAT  -  DVT prophylaxis- SCD w/ heparin  -  DC planning - SNF= had been at [facility]-  Son doesn't wasn't her to return there!!!     Signed By: Hiram Eagle PA-C

## 2021-05-30 NOTE — PROGRESS NOTES
Problem: Self Care Deficits Care Plan (Adult)  Goal: *Acute Goals and Plan of Care (Insert Text)  Description: FUNCTIONAL STATUS PRIOR TO ADMISSION: Patient was modified independent using a walker and single point cane for functional mobility on occasion, not using at time of fall. She has supervision and assistance as needed for ADLs but was able to bathe and dress herself. She does not drive, work or complete majority of IADLs. HOME SUPPORT: The patient lived with son who is around 24/7 for assistance as needed. Occupational Therapy Goals  Initiated 5/30/2021     1. Patient will perform lower body dressing using Reacher, Stocking Aid, and Long Handled Shoe Horn PRN at minimal assistance/contact guard assist level within 7 days. 2. Patient will perform toilet transfers at minimal assistance/contact guard assist level using Walkers, Type: Rolling Walker  within 7 days. 3. Patient will perform all aspects of toileting at minimal assistance/contact guard assist level within 7 days. 4. Patient will demonstrate ability to sit EOB without assistance for ADLs with min A within 7 days. 5/30/2021 1309 by Gisela Landa  Outcome: Progressing Towards Goal    OCCUPATIONAL THERAPY EVALUATION  Patient: Rickey Guan (74 y.o. female)  Date: 5/30/2021  Primary Diagnosis: Closed 2-part intertrochanteric fracture of left femur ([address] Rae Ora  Fall [W19. XXXA]  Procedure(s) (LRB):  LEFT FEMORAL INTERTROCHANTERIC NAIL INSERTION (URGENT) (Left) 1 Day Post-Op   Precautions: WBAT        ASSESSMENT  Based on the objective data described below, the patient presents with decreased functional mobility, sitting balance, strength and ability to complete ADLs secondary to above surgery. She is AAOx3, not oriented to date inially and follows commands. Overall she needed max A x2 to attempt sit<>stand from EOB and mod A sitting EOB due to poor balance. Recommend SNF placement since she was independent prior to admission.  Pt has a supportive family. Acute care OT will continue to follow. Current Level of Function Impacting Discharge (ADLs/self-care): max A    Functional Outcome Measure: The patient scored 25/100 on the Barthel outcome measure which is indicative of 75% impaired. Other factors to consider for discharge: supportive family, high PLOF     Patient will benefit from skilled therapy intervention to address the above noted impairments. PLAN :  Recommendations and Planned Interventions: self care training, functional mobility training, therapeutic exercise, balance training, therapeutic activities, cognitive retraining, endurance activities, patient education, home safety training, and family training/education    Frequency/Duration: Patient will be followed by occupational therapy 4 times a week to address goals. Recommendation for discharge: (in order for the patient to meet his/her long term goals)  Therapy up to 5 days/week in SNF setting    This discharge recommendation:  Has not yet been discussed the attending provider and/or case management    IF patient discharges home will need the following DME: TBD at family       SUBJECTIVE:   Patient stated I was a little confused last night.     OBJECTIVE DATA SUMMARY:   HISTORY:   Past Medical History:   Diagnosis Date    Adverse effect of anesthesia     Combative, confused for extended period of time with anesthesia    Anemia NEC     Asthma     Cervical spondylarthritis     Depression     beginning stages of dementia, sundowning    DJD (degenerative joint disease) of knee     Esophageal spasm     Has required esophageal dilatation; Dr. Kelton Sanchez    GERD (gastroesophageal reflux disease)     Hypercholesterolemia     Hypertension     Lumbar stenosis     Neuropathy, upper extremity     left    Vertigo [id number]    Saw Dr. Lonnie Chacon; improved.      Vitamin D deficiency 1/28/2011     Past Surgical History:   Procedure Laterality Date    HX CATARACT REMOVAL      bilateral HX HYSTERECTOMY      HX ORTHOPAEDIC      right bunion excision    HX OTHER SURGICAL      broken R hip    HX TONSILLECTOMY         Expanded or extensive additional review of patient history:     Home Situation  # Steps to Enter: 1  One/Two Story Residence: One story  Living Alone: No  Patient Expects to be Discharged to[de-identified] Private residence  Current DME Used/Available at Home: Grab bars, Shower chair  Tub or Shower Type: Tub/Shower combination    Hand dominance: Right    EXAMINATION OF PERFORMANCE DEFICITS:  Cognitive/Behavioral Status:  Neurologic State: Alert;Confused  Orientation Level: Disoriented to time;Oriented to person;Oriented to place;Oriented to situation  Cognition: Appropriate for age attention/concentration; Follows commands  Perception: Appears intact  Perseveration: No perseveration noted  Safety/Judgement: Awareness of environment    Skin: visible areas intact    Edema: none noted    Hearing: Auditory  Auditory Impairment: None  Hearing Aids/Status: Does not own    Vision/Perceptual:                           Acuity: Within Defined Limits    Corrective Lenses: Glasses    Range of Motion:    AROM: Generally decreased, functional    Strength:    Strength: Generally decreased, functional     Coordination:     Fine Motor Skills-Upper: Left Intact; Right Intact    Gross Motor Skills-Upper: Left Intact; Right Intact    Tone & Sensation:    Tone: Normal        Balance:  Sitting: Impaired  Sitting - Static: Poor (constant support)    Functional Mobility and Transfers for ADLs:  Bed Mobility:  Rolling: Maximum assistance  Supine to Sit: Maximum assistance;Assist x1  Sit to Supine: Moderate assistance;Assist x2  Scooting: Maximum assistance    Transfers:  Sit to Stand: Total assistance (unable today)    ADL Assessment:  Patient recalled and demonstrated avoiding extreme planes of movement with Left LE during ADLs and functional mobility with minimal verbal cues.     Feeding: Setup    Oral Facial Hygiene/Grooming: Setup    Upper Body Dressing: Maximum assistance    Lower Body Dressing: Total assistance    Toileting: Total assistance                ADL Intervention and task modifications:    Education provided for safety with ADLs. LLE into and out of clothing first and adaptive equipment. Patient completed grooming in chair position in bed and was set/up for feeding. Cognitive Retraining  Safety/Judgement: Awareness of environment        Therapeutic Exercise:  Pt with significant genu valgus bilaterally, affected LLE over RLE which attempts to educate and use pillow for placement were made to protect knees and alignment of hips      Functional Measure:  Barthel Index:    Bathin  Bladder: 5  Bowels: 5  Groomin  Dressin  Feedin  Mobility: 0  Stairs: 0  Toilet Use: 5  Transfer (Bed to Chair and Back): 5  Total: 25/100        The Barthel ADL Index: Guidelines  1. The index should be used as a record of what a patient does, not as a record of what a patient could do. 2. The main aim is to establish degree of independence from any help, physical or verbal, however minor and for whatever reason. 3. The need for supervision renders the patient not independent. 4. A patient's performance should be established using the best available evidence. Asking the patient, friends/relatives and nurses are the usual sources, but direct observation and common sense are also important. However direct testing is not needed. 5. Usually the patient's performance over the preceding 24-48 hours is important, but occasionally longer periods will be relevant. 6. Middle categories imply that the patient supplies over 50 per cent of the effort. 7. Use of aids to be independent is allowed. Dennys Whitingel., Barthel, D.W. (9904). Functional evaluation: the Barthel Index. [address] [facility] (14)2. OLYA Holliday, Miguel Singh, Kira Motta., Tony Arevalo, [address] ().  Measuring the change indisability after inpatient rehabilitation; comparison of the responsiveness of the Barthel Index and Functional [place] Measure. Journal of Neurology, Neurosurgery, and Psychiatry, 66(4), 369-233. PAN Bolton.A, HANNA Pathak, & Dbeby Bae M.A. (2004.) Assessment of post-stroke quality of life in cost-effectiveness studies: The usefulness of the Barthel Index and the EuroQoL-5D. Quality of Life Research, 15, 603-33        Occupational Therapy Evaluation Charge Determination   History Examination Decision-Making   LOW Complexity : Brief history review  MEDIUM Complexity : 3-5 performance deficits relating to physical, cognitive , or psychosocial skils that result in activity limitations and / or participation restrictions MEDIUM Complexity : Patient may present with comorbidities that affect occupational performnce. Miniml to moderate modification of tasks or assistance (eg, physical or verbal ) with assesment(s) is necessary to enable patient to complete evaluation       Based on the above components, the patient evaluation is determined to be of the following complexity level: LOW   Pain Rating:  None rated at end of session    Activity Tolerance:   Good    After treatment patient left in no apparent distress:    Supine in bed, Call bell within reach, Caregiver / family present, and Side rails x 3    COMMUNICATION/EDUCATION:   The patients plan of care was discussed with: Physical therapist and Registered nurse. Home safety education was provided and the patient/caregiver indicated understanding., Patient/family have participated as able in goal setting and plan of care. , and Patient/family agree to work toward stated goals and plan of care. This patients plan of care is appropriate for delegation to [facility].     Thank you for this referral.  Karon Piedra  Time Calculation: 23 mins

## 2021-05-30 NOTE — PROGRESS NOTES
Problem: Mobility Impaired (Adult and Pediatric)  Goal: *Acute Goals and Plan of Care (Insert Text)  Outcome: Not Met  Note: FUNCTIONAL STATUS PRIOR TO ADMISSION: Patient was independent and active without use of DME.    HOME SUPPORT PRIOR TO ADMISSION: Patient and son live together . Son is there all day. Pt also has 2 daughters. Pt has a walker and a cane but son says truthfully she uses neither. Physical Therapy Goals  Initiated 5/30/2021  1. Patient will move from supine to sit and sit to supine , scoot up and down, and roll side to side in bed with moderate assistance  within 7 day(s). 2.  Patient will transfer from bed to chair and chair to bed with moderate assistance  using the least restrictive device within 7 day(s). 3.  Patient will perform sit to stand with minimal assistance/contact guard assist within 7 day(s). 4.  Patient will ambulate with moderate assistance  for 25 feet with the least restrictive device within 7 day(s). 5. Patient will be able to do some basic hip exercises with AAROM. PHYSICAL THERAPY EVALUATION  Patient: Bismark Alcocer (11 y.o. female)  Date: 5/30/2021  Primary Diagnosis: Closed 2-part intertrochanteric fracture of left femur ([address] Thresa Brod  Fall [W19. XXXA]  Procedure(s) (LRB):  LEFT FEMORAL INTERTROCHANTERIC NAIL INSERTION (URGENT) (Left) 1 Day Post-Op   Precautions: fall, confusion post op       ASSESSMENT  Based on the objective data described below, the patient presents with now cleared after severe post op confusion. Pt was pulling at sutures, and trying to get OOB was in restraint but now doing much better. Pt is a& O and does well with person, place, though day is off. Pt fell in doorway to BR into hallway and remembers her fall. Son at home. Patient is in need of skilled care to improve bed mobility, transfers, gait, strength & balance following ORIF of L hip. Pt has an old THR on R following a previous hip fx per son. Lakshmi Caban She was previously ambulatory with and without AD (has cane and walker). Most likely pt will need SNF rehab. IN working with pt today she was unable to hold sitting position on EOB - leaned heavily to R and groaning with pain in hip L. Once OT came in we attempted stand but pt unable to even get buttocks off of bed - needed total A. In addition pts R knee seemed fixed at 90 and when flexed more pt cried out in pain in L hip. Continue 6 days/wk. Current Level of Function Impacting Discharge (mobility/balance): pain, total assist today    Functional Outcome Measure: The patient scored 0 on the MEMS outcome measure which is indicative of total dependence. Other factors to consider for discharge: age, confusion     Patient will benefit from skilled therapy intervention to address the above noted impairments. PLAN :  Recommendations and Planned Interventions: bed mobility training, transfer training, gait training, therapeutic exercises, patient and family training/education, and therapeutic activities      Frequency/Duration: Patient will be followed by physical therapy:  6 times a week to address goals. Recommendation for discharge: (in order for the patient to meet his/her long term goals)  Therapy up to 5 days/week in SNF setting or intensive home health therapy program    This discharge recommendation:  Has not yet been discussed the attending provider and/or case management    IF patient discharges home will need the following DME: patient owns DME required for discharge         SUBJECTIVE:   Patient stated I live with my son.     OBJECTIVE DATA SUMMARY:   HISTORY:    Past Medical History:   Diagnosis Date    Adverse effect of anesthesia     Combative, confused for extended period of time with anesthesia    Anemia NEC     Asthma     Cervical spondylarthritis     Depression     beginning stages of dementia, sundowning    DJD (degenerative joint disease) of knee     Esophageal spasm     Has required esophageal dilatation; Dr. Kelton Sanchez    GERD (gastroesophageal reflux disease)     Hypercholesterolemia     Hypertension     Lumbar stenosis     Neuropathy, upper extremity     left    Vertigo [id number]    Saw Dr. Lonnie Chacon; improved. Vitamin D deficiency 1/28/2011     Past Surgical History:   Procedure Laterality Date    HX CATARACT REMOVAL      bilateral    HX HYSTERECTOMY      HX ORTHOPAEDIC      right bunion excision    HX OTHER SURGICAL      broken R hip    HX TONSILLECTOMY         Personal factors and/or comorbidities impacting plan of care:     Home Situation  # Steps to Enter: 1  One/Two Story Residence: One story  Living Alone: No  Patient Expects to be Discharged to[de-identified] Private residence    EXAMINATION/PRESENTATION/DECISION MAKING:   Critical Behavior:  Neurologic State: Alert, Confused  Orientation Level: Oriented to person  Cognition: Impaired decision making     Hearing: Auditory  Auditory Impairment: None  Hearing Aids/Status: Does not own  Skin:    Edema:   Range Of Motion:  AROM: Generally decreased, functional - pt needing AAROM today due to pain in L hip  Moving R LE causes increased pain in L. Strength:    Strength: Generally decreased, functional (xL LE)                    Tone & Sensation:   Tone: Normal                              Coordination:     Vision:      Functional Mobility:  Bed Mobility:  Rolling: Maximum assistance  Supine to Sit: Maximum assistance;Assist x1  Sit to Supine: Moderate assistance;Assist x2  Scooting: Maximum assistance  Transfers:  Sit to Stand: Total assistance (unable today)                          Balance:   Sitting: Impaired  Sitting - Static: Poor (constant support)  Ambulation/Gait Training:      unable              Right Side Weight Bearing: Full  Left Side Weight Bearing: As tolerated                                 Stairs: Therapeutic Exercises:    Ankle pumps B    Functional Measure:    Elder Mobility Scale    0/20         Scores under 10 - generally these patients are dependent in mobility maneuvers; require help with  basic ADL, such as transfers, toileting and dressing. Scores between 10 - 13 - generally these patients are borderline in terms of safe mobility and  independence in ADL i.e. they require some help with some mobility maneuvers. Scores over 14 - Generally these patients are able to perform mobility maneuvers alone and safely  and are independent in basic ADL. Physical Therapy Evaluation Charge Determination   History Examination Presentation Decision-Making   MEDIUM  Complexity : 1-2 comorbidities / personal factors will impact the outcome/ POC  MEDIUM Complexity : 3 Standardized tests and measures addressing body structure, function, activity limitation and / or participation in recreation  MEDIUM Complexity : Evolving with changing characteristics  MEDIUM Complexity : FOTO score of 26-74      Based on the above components, the patient evaluation is determined to be of the following complexity level: MEDIUM    Pain Ratin/10 L hip    Activity Tolerance:   Poor - fatigued easily , no low BP symptoms    After treatment patient left in no apparent distress:   Supine in bed    COMMUNICATION/EDUCATION:   The patients plan of care was discussed with: Occupational therapist and Registered nurse. Patient/family have participated as able in goal setting and plan of care.     Thank you for this referral.  Saniya Hays, PT   Time Calculation: 26 mins

## 2021-05-30 NOTE — PROGRESS NOTES
General Daily Progress Note    Admit Date: 5/28/2021  Hospital day 3    Subjective:     Patient has no complaint of shortness of breath or chest pain. Overall is somewhat sleepy this am. Had some haldol ordered last night for agitation, but pt became sleepy before this was given and didn't get it.  ..   Medication side effects: none    Current Facility-Administered Medications   Medication Dose Route Frequency    alcohol 62% (NOZIN) nasal  1 Ampule  1 Ampule Topical Q12H    0.9% sodium chloride infusion  125 mL/hr IntraVENous CONTINUOUS    sodium chloride (NS) flush 5-40 mL  5-40 mL IntraVENous Q8H    sodium chloride (NS) flush 5-40 mL  5-40 mL IntraVENous PRN    acetaminophen (TYLENOL) tablet 650 mg  650 mg Oral Q6H    naloxone (NARCAN) injection 0.4 mg  0.4 mg IntraVENous PRN    calcium-vitamin D (OS-ROSLYN +D3) 500 mg-200 unit per tablet 1 Tablet  1 Tablet Oral TID WITH MEALS    senna-docusate (PERICOLACE) 8.6-50 mg per tablet 1 Tablet  1 Tablet Oral BID    polyethylene glycol (MIRALAX) packet 17 g  17 g Oral DAILY    [START ON 5/31/2021] bisacodyL (DULCOLAX) suppository 10 mg  10 mg Rectal DAILY PRN    oxyCODONE IR (ROXICODONE) tablet 2.5 mg  2.5 mg Oral Q4H PRN    heparin (porcine) injection 5,000 Units  5,000 Units SubCUTAneous Q12H    haloperidol lactate (HALDOL) injection 2 mg  2 mg IntraMUSCular ONCE    morphine injection 2 mg  2 mg IntraVENous Q1H PRN    albuterol (PROVENTIL HFA, VENTOLIN HFA, PROAIR HFA) inhaler 1 Puff  1 Puff Inhalation Q6H PRN    amLODIPine (NORVASC) tablet 2.5 mg  2.5 mg Oral DAILY    atorvastatin (LIPITOR) tablet 20 mg  20 mg Oral QHS    sodium chloride (NS) flush 5-40 mL  5-40 mL IntraVENous Q8H    sodium chloride (NS) flush 5-40 mL  5-40 mL IntraVENous PRN    acetaminophen (TYLENOL) tablet 650 mg  650 mg Oral Q6H PRN    Or    acetaminophen (TYLENOL) suppository 650 mg  650 mg Rectal Q6H PRN    polyethylene glycol (MIRALAX) packet 17 g  17 g Oral DAILY PRN  ondansetron (ZOFRAN ODT) tablet 4 mg  4 mg Oral Q8H PRN    0.9% sodium chloride infusion  75 mL/hr IntraVENous CONTINUOUS        Review of Systems  Review of systems not obtained due to patient factors. Objective:     Patient Vitals for the past 8 hrs:   BP Temp Pulse Resp SpO2   05/30/21 0345 (!) 143/57 98.4 °F (36.9 °C) 76 18 100 %     No intake/output data recorded. 05/28 1901 - 05/30 0700  In: 750 [I.V.:750]  Out: 50     Physical Exam:   Visit Vitals  BP (!) 143/57   Pulse 76   Temp 98.4 °F (36.9 °C)   Resp 18   Ht 5' (1.524 m)   Wt 98 lb (44.5 kg)   SpO2 100%   BMI 19.14 kg/m²     Lungs: clear to auscultation bilaterally  Heart: regular rate and rhythm, S1, S2 normal, no murmur, click, rub or gallop  Abdomen: soft, non-tender. Bowel sounds normal. No masses,  no organomegaly  Extremities: extremities normal, atraumatic, no cyanosis or edema      ECG: normal sinus rhythm     Data Review No results found for this or any previous visit (from the past 24 hour(s)). Assessment:     Active Problems:    Fall (5/28/2021)      Closed 2-part intertrochanteric fracture of left femur ([address] (5/28/2021)        Plan:     1. L hip fracture- sp L femoral intertrochanteric nail insertion. VSS. 2. Mild confusion yesterday. Pulled out iv , didn't cooperate for blood tests . Will try to check lab later this am.   3. Hypertension- stable. 4. Hypercholesterolemia  5.  On sc heparin for DVT prophylaxis

## 2021-05-30 NOTE — PROGRESS NOTES
End of Shift Note    Bedside shift change report given to Shyanne (oncoming nurse) by Allison Zaldivar (offgoing nurse). Report included the following information SBAR, Kardex, Intake/Output, MAR and Recent Results    Shift worked:  5300-9711     Shift summary and any significant changes:     Pt very confused and agitated at start of shift. Pulled dressing off surgical site x2 and began picking at staples. Placed in roll belt to discourage attempts to get out of bed. Scant bleeding from pt manipulation of surgical site. One-time order for Haldol received but was held due to QTc >500. Pt would not allow blood draw for AM labs. Concerns for physician to address:  agitation/confusion     Zone phone for oncoming shift:          Activity:  Activity Level: Bed Rest  Number times ambulated in hallways past shift: 0  Number of times OOB to chair past shift: 0    Cardiac:   Cardiac Monitoring: Yes      Cardiac Rhythm: Sinus Rhythm    Access:   Current line(s): PIV     Genitourinary:   Urinary status: voiding and incontinent    Respiratory:   O2 Device: None (Room air)  Chronic home O2 use?: NO  Incentive spirometer at bedside: NO     GI:     Current diet:  DIET CARDIAC Regular  DIET NUTRITIONAL SUPPLEMENTS Breakfast, Lunch, Dinner; Ensure Fraga-Suero  Passing flatus: YES  Tolerating current diet: YES       Pain Management:   Patient states pain is manageable on current regimen: N/A    Skin:  Victorino Score: 15  Interventions: float heels    Patient Safety:  Fall Score:  Total Score: 4  Interventions: bed/chair alarm  High Fall Risk: Yes    Length of Stay:  Expected LOS: - - -  Actual LOS: 2      Allison Zaldivar

## 2021-05-30 NOTE — ROUTINE PROCESS
Bedside shift change report given to [address] [place] [address] (oncoming nurse) by Lunette Sandhoff M.,RN (offgoing nurse). Report included the following information SBAR, Kardex and MAR.

## 2021-05-30 NOTE — PROGRESS NOTES
Received notification from bedside RN about patient with regards to: increasing agitation, pulled out dressing from surgical site and is pulling on the staples. Recurrent attempt to get out of bed.     Intervention given: Haldol 2 mg IM x 1 dose ordered

## 2021-05-31 LAB — HGB BLD-MCNC: 9.5 G/DL (ref 11.5–16)

## 2021-05-31 PROCEDURE — 74011250637 HC RX REV CODE- 250/637: Performed by: ORTHOPAEDIC SURGERY

## 2021-05-31 PROCEDURE — 74011250637 HC RX REV CODE- 250/637: Performed by: FAMILY MEDICINE

## 2021-05-31 PROCEDURE — 74011250636 HC RX REV CODE- 250/636: Performed by: INTERNAL MEDICINE

## 2021-05-31 PROCEDURE — 85018 HEMOGLOBIN: CPT

## 2021-05-31 PROCEDURE — 36415 COLL VENOUS BLD VENIPUNCTURE: CPT

## 2021-05-31 PROCEDURE — 97530 THERAPEUTIC ACTIVITIES: CPT

## 2021-05-31 PROCEDURE — 99232 SBSQ HOSP IP/OBS MODERATE 35: CPT | Performed by: FAMILY MEDICINE

## 2021-05-31 PROCEDURE — 65270000029 HC RM PRIVATE

## 2021-05-31 RX ADMIN — ACETAMINOPHEN 650 MG: 325 TABLET ORAL at 21:48

## 2021-05-31 RX ADMIN — LORAZEPAM 1 MG: 1 TABLET ORAL at 21:49

## 2021-05-31 RX ADMIN — Medication 1 AMPULE: at 09:35

## 2021-05-31 RX ADMIN — HEPARIN SODIUM 5000 UNITS: 5000 INJECTION INTRAVENOUS; SUBCUTANEOUS at 13:14

## 2021-05-31 RX ADMIN — DOCUSATE SODIUM 50MG AND SENNOSIDES 8.6MG 1 TABLET: 8.6; 5 TABLET, FILM COATED ORAL at 09:35

## 2021-05-31 RX ADMIN — Medication 1 TABLET: at 09:35

## 2021-05-31 RX ADMIN — POLYETHYLENE GLYCOL 3350 17 G: 17 POWDER, FOR SOLUTION ORAL at 09:36

## 2021-05-31 RX ADMIN — AMLODIPINE BESYLATE 2.5 MG: 2.5 TABLET ORAL at 09:35

## 2021-05-31 RX ADMIN — ATORVASTATIN CALCIUM 20 MG: 20 TABLET, FILM COATED ORAL at 21:49

## 2021-05-31 RX ADMIN — ACETAMINOPHEN 650 MG: 325 TABLET ORAL at 13:13

## 2021-05-31 RX ADMIN — Medication 1 TABLET: at 13:14

## 2021-05-31 RX ADMIN — ACETAMINOPHEN 650 MG: 325 TABLET ORAL at 07:00

## 2021-05-31 NOTE — PROGRESS NOTES
General Daily Progress Note    Admit Date: 5/28/2021  Hospital day 4    Subjective:     Patient has no complaint of shortness of breath or chest pain. ..   Medication side effects: none    Current Facility-Administered Medications   Medication Dose Route Frequency    LORazepam (ATIVAN) tablet 1 mg  1 mg Oral Q8H PRN    alcohol 62% (NOZIN) nasal  1 Ampule  1 Ampule Topical Q12H    sodium chloride (NS) flush 5-40 mL  5-40 mL IntraVENous Q8H    sodium chloride (NS) flush 5-40 mL  5-40 mL IntraVENous PRN    acetaminophen (TYLENOL) tablet 650 mg  650 mg Oral Q6H    naloxone (NARCAN) injection 0.4 mg  0.4 mg IntraVENous PRN    calcium-vitamin D (OS-ROSLYN +D3) 500 mg-200 unit per tablet 1 Tablet  1 Tablet Oral TID WITH MEALS    senna-docusate (PERICOLACE) 8.6-50 mg per tablet 1 Tablet  1 Tablet Oral BID    polyethylene glycol (MIRALAX) packet 17 g  17 g Oral DAILY    bisacodyL (DULCOLAX) suppository 10 mg  10 mg Rectal DAILY PRN    oxyCODONE IR (ROXICODONE) tablet 2.5 mg  2.5 mg Oral Q4H PRN    heparin (porcine) injection 5,000 Units  5,000 Units SubCUTAneous Q12H    morphine injection 2 mg  2 mg IntraVENous Q1H PRN    albuterol (PROVENTIL HFA, VENTOLIN HFA, PROAIR HFA) inhaler 1 Puff  1 Puff Inhalation Q6H PRN    amLODIPine (NORVASC) tablet 2.5 mg  2.5 mg Oral DAILY    atorvastatin (LIPITOR) tablet 20 mg  20 mg Oral QHS    sodium chloride (NS) flush 5-40 mL  5-40 mL IntraVENous Q8H    sodium chloride (NS) flush 5-40 mL  5-40 mL IntraVENous PRN    acetaminophen (TYLENOL) tablet 650 mg  650 mg Oral Q6H PRN    Or    acetaminophen (TYLENOL) suppository 650 mg  650 mg Rectal Q6H PRN    polyethylene glycol (MIRALAX) packet 17 g  17 g Oral DAILY PRN    ondansetron (ZOFRAN ODT) tablet 4 mg  4 mg Oral Q8H PRN    0.9% sodium chloride infusion  75 mL/hr IntraVENous CONTINUOUS        Review of Systems  Pertinent items are noted in HPI.     Objective:     Patient Vitals for the past 8 hrs:   BP Temp Pulse Resp SpO2   05/31/21 0335 131/62 98.5 °F (36.9 °C) 79 18 100 %   05/30/21 2331 (!) 110/49 98.2 °F (36.8 °C) 80 18 100 %     05/30 1901 - 05/31 0700  In: -   Out: 100 [Urine:100]  05/29 0701 - 05/30 1900  In: 1110 [P.O.:360; I.V.:750]  Out: 50     Physical Exam:   Visit Vitals  /62   Pulse 79   Temp 98.5 °F (36.9 °C)   Resp 18   Ht 5' (1.524 m)   Wt 105 lb 1.6 oz (47.7 kg)   SpO2 100%   BMI 20.53 kg/m²     Lungs: clear to auscultation bilaterally  Heart: regular rate and rhythm, S1, S2 normal, no murmur, click, rub or gallop  Abdomen: soft, non-tender. Bowel sounds normal. No masses,  no organomegaly  Extremities: extremities normal, atraumatic, no cyanosis or edema      ECG: normal sinus rhythm     Data Review   Recent Results (from the past 24 hour(s))   METABOLIC PANEL, BASIC    Collection Time: 05/30/21  1:23 PM   Result Value Ref Range    Sodium 142 136 - 145 mmol/L    Potassium 3.8 3.5 - 5.1 mmol/L    Chloride 109 (H) 97 - 108 mmol/L    CO2 30 21 - 32 mmol/L    Anion gap 3 (L) 5 - 15 mmol/L    Glucose 105 (H) 65 - 100 mg/dL    BUN 14 6 - 20 MG/DL    Creatinine 0.69 0.55 - 1.02 MG/DL    BUN/Creatinine ratio 20 12 - 20      GFR est AA >60 >60 ml/min/1.73m2    GFR est non-AA >60 >60 ml/min/1.73m2    Calcium 9.8 8.5 - 10.1 MG/DL   HEMOGLOBIN    Collection Time: 05/30/21  1:23 PM   Result Value Ref Range    HGB 9.5 (L) 11.5 - 16.0 g/dL           Assessment:     Active Problems:    Fall (5/28/2021)      Closed 2-part intertrochanteric fracture of left femur ([facility]) (5/28/2021)        Plan:     1. L hip fracture- sp L femoral intertrochanteric nail insertion. VSS. 2. Mild confusion at times. Has pulled IV out. Finally cooperated for lab yesterday- looked ok. hgb down to 9.5, .   3. Hypertension- stable. 4. Hypercholesterolemia  5. On sc heparin for DVT prophylaxis  6. Continue PT and eventual placement in snf.

## 2021-05-31 NOTE — PROGRESS NOTES
End of Shift Note    Bedside shift change report given to RN(oncoming nurse) by Michael Melton (offgoing nurse). Report included the following information SBAR, Kardex, Procedure Summary, Intake/Output and MAR    Shift worked: days     Shift summary and any significant changes:    Patient experiences increased confusion at night. Refused evening meds   Concerns for physician to address:    Zone phone for oncoming shift:  [phone]     Activity:  Activity Level: Bed Rest  Number times ambulated in hallways past shift: 0  Number of times OOB to chair past shift: 1    Cardiac:   Cardiac Monitoring: Yes      Cardiac Rhythm: Sinus Rhythm    Access:   Current line(s):  NONE     Genitourinary:   Urinary status: external catheter    Respiratory:   O2 Device: None (Room air)  Chronic home O2 use?: NO  Incentive spirometer at bedside: NO     GI:     Current diet:  DIET CARDIAC Regular  DIET NUTRITIONAL SUPPLEMENTS Breakfast, Lunch, Dinner; Ensure Fraga-Suero  Passing flatus: YES  Tolerating current diet: YES       Pain Management:   Patient states pain is manageable on current regimen: YES    Skin:  Victorino Score: 14  Interventions: float heels and PT/OT consult    Patient Safety:  Fall Score:  Total Score: 5  Interventions: bed/chair alarm and pt to call before getting OOB  High Fall Risk: Yes    Length of Stay:  Expected LOS: - - -  Actual LOS: 3      Michael Melton

## 2021-05-31 NOTE — PROGRESS NOTES
Transition of Care Plan:  RUR: 12%  Disposition: SNF- pending choice   Follow up appointments: PCP, Ortho  DME needed: n/a  Transportation at Discharge: [place] or means to access home: n/a       IM Medicare letter: to be provided prior to d/c  Caregiver Contact: daughter Mya Moreno [phone]  Discharge Caregiver contacted prior to discharge? yes            Reason for Admission:  Closed 2-part intertrochanteric fracture of left femur                   RUR Score: 12%                    Plan for utilizing home health:  Per recommendation         PCP: First and Last name:  Lolita Moore MD   Name of Practice: [facility]   Are you a current patient: Yes/No: yes   Approximate date of last visit: 2/4/21   Can you participate in a virtual visit with your PCP: yes                    Current Advanced Directive/Advance Care Plan: Venkatesh Mathew (ACP) Conversation      Date of Conversation: 5/31/21  Conducted with: Healthcare Decision Maker: Named in Advance Directive or Healthcare Power of [address]:     Click here to complete Sofea including [address] Relationship (ie \"Primary\")  Today we documented Decision Maker(s) consistent with ACP documents on file. Content/Action Overview: Has ACP document(s) on file - reflects the patient's care preferences    Healthcare Decision Maker:   Click here to complete Sofea including selection of the Healthcare Decision Maker Relationship (ie \"Primary\")                         Transition of Care Plan:                      CM spoke with patient's daughter, Mya Moreno, over the phone to complete initial assessment. Dtr confirmed demographics, insurance, and emergency contact on file. Dtr reported that patient should be a full code, not DNR, CM notified ortho PA. Pt and son live in a single level home with 2 dave.  Pt has a shower chair, cane, RW, wheelchair, and rollator. At baseline pt needs some assistance with ADLs. Pt does not drive and relies on family for transportation. [facility] the past but unable to recall agency name. Pt has also been to SNF at [facility]. Dtr does not wish for patient to go back to [facility] due to having a poor experience there last time. CM discussed d/c planning and therapy's recommendation for SNF. Dtr agreed to SNF placement as long as it was not at [facility]. Dtr provided CM with e-mail [email or web address] to send SNF list to. CM requested dtr review list and provide 2-3 choices as soon as possible. CM has e-mailed SNF list as requested. Care Management Interventions  PCP Verified by CM:  Yes  Last Visit to PCP: 02/04/21  Mode of Transport at Discharge: BLS  Transition of Care Consult (CM Consult): Discharge Planning, SNF  Discharge Durable Medical Equipment: No  Physical Therapy Consult: Yes  Occupational Therapy Consult: Yes  Speech Therapy Consult: No  Current Support Network: Own Home, Family Lives [place], Lives with Caregiver (patinet and son live in a single level home with 2 dave)  Confirm Follow Up Transport: Family  Discharge Location  Discharge Placement: Skilled nursing facility    Jose Armando Terrazas, [address]

## 2021-05-31 NOTE — PROGRESS NOTES
Problem: Mobility Impaired (Adult and Pediatric)  Goal: *Acute Goals and Plan of Care (Insert Text)  Description: Note: FUNCTIONAL STATUS PRIOR TO ADMISSION: Patient was independent and active without use of DME.     HOME SUPPORT PRIOR TO ADMISSION: Patient and son live together . Son is there all day. Pt also has 2 daughters. Pt has a walker and a cane but son says truthfully she uses neither. Physical Therapy Goals  Initiated 5/30/2021  1. Patient will move from supine to sit and sit to supine , scoot up and down, and roll side to side in bed with moderate assistance  within 7 day(s). 2.  Patient will transfer from bed to chair and chair to bed with moderate assistance  using the least restrictive device within 7 day(s). 3.  Patient will perform sit to stand with minimal assistance/contact guard assist within 7 day(s). 4.  Patient will ambulate with moderate assistance  for 25 feet with the least restrictive device within 7 day(s). 5. Patient will be able to do some basic hip exercises with AAROM. Outcome: Progressing Towards Goal     PHYSICAL THERAPY TREATMENT  Patient: Agnieszka Okeefe (36 y.o. female)  Date: 5/31/2021  Diagnosis: Closed 2-part intertrochanteric fracture of left femur ([facility] [address] Leslye Jessica  Fall [W19. XXXA] <principal problem not specified>  Procedure(s) (LRB):  LEFT FEMORAL INTERTROCHANTERIC NAIL INSERTION (URGENT) (Left) 2 Days Post-Op  Precautions:  WBAT LLE  Chart, physical therapy assessment, plan of care and goals were reviewed. ASSESSMENT  Patient continues with skilled PT services and is slowly progressing towards goals. Patient continues to demonstrate limited functional mobility and decreased independence secondary to impaired sitting and standing balance, decreased ROM, impaired gait mechanics, increased LLE pain, and decreased activity tolerance. Received supine in bed and agreeable to PT intervention. VS noted below.  Pt able to assist in bringing [facility], however ultimately needed max A x 1-2 for rolling and supine>sit transfer to bring hips to EOB and trunk upright. Sitting balance fair, progressing to good with increased time in sitting (needing CGA progressing to SBA for support). Completed sit>stand from EOB x 2 with elevated bed height and use of RW for support. Pt needed max A for positioning LLE in sitting to prepare for sit>stand transfer and once standing as patient with tendency to abduct and internally rotate LLE. Pt able to progress LLE to attempt gait, however unable to progress RLE without total A. Required max-total A x 2 for SPT with use of RW for bed>chair transfer. Would recommend SPT without AD next session if patient unable to progress RLE. C/o lightheadedness in sitting post-transfer with decline in BP, requiring BLEs to be elevated and ankle pumps to improve BP. Pt was left sitting in recliner with BLEs elevated, all needs met, RN aware, VSS, and chair alarm on following therapy session. Recommend patient discharge to SNF rehab to address functional impairments as noted above. Sitting EOB: /49, HR 90 bpm  Sitting post-bed>chair transfer: BP 86/39,  bpm, SpO2 100% on RA  Sitting with BLEs elevated: /55, HR 84 bpm, SpO2 99% on RA      Current Level of Function Impacting Discharge (mobility/balance): max-total A x 2    Other factors to consider for discharge: increased risk for falls; decline from baseline mobility; increased confusion         PLAN :  Patient continues to benefit from skilled intervention to address the above impairments. Continue treatment per established plan of care. to address goals.     Recommendation for discharge: (in order for the patient to meet his/her long term goals)  Therapy up to 5 days/week in SNF setting    This discharge recommendation:  A follow-up discussion with the attending provider and/or case management is planned    IF patient discharges home will need the following DME: to be determined (TBD) SUBJECTIVE:   Patient stated I know, I don't mean to fall asleep.     OBJECTIVE DATA SUMMARY:   Critical Behavior:  Neurologic State: Alert  Orientation Level: Disoriented to situation, Disoriented to time, Oriented to person, Oriented to place  Cognition: Poor safety awareness  Safety/Judgement: Awareness of environment  Functional Mobility Training:  Bed Mobility:  Rolling: Maximum assistance  Supine to Sit: Maximum assistance;Assist x2     Scooting: Maximum assistance        Transfers:  Sit to Stand: Maximum assistance;Assist x2  Stand to Sit: Maximum assistance; Total assistance;Assist x2        Bed to Chair: Total assistance;Maximum assistance;Assist x2; Additional time; Adaptive equipment (use of RW)                    Balance:  Sitting: Impaired  Sitting - Static: Fair (occasional)  Sitting - Dynamic: Poor (constant support)  Standing: Impaired; With support  Standing - Static: Poor;Constant support  Standing - Dynamic : Poor;Constant support  Ambulation/Gait Training:                    Right Side Weight Bearing: Full  Left Side Weight Bearing: As tolerated       Therapeutic Exercises: Ankle pumps 10x BLE  Pain Ratin/10 LLE pain    Activity Tolerance:   Fair, SpO2 stable on RA, requires rest breaks, and signs and symptoms of orthostatic hypotension    After treatment patient left in no apparent distress:   Sitting in chair, Heels elevated for pressure relief, Call bell within reach, and Bed / chair alarm activated    COMMUNICATION/COLLABORATION:   The patients plan of care was discussed with: Physical therapist and Registered nurse.      Ayla Kelley PT, DPT   Time Calculation: 29 mins

## 2021-05-31 NOTE — PROGRESS NOTES
CM left voice message for patient's daughter, Tari Modi [phone], and requested a return phone call to completed initial assessment and discuss d/c planning.     Vidal McdonoughSofia, [facility]

## 2021-05-31 NOTE — PROGRESS NOTES
End of Shift Note    Bedside shift change report given to Elizabeth Adler RN(oncoming nurse) by Cm Farr RN (offgoing nurse). Report included the following information SBAR, Kardex, Procedure Summary, Intake/Output and MAR    Shift worked:  night shift     Shift summary and any significant changes:    Patient experiences increased confusion at night. Refused all her meds and lab draws. Concerns for physician to address:    Zone phone for oncoming shift:  [phone]     Activity:  Activity Level: Bed Rest  Number times ambulated in hallways past shift: 0  Number of times OOB to chair past shift: 0    Cardiac:   Cardiac Monitoring: Yes      Cardiac Rhythm: Sinus Rhythm    Access:   Current line(s):  NONE     Genitourinary:   Urinary status: external catheter    Respiratory:   O2 Device: None (Room air)  Chronic home O2 use?: NO  Incentive spirometer at bedside: NO     GI:     Current diet:  DIET CARDIAC Regular  DIET NUTRITIONAL SUPPLEMENTS Breakfast, Lunch, Dinner; Ensure [place]  Passing flatus: YES  Tolerating current diet: YES       Pain Management:   Patient states pain is manageable on current regimen: YES    Skin:  Victorino Score: 14  Interventions: float heels and PT/OT consult    Patient Safety:  Fall Score:  Total Score: 5  Interventions: bed/chair alarm and pt to call before getting OOB  High Fall Risk: Yes    Length of Stay:  Expected LOS: - - -  Actual LOS: 3      Cm Farr RN

## 2021-05-31 NOTE — PROGRESS NOTES
ORTHO - Progress Note  Post Op day: 2 Days [address] [facility]     [id number]  female    80 y.o.    1928    Admit date:2021  Date of Surgery:2021   Procedures:Procedure(s):LEFT FEMORAL INTERTROCHANTERIC NAIL INSERTION (URGENT)  Surgeon:Surgeon(s) and Role:   Portia Carbajal MD - Primary        SUBJECTIVE:     Patrice Keller is a 80 y.o. female resting in the bed. Patient has complaints of appropriate post-op pain, tolerating PO pain medications APAP   Denies F/C, nausea, vomiting, dizziness, lightheadedness, chest pain, or shortness of breath. OBJECTIVE:       Physical Exam:  General: alert, cooperative, no distress. Gastrointestinal:  non-distended . Cardiovascular: equal pulses in the lower extremities,  Brisk cap refill in all distal extremities   Genitourinary: Voiding independently   Respiratory: No respiratory distress   Neurological:Neurovascular exam within normal limits. Baseline dementia/confusion   Senstion intact: LE bilat. Motor: + DF/PF/EHL. Musculoskeletal: Clint's sign negative in bilateral lower extremities. Calves soft, supple, non-tender upon palpation or with passive stretch. Dressing/Wound:  Clean, dry and intact. No significant erythema or swelling.     Vital Signs:         Patient Vitals for the past 8 hrs:   BP Temp Pulse Resp SpO2   21 1501 (!) 96/46 97.4 °F (36.3 °C) 63 16 96 %   21 1124 117/61 97.6 °F (36.4 °C) 88 18 98 %                                          Temp (24hrs), Av °F (36.7 °C), Min:97.4 °F (36.3 °C), Max:98.5 °F (36.9 °C)      Labs:        Recent Labs     21  0947   HGB 9.5*     PT/OT:              ASSESSMENT / PLAN:   Active Problems:    Fall (2021)      Closed 2-part intertrochanteric fracture of left femur ([address] (2021)         Pt removed her IV   -  Continue PT/OT WBAT  -  DVT prophylaxis- SCD w/ heparin--    OK to DC on BID asa/pepcid  -  DC planning - SNF= had been at [facility]-  Son doesn't wasn't her to return there!!!       Signed By: Joy Garcia PA-C

## 2021-06-01 LAB
GLUCOSE BLD STRIP.AUTO-MCNC: 155 MG/DL (ref 65–117)
SERVICE CMNT-IMP: ABNORMAL

## 2021-06-01 PROCEDURE — 65270000029 HC RM PRIVATE

## 2021-06-01 PROCEDURE — 97530 THERAPEUTIC ACTIVITIES: CPT | Performed by: OCCUPATIONAL THERAPIST

## 2021-06-01 PROCEDURE — 94760 N-INVAS EAR/PLS OXIMETRY 1: CPT

## 2021-06-01 PROCEDURE — 97530 THERAPEUTIC ACTIVITIES: CPT

## 2021-06-01 PROCEDURE — 74011250637 HC RX REV CODE- 250/637: Performed by: ORTHOPAEDIC SURGERY

## 2021-06-01 PROCEDURE — 77030038269 HC DRN EXT URIN PURWCK BARD -A

## 2021-06-01 PROCEDURE — 82962 GLUCOSE BLOOD TEST: CPT

## 2021-06-01 PROCEDURE — 97535 SELF CARE MNGMENT TRAINING: CPT | Performed by: OCCUPATIONAL THERAPIST

## 2021-06-01 RX ADMIN — POLYETHYLENE GLYCOL 3350 17 G: 17 POWDER, FOR SOLUTION ORAL at 09:01

## 2021-06-01 RX ADMIN — ACETAMINOPHEN 650 MG: 325 TABLET ORAL at 11:54

## 2021-06-01 RX ADMIN — Medication 1 TABLET: at 11:54

## 2021-06-01 RX ADMIN — Medication 1 AMPULE: at 09:01

## 2021-06-01 RX ADMIN — Medication 1 TABLET: at 17:05

## 2021-06-01 RX ADMIN — DOCUSATE SODIUM 50MG AND SENNOSIDES 8.6MG 1 TABLET: 8.6; 5 TABLET, FILM COATED ORAL at 17:05

## 2021-06-01 RX ADMIN — ACETAMINOPHEN 650 MG: 325 TABLET ORAL at 06:00

## 2021-06-01 RX ADMIN — DOCUSATE SODIUM 50MG AND SENNOSIDES 8.6MG 1 TABLET: 8.6; 5 TABLET, FILM COATED ORAL at 09:01

## 2021-06-01 RX ADMIN — ACETAMINOPHEN 650 MG: 325 TABLET ORAL at 17:05

## 2021-06-01 RX ADMIN — Medication 1 TABLET: at 09:01

## 2021-06-01 NOTE — PROGRESS NOTES
Problem: Falls - Risk of  Goal: *Absence of Falls  Description: Document [place] Fall Risk and appropriate interventions in the flowsheet. Outcome: Progressing Towards Goal  Note: Fall Risk Interventions:  Mobility Interventions: Assess mobility with egress test, Bed/chair exit alarm, Communicate number of staff needed for ambulation/transfer, OT consult for ADLs, Patient to call before getting OOB, PT Consult for mobility concerns, PT Consult for assist device competence, Utilize walker, cane, or other assistive device, Utilize gait belt for transfers/ambulation    Mentation Interventions: Adequate sleep, hydration, pain control, Bed/chair exit alarm, Door open when patient unattended, More frequent rounding, Reorient patient    Medication Interventions: Bed/chair exit alarm, Evaluate medications/consider consulting pharmacy, Patient to call before getting OOB    Elimination Interventions: Bed/chair exit alarm, Call light in reach, Patient to call for help with toileting needs, Toileting schedule/hourly rounds    History of Falls Interventions: Bed/chair exit alarm, Consult care management for discharge planning, Door open when patient unattended, Evaluate medications/consider consulting pharmacy, Investigate reason for fall, Room close to nurse's station, Utilize gait belt for transfer/ambulation         Problem: Pressure Injury - Risk of  Goal: *Prevention of pressure injury  Description: Document Victorino Scale and appropriate interventions in the flowsheet.   Outcome: Progressing Towards Goal  Note: Pressure Injury Interventions:  Sensory Interventions: Assess changes in LOC, Check visual cues for pain, Float heels, Pressure redistribution bed/mattress (bed type)    Moisture Interventions: Apply protective barrier, creams and emollients, Internal/External urinary devices    Activity Interventions: Increase time out of bed, Pressure redistribution bed/mattress(bed type), PT/OT evaluation    Mobility Interventions: Float heels, HOB 30 degrees or less, Pressure redistribution bed/mattress (bed type), PT/OT evaluation    Nutrition Interventions: Document food/fluid/supplement intake    Friction and Shear Interventions: HOB 30 degrees or less, Minimize layers, Lift sheet

## 2021-06-01 NOTE — PROGRESS NOTES
Patient is confused, but cooperating. Patient asked if she could get up and was attempting to get out of bed when I entered the room. She requested to speak to her daughter. Michael Genao, her daughter, and gave phone to the patient. Patient is anxious and becoming agitated because she did not know where she was. Patient knows that I am a nurse, but thought she was in someone's home and stated she \"needs to go home before they kick me out\". Reminded her that she is in the hospital and would be staying the night. She agreed to take some Tylenol. Patient was also given PRN ativan for agitation. 4:48 AM Patient has been resting and sleeping quietly. She refused her Heparin and is currently refusing vital signs. I will try again later.

## 2021-06-01 NOTE — PROGRESS NOTES
Problem: Self Care Deficits Care Plan (Adult)  Goal: *Acute Goals and Plan of Care (Insert Text)  Description: FUNCTIONAL STATUS PRIOR TO ADMISSION: Patient was modified independent using a walker and single point cane for functional mobility on occasion, not using at time of fall. She has supervision and assistance as needed for ADLs but was able to bathe and dress herself. She does not drive, work or complete majority of IADLs. HOME SUPPORT: The patient lived with son who is around 24/7 for assistance as needed. Occupational Therapy Goals  Initiated 5/30/2021     1. Patient will perform lower body dressing using Reacher, Stocking Aid, and Long Handled Shoe Horn PRN at minimal assistance/contact guard assist level within 7 days. 2. Patient will perform toilet transfers at minimal assistance/contact guard assist level using Walkers, Type: Rolling Walker  within 7 days. 3. Patient will perform all aspects of toileting at minimal assistance/contact guard assist level within 7 days. 4. Patient will demonstrate ability to sit EOB without assistance for ADLs with min A within 7 days. Outcome: Progressing Towards Goal   OCCUPATIONAL THERAPY TREATMENT  Patient: Dianne Kelly (29 y.o. female)  Date: 6/1/2021  Diagnosis: Closed 2-part intertrochanteric fracture of left femur ([facility] [address] [place]  Fall [W19. XXXA] <principal problem not specified>  Procedure(s) (LRB):  LEFT FEMORAL INTERTROCHANTERIC NAIL INSERTION (URGENT) (Left) 3 Days Post-Op  Precautions:  WBAT  Chart, occupational therapy assessment, plan of care, and goals were reviewed. ASSESSMENT  Patient continues with skilled OT services and is progressing towards goals. Pt was orthostatic this session and reported slight dizziness. BP improved with rest.  Pt was pleasantly confused and needed physical cues for LB dressing today. Assist of two needed for supine to sit EOB and fair static balance.   With attempt at [facility] dressing pt had posterior lean and needed min assist to correct. She tends to keep operative LE internally rotated. Pt was resistive to neutral positioning of LLE but this improved over time. Only able to stand x3 trial with progressing level of assist needed. Unable to progress to side stepping and manual assist needed through hips for upright. Total assist for doffing and donning depends. Pt was able to lift right leg to place foot in depend with therapist assist and she needed to use her hands to assist LLE into depends. Total assist to pull over hips. Continue to recommend SNF for rehab at discharge. Vitals:    06/01/21 1119 06/01/21 1123 06/01/21 1128 06/01/21 1129   BP: (!) 124/96 132/74 111/64 118/65   BP 1 [place] Right arm Right arm Right arm Right arm   BP Patient Position: Supine Sitting Standing Sitting   Pulse: 85 94 (!) 102 98   Resp:       Temp:       SpO2:       Weight:       Height:           Current Level of Function Impacting Discharge (ADLs): total assist donning/doffing depends,     Bed Mobility:  Rolling: Maximum assistance; Additional time;Assist x2  Supine to Sit: Maximum assistance; Additional time;Assist x2  Scooting: Maximum assistance    Transfers:  Sit to Stand: Moderate assistance; Additional time;Assist x2    Other factors to consider for discharge: two assist to stand with full assist to remain standing       PLAN :  Patient continues to benefit from skilled intervention to address the above impairments. Continue treatment per established plan of care to address goals.     Recommend with staff: chair position in bed    Recommend next OT session: standing tolerance, ADLs    Recommendation for discharge: (in order for the patient to meet his/her long term goals)  Therapy up to 5 days/week in SNF setting    This discharge recommendation:  Has been made in collaboration with the attending provider and/or case management    IF patient discharges home will need the following DME: needs rehab       SUBJECTIVE: Patient stated I want to walk.     OBJECTIVE DATA SUMMARY:   Cognitive/Behavioral Status:  Neurologic State: Alert;Confused  Orientation Level: Oriented to person  Cognition: Follows commands  Perception: Appears intact  Perseveration: No perseveration noted  Safety/Judgement: Fall prevention    Functional Mobility and Transfers for ADLs:  Bed Mobility:  Rolling: Maximum assistance; Additional time;Assist x2  Supine to Sit: Maximum assistance; Additional time;Assist x2  Scooting: Maximum assistance    Transfers:  Sit to Stand: Moderate assistance; Additional time;Assist x2          Balance:  Sitting - Static: Fair (occasional)  Sitting - Dynamic: Poor (constant support) (posterior lean)  Standing: Impaired  Standing - Static: Constant support;Poor  Standing - Dynamic :  (unable)    ADL Intervention:                           Lower Body Dressing Assistance  Protective Undergarmet: Total assistance (dependent) (assisted lifting BLE with UE and threading seated)    Toileting  Clothing Management: Total assistance (dependent)    Cognitive Retraining  Safety/Judgement: Fall prevention      Activity Tolerance:   Fair and signs and symptoms of orthostatic hypotension    After treatment patient left in no apparent distress:   Supine in bed, Call bell within reach and Bed / chair alarm activated    COMMUNICATION/COLLABORATION:   The patients plan of care was discussed with: Physical therapy assistant, Registered nurse and patient.      Demetrius Charles OTR/L  Time Calculation: 23 mins

## 2021-06-01 NOTE — PROGRESS NOTES
Transition of Care Plan:  RUR: 12%  Disposition: SNF- pending choice   Follow up appointments: PCP, Ortho  DME needed: n/a  Transportation at Discharge: [address] or means to access home: n/a       IM Medicare letter: to be provided prior to d/c  Caregiver Contact: daughter Saurav De Los Santos [phone]  Discharge Caregiver contacted prior to discharge? yes     CM spoke with patient's daughter Saurav De Los Santos [phone] who reported she did not receive e-mailed SNF list CM sent yesterday. CM resent SNF list to e-mail and additional e-mail dtr provided. Requested SNF choices as soon as possible.      Vidal RodriguezLens, [facility]

## 2021-06-01 NOTE — PROGRESS NOTES
Po hip fracture . Pain well managed  hgb 9.5    Patient Vitals for the past 24 hrs:   Temp Pulse Resp BP SpO2   06/01/21 1129  98  118/65    06/01/21 1128  (!) 102  111/64    06/01/21 1123  94  132/74    06/01/21 1119  85  (!) 124/96    06/01/21 1049 98.3 °F (36.8 °C) 81 18 (!) 120/48 97 %   06/01/21 0734 98.1 °F (36.7 °C) 82 18 (!) 122/49 96 %   05/31/21 2345 98 °F (36.7 °C) 78 16 115/61 99 %   05/31/21 1950 98.5 °F (36.9 °C) 97 16 129/72 99 %   05/31/21 1501 97.4 °F (36.3 °C) 63 16 (!) 96/46 96 %       Dressing clean and dry  Musculoskeletal: Clint's sign negative in bilateral lower extremities. Calves soft, supple, non-tender upon palpation or with passive stretch.      oob with pt  Dc planning

## 2021-06-01 NOTE — PROGRESS NOTES
Physician Progress Note      Maty Chacon  CSN #:                  [id number]  :                       1928  ADMIT DATE:       2021 9:26 AM  [address] [place] [place] DATE:  RESPONDING  PROVIDER #:        Yeni Culver MD          QUERY TEXT:    Dr Julio César Rodriguez:    Patient admitted with left hip fracture. RD rickyal noted 'severe malnutrition.'   If possible, please document in progress notes and discharge summary if you are evaluating and /or treating any of the following: The medical record reflects the following:  Risk Factors: 89yoF w/ BMI 19.14, confusion, postop surgery  Clinical Indicators:   RD notes  pt has severe muscle wasting and fat loss of upper and lower extremities, BMI 19.14. Unable to determine actual weight loss. Malnutrition Status:  Severe malnutrition  Context:  Chronic illness  Findings of the 6 clinical characteristics of malnutrition:  Energy Intake:  7 - 75% or less est energy requirements for 1 month or longer  Weight Loss:  Unable to assess  Body Fat Loss:  7 - Severe body fat loss, Triceps, Orbital, Fat overlying ribs  Muscle Mass Loss:  7 - Severe muscle mass loss, Hand (interosseous), Clavicles (pectoralis &deltoids), Scapula (trapezius), Temples (temporalis)  Fluid Accumulation:  Unable to assess,   Strength:  Not performed  Treatment: continue current diet, oral nutrition supplement, RD following. Thank you,  Rae Spear RN, CDI  Options provided:  -- Protein calorie malnutrition mild  -- Protein calorie malnutrition moderate  -- Protein calorie malnutrition severe  -- Underweight with BMI ***  -- Other - I will add my own diagnosis  -- Disagree - Not applicable / Not valid  -- Disagree - Clinically unable to determine / Unknown  -- Refer to Clinical Documentation Reviewer    PROVIDER RESPONSE TEXT:    This patient has severe protein calorie malnutrition.     Query created by: Angel Mckeon on 2021 11:46 AM      QUERY TEXT:    Dr Julio César Rodriguez:    Pt admitted with a Left hip fracture and has periods of confusion documented. If possible, please document in progress notes and discharge summary further specificity regarding the type of encephalopathy:    The medical record reflects the following:  Risk Factors: 89yoF w/ Baseline confusion per son at bedside, hx of Adverse effect of anesthesia  Combative, confused for extended period of time with anesthesia  Clinical Indicators: Ed presentation of pt alert oriented x4  5/29 Nursing:  increasing agitation, pulled out dressing from surgical site and is pulling on the staples. Recurrent attempt to get out of bed  5/30  Pn  . Mild confusion yesterday. Pulled out iv , didn't cooperate for blood tests  5/31  experiences increased confusion at night. Refused evening meds    Treatment: Haldol, Ativan, frequent orientation, bed alarm    Thank you,  Lucy Oro RN, CDI  Options provided:  -- Metabolic encephalopathy  -- Encephalopathy due to adverse effect of Anesthesia  -- Confusion  due to Sun downing  -- Encephalopathy  -- Other - I will add my own diagnosis  -- Disagree - Not applicable / Not valid  -- Disagree - Clinically unable to determine / Unknown  -- Refer to Clinical Documentation Reviewer    PROVIDER RESPONSE TEXT:    This patient has encephalopathy due to adverse effect of Anesthesia .     Query created by: Srinvias Vázquez on 6/1/2021 12:25 PM      Electronically signed by:  Nannette Lee MD 6/1/2021 1:45 PM

## 2021-06-01 NOTE — PROGRESS NOTES
INTERNAL MEDICINE ATTENDING NOTE    S: Ms. Iva Barajas was seen by me today during rounds. At this time, she is resting comfortably. The patient has no new complaints today. Pain is controlled. No dyspnea, fevers. Not yet passing stool. ROS otherwise unremarkable except as noted elsewhere. O: Blood pressure (!) 122/49, pulse 82, temperature 98.1 °F (36.7 °C), resp. rate 18, height 5' (1.524 m), weight 105 lb 1.6 oz (47.7 kg), SpO2 96 %. Gen: Patient is in no acute distress. Lungs: CTAB. Heart: RRR. Abd: S, NT, ND, BS present. Extremities: Warm. No results found for this or any previous visit (from the past 12 hour(s)). A / P:   1. Fall with L hip fracture- sp L femoral intertrochanteric nail insertion on 5/28. Per orthopedics.    2. Mild confusion at times. Encephalopathy seems to have been due to anesthesia.     3. Hypertension- stable. 4. Hypercholesterolemia  5. On sc heparin for DVT prophylaxis  6. Severe protein calorie malnutrition  7. Continue PT and eventual placement in snf.          Jesse Lai MD, FACP  Best contact is via Pager: [phone], or via hospital  at [phone]

## 2021-06-01 NOTE — PROGRESS NOTES
End of Shift Note    Bedside shift change report given to IMTIAZ Murcia (oncoming nurse) by Miguelina Clemente (offgoing nurse). Report included the following information SBAR, Kardex, Procedure Summary, Intake/Output, MAR and Recent Results    Shift worked:  7a-7p     Shift summary and any significant changes:     baseline confusion, no changes     Concerns for physician to address:  d/c plan     Zone phone for oncoming shift:          Activity:  Activity Level: Up with Assistance  Number times ambulated in hallways past shift: 0  Number of times OOB to chair past shift: 0    Cardiac:   Cardiac Monitoring: Yes      Cardiac Rhythm: Sinus Rhythm    Access:   Current line(s): PIV     Genitourinary:   Urinary status: voiding    Respiratory:   O2 Device: None (Room air)  Chronic home O2 use?: NO  Incentive spirometer at bedside: YES     GI:  Last Bowel Movement Date: 05/31/21  Current diet:  DIET CARDIAC Regular  DIET NUTRITIONAL SUPPLEMENTS Breakfast, Lunch, Dinner; Ensure Fraga-Suero  Passing flatus: YES  Tolerating current diet: YES       Pain Management:   Patient states pain is manageable on current regimen: YES    Skin:  Victorino Score: 16  Interventions: float heels    Patient Safety:  Fall Score:  Total Score: 5  Interventions: gripper socks and pt to call before getting OOB  High Fall Risk: Yes    Length of Stay:  Expected LOS: 3d 7h  Actual LOS: [facility]

## 2021-06-01 NOTE — PROGRESS NOTES
Problem: Mobility Impaired (Adult and Pediatric)  Goal: *Acute Goals and Plan of Care (Insert Text)  Description: Note: FUNCTIONAL STATUS PRIOR TO ADMISSION: Patient was independent and active without use of DME.     HOME SUPPORT PRIOR TO ADMISSION: Patient and son live together . Son is there all day. Pt also has 2 daughters. Pt has a walker and a cane but son says truthfully she uses neither. Physical Therapy Goals  Initiated 5/30/2021  1. Patient will move from supine to sit and sit to supine , scoot up and down, and roll side to side in bed with moderate assistance  within 7 day(s). 2.  Patient will transfer from bed to chair and chair to bed with moderate assistance  using the least restrictive device within 7 day(s). 3.  Patient will perform sit to stand with minimal assistance/contact guard assist within 7 day(s). 4.  Patient will ambulate with moderate assistance  for 25 feet with the least restrictive device within 7 day(s). 5. Patient will be able to do some basic hip exercises with AAROM. Outcome: Progressing Towards Goal   PHYSICAL THERAPY TREATMENT  Patient: Iva Barajas (89 y.o. female)  Date: 6/1/2021  Diagnosis: Closed 2-part intertrochanteric fracture of left femur ([address] [place]  Fall [W19. XXXA] <principal problem not specified>  Procedure(s) (LRB):  LEFT FEMORAL INTERTROCHANTERIC NAIL INSERTION (URGENT) (Left) 3 Days Post-Op  Precautions:    Chart, physical therapy assessment, plan of care and goals were reviewed. ASSESSMENT  Patient continues with skilled PT services and is progressing towards goals. Pt presents with decreased strength and endurance. Pt confused at times but appropriate with answering questions. Pt with LLE IR when found in bed. Pt with increased pain putting LLE in neutral.Pt performed a bed mobility at max A x2. Pt requiring assistance to scoot to EOB. Pt  performed x3 sit to stand transfers at mod A x2.  Pt able to come to stand with additional time and cueing. Pt unable to take any steps. Pt able to stand at min A for short bouts. Pt orthostatic in standing with complaints of dizziness. Pt returned supine with assistance. Pt LLE with improved positioning after mobility. Pt educated on keeing foot in neutral while in bed. Pt reported understanding. Current Level of Function Impacting Discharge (mobility/balance): Bed mobility at max A x2, sit to stand transfer at mod A x2 with additional time. Other factors to consider for discharge: pain, confusion          PLAN :  Patient continues to benefit from skilled intervention to address the above impairments. Continue treatment per established plan of care. to address goals. Recommendation for discharge: (in order for the patient to meet his/her long term goals)  Therapy up to 5 days/week in SNF setting    This discharge recommendation:  Has been made in collaboration with the attending provider and/or case management    IF patient discharges home will need the following DME: wheelchair       SUBJECTIVE:   Patient stated  I was ready to walk.     OBJECTIVE DATA SUMMARY:   Critical Behavior:  Neurologic State: Alert, Confused  Orientation Level: Oriented to person  Cognition: Follows commands  Safety/Judgement: Fall prevention  Functional Mobility Training:  Bed Mobility:  Rolling: Maximum assistance; Additional time;Assist x2  Supine to Sit: Maximum assistance; Additional time;Assist x2     Scooting: Maximum assistance        Transfers:  Sit to Stand: Moderate assistance; Additional time;Assist x2  Stand to Sit: Moderate assistance; Additional time;Assist x2                             Balance:  Sitting - Static: Fair (occasional)  Sitting - Dynamic: Poor (constant support) (posterior lean)  Standing: Impaired  Standing - Static: Constant support;Poor  Standing - Dynamic :  (unable)  Ambulation/Gait Training:         Pt unable to take any steps.                                     Pain Rating:  Pt with increased pain with mobility. Activity Tolerance:   Fair, requires rest breaks, and signs and symptoms of orthostatic hypotension    After treatment patient left in no apparent distress:   Supine in bed, Call bell within reach, Bed / chair alarm activated, and Side rails x 3    COMMUNICATION/COLLABORATION:   The patients plan of care was discussed with: Occupational therapist and Registered nurse.      Irais Velasco PTA   Time Calculation: 26 mins

## 2021-06-01 NOTE — PROGRESS NOTES
End of Shift Note    Bedside shift change report given to [facility] (oncoming nurse) by Monie Narayan RN (offgoing nurse). Report included the following information SBAR, Kardex, ED Summary, OR Summary, Procedure Summary, Intake/Output, MAR, Recent Results and Med Rec Status    Shift worked:  7p-7a     Shift summary and any significant changes:          Concerns for physician to address:    Patient confused overnight. Refused Heparin. Zone phone for oncoming shift:          Activity:  Activity Level: Bed Rest  Number times ambulated in hallways past shift: 0  Number of times OOB to chair past shift: 0    Cardiac:   Cardiac Monitoring: No      Cardiac Rhythm: Sinus Rhythm    Access:   Current line(s): no access     Genitourinary:   Urinary status: voiding and external catheter    Respiratory:   O2 Device: None (Room air)  Chronic home O2 use?: NO  Incentive spirometer at bedside: NO     GI:     Current diet:  DIET CARDIAC Regular  DIET NUTRITIONAL SUPPLEMENTS Breakfast, Lunch, Dinner; Ensure Fraga-Suero  Passing flatus: YES  Tolerating current diet: YES       Pain Management:   Patient states pain is manageable on current regimen: YES    Skin:  Victorino Score: 14  Interventions: float heels, increase time out of bed, foam dressing and PT/OT consult    Patient Safety:  Fall Score:  Total Score: 5  Interventions: bed/chair alarm, assistive device (walker, cane, etc), gripper socks and pt to call before getting OOB  High Fall Risk: Yes    Length of Stay:  Expected LOS: - - -  Actual LOS: 4      Divina Rainey RN

## 2021-06-02 ENCOUNTER — HOSPITAL ENCOUNTER (OUTPATIENT)
Dept: ULTRASOUND IMAGING | Age: 86
Discharge: HOME | DRG: 480 | End: 2021-06-02
Attending: PHYSICIAN ASSISTANT
Payer: MEDICARE

## 2021-06-02 LAB
COVID-19 RAPID TEST, COVR: NOT DETECTED
GLUCOSE BLD STRIP.AUTO-MCNC: 112 MG/DL (ref 65–117)
SERVICE CMNT-IMP: NORMAL
SOURCE, COVRS: NORMAL

## 2021-06-02 PROCEDURE — 82962 GLUCOSE BLOOD TEST: CPT

## 2021-06-02 PROCEDURE — 65270000029 HC RM PRIVATE

## 2021-06-02 PROCEDURE — 74011250637 HC RX REV CODE- 250/637: Performed by: PHYSICIAN ASSISTANT

## 2021-06-02 PROCEDURE — 74011250637 HC RX REV CODE- 250/637: Performed by: FAMILY MEDICINE

## 2021-06-02 PROCEDURE — 97110 THERAPEUTIC EXERCISES: CPT

## 2021-06-02 PROCEDURE — 74011250637 HC RX REV CODE- 250/637: Performed by: ORTHOPAEDIC SURGERY

## 2021-06-02 PROCEDURE — 93971 EXTREMITY STUDY: CPT

## 2021-06-02 PROCEDURE — 97530 THERAPEUTIC ACTIVITIES: CPT

## 2021-06-02 PROCEDURE — 87635 SARS-COV-2 COVID-19 AMP PRB: CPT

## 2021-06-02 PROCEDURE — 74011250636 HC RX REV CODE- 250/636: Performed by: INTERNAL MEDICINE

## 2021-06-02 PROCEDURE — 97530 THERAPEUTIC ACTIVITIES: CPT | Performed by: OCCUPATIONAL THERAPIST

## 2021-06-02 PROCEDURE — 99233 SBSQ HOSP IP/OBS HIGH 50: CPT | Performed by: INTERNAL MEDICINE

## 2021-06-02 RX ORDER — ASPIRIN 81 MG/1
81 TABLET ORAL 2 TIMES DAILY
Status: DISCONTINUED | OUTPATIENT
Start: 2021-06-02 | End: 2021-06-03 | Stop reason: HOSPADM

## 2021-06-02 RX ADMIN — Medication 1 TABLET: at 16:16

## 2021-06-02 RX ADMIN — DOCUSATE SODIUM 50MG AND SENNOSIDES 8.6MG 1 TABLET: 8.6; 5 TABLET, FILM COATED ORAL at 17:22

## 2021-06-02 RX ADMIN — ASPIRIN 81 MG: 81 TABLET, COATED ORAL at 12:35

## 2021-06-02 RX ADMIN — POLYETHYLENE GLYCOL 3350 17 G: 17 POWDER, FOR SOLUTION ORAL at 10:12

## 2021-06-02 RX ADMIN — Medication 1 TABLET: at 10:12

## 2021-06-02 RX ADMIN — DOCUSATE SODIUM 50MG AND SENNOSIDES 8.6MG 1 TABLET: 8.6; 5 TABLET, FILM COATED ORAL at 10:12

## 2021-06-02 RX ADMIN — LORAZEPAM 1 MG: 1 TABLET ORAL at 16:16

## 2021-06-02 RX ADMIN — HEPARIN SODIUM 5000 UNITS: 5000 INJECTION INTRAVENOUS; SUBCUTANEOUS at 14:25

## 2021-06-02 RX ADMIN — Medication 1 AMPULE: at 10:15

## 2021-06-02 RX ADMIN — AMLODIPINE BESYLATE 2.5 MG: 2.5 TABLET ORAL at 10:12

## 2021-06-02 RX ADMIN — ACETAMINOPHEN 650 MG: 325 TABLET ORAL at 12:36

## 2021-06-02 RX ADMIN — ACETAMINOPHEN 650 MG: 325 TABLET ORAL at 17:23

## 2021-06-02 RX ADMIN — ASPIRIN 81 MG: 81 TABLET, COATED ORAL at 17:22

## 2021-06-02 NOTE — PROGRESS NOTES
ORTHO FRACTURE PROGRESS NOTE    2021  Admit Date:   2021    Post Op day: 4 Days Post-Op    Subjective:    Penny Juarez states she is feeling relatively well. Some pain and soreness with movement but thinks she has been progressing with PT. Reports some Right calf soreness but denies SOB or CP.   Tolerating diet  Denies N/V     PT/OT:   Gait:                    Vital Signs:    Patient Vitals for the past 8 hrs:   BP Temp Pulse Resp SpO2   21 0809 (!) 143/59 98.7 °F (37.1 °C) 74 16 100 %     Temp (24hrs), Av.2 °F (36.8 °C), Min:97.7 °F (36.5 °C), Max:98.7 °F (37.1 °C)      Pain Control:   Pain Assessment  Pain Scale 1: Numeric (0 - 10)  Pain Intensity 1: 0  Pain Onset 1: fall  Pain Location 1: Hip  Pain Orientation 1: Left  Pain Description 1: Throbbing  Pain Intervention(s) 1: Medication (see MAR)    Meds:    Current Facility-Administered Medications   Medication Dose Route Frequency    LORazepam (ATIVAN) tablet 1 mg  1 mg Oral Q8H PRN    alcohol 62% (NOZIN) nasal  1 Ampule  1 Ampule Topical Q12H    sodium chloride (NS) flush 5-40 mL  5-40 mL IntraVENous Q8H    acetaminophen (TYLENOL) tablet 650 mg  650 mg Oral Q6H    naloxone (NARCAN) injection 0.4 mg  0.4 mg IntraVENous PRN    calcium-vitamin D (OS-ROSLYN +D3) 500 mg-200 unit per tablet 1 Tablet  1 Tablet Oral TID WITH MEALS    senna-docusate (PERICOLACE) 8.6-50 mg per tablet 1 Tablet  1 Tablet Oral BID    polyethylene glycol (MIRALAX) packet 17 g  17 g Oral DAILY    bisacodyL (DULCOLAX) suppository 10 mg  10 mg Rectal DAILY PRN    oxyCODONE IR (ROXICODONE) tablet 2.5 mg  2.5 mg Oral Q4H PRN    heparin (porcine) injection 5,000 Units  5,000 Units SubCUTAneous Q12H    morphine injection 2 mg  2 mg IntraVENous Q1H PRN    albuterol (PROVENTIL HFA, VENTOLIN HFA, PROAIR HFA) inhaler 1 Puff  1 Puff Inhalation Q6H PRN    amLODIPine (NORVASC) tablet 2.5 mg  2.5 mg Oral DAILY    atorvastatin (LIPITOR) tablet 20 mg  20 mg Oral QHS  sodium chloride (NS) flush 5-40 mL  5-40 mL IntraVENous Q8H    acetaminophen (TYLENOL) tablet 650 mg  650 mg Oral Q6H PRN    Or    acetaminophen (TYLENOL) suppository 650 mg  650 mg Rectal Q6H PRN    polyethylene glycol (MIRALAX) packet 17 g  17 g Oral DAILY PRN    ondansetron (ZOFRAN ODT) tablet 4 mg  4 mg Oral Q8H PRN    0.9% sodium chloride infusion  75 mL/hr IntraVENous CONTINUOUS       LAB:    Recent Labs     05/31/21  0947   HGB 9.5*       Transfuse PRBC's:      Assessment & Physician's Comment:  Left hip with mild drainage noted on dressings but not saturated  Thigh sore to touch  Left calf soft and non tender  Right calf TTP but soft and without swelling or erythema noted  Moves through ankles and toes to command  Distal pulses palpable  Dressing is clean, dry, and intact  Neurovascular checks within normal limits  Orientation:  Oriented    Active Problems:    Fall (5/28/2021)      Closed 2-part intertrochanteric fracture of left femur (HCC) (5/28/2021)        Plan:    PT/OT for mobilization - WBAT  Tylenol for pain  Ice packs PRN  Does not appear to have had heparin yesterday - will start on ASA 81mg BID in preparation for DC  Suspicion of RLE DVT is low but will get U/S out of abundance of caution  Medical management per primary team  Case Management for disposition Korin Chan PA-C   Orthopedic Trauma Service  [facility]

## 2021-06-02 NOTE — PROGRESS NOTES
Transition of Care Plan:  RUR: 12%  Disposition: SNF- [facility]  Follow up appointments: PCP, Ortho  DME needed: n/a  Transportation at [address] @ 11:00am on 6/3  [place] or means to access home: n/a       IM Medicare letter: to be provided prior to d/c  Caregiver Contact: daughter Kareem New [phone]  Discharge Caregiver contacted prior to discharge? yes     2:05pm  CM received return phone call from patient's daughter Kareem New. CM informed dtr of plan for potential d/c tomorrow to [facility]. CM informed dtr that transportation has been arranged for 11:00am in the event that MD medically clears patient for d/c in the morning. Pt's dtr was informed that if MD does not clear patient for d/c in the morning then transportation would be cancelled and CM would notify daughter promptly. Dtr aware and agrees with above plan. Dtr provided verbal consent for 2nd IMM letter due to patient's medical condition. Dtr also requested her brother Nathalia Farrell [phone]) and sister Roman Castilol [phone]) be added to emergency contact list, CM updated chart as requested. CM has notified [facility] of d/c plan and time, which they agree to.     2:00pm  [facility] would [facility] patient [facility] if medically cleared for d/c. Per attending note from this morning, possible d/c for tomorrow. CM has arranged [facility] transport for 11:00am tomorrow morning in the event that pt is medically cleared for d/c in the morning. If needed, CM can delay or cancel transport in the event MD does not medically clear pt for d/c tomorrow morning. CM has left message for pt's daughter Kareem New, requesting a return phone call, to discuss d/c plan above.      1:15pm  CM received phone call from patient's daughter, Kareem New, who reported she has chosen [facility] (1st choice) and [address] [place] [address] (2nd choice) for SNF. Verbal consent obtained for [address] and placed on bedside chart. Referrals sent to both facilities and waiting for response. Transition of Care Plan to SNF/Rehab    SNF/Rehab Transition:  Patient has been accepted to [facility] and meets criteria for admission. Patient will transported by [facility] and expected to leave at 11:00am.    Communication to Patient/Family:  Met with patient and daughter (identified care giver) and they are agreeable to the transition plan. Communication to SNF/Rehab:  Bedside RN has been notified to update the transition plan to the facility and call report (phone number [phone]). Discharge information has been updated on the AVS.     Discharge instructions to be fax'd to facility at [facility] # [phone]). Nursing Please include all hard scripts for controlled substances, med rec and dc summary, and AVS in packet. Reviewed and confirmed with facility, Anh, can manage the patient care needs for the following:     Donna Uriel with (X) only those applicable:    Medication:  [x]  Medications will be available at the facility  []  IV Antibiotics   [x]  Controlled Substance - hard copy to be sent with patient   []  Weekly Labs   Documents:  [x] Hard RX  [x] MAR  [x] Kardex  [x] AVS  [x]Transfer Summary  [x]Discharge   Equipment:  []  CPAP/BiPAP  []  Wound Vacuum  []  Sands or Urinary Device  []  PICC/Central Line  []  Nebulizer  []  Ventilator   Treatment:  []Isolation (for MRSA, VRE, etc.)  []Surgical Drain Management  []Tracheostomy Care  []Dressing Changes  []Dialysis with transportation and chair time. []PEG Care  []Oxygen  []Daily Weights for Heart Failure   Dietary:  [x]Any diet limitations cardiac diet  []Tube Feedings   []Total Parenteral Management (TPN)   Eligible for Medicaid Long Term Services and Supports  Yes:  [] Eligible for medical assistance or will become eligible within 180 days and UAI completed.    [] Provider/Patient and/or support system has requested screening. [] UAI copy provided to patient or responsible party. [] UAI unavailable at discharge will send once processed to SNF provider. [] UAI unavailable at discharged mailed to patient  No:   [] Private pay and is not financially eligible for Medicaid within the next 180 days. [] Reside out-of-state.   [] A residents of a state owned/operated facility that is licensed  by [address] [employer] [facility] or [facility]  [] Enrollment in [facility] hospice services  [] [address] [place] [address]  [] Patient /Family declines to have screening completed or provide financial information for screening     Financial Resources:  Medicaid    [] Initiated and application pending   [] Full coverage     Advanced Care Plan:  []Surrogate Decision Maker of Care  []POA  [x]Communicated Code Status DNR   Other       Jose Armando Pritchard, [facility]

## 2021-06-02 NOTE — PROGRESS NOTES
Problem: Falls - Risk of  Goal: *Absence of Falls  Description: Document Michaelle Ruts Fall Risk and appropriate interventions in the flowsheet. Outcome: Progressing Towards Goal  Note: Fall Risk Interventions:  Mobility Interventions: Bed/chair exit alarm, Communicate number of staff needed for ambulation/transfer, OT consult for ADLs, Patient to call before getting OOB, PT Consult for mobility concerns, PT Consult for assist device competence, Utilize walker, cane, or other assistive device, Utilize gait belt for transfers/ambulation    Mentation Interventions: Adequate sleep, hydration, pain control, Bed/chair exit alarm, Gait belt with transfers/ambulation, More frequent rounding, Reorient patient    Medication Interventions: Bed/chair exit alarm, Evaluate medications/consider consulting pharmacy, Patient to call before getting OOB    Elimination Interventions: Bed/chair exit alarm, Toileting schedule/hourly rounds    History of Falls Interventions: Bed/chair exit alarm, Consult care management for discharge planning, Door open when patient unattended, Evaluate medications/consider consulting pharmacy, Investigate reason for fall, Utilize gait belt for transfer/ambulation, Room close to nurse's station         Problem: Pressure Injury - Risk of  Goal: *Prevention of pressure injury  Description: Document Victorino Scale and appropriate interventions in the flowsheet.   Outcome: Progressing Towards Goal  Note: Pressure Injury Interventions:  Sensory Interventions: Assess changes in LOC, Check visual cues for pain, Pressure redistribution bed/mattress (bed type)    Moisture Interventions: Internal/External urinary devices    Activity Interventions: Increase time out of bed, Pressure redistribution bed/mattress(bed type), PT/OT evaluation    Mobility Interventions: Float heels, HOB 30 degrees or less, Pressure redistribution bed/mattress (bed type), PT/OT evaluation    Nutrition Interventions: Document food/fluid/supplement intake, Discuss nutritional consult with provider    Friction and Shear Interventions: HOB 30 degrees or less, Lift sheet

## 2021-06-02 NOTE — PROGRESS NOTES
Problem: Mobility Impaired (Adult and Pediatric)  Goal: *Acute Goals and Plan of Care (Insert Text)  Description: Note: FUNCTIONAL STATUS PRIOR TO ADMISSION: Patient was independent and active without use of DME.     HOME SUPPORT PRIOR TO ADMISSION: Patient and son live together . Son is there all day. Pt also has 2 daughters. Pt has a walker and a cane but son says truthfully she uses neither. Physical Therapy Goals  Initiated 5/30/2021  1. Patient will move from supine to sit and sit to supine , scoot up and down, and roll side to side in bed with moderate assistance  within 7 day(s). 2.  Patient will transfer from bed to chair and chair to bed with moderate assistance  using the least restrictive device within 7 day(s). 3.  Patient will perform sit to stand with minimal assistance/contact guard assist within 7 day(s). 4.  Patient will ambulate with moderate assistance  for 25 feet with the least restrictive device within 7 day(s). 5. Patient will be able to do some basic hip exercises with AAROM. Outcome: Progressing Towards Goal   PHYSICAL THERAPY TREATMENT  Patient: Adriano Love (50 y.o. female)  Date: 6/2/2021  Diagnosis: Closed 2-part intertrochanteric fracture of left femur ([address] [place] A[place]  Fall [W19. XXXA] <principal problem not specified>  Procedure(s) (LRB):  LEFT FEMORAL INTERTROCHANTERIC NAIL INSERTION (URGENT) (Left) 4 Days Post-Op  Precautions: Fall, WBAT, Skin  Chart, physical therapy assessment, plan of care and goals were reviewed. ASSESSMENT  Patient continues with skilled PT services and is progressing towards goals. She continues to position LLE to into excessive internal rotation risking a contracture. Needed extra time to unwind her leg and provide additional exercises to decrease stiffness in other joints of LEs to prepare for mobilizing to the edge of bed. She needed heavy assistance of 2 persons to come to sitting.  Stood with walker with mod/max a x 2 then took some steps to recliner with heavy assist x 2. Steps were very small and antalgic. BP was orthostatic but improved with continued sitting in the recliner. LLE positioned in neutral rotation while sitting and towel roll placed between knees to assist maintaining position. Will need rehab to regain functional independence. Vitals:    06/02/21 1201 06/02/21 1206 06/02/21 1210 06/02/21 1223   BP: 134/68 (!) 111/57 (!) 159/74 (!) 159/74   BP 1 [place] Right arm Right arm Right arm Right arm   BP Patient Position: Sitting Sitting  Comment: post walking to chair Sitting Sitting   Pulse: 80 84 77 80   Temp:    98.7 °F (37.1 °C)   Resp:    16   Height:       Weight:       SpO2:   99% 99%        Current Level of Function Impacting Discharge (mobility/balance): max a x 2 supine to sit; mod/max a x 2 sit to stand; max a x 2 bed to chair with RW taking some steps poorly. Other factors to consider for discharge: independent and active PLOF. PLAN :  Patient continues to benefit from skilled intervention to address the above impairments. Continue treatment per established plan of care. to address goals.     Recommendation for discharge: (in order for the patient to meet his/her long term goals)  Therapy up to 5 days/week in SNF setting    This discharge recommendation:  Has been made in collaboration with the attending provider and/or case management    IF patient discharges home will need the following DME: wheelchair     SUBJECTIVE:   Patient stated I'm in my kitchen at home\"    OBJECTIVE DATA SUMMARY:   Critical Behavior:  Neurologic State: Alert, Confused  Orientation Level: Oriented to person, Disoriented to place, Disoriented to situation, Disoriented to time  Cognition: Decreased attention/concentration, Decreased command following, Memory loss, Impaired decision making, Impulsive, Poor safety awareness  Safety/Judgement: Fall prevention, Decreased awareness of need for safety, Decreased awareness of need for assistance, Decreased insight into deficits  Functional Mobility Training:  Bed Mobility:  Rolling: Maximum assistance; Additional time;Assist x2  Supine to Sit: Maximum assistance;Assist x2 (pt was able to assist LE to EOB with assist)     Scooting:  (inital moderate assist improved to CGA over time)        Transfers:  Sit to Stand: Moderate assistance;Maximum assistance; Additional time;Assist x2 (two trials)  Stand to Sit: Total assistance (attempting to sit prior to completion of transfer)        Bed to Chair: Maximum assistance; Additional time;Assist x2 (with RW)                    Balance:  Sitting - Static: Fair (occasional)  Sitting - Dynamic: Poor (constant support) (progressed to fair over time)  Standing - Static: Constant support;Poor  Standing - Dynamic : Constant support;Poor  Ambulation/Gait Training:              Gait Description (WDL):  (took steps to chair with max a x 2 and walker)                                         Therapeutic Exercises: Ankle pumps, quad sets, glut sets, heel slides, hip ext rotation, hip abd  Pain Ratin/10 sitting in chair post session. Activity Tolerance:   Fair, SpO2 stable on RA, and requires rest breaks    After treatment patient left in no apparent distress:   Call bell within reach and Bed / chair alarm activated    COMMUNICATION/COLLABORATION:   The patients plan of care was discussed with: Occupational therapist and Registered nurse.      Ruby Piedra, PT   Time Calculation: 28 mins

## 2021-06-02 NOTE — PROGRESS NOTES
Problem: Falls - Risk of  Goal: *Absence of Falls  Description: Document Kevin Garcia Fall Risk and appropriate interventions in the flowsheet. Outcome: Progressing Towards Goal  Note: Fall Risk Interventions:  Mobility Interventions: Patient to call before getting OOB    Mentation Interventions: Adequate sleep, hydration, pain control    Medication Interventions: Teach patient to arise slowly, Patient to call before getting OOB    Elimination Interventions: Call light in reach    History of Falls Interventions: Consult care management for discharge planning         Problem: Patient Education: Go to Patient Education Activity  Goal: Patient/Family Education  Outcome: Progressing Towards Goal     Problem: Pressure Injury - Risk of  Goal: *Prevention of pressure injury  Description: Document Victorino Scale and appropriate interventions in the flowsheet.   Outcome: Progressing Towards Goal  Note: Pressure Injury Interventions:  Sensory Interventions: Assess changes in LOC    Moisture Interventions: Minimize layers    Activity Interventions: Increase time out of bed    Mobility Interventions: Float heels    Nutrition Interventions: Document food/fluid/supplement intake    Friction and Shear Interventions: Minimize layers                Problem: Patient Education: Go to Patient Education Activity  Goal: Patient/Family Education  Outcome: Progressing Towards Goal     Problem: Patient Education: Go to Patient Education Activity  Goal: Patient/Family Education  Outcome: Progressing Towards Goal     Problem: Patient Education: Go to Patient Education Activity  Goal: Patient/Family Education  Outcome: Progressing Towards Goal     Problem: Pain  Goal: *Control of Pain  Outcome: Progressing Towards Goal     Problem: Patient Education: Go to Patient Education Activity  Goal: Patient/Family Education  Outcome: Progressing Towards Goal     Problem: Patient Education: Go to Patient Education Activity  Goal: Patient/Family Education  Outcome: Progressing Towards Goal

## 2021-06-02 NOTE — PROGRESS NOTES
Comprehensive Nutrition Assessment    Type and Reason for Visit: Reassess    Nutrition Recommendations/Plan:   Continue cardiac diet and Ensure compact TID  Please document % meals and supplements consumed in flowsheet I/O's under intake     Nutrition Assessment:      Chart reviewed for f/u. RD visited pt and family at bedside. She remains pleasantly confused and keeps trying to get up. Family reports she ate about half her lunch tray and then she had all of the dessert and Ensure compact. RD encouraged them to continue ONS after d/c. Will continue monitoring. Patient Vitals for the past 168 hrs:   % Diet Eaten   05/30/21 1306 51 - 75%   05/30/21 0850 1 - 25%   05/29/21 1414 26 - 50%       Malnutrition Assessment:  Malnutrition Status:  Severe malnutrition    Context:  Chronic illness     Findings of the 6 clinical characteristics of malnutrition:   Energy Intake:  7 - 75% or less est energy requirements for 1 month or longer  Weight Loss:  Unable to assess     Body Fat Loss:  7 - Severe body fat loss, Triceps, Orbital, Fat overlying ribs   Muscle Mass Loss:  7 - Severe muscle mass loss, Hand (interosseous), Clavicles (pectoralis &deltoids), Scapula (trapezius), Temples (temporalis)  Fluid Accumulation:  Unable to assess,     Strength:  Not performed         Estimated Daily Nutrient Needs:  Energy (kcal): 1260 ( x 1. 3AF) + 250 kcals; Weight Used for Energy Requirements: Current  Protein (g): 53 (1.2 g/kg bw); Weight Used for Protein Requirements: Current  Fluid (ml/day): 1200 ml/day; Method Used for Fluid Requirements: 1 ml/kcal      Nutrition Related Findings:  No labs today. Meds: oscal, miralax, pericolace. BM 5/31.       Wounds:    Surgical incision       Current Nutrition Therapies:  DIET CARDIAC Regular  DIET NUTRITIONAL SUPPLEMENTS Breakfast, Lunch, Dinner; Ensure Fraga-Suero    Anthropometric Measures:  · Height:  5' (152.4 cm)  · Current Body Wt:  48.4 kg (106 lb 11.2 oz)    · Ideal Body Wt:  100 lbs:  98.1 %   · BMI Category:  Underweight (BMI less than 22) age over 72       Nutrition Diagnosis:   · Severe malnutrition related to  (advanced age) as evidenced by intake 0-25%, severe muscle loss, severe loss of subcutaneous fat      Nutrition Interventions:   Food and/or Nutrient Delivery: Continue current diet, Continue oral nutrition supplement  Nutrition Education and Counseling: No recommendations at this time  Coordination of Nutrition Care: Continue to monitor while inpatient    Goals:  PO intake >50% meals and ONS next 4-6 days       Nutrition Monitoring and Evaluation:   Behavioral-Environmental Outcomes: None identified  Food/Nutrient Intake Outcomes: Food and nutrient intake, Supplement intake  Physical Signs/Symptoms Outcomes: Biochemical data, Nutrition focused physical findings, Skin, Weight, GI status    Discharge Planning:    Continue current diet, Continue oral nutrition supplement     Electronically signed by Carla Gan RD on 6/2/2021 at 3:35 PM    Contact: [id number]

## 2021-06-02 NOTE — PROGRESS NOTES
INTERNAL MEDICINE ATTENDING NOTE    S: Ms. Bertin Mcneal was seen by me today during rounds. At this time, she is resting comfortably. The patient has no new complaints today. Pain is controlled. No dyspnea, fevers. Not yet passing stool. ROS otherwise unremarkable except as noted elsewhere. O: Blood pressure (!) 154/64, pulse 91, temperature 98 °F (36.7 °C), resp. rate 18, height 5' (1.524 m), weight 106 lb 12.8 oz (48.4 kg), SpO2 97 %. Gen: Patient is in no acute distress. Lungs: CTAB. Heart: RRR. Abd: S, NT, ND, BS present. Extremities: Warm. No results found for this or any previous visit (from the past 12 hour(s)). A / P:   1. Fall with L hip fracture- sp L femoral intertrochanteric nail insertion on 5/28. Per orthopedics.    2. Mild confusion at times. Encephalopathy seems to have been due to anesthesia.     3. Hypertension- BP high this morning; labile. 4. Hypercholesterolemia  5. On sc heparin for DVT prophylaxis  6. Severe protein calorie malnutrition  7. Continue PT; Look to placement in SNF--perhaps tomorrow.          Roula Garay MD, FACP  Best contact is via Pager: [phone], or via hospital  at [phone]

## 2021-06-02 NOTE — PROGRESS NOTES
End of Shift Note    Bedside shift change report given to Nura Parham (oncoming nurse) by Zacarias Ye RN (offgoing nurse). Report included the following information SBAR, Kardex, ED Summary, OR Summary, Procedure Summary, Intake/Output, MAR, Recent Results, Med Rec Status and Cardiac Rhythm NSR    Shift worked:  7p-7a     Shift summary and any significant changes:          Concerns for physician to address:       Zone phone for oncoming shift:   [phone]       Activity:  Activity Level: Up with Assistance  Number times ambulated in hallways past shift: 0  Number of times OOB to chair past shift: 0    Cardiac:   Cardiac Monitoring: Yes      Cardiac Rhythm: Sinus Rhythm    Access:   Current line(s): no access     Genitourinary:   Urinary status: incontinent    Respiratory:   O2 Device: None (Room air)  Chronic home O2 use?: NO  Incentive spirometer at bedside: NO     GI:  Last Bowel Movement Date: 05/31/21  Current diet:  DIET CARDIAC Regular  DIET NUTRITIONAL SUPPLEMENTS Breakfast, Lunch, Dinner; Ensure Fraga-Suero  Passing flatus: YES  Tolerating current diet: YES       Pain Management:   Patient states pain is manageable on current regimen: YES    Skin:  Victorino Score: 15  Interventions: float heels, increase time out of bed, foam dressing and PT/OT consult    Patient Safety:  Fall Score:  Total Score: 5  Interventions: assistive device (walker, cane, etc), gripper socks, pt to call before getting OOB and stay with me (per policy)  High Fall Risk: Yes    Length of Stay:  Expected LOS: 3d 7h  Actual LOS: 5      Divina Rainey RN

## 2021-06-02 NOTE — PROGRESS NOTES
Patient is confused, uncooperative and is refusing all medications this evening. Bed alarm is on, frequent rounding is being done.

## 2021-06-02 NOTE — PROGRESS NOTES
Problem: Self Care Deficits Care Plan (Adult)  Goal: *Acute Goals and Plan of Care (Insert Text)  Description: FUNCTIONAL STATUS PRIOR TO ADMISSION: Patient was modified independent using a walker and single point cane for functional mobility on occasion, not using at time of fall. She has supervision and assistance as needed for ADLs but was able to bathe and dress herself. She does not drive, work or complete majority of IADLs. HOME SUPPORT: The patient lived with son who is around 24/7 for assistance as needed. Occupational Therapy Goals  Initiated 5/30/2021     1. Patient will perform lower body dressing using Reacher, Stocking Aid, and Long Handled Shoe Horn PRN at minimal assistance/contact guard assist level within 7 days. 2. Patient will perform toilet transfers at minimal assistance/contact guard assist level using Walkers, Type: Rolling Walker  within 7 days. 3. Patient will perform all aspects of toileting at minimal assistance/contact guard assist level within 7 days. 4. Patient will demonstrate ability to sit EOB without assistance for ADLs with min A within 7 days. Outcome: Progressing Towards Goal   OCCUPATIONAL THERAPY TREATMENT  Patient: Niyah Marley (89 y.o. female)  Date: 6/2/2021  Diagnosis: Closed 2-part intertrochanteric fracture of left femur ([facility] [address] Rocio Si  Fall [W19. XXXA] <principal problem not specified>  Procedure(s) (LRB):  LEFT FEMORAL INTERTROCHANTERIC NAIL INSERTION (URGENT) (Left) 4 Days Post-Op  Precautions:    Chart, occupational therapy assessment, plan of care, and goals were reviewed. ASSESSMENT  Patient continues with skilled OT services and is progressing towards goals. Pt continues to posture operative LE in a internally rotated position and is resistive at times to having LE in neutral position. Pt doesn't have THP but therapist obtained abductor wedge for in bed for more neutral alignment of LE to prevent contractures.   Pt was more confused today and thought that she was in the kitchen. She was reoriented at least 8x this session to where she was and even had pt look at the white board for reference but pt was unable to retrain teaching. She remains motivated to participate. Max assist x2 needed for standing attempts but pt was able to mobilize to recliner chair with RW and max assist x2. She was attempting to sit prior to completion of transfer. Total assist for donning of socks and pt declined performing oral care. She was orthostatic after transfer to chair but was asymptomatic and BP improved once pt was seated at bedside chair. Wingate roll needed to be placed in between pts knees for neutral position of LLE. Continue to recommend SNF for rehab. Vitals:    06/02/21 1201 06/02/21 1206 06/02/21 1210 06/02/21 1223   BP: 134/68 (!) 111/57 (!) 159/74 (!) 159/74   BP 1 [place] Right arm Right arm Right arm Right arm   BP Patient Position: Sitting Sitting  Comment: post walking to chair Sitting Sitting   Pulse: 80 84 77 80   Temp:    98.7 °F (37.1 °C)   Resp:    16   Height:       Weight:       SpO2:   99% 99%       Current Level of Function Impacting Discharge (ADLs): total assist donning socks    Other factors to consider for discharge: orthostatic, confused, very motivated to participate         PLAN :  Patient continues to benefit from skilled intervention to address the above impairments. Continue treatment per established plan of care to address goals. Recommend with staff: two assist with mobility, monitor BP    Recommend next OT session: ADLS, mobility    Recommendation for discharge: (in order for the patient to meet his/her long term goals)  Therapy up to 5 days/week in SNF setting    This discharge recommendation:  Has been made in collaboration with the attending provider and/or case management    IF patient discharges home will need the following DME: needs rehab       SUBJECTIVE:   Patient stated I am in the kitchen.   I called Cookie Salazar this morning to help me get up and walk and she has not come yet.     OBJECTIVE DATA SUMMARY:   Cognitive/Behavioral Status:  Neurologic State: Alert;Confused  Orientation Level: Oriented to person;Disoriented to place; Disoriented to situation;Disoriented to time  Cognition: Decreased attention/concentration;Decreased command following;Memory loss; Impaired decision making; Impulsive;Poor safety awareness  Perception: Cues to maintain midline in sitting;Cues to maintain midline in standing; Tactile;Verbal;Visual (physical)  Perseveration: Perseverates during conversation  Safety/Judgement: Fall prevention;Decreased awareness of need for safety;Decreased awareness of need for assistance;Decreased insight into deficits    Functional Mobility and Transfers for ADLs:  Bed Mobility:  Rolling: Maximum assistance; Additional time;Assist x2  Supine to Sit: Maximum assistance;Assist x2 (pt was able to assist LE to EOB with assist)  Scooting:  (inital moderate assist improved to CGA over time)    Transfers:  Sit to Stand: Moderate assistance;Maximum assistance; Additional time;Assist x2 (two trials)     Bed to Chair: Maximum assistance; Additional time;Assist x2 (with RW)    Balance:  Sitting - Static: Fair (occasional)  Sitting - Dynamic: Poor (constant support) (progressed to fair over time)  Standing - Static: Constant support;Poor  Standing - Dynamic : Constant support;Poor    ADL Intervention:      Lower Body Dressing Assistance  Socks: Total assistance (dependent)    Toileting  Clothing Management: Total assistance (dependent)    Cognitive Retraining  Orientation Retraining: Place; Reorienting;Situation  Problem Solving: Identifying the task; Identifying the problem  Safety/Judgement: Fall prevention;Decreased awareness of need for safety;Decreased awareness of need for assistance;Decreased insight into deficits          Activity Tolerance:   Fair    After treatment patient left in no apparent distress:   Sitting in chair, Call bell within reach, and Bed / chair alarm activated    COMMUNICATION/COLLABORATION:   The patients plan of care was discussed with: Physical therapist and Registered nurse.      Christina Riser, OTR/L  Time Calculation: 27 mins

## 2021-06-03 VITALS
HEART RATE: 76 BPM | BODY MASS INDEX: 20.24 KG/M2 | WEIGHT: 103.1 LBS | OXYGEN SATURATION: 97 % | TEMPERATURE: 97.9 F | HEIGHT: 60 IN | DIASTOLIC BLOOD PRESSURE: 51 MMHG | SYSTOLIC BLOOD PRESSURE: 123 MMHG | RESPIRATION RATE: 16 BRPM

## 2021-06-03 PROCEDURE — 99239 HOSP IP/OBS DSCHRG MGMT >30: CPT | Performed by: INTERNAL MEDICINE

## 2021-06-03 PROCEDURE — 74011250637 HC RX REV CODE- 250/637: Performed by: ORTHOPAEDIC SURGERY

## 2021-06-03 PROCEDURE — 74011250637 HC RX REV CODE- 250/637: Performed by: PHYSICIAN ASSISTANT

## 2021-06-03 PROCEDURE — 94760 N-INVAS EAR/PLS OXIMETRY 1: CPT

## 2021-06-03 RX ORDER — ASPIRIN 81 MG/1
81 TABLET ORAL 2 TIMES DAILY
Qty: 60 TABLET | Refills: 0 | Status: SHIPPED | OUTPATIENT
Start: 2021-06-03 | End: 2021-12-06

## 2021-06-03 RX ORDER — FAMOTIDINE 20 MG/1
20 TABLET, FILM COATED ORAL 2 TIMES DAILY
Qty: 60 TABLET | Refills: 0 | Status: SHIPPED | OUTPATIENT
Start: 2021-06-03

## 2021-06-03 RX ORDER — ACETAMINOPHEN 325 MG/1
650 TABLET ORAL EVERY 6 HOURS
Qty: 1 TABLET | Refills: 0 | Status: SHIPPED
Start: 2021-06-03

## 2021-06-03 RX ORDER — POLYETHYLENE GLYCOL 3350 17 G/17G
17 POWDER, FOR SOLUTION ORAL DAILY
Qty: 1 EACH | Refills: 0 | Status: SHIPPED
Start: 2021-06-03

## 2021-06-03 RX ADMIN — Medication 1 AMPULE: at 08:29

## 2021-06-03 RX ADMIN — POLYETHYLENE GLYCOL 3350 17 G: 17 POWDER, FOR SOLUTION ORAL at 08:28

## 2021-06-03 RX ADMIN — ASPIRIN 81 MG: 81 TABLET, COATED ORAL at 08:28

## 2021-06-03 RX ADMIN — Medication 1 TABLET: at 08:28

## 2021-06-03 RX ADMIN — ACETAMINOPHEN 650 MG: 325 TABLET ORAL at 06:00

## 2021-06-03 RX ADMIN — DOCUSATE SODIUM 50MG AND SENNOSIDES 8.6MG 1 TABLET: 8.6; 5 TABLET, FILM COATED ORAL at 08:28

## 2021-06-03 RX ADMIN — AMLODIPINE BESYLATE 2.5 MG: 2.5 TABLET ORAL at 08:28

## 2021-06-03 NOTE — PROGRESS NOTES
Bedside and Verbal shift change report given to Fidel Huang (oncoming nurse) by Tamiko Krause (offgoing nurse). Report included the following information SBAR, Kardex, Intake/Output, MAR, Recent Results and Med Rec Status.

## 2021-06-03 NOTE — PROGRESS NOTES
Transition of Care Plan:  RUR: 13%  Disposition: SNF- [facility] and Re[facility]  Follow up appointments: PCP, Ortho  DME needed: n/a  Transportation at [address] @ 11:00am   [place] or means to access home: n/a       IM Medicare letter: to be provided prior to d/c  Caregiver Contact: felipe Villafuerte [phone]  Discharge Caregiver contacted prior to discharge? yes     Call report [phone]  [facility] @ 11:00am    9:58am  CM has not received return phone call from patient's daughter, Aldair Villafuerte, yet. CM contacted patient's son Radha Chávez and informed him of d/c today. Son agreed with plan and time for d/c.     8:50am  CM acknowledged d/c order. Plan for pt to d/c to [facility] today at 11:00am. CM left message for patient's daughterAldair to confirm d/c today. Care Management Interventions  PCP Verified by CM:  Yes  Last Visit to PCP: 02/04/21  Mode of Transport at Discharge: BLS  Transition of Care Consult (CM Consult): Discharge Planning, SNF  Partner SNF: Yes  Discharge Durable Medical Equipment: No  Physical Therapy Consult: Yes  Occupational Therapy Consult: Yes  Speech Therapy Consult: No  Current Support Network: Own Home, Family Lives [place], Lives with Caregiver (joananet and son live in a single level home with 2 dave)  Confirm Follow Up Transport: Family  The Plan for Transition of Care is Related to the Following Treatment Goals : SNF  The Patient and/or Patient Representative was Provided with a Choice of Provider and Agrees with the Discharge Plan?: Yes  Name of the Patient Representative Who was Provided with a Choice of Provider and Agrees with the Discharge Plan: felipe Best of Choice List was Provided with Basic Dialogue that Supports the Patient's Individualized Plan of Care/Goals, Treatment Preferences and Shares the Quality Data Associated with the Providers?: Yes  Discharge Location  Discharge Placement: Skilled nursing facility    Lennox Burciaga [facility], [address]

## 2021-06-03 NOTE — PROGRESS NOTES
Problem: Falls - Risk of  Goal: *Absence of Falls  Description: Document Onalee Gum Fall Risk and appropriate interventions in the flowsheet.   Outcome: Progressing Towards Goal  Note: Fall Risk Interventions:  Mobility Interventions: Bed/chair exit alarm, Patient to call before getting OOB    Mentation Interventions: Adequate sleep, hydration, pain control, Bed/chair exit alarm    Medication Interventions: Bed/chair exit alarm, Patient to call before getting OOB, Teach patient to arise slowly    Elimination Interventions: Call light in reach    History of Falls Interventions: Bed/chair exit alarm, Investigate reason for fall, Room close to nurse's station

## 2021-06-03 NOTE — PROGRESS NOTES
End of Shift Note    Bedside shift change report given to Ca (oncoming nurse) by Darian Parker RN (offgoing nurse). Report included the following information SBAR, Kardex, Intake/Output, MAR and Recent Results    Shift worked:  Day     Shift summary and any significant changes:    Patient has had stable vital signs and continues to try to get out of the bed, roll belt is now being used. Concerns for physician to address:  n/a     Zone phone for oncoming shift:  [phone]     Activity:  Activity Level: Up with Assistance  Number times ambulated in hallways past shift: 0  Number of times OOB to chair past shift: 0    Cardiac:   Cardiac Monitoring: Yes     Cardiac Rhythm: Sinus Rhythm    Access:   Current line(s): no access     Genitourinary:   Urinary status: incontinent    Respiratory:   O2 Device: None (Room air)  Chronic home O2 use?: NO  Incentive spirometer at bedside: YES     GI:  Last Bowel Movement Date: 05/31/21  Current diet:  DIET CARDIAC Regular  DIET NUTRITIONAL SUPPLEMENTS Breakfast, Lunch, Dinner; Ensure Fraga-Suero  Passing flatus: YES  Tolerating current diet: YES       Pain Management:   Patient states pain is manageable on current regimen: YES    Skin:  Victorino Score: 15  Interventions: float heels and increase time out of bed    Patient Safety:  Fall Score:  Total Score: 5  Interventions: gripper socks and pt to call before getting OOB  High Fall Risk: Yes    Length of Stay:  Expected LOS: 6d 4h  Actual LOS: 262 Raymond Johnson RN

## 2021-06-03 NOTE — DISCHARGE SUMMARY
Physician Discharge Summary     Patient ID:  Bismark Alcocer  [id number]  33 y.o.  9/12/1928    Admit date: 5/28/2021    Discharge date: 6/3/2021    Admission Diagnoses: Closed 2-part intertrochanteric fracture of left femur ([address] Mari Najerad; Fall [W19. XXXA]    Discharge Diagnoses:  Principal Diagnosis                                               Active Problems:    Fall (5/28/2021)      Closed 2-part intertrochanteric fracture of left femur ([address] (5/28/2021)       Consults: Orthopedic Surgery    Significant Diagnostic Studies:     CT head: No acute abnormality identified. XR hip Left: Acute, comminuted left intertrochanteric fracture with moderate  varus angulation. Hospital Course: Ms. Bismark Alcocer is a patient of mine who presented with the problems above. See the initial H and P for full details. CHIEF COMPLAINT: L hip pain s/p mechanical fall at home     HISTORY OF PRESENT ILLNESS:     Janny Khalil is a 80 y.o.  female who presents with above complains via EMS. Pt presents with CC of L hip pain s/p fall at home. H/o not using her walker when pt fell . H/o hitting head to the counter but denies LOC  Denies any chest pain, SOB, palpitations, Syncope. Pt was was found to have L Hip IT fracture with mild displacement on imaging in ER with otherwise unremarkable workup in ER including CT head. By problem. ..    1. Fall with L hip fracture- sp L femoral intertrochanteric nail insertion on 5/28. Per orthopedics.    2. Mild confusion at times. Encephalopathy seems to have been due to anesthesia.     3. Hypertension- BP has been labile, but overall trending up. Resume the lisinopril. 4. Hypercholesterolemia: continue the atorvastatin. 5. On sc heparin for DVT prophylaxis  6. Severe protein calorie malnutrition: May add supplements.        Disposition: SNF    Patient Instructions:   Current Discharge Medication List      START taking these medications    Details   acetaminophen (TYLENOL) 325 mg tablet Take 2 Tablets by mouth every six (6) hours. Qty: 1 Tablet, Refills: 0      polyethylene glycol (MIRALAX) 17 gram packet Take 1 Packet by mouth daily. Qty: 1 Each, Refills: 0         CONTINUE these medications which have NOT CHANGED    Details   atorvastatin (LIPITOR) 20 mg tablet TAKE 1 TABLET BY MOUTH EVERY DAY  Qty: 90 Tab, Refills: 0      amLODIPine (NORVASC) 2.5 mg tablet Take 1 Tab by mouth daily. Qty: 90 Tab, Refills: 5      lisinopriL (PRINIVIL, ZESTRIL) 10 mg tablet Take 1 Tab by mouth daily. Qty: 90 Tab, Refills: 3      cyproheptadine (PERIACTIN) 2 mg/5 mL syrup TK 5 ML PO BID.      albuterol (PROVENTIL HFA, VENTOLIN HFA, PROAIR HFA) 90 mcg/actuation inhaler Take 1 Puff by inhalation as needed for Wheezing. Qty: 1 Inhaler, Refills: 5      ergocalciferol (ERGOCALCIFEROL) 50,000 unit capsule Take 1 Cap by mouth every seven (7) days. Qty: 1 Cap, Refills: 0      MULTIVITAMINS (MULTIVITAMIN PO) Take  by mouth daily. Activity: PT/OT Eval and Treat  Diet: Regular diet; May add supplements for malnutrition. Wound Care: As directed    Follow-up With  Details  Why  Contact Info   Benigno Daugherty MD  In 2 weeks  For Follow up after surgery  [id number] [place] [id number] [phone]   Giuliana Antonio MD    After discharge from [address]  [phone]         Signed:  Amalia Mcgarry MD  6/3/2021  8:31 AM    Note: Greater than 30 minutes were spent on activities related to this discharge.

## 2021-06-03 NOTE — PROGRESS NOTES
0730-Report taken from [facility]. Patient resting in bed at this time. Patient is pending discharge to [facility] at 11 AM.     0930-Report called to Whitney at [facility]. Information given included SBAR, kardex, MAr and all pertinent information for transfer. Patient has no iv access. She will be transported via [facility] with an ETA for pickup at 1100.

## 2021-06-03 NOTE — PROGRESS NOTES
End of Shift Note    Bedside shift change report given to Brayan Boateng (oncoming nurse) by Lakeisha Dwyer (offgoing nurse). Report included the following information SBAR and Kardex    Shift worked:  night     Shift summary and any significant changes:          Concerns for physician to address:       Zone phone for oncoming shift:          Activity:  Activity Level: Up with Assistance  Number times ambulated in hallways past shift: 0  Number of times OOB to chair past shift: 0    Cardiac:   Cardiac Monitoring: Yes      Cardiac Rhythm: Sinus Rhythm    Access:   Current line(s): PIV     Genitourinary:   Urinary status: voiding and incontinent    Respiratory:   O2 Device: None (Room air)  Chronic home O2 use?: NO  Incentive spirometer at bedside: NO     GI:  Last Bowel Movement Date: 05/31/21  Current diet:  DIET CARDIAC Regular  DIET NUTRITIONAL SUPPLEMENTS Breakfast, Lunch, Dinner; Ensure Fraga-Suero  Passing flatus: YES  Tolerating current diet: YES       Pain Management:   Patient states pain is manageable on current regimen: YES    Skin:  Victorino Score: 15  Interventions: increase time out of bed    Patient Safety:  Fall Score:  Total Score: 5  Interventions: bed/chair alarm and pt to call before getting OOB  High Fall Risk: Yes    Length of Stay:  Expected LOS: 6d 4h  Actual LOS: [address]

## 2021-06-04 ENCOUNTER — PATIENT OUTREACH (OUTPATIENT)
Dept: CASE MANAGEMENT | Age: 86
End: 2021-06-04

## 2021-06-04 NOTE — PROGRESS NOTES
Transition of care outreach postponed for 14 days due to patient's discharge to SNF. Per EMR patient discharged to [facility]. Will continue to monitor patient progress.

## 2021-06-12 ENCOUNTER — PATIENT OUTREACH (OUTPATIENT)
Dept: CASE MANAGEMENT | Age: 86
End: 2021-06-12

## 2021-06-13 NOTE — PROGRESS NOTES
Post Acute Facility Update     *The information contained in this note was received during a weekly care coordination call with the post acute facility*    Current Facility: [address] [place] [address] ([facility])  Anticipated Discharge Date: 4 weeks, date TBD    Facility Nursing Update: no current updates  Facility Social Work Update: no current updates  Upper Extremity Assistance: Minimal Assistance   Lower Extremity Assistance: Maximum Assistance  Bed Mobility: Moderate Assistance   Transfers: Moderate Assistance   Ambulation: Dependent  How far (in feet) is the patient ambulating?  0  Device Used:N/A  Barriers to Discharge: Angeles REIS, RN  [facility] [place]

## 2021-06-16 ENCOUNTER — PATIENT OUTREACH (OUTPATIENT)
Dept: CASE MANAGEMENT | Age: 86
End: 2021-06-16

## 2021-06-18 ENCOUNTER — PATIENT OUTREACH (OUTPATIENT)
Dept: CASE MANAGEMENT | Age: 86
End: 2021-06-18

## 2021-06-23 ENCOUNTER — PATIENT OUTREACH (OUTPATIENT)
Dept: CASE MANAGEMENT | Age: 86
End: 2021-06-23

## 2021-06-23 NOTE — PROGRESS NOTES
Post Acute Facility Update     *The information contained in this note was received during a weekly care coordination call with the post acute facility*    Current Facility: [address] [place] [address] ([facility])  Anticipated Discharge Date: TBD    Facility Nursing Update: No current updates   Facility Social Work Update: No current updates   Upper Extremity Assistance: Minimal Assistance   Lower Extremity Assistance: Min/Mod Assistance  Bed Mobility: Minimal Assistance   Transfers: Moderate Assistance   Ambulation: Moderate Assistance   How far (in feet) is the patient ambulating?  15  Device Used: walker  Barriers to Discharge: JOSÉ ANTONIO Trinidad MSW  [facility]

## 2021-06-30 ENCOUNTER — PATIENT OUTREACH (OUTPATIENT)
Dept: CASE MANAGEMENT | Age: 86
End: 2021-06-30

## 2021-07-01 ENCOUNTER — APPOINTMENT (OUTPATIENT)
Dept: CT IMAGING | Age: 86
End: 2021-07-01
Attending: EMERGENCY MEDICINE
Payer: MEDICARE

## 2021-07-01 ENCOUNTER — APPOINTMENT (OUTPATIENT)
Dept: GENERAL RADIOLOGY | Age: 86
End: 2021-07-01
Attending: EMERGENCY MEDICINE
Payer: MEDICARE

## 2021-07-01 ENCOUNTER — HOSPITAL ENCOUNTER (EMERGENCY)
Age: 86
Discharge: NURSING, REHAB OR LONG-TERM CARE | End: 2021-07-01
Attending: EMERGENCY MEDICINE
Payer: MEDICARE

## 2021-07-01 VITALS
WEIGHT: 91.49 LBS | TEMPERATURE: 98.3 F | DIASTOLIC BLOOD PRESSURE: 57 MMHG | OXYGEN SATURATION: 100 % | SYSTOLIC BLOOD PRESSURE: 124 MMHG | BODY MASS INDEX: 17.87 KG/M2 | RESPIRATION RATE: 19 BRPM | HEART RATE: 70 BPM

## 2021-07-01 DIAGNOSIS — R41.0 TRANSIENT CONFUSION: Primary | ICD-10-CM

## 2021-07-01 LAB
ALBUMIN SERPL-MCNC: 2.4 G/DL (ref 3.5–5)
ALBUMIN/GLOB SERPL: 0.6 {RATIO} (ref 1.1–2.2)
ALP SERPL-CCNC: 188 U/L (ref 45–117)
ALT SERPL-CCNC: 25 U/L (ref 12–78)
ANION GAP SERPL CALC-SCNC: 4 MMOL/L (ref 5–15)
APPEARANCE UR: CLEAR
AST SERPL-CCNC: 19 U/L (ref 15–37)
BACTERIA URNS QL MICRO: NEGATIVE /HPF
BASOPHILS # BLD: 0 K/UL (ref 0–0.1)
BASOPHILS NFR BLD: 1 % (ref 0–1)
BILIRUB SERPL-MCNC: 0.6 MG/DL (ref 0.2–1)
BILIRUB UR QL: NEGATIVE
BUN SERPL-MCNC: 16 MG/DL (ref 6–20)
BUN/CREAT SERPL: 27 (ref 12–20)
CALCIUM SERPL-MCNC: 9.7 MG/DL (ref 8.5–10.1)
CHLORIDE SERPL-SCNC: 108 MMOL/L (ref 97–108)
CO2 SERPL-SCNC: 29 MMOL/L (ref 21–32)
COLOR UR: NORMAL
CREAT SERPL-MCNC: 0.59 MG/DL (ref 0.55–1.02)
DIFFERENTIAL METHOD BLD: ABNORMAL
EOSINOPHIL # BLD: 0.1 K/UL (ref 0–0.4)
EOSINOPHIL NFR BLD: 2 % (ref 0–7)
EPITH CASTS URNS QL MICRO: NORMAL /LPF
ERYTHROCYTE [DISTWIDTH] IN BLOOD BY AUTOMATED COUNT: 13.6 % (ref 11.5–14.5)
GLOBULIN SER CALC-MCNC: 3.8 G/DL (ref 2–4)
GLUCOSE SERPL-MCNC: 79 MG/DL (ref 65–100)
GLUCOSE UR STRIP.AUTO-MCNC: NEGATIVE MG/DL
HCT VFR BLD AUTO: 30.2 % (ref 35–47)
HGB BLD-MCNC: 9.5 G/DL (ref 11.5–16)
HGB UR QL STRIP: NEGATIVE
HYALINE CASTS URNS QL MICRO: NORMAL /LPF (ref 0–5)
IMM GRANULOCYTES # BLD AUTO: 0 K/UL (ref 0–0.04)
IMM GRANULOCYTES NFR BLD AUTO: 0 % (ref 0–0.5)
KETONES UR QL STRIP.AUTO: NEGATIVE MG/DL
LEUKOCYTE ESTERASE UR QL STRIP.AUTO: NEGATIVE
LYMPHOCYTES # BLD: 1.5 K/UL (ref 0.8–3.5)
LYMPHOCYTES NFR BLD: 24 % (ref 12–49)
MCH RBC QN AUTO: 30.5 PG (ref 26–34)
MCHC RBC AUTO-ENTMCNC: 31.5 G/DL (ref 30–36.5)
MCV RBC AUTO: 97.1 FL (ref 80–99)
MONOCYTES # BLD: 0.8 K/UL (ref 0–1)
MONOCYTES NFR BLD: 12 % (ref 5–13)
NEUTS SEG # BLD: 4 K/UL (ref 1.8–8)
NEUTS SEG NFR BLD: 61 % (ref 32–75)
NITRITE UR QL STRIP.AUTO: NEGATIVE
NRBC # BLD: 0 K/UL (ref 0–0.01)
NRBC BLD-RTO: 0 PER 100 WBC
PH UR STRIP: 6.5 [PH] (ref 5–8)
PLATELET # BLD AUTO: 297 K/UL (ref 150–400)
PMV BLD AUTO: 10.4 FL (ref 8.9–12.9)
POTASSIUM SERPL-SCNC: 4 MMOL/L (ref 3.5–5.1)
PROT SERPL-MCNC: 6.2 G/DL (ref 6.4–8.2)
PROT UR STRIP-MCNC: NEGATIVE MG/DL
RBC # BLD AUTO: 3.11 M/UL (ref 3.8–5.2)
RBC #/AREA URNS HPF: NORMAL /HPF (ref 0–5)
SODIUM SERPL-SCNC: 141 MMOL/L (ref 136–145)
SP GR UR REFRACTOMETRY: 1.01 (ref 1–1.03)
TSH SERPL DL<=0.05 MIU/L-ACNC: 0.89 UIU/ML (ref 0.36–3.74)
UA: UC IF INDICATED,UAUC: NORMAL
UROBILINOGEN UR QL STRIP.AUTO: 1 EU/DL (ref 0.2–1)
WBC # BLD AUTO: 6.4 K/UL (ref 3.6–11)
WBC URNS QL MICRO: NORMAL /HPF (ref 0–4)

## 2021-07-01 PROCEDURE — 71045 X-RAY EXAM CHEST 1 VIEW: CPT

## 2021-07-01 PROCEDURE — 81001 URINALYSIS AUTO W/SCOPE: CPT

## 2021-07-01 PROCEDURE — 70450 CT HEAD/BRAIN W/O DYE: CPT

## 2021-07-01 PROCEDURE — 93005 ELECTROCARDIOGRAM TRACING: CPT

## 2021-07-01 PROCEDURE — 99285 EMERGENCY DEPT VISIT HI MDM: CPT

## 2021-07-01 PROCEDURE — 36415 COLL VENOUS BLD VENIPUNCTURE: CPT

## 2021-07-01 PROCEDURE — 80053 COMPREHEN METABOLIC PANEL: CPT

## 2021-07-01 PROCEDURE — 85025 COMPLETE CBC W/AUTO DIFF WBC: CPT

## 2021-07-01 PROCEDURE — 84443 ASSAY THYROID STIM HORMONE: CPT

## 2021-07-01 NOTE — ED TRIAGE NOTES
Pt comes to ED via EMS from [facility] with reports of lethargy. RAY Matta 115 well 1900 last night. Per EMS, Bhavna Llanes reported pt slid from a chair to the floor this morning with no signs of injury. Pt is noted to have left hip staples. Pt does not open her eyes. Best verbal response is moan. Pt placed on cardiac monitor x 3. Call ferrara within reach. Dr Mariely Pacheco at bedside at this time.

## 2021-07-01 NOTE — ED NOTES
Verbal shift change report given to Jerrica Piedra RN (oncoming nurse) by Delora Lesch, RN (offgoing nurse). Report included the following information SBAR, Kardex, ED Summary, Procedure Summary, Intake/Output and Recent Results.

## 2021-07-01 NOTE — ED PROVIDER NOTES
EMERGENCY DEPARTMENT HISTORY AND PHYSICAL EXAM      Date: 7/1/2021  Patient Name: Kandi Elizalde    History of Presenting Illness     Chief Complaint   Patient presents with    Lethargy     From [facility]. LKW was 1900 last night. Pt less responsive than her baseline. Slid from chair to floor this morning. History Provided By: Patient    HPI: Kandi Elizalde, 80 y.o. female with PMHx significant for dementia, asthma, GERD, hypertension, hyperlipidemia, recent hip fracture at the end of May who presents from [address] due to concerns for change in mental status. Per EMS the patient has not been acting herself all day. Apparently normally she is very interactive and today has not been interactive. Last known well was sometime last night. EMS reports that the facility also stated that the patient slid from the chair to the floor at some point but they could not provide additional details. On my evaluation, patient is awake but not speaking and not following commands. Additional history is limited by altered mental status      PCP: Ho Diaz MD      Current Outpatient Medications   Medication Sig Dispense Refill    acetaminophen (TYLENOL) 325 mg tablet Take 2 Tablets by mouth every six (6) hours. 1 Tablet 0    polyethylene glycol (MIRALAX) 17 gram packet Take 1 Packet by mouth daily. 1 Each 0    aspirin delayed-release 81 mg tablet Take 1 Tablet by mouth two (2) times a day. 60 Tablet 0    famotidine (PEPCID) 20 mg tablet Take 1 Tablet by mouth two (2) times a day. 60 Tablet 0    atorvastatin (LIPITOR) 20 mg tablet TAKE 1 TABLET BY MOUTH EVERY DAY 90 Tab 0    amLODIPine (NORVASC) 2.5 mg tablet Take 1 Tab by mouth daily. 90 Tab 5    lisinopriL (PRINIVIL, ZESTRIL) 10 mg tablet Take 1 Tab by mouth daily. 90 Tab 3    cyproheptadine (PERIACTIN) 2 mg/5 mL syrup TK 5 ML PO BID.       albuterol (PROVENTIL HFA, VENTOLIN HFA, PROAIR HFA) 90 mcg/actuation inhaler Take 1 Puff by inhalation as needed for Wheezing. 1 Inhaler 5    ergocalciferol (ERGOCALCIFEROL) 50,000 unit capsule Take 1 Cap by mouth every seven (7) days. (Patient taking differently: Take 50,000 Units by mouth every Sunday.) 1 Cap 0    MULTIVITAMINS (MULTIVITAMIN PO) Take  by mouth daily. Past History     Past Medical History:  Past Medical History:   Diagnosis Date    Adverse effect of anesthesia     Combative, confused for extended period of time with anesthesia    Anemia NEC     Asthma     Cervical spondylarthritis     Depression     beginning stages of dementia, sundowning    DJD (degenerative joint disease) of knee     Esophageal spasm     Has required esophageal dilatation; Dr. Gloria Montemayor GERD (gastroesophageal reflux disease)     Hypercholesterolemia     Hypertension     Lumbar stenosis     Neuropathy, upper extremity     left    Vertigo [id number]    Saw Dr. Barbra Jaffe; improved.  Vitamin D deficiency 1/28/2011     Past Surgical History:  Past Surgical History:   Procedure Laterality Date    HX CATARACT REMOVAL      bilateral    HX HYSTERECTOMY      HX ORTHOPAEDIC      right bunion excision    HX OTHER SURGICAL      broken R hip    HX TONSILLECTOMY       Family History:  Family History   Problem Relation Age of Onset    Heart Disease Mother     Hypertension Mother     Stroke Mother     Heart Disease Brother     Heart Disease Father     Heart Disease Brother      Social History:  Social History     Tobacco Use    Smoking status: Never Smoker    Smokeless tobacco: Never Used   Vaping Use    Vaping Use: Never used   Substance Use Topics    Alcohol use: No    Drug use: No     Allergies: Allergies   Allergen Reactions    Diclofenac Itching     The voltaren gel caused itching.      Gabapentin Swelling    Hydrochlorothiazide Other (comments)     dizziness    Pcn [Penicillins] Swelling     Review of Systems   Review of Systems   Unable to perform ROS: Mental status change     Physical Exam Physical Exam  Vitals and nursing note reviewed. Constitutional:       General: She is not in acute distress. Appearance: She is well-developed. HENT:      Head: Normocephalic and atraumatic. Eyes:      Conjunctiva/sclera: Conjunctivae normal.      Pupils: Pupils are equal, round, and reactive to light. Cardiovascular:      Rate and Rhythm: Normal rate and regular rhythm. Pulmonary:      Effort: Pulmonary effort is normal. No respiratory distress. Breath sounds: Normal breath sounds. No stridor. Abdominal:      General: There is no distension. Palpations: Abdomen is soft. Tenderness: There is no abdominal tenderness. Musculoskeletal:         General: Normal range of motion. Cervical back: Normal range of motion. Comments: Staples over left hip clean dry and intact   Skin:     General: Skin is warm and dry. Neurological:      Mental Status: She is alert.       Comments: Not following commands, not answering questions       Diagnostic Study Results   Labs -     Recent Results (from the past 12 hour(s))   EKG, 12 LEAD, INITIAL    Collection Time: 07/01/21  5:08 PM   Result Value Ref Range    Ventricular Rate 76 BPM    Atrial Rate 76 BPM    P-R Interval 200 ms    QRS Duration 104 ms    Q-T Interval 398 ms    QTC Calculation (Bezet) 447 ms    Calculated R Axis -49 degrees    Calculated T Axis 53 degrees    Diagnosis       Normal sinus rhythm  Left axis deviation  Anteroseptal infarct , possibly acute  ** ** ACUTE MI / STEMI ** **  When compared with ECG of 28-MAY-2021 10:09,  premature supraventricular complexes are no longer present  Nonspecific T wave abnormality has replaced inverted T waves in Lateral leads     CBC WITH AUTOMATED DIFF    Collection Time: 07/01/21  5:16 PM   Result Value Ref Range    WBC 6.4 3.6 - 11.0 K/uL    RBC 3.11 (L) 3.80 - 5.20 M/uL    HGB 9.5 (L) 11.5 - 16.0 g/dL    HCT 30.2 (L) 35.0 - 47.0 %    MCV 97.1 80.0 - 99.0 FL    MCH 30.5 26.0 - 34.0 PG MCHC 31.5 30.0 - 36.5 g/dL    RDW 13.6 11.5 - 14.5 %    PLATELET 346 702 - 435 K/uL    MPV 10.4 8.9 - 12.9 FL    NRBC 0.0 0  WBC    ABSOLUTE NRBC 0.00 0.00 - 0.01 K/uL    NEUTROPHILS 61 32 - 75 %    LYMPHOCYTES 24 12 - 49 %    MONOCYTES 12 5 - 13 %    EOSINOPHILS 2 0 - 7 %    BASOPHILS 1 0 - 1 %    IMMATURE GRANULOCYTES 0 0.0 - 0.5 %    ABS. NEUTROPHILS 4.0 1.8 - 8.0 K/UL    ABS. LYMPHOCYTES 1.5 0.8 - 3.5 K/UL    ABS. MONOCYTES 0.8 0.0 - 1.0 K/UL    ABS. EOSINOPHILS 0.1 0.0 - 0.4 K/UL    ABS. BASOPHILS 0.0 0.0 - 0.1 K/UL    ABS. IMM. GRANS. 0.0 0.00 - 0.04 K/UL    DF AUTOMATED     METABOLIC PANEL, COMPREHENSIVE    Collection Time: 07/01/21  5:16 PM   Result Value Ref Range    Sodium 141 136 - 145 mmol/L    Potassium 4.0 3.5 - 5.1 mmol/L    Chloride 108 97 - 108 mmol/L    CO2 29 21 - 32 mmol/L    Anion gap 4 (L) 5 - 15 mmol/L    Glucose 79 65 - 100 mg/dL    BUN 16 6 - 20 MG/DL    Creatinine 0.59 0.55 - 1.02 MG/DL    BUN/Creatinine ratio 27 (H) 12 - 20      GFR est AA >60 >60 ml/min/1.73m2    GFR est non-AA >60 >60 ml/min/1.73m2    Calcium 9.7 8.5 - 10.1 MG/DL    Bilirubin, total 0.6 0.2 - 1.0 MG/DL    ALT (SGPT) 25 12 - 78 U/L    AST (SGOT) 19 15 - 37 U/L    Alk.  phosphatase 188 (H) 45 - 117 U/L    Protein, total 6.2 (L) 6.4 - 8.2 g/dL    Albumin 2.4 (L) 3.5 - 5.0 g/dL    Globulin 3.8 2.0 - 4.0 g/dL    A-G Ratio 0.6 (L) 1.1 - 2.2     TSH 3RD GENERATION    Collection Time: 07/01/21  5:16 PM   Result Value Ref Range    TSH 0.89 0.36 - 3.74 uIU/mL   URINALYSIS W/ REFLEX CULTURE    Collection Time: 07/01/21  6:23 PM    Specimen: Urine   Result Value Ref Range    Color YELLOW/STRAW      Appearance CLEAR CLEAR      Specific gravity 1.015 1.003 - 1.030      pH (UA) 6.5 5.0 - 8.0      Protein Negative NEG mg/dL    Glucose Negative NEG mg/dL    Ketone Negative NEG mg/dL    Bilirubin Negative NEG      Blood Negative NEG      Urobilinogen 1.0 0.2 - 1.0 EU/dL    Nitrites Negative NEG      Leukocyte Esterase Negative NEG WBC 0-4 0 - 4 /hpf    RBC 0-5 0 - 5 /hpf    Epithelial cells FEW FEW /lpf    Bacteria Negative NEG /hpf    UA:UC IF INDICATED CULTURE NOT INDICATED BY UA RESULT CNI      Hyaline cast 0-2 0 - 5 /lpf       Radiologic Studies -   CT HEAD WO CONT   Final Result   No acute intracranial process identified by noncontrast CT      XR CHEST PORT   Final Result   No acute cardiopulmonary process seen        CT HEAD WO CONT    Result Date: 7/1/2021  No acute intracranial process identified by noncontrast CT    XR CHEST PORT    Result Date: 7/1/2021  No acute cardiopulmonary process seen    Medical Decision Making   I am the first provider for this patient. I reviewed the vital signs, available nursing notes, past medical history, past surgical history, family history and social history. Vital Signs-Reviewed the patient's vital signs. Patient Vitals for the past 12 hrs:   Temp Pulse Resp BP SpO2   07/01/21 2200 98.3 °F (36.8 °C) 70 19 (!) 124/57 100 %   07/01/21 2145  66 14 (!) 140/50 100 %   07/01/21 2015  71 17 (!) 129/46    07/01/21 1925 98.6 °F (37 °C) 78 14 (!) 155/59 100 %   07/01/21 1715  79 15 (!) 152/51 100 %   07/01/21 1641 98.3 °F (36.8 °C) 76 14 120/61 100 %       Pulse Oximetry Analysis - 100% on ra    Cardiac Monitor:   Rate: 66 bpm  Rhythm: Normal Sinus Rhythm      ED EKG interpretation:  Rhythm: normal sinus rhythm; and regular . Rate (approx.): 76; Axis: Left; P wave: normal; QRS interval: normal ; ST/T wave: non-specific changes; Other findings: borderline ekg. This EKG was interpreted by CYNTHIA Trotter MD,ED Provider. Records Reviewed: Nursing Notes and Old Medical Records    Provider Notes (Medical Decision Making):   Patient presents with a chief complaint of a change in mental status. On my initial evaluation, patient not following commands or answer any questions, however when nurse went to draw the blood patient began talking her stating \"you won't get blood from me. \" however then agreed and would follow commands. I suspect that some of her not speaking is volitional, however will rule out other causes with CT head, urinalysis, basic lab work. ED Course:   Initial assessment performed. The patients presenting problems have been discussed, and they are in agreement with the care plan formulated and outlined with them. I have encouraged them to ask questions as they arise throughout their visit. Lab work and imaging unremarkable. Patient will answer questions when she wants and will follow commands. Will discharge back to her nursing easily. Procedures:  Procedures    Critical Care:  none    Disposition:  Discharge Note:  The patient has been re-evaluated and is ready for discharge. Reviewed available results with patient. Counseled patient on diagnosis and care plan. Patient has expressed understanding, and all questions have been answered. Patient agrees with plan and agrees to follow up as recommended, or to return to the ED if their symptoms worsen. Discharge instructions have been provided and explained to the patient, along with reasons to return to the ED. PLAN:  1. Current Discharge Medication List        2. Follow-up Information     Follow up With Specialties Details Why Contact Info    Racquel Hart MD Internal Medicine Schedule an appointment as soon as possible for a visit   [address] [place] [address]  [phone]      [facility] EMERGENCY DEPT Emergency Medicine  As needed, If symptoms worsen [address] [place] [address] Drive  [address] [facility]  [phone]        Return to ED if worse     Diagnosis     Clinical Impression:   1. Transient confusion            Please note that this dictation was completed with [employer], the computer voice recognition software. Quite often unanticipated grammatical, syntax, homophones, and other interpretive errors are inadvertently transcribed by the computer software.   Please disregard these errors.   Please excuse any errors that have escaped final proofreading

## 2021-07-01 NOTE — ED NOTES
Pt refusing blood work. With 1 attempt by ED tech, pt pulled arm away stating, \"You aren't getting blood from me. I don't want this. \"     Called pt's daughter, Lucius Mead, asking if any family could come to ED to be with pt to assist with treatment. She stated she was unable to come in. Dr Aida Zee updated.

## 2021-07-01 NOTE — ED NOTES
Report received from [place], [address] [place] [address]. Writer informed of patient chief complaint, current status, orders completed (to include IV access/medications/radiology testing), outstanding orders that still need to be completed, and the treatment plan. Ensured no questions or concerns regarding the patient prior to departure. Pt resting comfortably on the stretcher. A&Ox4, breathing even & unlabored, no apparent signs of distress. Monitor x 3. VS stable. Call bell within reach. 2120 - Pt is able to speak and follow directions. Refuses to open her eyes. Pulled the covers over her head and stated that she was cold. Asked pt if she was feeling okay and she stated \"Yes\". When asked if she wanted to go home, she stated \"Yes\". Asked pt if she wanted to talk to me and she stated \"No\". Provided warm blankets for patient. MD notified. 2125 - Spoke w/ DARLEEN Tang. Scheduled for transport back to [facility] @ 3738.    2140 - Attempted to contact [facility] and [address] [place] [address] @ [phone]. No answer. 2215 - Attempted to call [facility] again. No answer. Recording stated to [facility] enter remote access code\". 22:46  [facility] at bedside to transport pt back to facility. 2307 - Pt leaving facility w/ DARLEEN transport. Discharge papers given to [facility].

## 2021-07-02 ENCOUNTER — PATIENT OUTREACH (OUTPATIENT)
Dept: CASE MANAGEMENT | Age: 86
End: 2021-07-02

## 2021-07-02 LAB
ATRIAL RATE: 76 BPM
CALCULATED R AXIS, ECG10: -49 DEGREES
CALCULATED T AXIS, ECG11: 53 DEGREES
DIAGNOSIS, 93000: NORMAL
P-R INTERVAL, ECG05: 200 MS
Q-T INTERVAL, ECG07: 398 MS
QRS DURATION, ECG06: 104 MS
QTC CALCULATION (BEZET), ECG08: 447 MS
VENTRICULAR RATE, ECG03: 76 BPM

## 2021-07-02 NOTE — PROGRESS NOTES
Outgoing call placed to [facility] regarding update of patient status. Spoke to Max (staff member) patient identified utilizing 2 identifiers. Introduced self and reason for the call. Max reports patient is currently admitted to the facility. Will continue to monitor patient progress.

## 2021-07-07 ENCOUNTER — PATIENT OUTREACH (OUTPATIENT)
Dept: CASE MANAGEMENT | Age: 86
End: 2021-07-07

## 2021-07-07 NOTE — PROGRESS NOTES
Post Acute Facility Update     *The information contained in this note was received during a weekly care coordination call with the post acute facility*    Current Facility: [address] (SNF)  Anticipated Discharge Date: 3 weeks, date TBD    Facility Nursing Update: no current updates  Facility Social Work Update: no  current updates  Upper Extremity Assistance: Stand by Assist - Presence and Cueing  Lower Extremity Assistance: Maximum Assistance  Bed Mobility: Minimal Assistance   Transfers: Moderate Assistance   Ambulation: Moderate Assistance   How far (in feet) is the patient ambulating?  25  Device Used: walker  Barriers to Discharge:N/A    Shayan YOUSIFN, RN  [employer]

## 2021-07-07 NOTE — PROGRESS NOTES
Outgoing call placed to [facility] and [address] [place] [address] regarding update of patient status. Spoke with Bereket Willis () patient identified utilizing 2 identifiers. Introduced self and reason for the call Bereket Willis reports patient is currently admitted to the facility. Patient has been discharged from the facility greater than 30 days will sign off.

## 2021-07-14 ENCOUNTER — PATIENT OUTREACH (OUTPATIENT)
Dept: CASE MANAGEMENT | Age: 86
End: 2021-07-14

## 2021-07-15 NOTE — DISCHARGE INSTRUCTIONS
PATIENT DISCHARGE INSTRUCTIONS      PATIENT DISCHARGE INSTRUCTIONS    Roberth Castaneda / [id number] : 1928    Admitted 2021 Discharged: 6/3/2021       · It is important that you take the medication exactly as they are prescribed. · Keep your medication in the bottles provided by the pharmacist and keep a list of the medication names, dosages, and times to be taken in your wallet. · Do not take other medications without consulting your doctor. What to do at Home    Recommended Diet:   Diet: Regular diet; May add supplements for malnutrition. Recommended Activity:   Activity: PT/OT Eval and Treat  Wound Care: As directed               [facility]    Discharge Instruction Sheet: Hip Fracture Repair    DR. Jose Palacios    Blood Clot Prevention   Aspirin - Please take 81mg delayed release Aspirin two times a day until seen back in the office by your surgeon    Pain control:  Medications   Typically, we will prescribe a narcotic usually 1-2 tabs every three to four hours as needed for your pain. Most patients need this only for the first few weeks.  You should discontinue this as the pain decreases.  Some of these medications contain a large dose of Tylenol (acetaminophen). Therefore, you should consult your pharmacist before taking any additional Tylenol at the same time. You should not drive while taking any narcotic pain medications. Take your pain medications with food to prevent nausea! Your last dose of pain medication in the hospital was given @ :__________    [address] will be discharged home with ice/gel packs.  Continue using these regularly to minimize pain and swelling, especially after physical activity. Constipation   Pain medication and anesthesia can be constipating-this can be prevented by gentle physical activity and drinking plenty of fluid.     It should be treated with over-the-counter medications such as Dulcolax, Miralax, Magnesium Citrate and/or Fleets enema.  You should have a bowel movement at least every other day following surgery. Incision care   You will be discharged with a transparent and waterproof dressing over your incision. o This should remain in place for 5-7 days after discharge.   You will be given one additional dressing to use at home in 5-7 days if you are still having drainage   You may shower with this dressing in place after you are discharged. o After showering pat the dressing/incision dry.  When the incision is no longer draining, it may be left open to the air.  DO NOT rub the incision.  DO NOT take a tub bath or go swimming until cleared by your doctor.  DO NOT apply lotions, oils, or creams to incision.               To increase and promote healing:   Stop Smoking (or at least cut back on smoking).  Eat a well-balanced diet (high in protein and vitamin C)   If your appetite is poor, consider nutritional supplements like Ensure, Glucerna, or Pioneer Instant Breakfast.   If you are diabetic, controlling you blood sugars is very important to prevent infection and promote wound healing. Nutrition:   If you were on a supplement such as Ensure or Glucerna) while in the hospital, please continue using them with each meal for the next 30 days.  Eat a well-balanced diet - High in protein, high in vitamins and minerals, especially vitamin C and zinc.     Home Health:   If you have staples a home health nurse will remove them in about 10 days.  Physical Therapy will come to your home to continue training for walking, transferring and exercises    Physical Activity     You can weight bear as tolerated on your new hip ***.  Bed-rest may slow your recovery.  Use your walker and take short walks about every 2 hours.  Increase your distance each day.    NO DRIVING until told to do so.    Warning signs: Please call your physician immediately at [phone] if you have   Bleeding from incision that is constant.  Change in mental status (unusual behavior or confusion)   If your incision develops redness or swelling   Change in wound drainage (increase in amount, color, or foul odor)   Temperature over 101.5 degrees Fahrenheit    Pain in the calf of your leg   Tenderness or redness in the calf of your leg   Increased swelling of the thigh, ankle, calf, or foot. Emergency: CALL 911 if you have   Shortness of breath   Chest pain   Localized chest pain when coughing or taking a deep breath    Follow-up  Call Dr. Carmelo Gao office for a follow up appointment in 2 weeks. You can return to work when cleared by a physician. Please call [phone] if you have concerns or questions. ....       Anaya Kirby

## 2021-07-17 NOTE — PROGRESS NOTES
Post Acute Facility Update     *The information contained in this note was received during a weekly care coordination call with the post acute facility*    Current Facility: [address] (SNF)  Anticipated Discharge Date: 3 weeks, date TBD    Facility Nursing Update:  decline in therapy,UAsent and labs drawn. Pt is starting to do better. Facility Social Work Update: no current updates  Bundle Program Status: none  Upper Extremity Assistance: Maximum Assistance  Lower Extremity Assistance: Maximum Assistance  Bed Mobility: Minimal Assistance   Transfers: Moderate Assistance   Ambulation: Minimal Assistance   How far (in feet) is the patient ambulating?  7  Device Used: parallel bars  Barriers to Discharge: TBD    Shabana REIS, RN  [employer]

## 2021-07-21 ENCOUNTER — PATIENT OUTREACH (OUTPATIENT)
Dept: CASE MANAGEMENT | Age: 86
End: 2021-07-21

## 2021-07-21 NOTE — PROGRESS NOTES
Post Acute Facility Update     *The information contained in this note was received during a weekly care coordination call with the post acute facility*    Current Facility: [address] (SNF)  Anticipated Discharge Date: D    Facility Nursing Update: No current updates   Facility Social Work Update: Return home with family   Bundle Program Status: none  Upper Extremity Assistance: Moderate Assistance   Lower Extremity Assistance: Maximum Assistance  Bed Mobility: Minimal Assistance   Transfers: Contact Guard Assist - hands on patient for balance   Ambulation: Minimal Assistance   How far (in feet) is the patient ambulating?  5-6  Device Used: None   Barriers to Discharge: JOSÉ ANTONIO Terrazas MSW  [facility]

## 2021-07-28 ENCOUNTER — PATIENT OUTREACH (OUTPATIENT)
Dept: CASE MANAGEMENT | Age: 86
End: 2021-07-28

## 2021-07-29 NOTE — PROGRESS NOTES
Post Acute Facility Update     *The information contained in this note was received during a weekly care coordination call with the post acute facility*    Current Facility: [address] [place] [address] ([facility])  Anticipated Discharge Date: 8/3/2021    Facility Nursing Update: No current updates   Facility Social Work Update: No current updates   Bundle Program Status: none  Upper Extremity Assistance: Moderate Assistance   Lower Extremity Assistance: Mod/Max Assistance  Bed Mobility: Minimal Assistance   Transfers: Minimal Assistance   Ambulation: Contact Guard Assist - hands on patient for balance   How far (in feet) is the patient ambulating?  20  Device Used: walker  Barriers to Discharge: JOSÉ ANTONIO Yoder, MSW  [facility] [place]

## 2021-08-02 ENCOUNTER — HOME HEALTH ADMISSION (OUTPATIENT)
Dept: HOME HEALTH SERVICES | Facility: HOME HEALTH | Age: 86
End: 2021-08-02

## 2021-08-03 ENCOUNTER — TELEPHONE (OUTPATIENT)
Dept: INTERNAL MEDICINE CLINIC | Age: 86
End: 2021-08-03

## 2021-08-03 NOTE — TELEPHONE ENCOUNTER
Spoke with Finn Lambert, made aware that patient needs to schedule an appointment with Dr. Tito Eric for follow up and will follow for [place].

## 2021-08-03 NOTE — TELEPHONE ENCOUNTER
Philip//[facility] [place] states she needs a call back to discuss if Dr. Adeline Mckay will follow patient for [place] that was last seen on Virtual Visit 2/4/21. Please call to advise.  Thank you

## 2021-08-04 ENCOUNTER — PATIENT OUTREACH (OUTPATIENT)
Dept: CASE MANAGEMENT | Age: 86
End: 2021-08-04

## 2021-08-05 NOTE — PROGRESS NOTES
[address] [place] [address] Dr Discharge Note    *The information contained in this note was received during a weekly care coordination call with the post acute facility*      Patient was discharged from [facility] [address]  (SNF) on 8/3/2021. PCP: MD CHINEDU Chaudhary appointment: PCP- will contact daughter to get a f/u could not get into office for another 2-3 weeks    Home Health/Outpatient orders at discharge: home health care  [address]: [facility]   Please note, Pt could not go with [facility] because unable to get f/u PCP appointment. Durable Medical Equipment ordered at discharge: None   [address]: N/A  Durable Medical Equipment received prior to discharge: [address] Team will sign off at this time. Medications were not reconciled and general patient assessment was not completed during this skilled nursing facility outreach.      SUSANNE Potter  [facility] Team

## 2021-08-10 ENCOUNTER — OFFICE VISIT (OUTPATIENT)
Dept: INTERNAL MEDICINE CLINIC | Age: 86
End: 2021-08-10
Payer: MEDICARE

## 2021-08-10 VITALS
OXYGEN SATURATION: 100 % | BODY MASS INDEX: 17.87 KG/M2 | TEMPERATURE: 98.7 F | SYSTOLIC BLOOD PRESSURE: 115 MMHG | RESPIRATION RATE: 12 BRPM | DIASTOLIC BLOOD PRESSURE: 67 MMHG | HEIGHT: 60 IN | HEART RATE: 70 BPM

## 2021-08-10 DIAGNOSIS — Z09 HOSPITAL DISCHARGE FOLLOW-UP: ICD-10-CM

## 2021-08-10 DIAGNOSIS — S72.002D CLOSED FRACTURE OF LEFT HIP WITH ROUTINE HEALING, SUBSEQUENT ENCOUNTER: Primary | ICD-10-CM

## 2021-08-10 DIAGNOSIS — I10 ESSENTIAL HYPERTENSION: ICD-10-CM

## 2021-08-10 DIAGNOSIS — G30.9 ALZHEIMER'S DISEASE, UNSPECIFIED (CODE) (HCC): ICD-10-CM

## 2021-08-10 PROCEDURE — G8427 DOCREV CUR MEDS BY ELIG CLIN: HCPCS | Performed by: INTERNAL MEDICINE

## 2021-08-10 PROCEDURE — 99496 TRANSJ CARE MGMT HIGH F2F 7D: CPT | Performed by: INTERNAL MEDICINE

## 2021-08-10 PROCEDURE — 1111F DSCHRG MED/CURRENT MED MERGE: CPT | Performed by: INTERNAL MEDICINE

## 2021-08-10 PROCEDURE — 99215 OFFICE O/P EST HI 40 MIN: CPT | Performed by: INTERNAL MEDICINE

## 2021-08-10 RX ORDER — MEMANTINE HYDROCHLORIDE 28 MG/1
28 CAPSULE, EXTENDED RELEASE ORAL DAILY
Qty: 30 CAPSULE | Refills: 5 | Status: SHIPPED | OUTPATIENT
Start: 2021-08-10 | End: 2021-08-17

## 2021-08-10 NOTE — PROGRESS NOTES
SUBJECTIVE  Ms. Flora Cheek presents today acutely for     Chief Complaint   Patient presents with   [facility] Follow Up     She fractured hip May 28,    Hospital Course: Ms. Flora Cheek is a patient of mine who presented with the problems above. See the initial H and P for full details. CHIEF COMPLAINT: L hip pain s/p mechanical fall at home     HISTORY OF PRESENT ILLNESS:     Silvia Camacho is a 80 y.o.  female who presents with above complains via EMS. Pt presents with CC of L hip pain s/p fall at home. H/o not using her walker when pt fell . H/o hitting head to the counter but denies LOC  Denies any chest pain, SOB, palpitations, Syncope. Pt was was found to have L Hip IT fracture with mild displacement on imaging in ER with otherwise unremarkable workup in ER including CT head. By problem. ..    1. Fall with L hip fracture- sp L femoral intertrochanteric nail insertion on 5/28. Per orthopedics.    2. Mild confusion at times. Encephalopathy seems to have been due to anesthesia.     3. Hypertension- BP has been labile, but overall trending up. Resume the lisinopril. 4. Hypercholesterolemia: continue the atorvastatin. 5. On sc heparin for DVT prophylaxis  6. Severe protein calorie malnutrition: May add supplements. Discharged to [facility] [employer] on 23rd. She was released on 8/3. She had home health ordered, \"they came for evaluation but haven't started yet. \"    \"I've been doing fair. \"     She has difficulty standing and is wheelchair bound. She requires assistance for transfers. She has a sacral decub ulcer; family is doing wound care. Using [address] [place] and dressings. Pain has been worse, \"not too bad today. \"     Past Medical History:   Diagnosis Date    Adverse effect of anesthesia     Combative, confused for extended period of time with anesthesia    Anemia NEC     Asthma     Cervical spondylarthritis     Depression     beginning stages of dementia, sundowning    DJD (degenerative joint disease) of knee     Esophageal spasm     Has required esophageal dilatation; Dr. Angelica Shin GERD (gastroesophageal reflux disease)     Hypercholesterolemia     Hypertension     Lumbar stenosis     Neuropathy, upper extremity     left    Vertigo [id number]    Saw Dr. Ene Brown; improved.  Vitamin D deficiency 1/28/2011         OBJECTIVE  Visit Vitals  /67 (BP 1 [place] Left arm, BP Patient Position: Sitting)   Pulse 70   Temp 98.7 °F (37.1 °C) (Temporal)   Resp 12   Ht 5' (1.524 m)   SpO2 100%   BMI 17.87 kg/m²     Gen: Pleasant, thin 80 y.o. AA female in [place].  She is in wheelchair.  HEENT: PERRLA. EOMI. OP moist and pink.  Neck: Supple.  No LAD.  HEART: RRR, No M/G/R.   LUNGS: CTAB No W/R.   ABDOMEN: S, NT, ND, BS+.   EXTREMITIES: Warm. No C/C/E.     ASSESSMENT / PLAN    ICD-10-CM ICD-9-CM    1. Hospital discharge follow-up  Z09 V67.59 OK DISCHARGE MEDS RECONCILED W/ CURRENT OUTPATIENT MED LIST       1. Fall with L hip fracture: She sp L femoral intertrochanteric nail insertion on 5/28. She has had PT/OT, currently wheelchair bound and dependent for ADLs. Recommended that her daughters discuss with home health  any options for long term home care vs NH. 2. Dementia: Progressive. Reordered memantine. Apparently in facility she got changed to Namenda XR. 3. Hypertension: BP okay today. 4. Hypercholesterolemia: continue the atorvastatin. 5. Severe protein calorie malnutrition:     I have reviewed with the patient details of the assessment and plan and all questions were answered. Relevant patient education was performed. Follow-up and Dispositions    · Return in about 4 months (around 12/10/2021) for HTN.

## 2021-08-13 ENCOUNTER — TELEPHONE (OUTPATIENT)
Dept: INTERNAL MEDICINE CLINIC | Age: 86
End: 2021-08-13

## 2021-08-13 NOTE — TELEPHONE ENCOUNTER
Patient's daughter, Heydi Hill, states she need a call back to get something else prescribed for patient's Dementia. Roselia Diaz state she was advised that the [employer] does not cover, Memantine ER (Namenda XR) 28 mg capsule & needs to request to get something else prescribed. Please call to discuss .  Thank you

## 2021-08-16 ENCOUNTER — TELEPHONE (OUTPATIENT)
Dept: INTERNAL MEDICINE CLINIC | Age: 86
End: 2021-08-16

## 2021-08-16 NOTE — TELEPHONE ENCOUNTER
Anastasiia//Sumi Rodrigez. States she needs a call back to discuss further Prior Auth & questions on patients Memantine ER (Namenda XR) 28 mg capsule.      Please call back at [phone]   Option 3,    Case#  [id number]

## 2021-08-17 RX ORDER — MEMANTINE HYDROCHLORIDE 10 MG/1
10 TABLET ORAL 2 TIMES DAILY
Qty: 60 TABLET | Refills: 5 | Status: SHIPPED | OUTPATIENT
Start: 2021-08-17 | End: 2022-03-08

## 2021-08-17 NOTE — TELEPHONE ENCOUNTER
Orders Placed This Encounter    memantine (NAMENDA) 10 mg tablet     Sig: Take 1 Tablet by mouth two (2) times a day.      Dispense:  60 Tablet     Refill:  5

## 2021-08-20 NOTE — TELEPHONE ENCOUNTER
----- Message from Nadege Craig sent at 8/20/2021  9:18 AM EDT -----  Regarding: Dr. Wm Kumari: [phone]  General Message/Vendor Calls    Caller's first and last name: Aayush Vogel, Daughter. Reason for call: Dropped off [place] paperwork Monday, has not heard back. Matthew called last  Friday for authorization and has not heard back. Callback required yes/no and why: Yes, call back from nurse. Best contact number(s): [phone]      Details to clarify the request: N/a.       Message from [facility]

## 2021-08-23 NOTE — TELEPHONE ENCOUNTER
[phone]  Sade Nava  Verified on [employer] dated 6/11/18      States please call to update regarding [place] paperwork that was dropped off last week to . Caller states deadline approaching.

## 2021-12-06 ENCOUNTER — OFFICE VISIT (OUTPATIENT)
Dept: INTERNAL MEDICINE CLINIC | Age: 86
End: 2021-12-06
Payer: MEDICARE

## 2021-12-06 VITALS
OXYGEN SATURATION: 98 % | DIASTOLIC BLOOD PRESSURE: 64 MMHG | HEART RATE: 66 BPM | RESPIRATION RATE: 16 BRPM | TEMPERATURE: 97.1 F | WEIGHT: 97.6 LBS | BODY MASS INDEX: 19.16 KG/M2 | SYSTOLIC BLOOD PRESSURE: 171 MMHG | HEIGHT: 60 IN

## 2021-12-06 DIAGNOSIS — D64.9 ANEMIA, UNSPECIFIED TYPE: ICD-10-CM

## 2021-12-06 DIAGNOSIS — I10 ESSENTIAL HYPERTENSION: ICD-10-CM

## 2021-12-06 DIAGNOSIS — Z23 NEEDS FLU SHOT: ICD-10-CM

## 2021-12-06 DIAGNOSIS — E55.9 VITAMIN D DEFICIENCY: ICD-10-CM

## 2021-12-06 DIAGNOSIS — E78.00 HYPERCHOLESTEROLEMIA: ICD-10-CM

## 2021-12-06 LAB
25(OH)D3 SERPL-MCNC: 43.3 NG/ML (ref 30–100)
ALBUMIN SERPL-MCNC: 3 G/DL (ref 3.5–5)
ALBUMIN/GLOB SERPL: 0.8 {RATIO} (ref 1.1–2.2)
ALP SERPL-CCNC: 111 U/L (ref 45–117)
ALT SERPL-CCNC: 24 U/L (ref 12–78)
ANION GAP SERPL CALC-SCNC: 4 MMOL/L (ref 5–15)
AST SERPL-CCNC: 18 U/L (ref 15–37)
BASOPHILS # BLD: 0 K/UL (ref 0–0.1)
BASOPHILS NFR BLD: 1 % (ref 0–1)
BILIRUB SERPL-MCNC: 0.2 MG/DL (ref 0.2–1)
BUN SERPL-MCNC: 24 MG/DL (ref 6–20)
BUN/CREAT SERPL: 30 (ref 12–20)
CALCIUM SERPL-MCNC: 10.5 MG/DL (ref 8.5–10.1)
CHLORIDE SERPL-SCNC: 108 MMOL/L (ref 97–108)
CHOLEST SERPL-MCNC: 173 MG/DL
CO2 SERPL-SCNC: 31 MMOL/L (ref 21–32)
CREAT SERPL-MCNC: 0.8 MG/DL (ref 0.55–1.02)
DIFFERENTIAL METHOD BLD: NORMAL
EOSINOPHIL # BLD: 0.1 K/UL (ref 0–0.4)
EOSINOPHIL NFR BLD: 1 % (ref 0–7)
ERYTHROCYTE [DISTWIDTH] IN BLOOD BY AUTOMATED COUNT: 13.5 % (ref 11.5–14.5)
GLOBULIN SER CALC-MCNC: 3.9 G/DL (ref 2–4)
GLUCOSE SERPL-MCNC: 96 MG/DL (ref 65–100)
HCT VFR BLD AUTO: 41.6 % (ref 35–47)
HDLC SERPL-MCNC: 65 MG/DL
HDLC SERPL: 2.7 {RATIO} (ref 0–5)
HGB BLD-MCNC: 12.7 G/DL (ref 11.5–16)
IMM GRANULOCYTES # BLD AUTO: 0 K/UL (ref 0–0.04)
IMM GRANULOCYTES NFR BLD AUTO: 0 % (ref 0–0.5)
LDLC SERPL CALC-MCNC: 89.8 MG/DL (ref 0–100)
LYMPHOCYTES # BLD: 1.5 K/UL (ref 0.8–3.5)
LYMPHOCYTES NFR BLD: 20 % (ref 12–49)
MCH RBC QN AUTO: 28.9 PG (ref 26–34)
MCHC RBC AUTO-ENTMCNC: 30.5 G/DL (ref 30–36.5)
MCV RBC AUTO: 94.5 FL (ref 80–99)
MONOCYTES # BLD: 0.7 K/UL (ref 0–1)
MONOCYTES NFR BLD: 10 % (ref 5–13)
NEUTS SEG # BLD: 4.9 K/UL (ref 1.8–8)
NEUTS SEG NFR BLD: 68 % (ref 32–75)
NRBC # BLD: 0 K/UL (ref 0–0.01)
NRBC BLD-RTO: 0 PER 100 WBC
PLATELET # BLD AUTO: 271 K/UL (ref 150–400)
PMV BLD AUTO: 11.9 FL (ref 8.9–12.9)
POTASSIUM SERPL-SCNC: 4.5 MMOL/L (ref 3.5–5.1)
PROT SERPL-MCNC: 6.9 G/DL (ref 6.4–8.2)
RBC # BLD AUTO: 4.4 M/UL (ref 3.8–5.2)
SODIUM SERPL-SCNC: 143 MMOL/L (ref 136–145)
TRIGL SERPL-MCNC: 91 MG/DL (ref ?–150)
VLDLC SERPL CALC-MCNC: 18.2 MG/DL
WBC # BLD AUTO: 7.3 K/UL (ref 3.6–11)

## 2021-12-06 PROCEDURE — 1101F PT FALLS ASSESS-DOCD LE1/YR: CPT | Performed by: INTERNAL MEDICINE

## 2021-12-06 PROCEDURE — G8536 NO DOC ELDER MAL SCRN: HCPCS | Performed by: INTERNAL MEDICINE

## 2021-12-06 PROCEDURE — G9717 DOC PT DX DEP/BP F/U NT REQ: HCPCS | Performed by: INTERNAL MEDICINE

## 2021-12-06 PROCEDURE — 90694 VACC AIIV4 NO PRSRV 0.5ML IM: CPT | Performed by: INTERNAL MEDICINE

## 2021-12-06 PROCEDURE — 99214 OFFICE O/P EST MOD 30 MIN: CPT | Performed by: INTERNAL MEDICINE

## 2021-12-06 PROCEDURE — G8427 DOCREV CUR MEDS BY ELIG CLIN: HCPCS | Performed by: INTERNAL MEDICINE

## 2021-12-06 PROCEDURE — G8420 CALC BMI NORM PARAMETERS: HCPCS | Performed by: INTERNAL MEDICINE

## 2021-12-06 PROCEDURE — 1090F PRES/ABSN URINE INCON ASSESS: CPT | Performed by: INTERNAL MEDICINE

## 2021-12-06 PROCEDURE — G0463 HOSPITAL OUTPT CLINIC VISIT: HCPCS | Performed by: INTERNAL MEDICINE

## 2021-12-06 RX ORDER — ERGOCALCIFEROL 1.25 MG/1
50000 CAPSULE ORAL
Qty: 4 CAPSULE | Refills: 11 | Status: SHIPPED | OUTPATIENT
Start: 2021-12-06

## 2021-12-06 RX ORDER — ERGOCALCIFEROL 1.25 MG/1
50000 CAPSULE ORAL
Qty: 1 CAPSULE | Refills: 0 | Status: SHIPPED | OUTPATIENT
Start: 2021-12-06 | End: 2021-12-06 | Stop reason: SDUPTHER

## 2021-12-06 RX ORDER — DOXYCYCLINE 100 MG/1
CAPSULE ORAL
COMMUNITY
Start: 2021-11-24 | End: 2022-04-28

## 2021-12-06 NOTE — PROGRESS NOTES
Natalia Willard is a 80 y.o. female  Chief Complaint   Patient presents with    Follow-up     4 month    Hypertension     Health Maintenance Due   Topic Date Due    DTaP/Tdap/Td series (1 - Tdap) Never done    Shingrix Vaccine Age 50> (1 of 2) Never done    Pneumococcal 65+ years (2 of 2 - PPSV23) 01/23/2020    Flu Vaccine (1) 09/01/2021    COVID-19 Vaccine (3 - Booster for Moderna series) 09/01/2021    Lipid Screen  11/10/2021     Visit Vitals  BP (!) 171/64   Pulse 66   Temp 97.1 °F (36.2 °C) (Temporal)   Resp 16   Ht 5' (1.524 m)   SpO2 98%   BMI 17.87 kg/m²

## 2021-12-06 NOTE — PROGRESS NOTES
SUBJECTIVE  Ms. Klaus Foreman presents today for routine follow up. She has family members with her. Chief Complaint   Patient presents with    Follow-up     4 month    Hypertension       She had hip fracture in May, and was in Rehab at [facility] until August. El with therapies last week. She is in wheelchair today; ambulates sometimes with walker. She gets dizziness (vertigo) at times, sometimes when getting up. She saw Dr. Earnest Adorno, in Feb 2020. \"He said her balance nerve had deteriorated and there was nothing that could be done for it. \"      She saw Dr. Adrianna Douglass in early 2020, and had cardiac work up. Echo was normal with ER 55-60%. Duplex shows <50% stenoses. Event monitor was okay. Weight:  Seems to have stabilized. Wt Readings from Last 5 Encounters:   12/06/21 97 lb 9.6 oz (44.3 kg)   07/01/21 91 lb 7.9 oz (41.5 kg)   06/03/21 103 lb 1.6 oz (46.8 kg)   03/16/21 98 lb (44.5 kg)   01/07/21 103 lb 3.2 oz (46.8 kg)     Left knee pain is ongoing but better; Dr. Kem Mae following. Has had steroid injections. Off cymbalta and celebrex. She was unable to tolerate gabapentin . The tramadol we gave her didn't seem to help the pain and caused insomnia. We noted in early 2016:  L knee swells. In the past has received corticosteroid injections from Dr. Carlos Ramírez. She sees Dr. Kem Mae, and he has done injections. \"He says you can do the needle\"--no plans for further follow up with him. History of GI bleed and anemia: Dr. Markel Alaniz saw her most recently in 2020. No recent melena (other than once about 3 months ago) or hematochezia. She has had problems with diclofenac (itching) and celebrex--As we noted in January 2015: \"I had to stop the pills [celebrex] because of bleeding, and had a scope. \"  Dr. Markel Alaniz did colonoscopy finding a cecal polyp and diverticuli. Still has the neuropathic pain in leg, as noted in prior notes. This is doing better on cymbalta.      She has seen Dr. Sebastian Hernandez for spinal stenosis / sciatica. Asthma stable, uses inhaler \"now and then. \"      She still has arthritis pain: R neck pain is stable. Saw Dr. Rudi Araya, who diagnosed cervical spine arthritis. Depression is doing fine. For all issues addressed today, see A/P below. PMH: She has a past medical history of Adverse effect of anesthesia, Anemia NEC, Asthma, Cervical spondylarthritis, Depression, DJD (degenerative joint disease) of knee, Esophageal spasm, GERD (gastroesophageal reflux disease), Hypercholesterolemia, Hypertension, Lumbar stenosis, Neuropathy, upper extremity, Vertigo ([id number]), and Vitamin D deficiency (1/28/2011). She also has no past medical history of Malignant hyperthermia due to anesthesia or Pseudocholinesterase deficiency. Had pneumovax once. PSH:  has a past surgical history that includes hx hysterectomy; hx cataract removal; hx tonsillectomy; hx orthopaedic; and hx other surgical.  Colonoscopy 2014 showed cecal polyp, diverticuli, and internal hemorrhoids, Dr. Fabio Fields. All and MED reviewed. I have taken aspirin off the list; caused N/V once, but she is on it now and tolerating. Current Outpatient Medications on File Prior to Visit   Medication Sig Dispense Refill    doxycycline (VIBRAMYCIN) 100 mg capsule TAKE 1 CAPSULE BY MOUTH EVERY 12 HOURS      memantine (NAMENDA) 10 mg tablet Take 1 Tablet by mouth two (2) times a day. 60 Tablet 5    acetaminophen (TYLENOL) 325 mg tablet Take 2 Tablets by mouth every six (6) hours. 1 Tablet 0    polyethylene glycol (MIRALAX) 17 gram packet Take 1 Packet by mouth daily. 1 Each 0    famotidine (PEPCID) 20 mg tablet Take 1 Tablet by mouth two (2) times a day. 60 Tablet 0    atorvastatin (LIPITOR) 20 mg tablet TAKE 1 TABLET BY MOUTH EVERY DAY 90 Tab 0    amLODIPine (NORVASC) 2.5 mg tablet Take 1 Tab by mouth daily. 90 Tab 5    lisinopriL (PRINIVIL, ZESTRIL) 10 mg tablet Take 1 Tab by mouth daily.  90 Tab 3    albuterol (PROVENTIL HFA, VENTOLIN HFA, PROAIR HFA) 90 mcg/actuation inhaler Take 1 Puff by inhalation as needed for Wheezing. 1 Inhaler 5    ergocalciferol (ERGOCALCIFEROL) 50,000 unit capsule Take 1 Cap by mouth every seven (7) days. (Patient taking differently: Take 50,000 Units by mouth every Sunday.) 1 Cap 0    MULTIVITAMINS (MULTIVITAMIN PO) Take  by mouth daily.  aspirin delayed-release 81 mg tablet Take 1 Tablet by mouth two (2) times a day. (Patient not taking: Reported on 12/6/2021) 60 Tablet 0    cyproheptadine (PERIACTIN) 2 mg/5 mL syrup TK 5 ML PO BID. (Patient not taking: Reported on 12/6/2021)       No current facility-administered medications on file prior to visit. FH: Her family history includes Heart Disease in her brother, brother, father, and mother; Hypertension in her mother; Stroke in her mother. SH: She is . Her son lives with her. She reports that she has never used tobacco.  She reports that she drinks alcohol. ROS: Complete review of systems was performed and is otherwise unremarkable except as noted elsewhere. OBJECTIVE  Vitals:  Visit Vitals  BP (!) 171/64   Pulse 66   Temp 97.1 °F (36.2 °C) (Temporal)   Resp 16   Ht 5' (1.524 m)   Wt 97 lb 9.6 oz (44.3 kg)   SpO2 98%   BMI 19.06 kg/m²     Gen: Pleasant, thin 80 y.o. AA female in [facility].  She is in wheelchair.  HEENT: PERRLA. EOMI. OP moist and pink.  Neck: Supple.  No LAD.  HEART: RRR, No M/G/R.   LUNGS: CTAB No W/R.   ABDOMEN: S, NT, ND, BS+.   EXTREMITIES: Warm. No C/C/E.        Lab Results   Component Value Date/Time    Cholesterol, total 167 11/10/2020 09:45 AM    HDL Cholesterol 61 11/10/2020 09:45 AM    LDL, calculated 89 11/10/2020 09:45 AM    LDL, calculated 88 01/15/2020 08:49 AM    VLDL, calculated 17 11/10/2020 09:45 AM    VLDL, calculated 19 01/15/2020 08:49 AM    Triglyceride 90 11/10/2020 09:45 AM    CHOL/HDL Ratio 1.6 04/29/2017 05:38 AM      Lab Results   Component Value Date/Time    WBC 6.4 07/01/2021 05:16 PM    WBC 6.7 05/21/2012 08:23 AM    HGB 9.5 (L) 07/01/2021 05:16 PM    HCT 30.2 (L) 07/01/2021 05:16 PM    PLATELET 588 50/24/0251 05:16 PM    MCV 97.1 07/01/2021 05:16 PM     Lab Results   Component Value Date/Time    Vitamin D 25-Hydroxy 13.8 (L) 04/29/2017 05:37 AM    VITAMIN D, 25-HYDROXY 33.7 11/10/2020 09:45 AM       Lab Results   Component Value Date/Time    Sodium 141 07/01/2021 05:16 PM    Potassium 4.0 07/01/2021 05:16 PM    Chloride 108 07/01/2021 05:16 PM    CO2 29 07/01/2021 05:16 PM    Anion gap 4 (L) 07/01/2021 05:16 PM    Glucose 79 07/01/2021 05:16 PM    BUN 16 07/01/2021 05:16 PM    Creatinine 0.59 07/01/2021 05:16 PM    BUN/Creatinine ratio 27 (H) 07/01/2021 05:16 PM    GFR est AA >60 07/01/2021 05:16 PM    GFR est non-AA >60 07/01/2021 05:16 PM    Calcium 9.7 07/01/2021 05:16 PM         ASSESSMENT / PLAN:      1. Dizziness: Vertigo. Not orthostatic in Dr. Jeovany Velasco office. Cardiac work up negative. Duplex carotid okay. Seen by Dr. Antonette Rust. Also seen in 2021 by Dr. Priti Lombardi, neurologist and sent for vestibular rehab. 2. Dementia: Progressing. 3. Hypertension: High today. ? White coat syndrome. Normal Aug 2021. 4. Knee pain: DJD. Stable. Follow up with orthopedics as desired. 5. L leg neuropathic pain of lower extremity: Ongoing. Likely secondary to her spinal stenosis. Sees Dr. Oneida Resendez. Off cymbalta. 6. GI bleed: S/p colonoscopy. As per Dr. Song Adkins. No problem recently. Off NSAIDs and ASA. 7. Cervical spine stenosis: Not wanting surgery. 8. Hypercholesterolemia: Reasonable at last check. 9. Anemia: Hb okay. 10. Depression:  Improved. No SI. Off SSRI. 11. GERD (gastroesophageal reflux disease): Stable. 12. Asthma: Controlled. 13. Head and neck pain: Stable. Arthritis. 14. Vit D deficiency: Continue supplement. Recheck. RTC 4 months.

## 2021-12-07 NOTE — PROGRESS NOTES
Please call the patient and let the patient know that her test result(s) is/are basically normal.  Thanks. Kishan.

## 2021-12-08 NOTE — PROGRESS NOTES
Verified two patient identifiers, name and . Patient provided with lab results per Dr. Mae Calderon. See message below. Please call the patient and let the patient know that her test result(s) is/are basically normal.    Thanks. Christie Cuevas.       No further questions at this time

## 2022-03-08 RX ORDER — MEMANTINE HYDROCHLORIDE 10 MG/1
TABLET ORAL
Qty: 60 TABLET | Refills: 5 | Status: SHIPPED | OUTPATIENT
Start: 2022-03-08

## 2022-03-19 PROBLEM — N39.0 UTI (URINARY TRACT INFECTION): Status: ACTIVE | Noted: 2017-05-08

## 2022-03-19 PROBLEM — S72.009A HIP FRACTURE (HCC): Status: ACTIVE | Noted: 2017-04-27

## 2022-03-19 PROBLEM — G30.9 ALZHEIMER'S DISEASE, UNSPECIFIED (CODE) (HCC): Status: ACTIVE | Noted: 2021-08-10
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
## 2022-03-20 PROBLEM — S72.142A CLOSED 2-PART INTERTROCHANTERIC FRACTURE OF LEFT FEMUR (HCC): Status: ACTIVE | Noted: 2021-05-28

## 2022-04-11 ENCOUNTER — OFFICE VISIT (OUTPATIENT)
Dept: INTERNAL MEDICINE CLINIC | Age: 87
End: 2022-04-11
Payer: MEDICARE

## 2022-04-11 VITALS
WEIGHT: 97.5 LBS | SYSTOLIC BLOOD PRESSURE: 185 MMHG | TEMPERATURE: 97.4 F | RESPIRATION RATE: 18 BRPM | OXYGEN SATURATION: 98 % | HEART RATE: 70 BPM | DIASTOLIC BLOOD PRESSURE: 71 MMHG | BODY MASS INDEX: 19.04 KG/M2

## 2022-04-11 DIAGNOSIS — E43 UNSPECIFIED SEVERE PROTEIN-CALORIE MALNUTRITION (HCC): ICD-10-CM

## 2022-04-11 DIAGNOSIS — Z00.00 MEDICARE ANNUAL WELLNESS VISIT, SUBSEQUENT: ICD-10-CM

## 2022-04-11 DIAGNOSIS — R26.89 IMBALANCE: Primary | ICD-10-CM

## 2022-04-11 DIAGNOSIS — G30.9 ALZHEIMER'S DISEASE, UNSPECIFIED (CODE) (HCC): ICD-10-CM

## 2022-04-11 PROCEDURE — G8420 CALC BMI NORM PARAMETERS: HCPCS | Performed by: INTERNAL MEDICINE

## 2022-04-11 PROCEDURE — G9717 DOC PT DX DEP/BP F/U NT REQ: HCPCS | Performed by: INTERNAL MEDICINE

## 2022-04-11 PROCEDURE — G8427 DOCREV CUR MEDS BY ELIG CLIN: HCPCS | Performed by: INTERNAL MEDICINE

## 2022-04-11 PROCEDURE — 1101F PT FALLS ASSESS-DOCD LE1/YR: CPT | Performed by: INTERNAL MEDICINE

## 2022-04-11 PROCEDURE — G0439 PPPS, SUBSEQ VISIT: HCPCS | Performed by: INTERNAL MEDICINE

## 2022-04-11 PROCEDURE — G8536 NO DOC ELDER MAL SCRN: HCPCS | Performed by: INTERNAL MEDICINE

## 2022-04-11 RX ORDER — SERTRALINE HYDROCHLORIDE 50 MG/1
50 TABLET, FILM COATED ORAL DAILY
Qty: 90 TABLET | Refills: 3 | Status: SHIPPED | OUTPATIENT
Start: 2022-04-11

## 2022-04-11 NOTE — PROGRESS NOTES
This is the Subsequent Medicare Annual Wellness Exam, performed 12 months or more after the Initial AWV or the last Subsequent AWV    I have reviewed the patient's medical history in detail and updated the computerized patient record. History     Patient Active Problem List   Diagnosis Code    Hypertension I10    Hypercholesterolemia E78.00    Depression F32. A    GERD (gastroesophageal reflux disease) K21.9    Asthma J45.909    Neuropathy, upper extremity G56.90    Vitamin D deficiency E55.9    Vertigo R42    Lumbar stenosis M48.061    Anemia D64.9    Hip fracture (HCC) S72.009A    UTI (urinary tract infection) N39.0    Fall W19. Adri Sers    Closed 2-part intertrochanteric fracture of left femur ([address] S72.142A    Alzheimer's disease, unspecified G30.9     Past Medical History:   Diagnosis Date    Adverse effect of anesthesia     Combative, confused for extended period of time with anesthesia    Anemia NEC     Asthma     Cervical spondylarthritis     Depression     beginning stages of dementia, sundowning    DJD (degenerative joint disease) of knee     Esophageal spasm     Has required esophageal dilatation; Dr. Weakley Fuel GERD (gastroesophageal reflux disease)     Hallucination     Hypercholesterolemia     Hypertension     Lumbar stenosis     Neuropathy, upper extremity     left    Vertigo 2623-7629    Saw Dr. Luly Diehl; improved.      Vitamin D deficiency 1/28/2011      Past Surgical History:   Procedure Laterality Date    HX CATARACT REMOVAL      bilateral    HX HYSTERECTOMY      HX ORTHOPAEDIC      right bunion excision    HX OTHER SURGICAL      broken R hip    HX TONSILLECTOMY       Current Outpatient Medications   Medication Sig Dispense Refill    memantine (NAMENDA) 10 mg tablet TAKE 1 TABLET BY MOUTH TWICE DAILY 60 Tablet 5    doxycycline (VIBRAMYCIN) 100 mg capsule TAKE 1 CAPSULE BY MOUTH EVERY 12 HOURS      ergocalciferol (ERGOCALCIFEROL) 1,250 mcg (50,000 unit) capsule Take 1 Capsule by mouth every seven (7) days. 4 Capsule 11    acetaminophen (TYLENOL) 325 mg tablet Take 2 Tablets by mouth every six (6) hours. 1 Tablet 0    polyethylene glycol (MIRALAX) 17 gram packet Take 1 Packet by mouth daily. 1 Each 0    famotidine (PEPCID) 20 mg tablet Take 1 Tablet by mouth two (2) times a day. 60 Tablet 0    atorvastatin (LIPITOR) 20 mg tablet TAKE 1 TABLET BY MOUTH EVERY DAY 90 Tab 0    amLODIPine (NORVASC) 2.5 mg tablet Take 1 Tab by mouth daily. 90 Tab 5    lisinopriL (PRINIVIL, ZESTRIL) 10 mg tablet Take 1 Tab by mouth daily. 90 Tab 3    albuterol (PROVENTIL HFA, VENTOLIN HFA, PROAIR HFA) 90 mcg/actuation inhaler Take 1 Puff by inhalation as needed for Wheezing. 1 Inhaler 5    MULTIVITAMINS (MULTIVITAMIN PO) Take  by mouth daily. Allergies   Allergen Reactions    Diclofenac Itching     The voltaren gel caused itching.  Gabapentin Swelling    Hydrochlorothiazide Other (comments)     dizziness    Pcn [Penicillins] Swelling       Family History   Problem Relation Age of Onset    Heart Disease Mother     Hypertension Mother     Stroke Mother     Heart Disease Brother     Heart Disease Father     Heart Disease Brother      Social History     Tobacco Use    Smoking status: Never Smoker    Smokeless tobacco: Never Used   Substance Use Topics    Alcohol use: No       Depression Risk Factor Screening:     3 most recent PHQ Screens 4/11/2022   Little interest or pleasure in doing things More than half the days   Feeling down, depressed, irritable, or hopeless More than half the days   Total Score PHQ 2 4       Alcohol Risk Factor Screening:   Do you average 1 drink per night or more than 7 drinks a week:  No    On any one occasion in the past three months have you have had more than 3 drinks containing alcohol:  No      Functional Ability and Level of Safety:   Hearing: Somewhat diminished. She lives in 1 [place] home, with son. Daughters are near by.      She qualifies for in home care for 2 days and 1 night via the [place]. 44 hours a month. Activities of Daily Living: The home contains: no safety equipment. Patient needs help with:  transportation    Ambulation: with some difficulty, uses cane at times    Fall Risk:  Fall Risk Assessment, last 12 mths 4/11/2022   Able to walk? Yes   Fall in past 12 months? 1   Do you feel unsteady? 1   Are you worried about falling 1   Number of falls in past 12 months -   Fall with injury? 1       Abuse Screen:  Patient is not abused    Cognitive Screening   Has your family/caregiver stated any concerns about your memory: \"sometimes\"  She has dementia, slowly worsening. Patient Care Team   Patient Care Team:  Amina Hamm MD as PCP - General  Amina Hamm MD as PCP - [facility]  Zack Franco MD (Gastroenterology)  Savannah Garzon MD (Ophthalmology)  Vitor Hansen MD (Orthopedic Surgery)  Tay Gunter MD as Physician (Cardiology)    Assessment/Plan   Education and counseling provided:  Are appropriate based on today's review and evaluation    Diagnoses and all orders for this visit:    1. Imbalance  -     REFERRAL TO HOME HEALTH    2. Unspecified severe protein-calorie malnutrition ([facility]    3.  Alzheimer's disease, unspecified (CODE) ([facility]        Health Maintenance Due   Topic Date Due    DTaP/Tdap/Td series (1 - Tdap) Never done    Shingrix Vaccine Age 50> (1 of 2) Never done    Pneumococcal 65+ years (2 of 2 - PPSV23) 01/23/2020    COVID-19 Vaccine (3 - Booster for Olivia Coup series) 08/01/2021    Depression Monitoring  02/04/2022    Medicare Yearly Exam  02/05/2022

## 2022-04-11 NOTE — PROGRESS NOTES
SUBJECTIVE  Ms. Omi Cervantes presents today for routine follow up. She has family members with her. Chief Complaint   Patient presents with    Follow-up       The patient has worsening depression. She is tearful during the visit today. According to family members she is down emotionally, and cries all the time. She has been having intense nightmares. She is not currently on Zoloft but tolerated this in the past.    She has had a couple of falls. According to family, she refuses to use her walker but is willing to use a cane. She has dementia and her memory continues to deteriorate as time goes on. She is currently on memantine. She had hip fracture in May, and was in Rehab at [facility] until August. Finished with therapies last week. She is in wheelchair today; ambulates sometimes with walker. She gets dizziness (vertigo) at times, sometimes when getting up. She saw Dr. Rasta Martinez, in Feb 2020. \"He said her balance nerve had deteriorated and there was nothing that could be done for it. \"      She saw Dr. Von Atkinson in early 2020, and had cardiac work up. Echo was normal with ER 55-60%. Duplex shows <50% stenoses. Event monitor was okay. Weight:  Seems to have stabilized. Wt Readings from Last 5 Encounters:   04/11/22 97 lb 8 oz (44.2 kg)   12/06/21 97 lb 9.6 oz (44.3 kg)   07/01/21 91 lb 7.9 oz (41.5 kg)   06/03/21 103 lb 1.6 oz (46.8 kg)   03/16/21 98 lb (44.5 kg)     Left knee pain is ongoing but better; Dr. Bogdan Alexander following. Has had steroid injections. Off cymbalta and celebrex. She was unable to tolerate gabapentin . The tramadol we gave her didn't seem to help the pain and caused insomnia. We noted in early 2016:  L knee swells. In the past has received corticosteroid injections from Dr. Lindsey Collins. She sees Dr. Bogdan Alexander, and he has done injections. \"He says you can do the needle\"--no plans for further follow up with him.       History of GI bleed and anemia: Dr. Shonna Mckenzie saw her most recently in 2020. No recent melena (other than once about 3 months ago) or hematochezia. She has had problems with diclofenac (itching) and celebrex--As we noted in January 2015: \"I had to stop the pills [celebrex] because of bleeding, and had a scope. \"  Dr. Harpreet Akers did colonoscopy finding a cecal polyp and diverticuli. Still has the neuropathic pain in leg, as noted in prior notes. This is doing better on cymbalta. She has seen Dr. Mian Rosas for spinal stenosis / sciatica. Asthma stable, uses inhaler \"now and then. \"      She still has arthritis pain: R neck pain is stable. Saw Dr. Clifford Bee, who diagnosed cervical spine arthritis. For all issues addressed today, see A/P below. PMH: She has a past medical history of Adverse effect of anesthesia, Anemia NEC, Asthma, Cervical spondylarthritis, Depression, DJD (degenerative joint disease) of knee, Esophageal spasm, GERD (gastroesophageal reflux disease), Hallucination, Hypercholesterolemia, Hypertension, Lumbar stenosis, Neuropathy, upper extremity, Vertigo ([id number]), and Vitamin D deficiency (1/28/2011). She has no past medical history of Malignant hyperthermia due to anesthesia or Pseudocholinesterase deficiency. Had pneumovax once. PSH:  has a past surgical history that includes hx hysterectomy; hx cataract removal; hx tonsillectomy; hx orthopaedic; and hx other surgical.  Colonoscopy 2014 showed cecal polyp, diverticuli, and internal hemorrhoids, Dr. Harpreet Akers. All and MED reviewed. I have taken aspirin off the list; caused N/V once, but she is on it now and tolerating. Current Outpatient Medications on File Prior to Visit   Medication Sig Dispense Refill    memantine (NAMENDA) 10 mg tablet TAKE 1 TABLET BY MOUTH TWICE DAILY 60 Tablet 5    doxycycline (VIBRAMYCIN) 100 mg capsule TAKE 1 CAPSULE BY MOUTH EVERY 12 HOURS      ergocalciferol (ERGOCALCIFEROL) 1,250 mcg (50,000 unit) capsule Take 1 Capsule by mouth every seven (7) days.  4 Capsule 11  acetaminophen (TYLENOL) 325 mg tablet Take 2 Tablets by mouth every six (6) hours. 1 Tablet 0    polyethylene glycol (MIRALAX) 17 gram packet Take 1 Packet by mouth daily. 1 Each 0    famotidine (PEPCID) 20 mg tablet Take 1 Tablet by mouth two (2) times a day. 60 Tablet 0    atorvastatin (LIPITOR) 20 mg tablet TAKE 1 TABLET BY MOUTH EVERY DAY 90 Tab 0    amLODIPine (NORVASC) 2.5 mg tablet Take 1 Tab by mouth daily. 90 Tab 5    lisinopriL (PRINIVIL, ZESTRIL) 10 mg tablet Take 1 Tab by mouth daily. 90 Tab 3    albuterol (PROVENTIL HFA, VENTOLIN HFA, PROAIR HFA) 90 mcg/actuation inhaler Take 1 Puff by inhalation as needed for Wheezing. 1 Inhaler 5    MULTIVITAMINS (MULTIVITAMIN PO) Take  by mouth daily. No current facility-administered medications on file prior to visit. FH: Her family history includes Heart Disease in her brother, brother, father, and mother; Hypertension in her mother; Stroke in her mother. SH: She is . Her son lives with her. She reports that she has never used tobacco.  She reports that she drinks alcohol. ROS: Complete review of systems was performed and is otherwise unremarkable except as noted elsewhere. OBJECTIVE  Vitals:  Visit Vitals  BP (!) 185/71 (BP 1 [place] Right arm, BP Patient Position: Sitting, BP Cuff Size: Adult)   Pulse 70   Temp 97.4 °F (36.3 °C) (Temporal)   Resp 18   Wt 97 lb 8 oz (44.2 kg)   SpO2 98%   BMI 19.04 kg/m²     Gen: Pleasant, thin 80 y.o. AA female in NAD.  She is in wheelchair.  HEENT: PERRLA. EOMI. OP moist and pink.  Neck: Supple.  No LAD.  HEART: RRR, No M/G/R.   LUNGS: CTAB No W/R.   ABDOMEN: S, NT, ND, BS+.   EXTREMITIES: Warm. No C/C/E.        Lab Results   Component Value Date/Time    Cholesterol, total 173 12/06/2021 03:23 PM    HDL Cholesterol 65 12/06/2021 03:23 PM    LDL, calculated 89.8 12/06/2021 03:23 PM    VLDL, calculated 18.2 12/06/2021 03:23 PM    Triglyceride 91 12/06/2021 03:23 PM    CHOL/HDL Ratio 2.7 12/06/2021 03:23 PM      Lab Results   Component Value Date/Time    WBC 7.3 12/06/2021 03:23 PM    WBC 6.7 05/21/2012 08:23 AM    HGB 12.7 12/06/2021 03:23 PM    HCT 41.6 12/06/2021 03:23 PM    PLATELET 213 00/49/5915 03:23 PM    MCV 94.5 12/06/2021 03:23 PM     Lab Results   Component Value Date/Time    Vitamin D 25-Hydroxy 43.3 12/06/2021 03:23 PM       Lab Results   Component Value Date/Time    Sodium 143 12/06/2021 03:23 PM    Potassium 4.5 12/06/2021 03:23 PM    Chloride 108 12/06/2021 03:23 PM    CO2 31 12/06/2021 03:23 PM    Anion gap 4 (L) 12/06/2021 03:23 PM    Glucose 96 12/06/2021 03:23 PM    BUN 24 (H) 12/06/2021 03:23 PM    Creatinine 0.80 12/06/2021 03:23 PM    BUN/Creatinine ratio 30 (H) 12/06/2021 03:23 PM    GFR est AA >60 12/06/2021 03:23 PM    GFR est non-AA >60 12/06/2021 03:23 PM    Calcium 10.5 (H) 12/06/2021 03:23 PM         ASSESSMENT / PLAN:      1. Depression: Doing worse. No SI. Reinstate SSRI. 2. Dementia: Progressing. 3. Difficulty ambulating: If she will use a cane, then perhaps we should get PT to work with her on that. Home health consult. 4. Hypertension: High today. ? White coat syndrome. Normal Aug 2021.    5. Knee pain: DJD. Stable. Follow up with orthopedics as desired. 6. L leg neuropathic pain of lower extremity: Ongoing. Likely secondary to her spinal stenosis. Sees Dr. Munoz Query. Off cymbalta. 7. GI bleed: S/p colonoscopy. As per Dr. Shonna Mckenzie. No problem recently. Off NSAIDs and ASA. 8. Cervical spine stenosis: Not wanting surgery. 9. Hypercholesterolemia: Reasonable at last check. 10. Anemia: Hb okay. 11. GERD (gastroesophageal reflux disease): Stable. 12. Asthma: Controlled. 13. Head and neck pain: Stable. Arthritis. 14. Vit D deficiency: Continue supplement. Recheck. 15. Dizziness: Vertigo. Not orthostatic in Dr. Joby Norwood office. Cardiac work up negative. Duplex carotid okay. Seen by Dr. Rasta Martinez.  Also seen in 2021 by Dr. Drew Carmona, neurologist and sent for vestibular rehab. RTC 4 months. Also 6 weeks for follow-up of depression.

## 2022-04-11 NOTE — PATIENT INSTRUCTIONS

## 2022-04-12 ENCOUNTER — HOME HEALTH ADMISSION (OUTPATIENT)
Dept: HOME HEALTH SERVICES | Facility: HOME HEALTH | Age: 87
End: 2022-04-12
Payer: MEDICARE

## 2022-04-12 ENCOUNTER — TELEPHONE (OUTPATIENT)
Dept: INTERNAL MEDICINE CLINIC | Age: 87
End: 2022-04-12

## 2022-04-12 NOTE — TELEPHONE ENCOUNTER
----- Message from Kenroy Blanchard LPN sent at 4/34/5041  4:15 PM EDT -----  Good afternoon,      Thank you for the referral, what it was not stated what [facility] services she need?  Please update the [facility] order, then I will send it for approval. Thank you

## 2022-04-13 ENCOUNTER — TELEPHONE (OUTPATIENT)
Dept: INTERNAL MEDICINE CLINIC | Age: 87
End: 2022-04-13

## 2022-04-13 ENCOUNTER — HOME CARE VISIT (OUTPATIENT)
Dept: SCHEDULING | Facility: HOME HEALTH | Age: 87
End: 2022-04-13
Payer: MEDICARE

## 2022-04-13 VITALS
TEMPERATURE: 97.9 F | HEART RATE: 76 BPM | RESPIRATION RATE: 16 BRPM | OXYGEN SATURATION: 98 % | SYSTOLIC BLOOD PRESSURE: 120 MMHG | DIASTOLIC BLOOD PRESSURE: 80 MMHG

## 2022-04-13 PROCEDURE — 400013 HH SOC

## 2022-04-13 PROCEDURE — 3331090002 HH PPS REVENUE DEBIT

## 2022-04-13 PROCEDURE — 3331090001 HH PPS REVENUE CREDIT

## 2022-04-13 PROCEDURE — G0151 HHCP-SERV OF PT,EA 15 MIN: HCPCS

## 2022-04-13 PROCEDURE — 400018 HH-NO PAY CLAIM PROCEDURE

## 2022-04-13 NOTE — TELEPHONE ENCOUNTER
#[phone] Clarence Diss states she would like a verbal for plan of care (PT)   2/wk for 4 and 3 prn as needed for post fall assessment. She would also like a verbal for OT eval ADL increase safety. Please call with both verbals. Thanks.

## 2022-04-14 PROCEDURE — 3331090001 HH PPS REVENUE CREDIT

## 2022-04-14 PROCEDURE — 3331090002 HH PPS REVENUE DEBIT

## 2022-04-14 NOTE — TELEPHONE ENCOUNTER
Called, spoke with Gloria Willett w/ [facility]. Two pt identifiers confirmed. Gloria Willett given verbal orders per Dr. Manny Rodas. Gloria Willett verbalized understanding of information discussed w/ no further questions at this time.

## 2022-04-14 NOTE — TELEPHONE ENCOUNTER
Continue home meds.  Further evaluation/diagnostics/interventions/consults pending course        That's fine.  BJF

## 2022-04-15 ENCOUNTER — HOME CARE VISIT (OUTPATIENT)
Dept: SCHEDULING | Facility: HOME HEALTH | Age: 87
End: 2022-04-15
Payer: MEDICARE

## 2022-04-15 VITALS
DIASTOLIC BLOOD PRESSURE: 55 MMHG | HEART RATE: 69 BPM | RESPIRATION RATE: 16 BRPM | SYSTOLIC BLOOD PRESSURE: 130 MMHG | TEMPERATURE: 98.3 F | OXYGEN SATURATION: 97 %

## 2022-04-15 PROCEDURE — 3331090001 HH PPS REVENUE CREDIT

## 2022-04-15 PROCEDURE — G0151 HHCP-SERV OF PT,EA 15 MIN: HCPCS

## 2022-04-15 PROCEDURE — 3331090002 HH PPS REVENUE DEBIT

## 2022-04-16 PROCEDURE — 3331090001 HH PPS REVENUE CREDIT

## 2022-04-16 PROCEDURE — 3331090002 HH PPS REVENUE DEBIT

## 2022-04-17 PROCEDURE — 3331090002 HH PPS REVENUE DEBIT

## 2022-04-17 PROCEDURE — 3331090001 HH PPS REVENUE CREDIT

## 2022-04-18 ENCOUNTER — HOME CARE VISIT (OUTPATIENT)
Dept: SCHEDULING | Facility: HOME HEALTH | Age: 87
End: 2022-04-18
Payer: MEDICARE

## 2022-04-18 VITALS
DIASTOLIC BLOOD PRESSURE: 70 MMHG | SYSTOLIC BLOOD PRESSURE: 150 MMHG | HEART RATE: 79 BPM | OXYGEN SATURATION: 99 % | RESPIRATION RATE: 16 BRPM | TEMPERATURE: 97.4 F

## 2022-04-18 VITALS
HEART RATE: 79 BPM | DIASTOLIC BLOOD PRESSURE: 70 MMHG | SYSTOLIC BLOOD PRESSURE: 150 MMHG | TEMPERATURE: 97.4 F | OXYGEN SATURATION: 99 %

## 2022-04-18 PROCEDURE — G0152 HHCP-SERV OF OT,EA 15 MIN: HCPCS

## 2022-04-18 PROCEDURE — 3331090002 HH PPS REVENUE DEBIT

## 2022-04-18 PROCEDURE — G0151 HHCP-SERV OF PT,EA 15 MIN: HCPCS

## 2022-04-18 PROCEDURE — 3331090001 HH PPS REVENUE CREDIT

## 2022-04-19 PROCEDURE — 3331090002 HH PPS REVENUE DEBIT

## 2022-04-19 PROCEDURE — 3331090001 HH PPS REVENUE CREDIT

## 2022-04-20 ENCOUNTER — HOME CARE VISIT (OUTPATIENT)
Dept: SCHEDULING | Facility: HOME HEALTH | Age: 87
End: 2022-04-20
Payer: MEDICARE

## 2022-04-20 VITALS
DIASTOLIC BLOOD PRESSURE: 80 MMHG | HEART RATE: 70 BPM | OXYGEN SATURATION: 98 % | RESPIRATION RATE: 16 BRPM | TEMPERATURE: 97.6 F | SYSTOLIC BLOOD PRESSURE: 138 MMHG

## 2022-04-20 PROCEDURE — 3331090002 HH PPS REVENUE DEBIT

## 2022-04-20 PROCEDURE — G0151 HHCP-SERV OF PT,EA 15 MIN: HCPCS

## 2022-04-20 PROCEDURE — G0158 HHC OT ASSISTANT EA 15: HCPCS

## 2022-04-20 PROCEDURE — 3331090001 HH PPS REVENUE CREDIT

## 2022-04-21 PROCEDURE — 3331090001 HH PPS REVENUE CREDIT

## 2022-04-21 PROCEDURE — 3331090002 HH PPS REVENUE DEBIT

## 2022-04-22 PROCEDURE — 3331090002 HH PPS REVENUE DEBIT

## 2022-04-22 PROCEDURE — 3331090001 HH PPS REVENUE CREDIT

## 2022-04-23 PROCEDURE — 3331090001 HH PPS REVENUE CREDIT

## 2022-04-23 PROCEDURE — 3331090002 HH PPS REVENUE DEBIT

## 2022-04-24 PROCEDURE — 3331090001 HH PPS REVENUE CREDIT

## 2022-04-24 PROCEDURE — 3331090002 HH PPS REVENUE DEBIT

## 2022-04-25 ENCOUNTER — HOME CARE VISIT (OUTPATIENT)
Dept: SCHEDULING | Facility: HOME HEALTH | Age: 87
End: 2022-04-25
Payer: MEDICARE

## 2022-04-25 ENCOUNTER — APPOINTMENT (OUTPATIENT)
Dept: CT IMAGING | Age: 87
DRG: 301 | End: 2022-04-25
Attending: EMERGENCY MEDICINE
Payer: MEDICARE

## 2022-04-25 ENCOUNTER — APPOINTMENT (OUTPATIENT)
Dept: ULTRASOUND IMAGING | Age: 87
DRG: 301 | End: 2022-04-25
Attending: EMERGENCY MEDICINE
Payer: MEDICARE

## 2022-04-25 ENCOUNTER — HOME CARE VISIT (OUTPATIENT)
Dept: HOME HEALTH SERVICES | Facility: HOME HEALTH | Age: 87
End: 2022-04-25
Payer: MEDICARE

## 2022-04-25 ENCOUNTER — HOSPITAL ENCOUNTER (INPATIENT)
Age: 87
LOS: 3 days | Discharge: HOME HEALTH | DRG: 301 | End: 2022-04-28
Attending: EMERGENCY MEDICINE | Admitting: INTERNAL MEDICINE
Payer: MEDICARE

## 2022-04-25 DIAGNOSIS — I82.4Y9 DEEP VEIN THROMBOSIS (DVT) OF PROXIMAL LOWER EXTREMITY, UNSPECIFIED CHRONICITY, UNSPECIFIED LATERALITY (HCC): Primary | ICD-10-CM

## 2022-04-25 PROBLEM — I82.409 DVT (DEEP VENOUS THROMBOSIS) (HCC): Status: ACTIVE | Noted: 2022-04-25

## 2022-04-25 LAB
ALBUMIN SERPL-MCNC: 2.6 G/DL (ref 3.5–5)
ALBUMIN/GLOB SERPL: 0.6 {RATIO} (ref 1.1–2.2)
ALP SERPL-CCNC: 100 U/L (ref 45–117)
ALT SERPL-CCNC: 26 U/L (ref 12–78)
ANION GAP SERPL CALC-SCNC: 4 MMOL/L (ref 5–15)
APTT PPP: 26.8 SEC (ref 22.1–31)
AST SERPL-CCNC: 19 U/L (ref 15–37)
ATRIAL RATE: 71 BPM
BASOPHILS # BLD: 0.1 K/UL (ref 0–0.1)
BASOPHILS NFR BLD: 1 % (ref 0–1)
BILIRUB SERPL-MCNC: 0.5 MG/DL (ref 0.2–1)
BUN SERPL-MCNC: 16 MG/DL (ref 6–20)
BUN/CREAT SERPL: 20 (ref 12–20)
CALCIUM SERPL-MCNC: 10 MG/DL (ref 8.5–10.1)
CALCULATED P AXIS, ECG09: 63 DEGREES
CALCULATED R AXIS, ECG10: -47 DEGREES
CALCULATED T AXIS, ECG11: 64 DEGREES
CHLORIDE SERPL-SCNC: 104 MMOL/L (ref 97–108)
CO2 SERPL-SCNC: 31 MMOL/L (ref 21–32)
COMMENT, HOLDF: NORMAL
CREAT SERPL-MCNC: 0.8 MG/DL (ref 0.55–1.02)
DIAGNOSIS, 93000: NORMAL
DIFFERENTIAL METHOD BLD: NORMAL
EOSINOPHIL # BLD: 0.1 K/UL (ref 0–0.4)
EOSINOPHIL NFR BLD: 1 % (ref 0–7)
ERYTHROCYTE [DISTWIDTH] IN BLOOD BY AUTOMATED COUNT: 12.3 % (ref 11.5–14.5)
GLOBULIN SER CALC-MCNC: 4.6 G/DL (ref 2–4)
GLUCOSE SERPL-MCNC: 89 MG/DL (ref 65–100)
HCT VFR BLD AUTO: 39.6 % (ref 35–47)
HGB BLD-MCNC: 12.3 G/DL (ref 11.5–16)
IMM GRANULOCYTES # BLD AUTO: 0 K/UL (ref 0–0.04)
IMM GRANULOCYTES NFR BLD AUTO: 0 % (ref 0–0.5)
LYMPHOCYTES # BLD: 1.4 K/UL (ref 0.8–3.5)
LYMPHOCYTES NFR BLD: 20 % (ref 12–49)
MCH RBC QN AUTO: 28.7 PG (ref 26–34)
MCHC RBC AUTO-ENTMCNC: 31.1 G/DL (ref 30–36.5)
MCV RBC AUTO: 92.5 FL (ref 80–99)
MONOCYTES # BLD: 0.7 K/UL (ref 0–1)
MONOCYTES NFR BLD: 10 % (ref 5–13)
NEUTS SEG # BLD: 4.8 K/UL (ref 1.8–8)
NEUTS SEG NFR BLD: 68 % (ref 32–75)
NRBC # BLD: 0 K/UL (ref 0–0.01)
NRBC BLD-RTO: 0 PER 100 WBC
P-R INTERVAL, ECG05: 212 MS
PLATELET # BLD AUTO: 275 K/UL (ref 150–400)
PMV BLD AUTO: 11 FL (ref 8.9–12.9)
POTASSIUM SERPL-SCNC: 4.4 MMOL/L (ref 3.5–5.1)
PROCALCITONIN SERPL-MCNC: <0.05 NG/ML
PROT SERPL-MCNC: 7.2 G/DL (ref 6.4–8.2)
Q-T INTERVAL, ECG07: 422 MS
QRS DURATION, ECG06: 124 MS
QTC CALCULATION (BEZET), ECG08: 458 MS
RBC # BLD AUTO: 4.28 M/UL (ref 3.8–5.2)
SAMPLES BEING HELD,HOLD: NORMAL
SODIUM SERPL-SCNC: 139 MMOL/L (ref 136–145)
THERAPEUTIC RANGE,PTTT: NORMAL SECS (ref 58–77)
VENTRICULAR RATE, ECG03: 71 BPM
WBC # BLD AUTO: 7.1 K/UL (ref 3.6–11)

## 2022-04-25 PROCEDURE — 74011000636 HC RX REV CODE- 636: Performed by: EMERGENCY MEDICINE

## 2022-04-25 PROCEDURE — 99285 EMERGENCY DEPT VISIT HI MDM: CPT

## 2022-04-25 PROCEDURE — 87040 BLOOD CULTURE FOR BACTERIA: CPT

## 2022-04-25 PROCEDURE — 3331090002 HH PPS REVENUE DEBIT

## 2022-04-25 PROCEDURE — 96372 THER/PROPH/DIAG INJ SC/IM: CPT

## 2022-04-25 PROCEDURE — 84145 PROCALCITONIN (PCT): CPT

## 2022-04-25 PROCEDURE — 36415 COLL VENOUS BLD VENIPUNCTURE: CPT

## 2022-04-25 PROCEDURE — 93005 ELECTROCARDIOGRAM TRACING: CPT

## 2022-04-25 PROCEDURE — 3331090001 HH PPS REVENUE CREDIT

## 2022-04-25 PROCEDURE — 85025 COMPLETE CBC W/AUTO DIFF WBC: CPT

## 2022-04-25 PROCEDURE — 85730 THROMBOPLASTIN TIME PARTIAL: CPT

## 2022-04-25 PROCEDURE — 74011250637 HC RX REV CODE- 250/637: Performed by: STUDENT IN AN ORGANIZED HEALTH CARE EDUCATION/TRAINING PROGRAM

## 2022-04-25 PROCEDURE — 74011250636 HC RX REV CODE- 250/636: Performed by: EMERGENCY MEDICINE

## 2022-04-25 PROCEDURE — 80053 COMPREHEN METABOLIC PANEL: CPT

## 2022-04-25 PROCEDURE — 93971 EXTREMITY STUDY: CPT

## 2022-04-25 PROCEDURE — 71275 CT ANGIOGRAPHY CHEST: CPT

## 2022-04-25 PROCEDURE — 65660000001 HC RM ICU INTERMED STEPDOWN

## 2022-04-25 RX ORDER — ACETAMINOPHEN 325 MG/1
650 TABLET ORAL
Status: DISCONTINUED | OUTPATIENT
Start: 2022-04-25 | End: 2022-04-28 | Stop reason: HOSPADM

## 2022-04-25 RX ORDER — MEMANTINE HYDROCHLORIDE 10 MG/1
10 TABLET ORAL 2 TIMES DAILY
Status: DISCONTINUED | OUTPATIENT
Start: 2022-04-25 | End: 2022-04-28 | Stop reason: HOSPADM

## 2022-04-25 RX ORDER — LISINOPRIL 10 MG/1
10 TABLET ORAL DAILY
Status: DISCONTINUED | OUTPATIENT
Start: 2022-04-26 | End: 2022-04-28 | Stop reason: HOSPADM

## 2022-04-25 RX ORDER — ACETAMINOPHEN 325 MG/1
650 TABLET ORAL EVERY 6 HOURS
Status: DISCONTINUED | OUTPATIENT
Start: 2022-04-25 | End: 2022-04-28 | Stop reason: HOSPADM

## 2022-04-25 RX ORDER — ATORVASTATIN CALCIUM 20 MG/1
20 TABLET, FILM COATED ORAL DAILY
Status: DISCONTINUED | OUTPATIENT
Start: 2022-04-26 | End: 2022-04-28 | Stop reason: HOSPADM

## 2022-04-25 RX ORDER — ENOXAPARIN SODIUM 100 MG/ML
1 INJECTION SUBCUTANEOUS
Status: COMPLETED | OUTPATIENT
Start: 2022-04-25 | End: 2022-04-25

## 2022-04-25 RX ORDER — ACETAMINOPHEN 325 MG/1
650 TABLET ORAL
Status: DISCONTINUED | OUTPATIENT
Start: 2022-04-25 | End: 2022-04-25

## 2022-04-25 RX ORDER — HYDROCODONE BITARTRATE AND ACETAMINOPHEN 5; 325 MG/1; MG/1
1 TABLET ORAL
Status: DISPENSED | OUTPATIENT
Start: 2022-04-25 | End: 2022-04-26

## 2022-04-25 RX ORDER — ONDANSETRON 2 MG/ML
4 INJECTION INTRAMUSCULAR; INTRAVENOUS
Status: DISCONTINUED | OUTPATIENT
Start: 2022-04-25 | End: 2022-04-28 | Stop reason: HOSPADM

## 2022-04-25 RX ORDER — SODIUM CHLORIDE 0.9 % (FLUSH) 0.9 %
5-40 SYRINGE (ML) INJECTION AS NEEDED
Status: DISCONTINUED | OUTPATIENT
Start: 2022-04-25 | End: 2022-04-28 | Stop reason: HOSPADM

## 2022-04-25 RX ORDER — FAMOTIDINE 20 MG/1
20 TABLET, FILM COATED ORAL DAILY
Status: DISCONTINUED | OUTPATIENT
Start: 2022-04-26 | End: 2022-04-28 | Stop reason: HOSPADM

## 2022-04-25 RX ORDER — HEPARIN SODIUM 10000 [USP'U]/100ML
18-36 INJECTION, SOLUTION INTRAVENOUS
Status: DISCONTINUED | OUTPATIENT
Start: 2022-04-25 | End: 2022-04-25

## 2022-04-25 RX ORDER — HEPARIN SODIUM 1000 [USP'U]/ML
80 INJECTION, SOLUTION INTRAVENOUS; SUBCUTANEOUS AS NEEDED
Status: DISCONTINUED | OUTPATIENT
Start: 2022-04-25 | End: 2022-04-25

## 2022-04-25 RX ORDER — IPRATROPIUM BROMIDE AND ALBUTEROL SULFATE 2.5; .5 MG/3ML; MG/3ML
3 SOLUTION RESPIRATORY (INHALATION)
Status: DISCONTINUED | OUTPATIENT
Start: 2022-04-25 | End: 2022-04-28 | Stop reason: HOSPADM

## 2022-04-25 RX ORDER — HYDRALAZINE HYDROCHLORIDE 20 MG/ML
20 INJECTION INTRAMUSCULAR; INTRAVENOUS
Status: DISCONTINUED | OUTPATIENT
Start: 2022-04-25 | End: 2022-04-28 | Stop reason: HOSPADM

## 2022-04-25 RX ORDER — AMLODIPINE BESYLATE 2.5 MG/1
2.5 TABLET ORAL DAILY
Status: DISCONTINUED | OUTPATIENT
Start: 2022-04-25 | End: 2022-04-27

## 2022-04-25 RX ORDER — HYDRALAZINE HYDROCHLORIDE 20 MG/ML
20 INJECTION INTRAMUSCULAR; INTRAVENOUS
Status: DISCONTINUED | OUTPATIENT
Start: 2022-04-25 | End: 2022-04-25

## 2022-04-25 RX ORDER — ENOXAPARIN SODIUM 100 MG/ML
1 INJECTION SUBCUTANEOUS EVERY 12 HOURS
Status: DISCONTINUED | OUTPATIENT
Start: 2022-04-26 | End: 2022-04-28 | Stop reason: ALTCHOICE

## 2022-04-25 RX ORDER — SODIUM CHLORIDE 0.9 % (FLUSH) 0.9 %
5-40 SYRINGE (ML) INJECTION EVERY 8 HOURS
Status: DISCONTINUED | OUTPATIENT
Start: 2022-04-25 | End: 2022-04-28 | Stop reason: HOSPADM

## 2022-04-25 RX ORDER — SERTRALINE HYDROCHLORIDE 50 MG/1
50 TABLET, FILM COATED ORAL DAILY
Status: DISCONTINUED | OUTPATIENT
Start: 2022-04-26 | End: 2022-04-28 | Stop reason: HOSPADM

## 2022-04-25 RX ORDER — HEPARIN SODIUM 1000 [USP'U]/ML
80 INJECTION, SOLUTION INTRAVENOUS; SUBCUTANEOUS ONCE
Status: DISCONTINUED | OUTPATIENT
Start: 2022-04-25 | End: 2022-04-25

## 2022-04-25 RX ORDER — ONDANSETRON 4 MG/1
4 TABLET, ORALLY DISINTEGRATING ORAL
Status: DISCONTINUED | OUTPATIENT
Start: 2022-04-25 | End: 2022-04-28 | Stop reason: HOSPADM

## 2022-04-25 RX ORDER — HEPARIN SODIUM 1000 [USP'U]/ML
40 INJECTION, SOLUTION INTRAVENOUS; SUBCUTANEOUS AS NEEDED
Status: DISCONTINUED | OUTPATIENT
Start: 2022-04-25 | End: 2022-04-25

## 2022-04-25 RX ORDER — FAMOTIDINE 20 MG/1
20 TABLET, FILM COATED ORAL 2 TIMES DAILY
Status: DISCONTINUED | OUTPATIENT
Start: 2022-04-25 | End: 2022-04-25

## 2022-04-25 RX ORDER — CALCIUM CARBONATE 200(500)MG
400 TABLET,CHEWABLE ORAL
Status: DISCONTINUED | OUTPATIENT
Start: 2022-04-25 | End: 2022-04-28 | Stop reason: HOSPADM

## 2022-04-25 RX ORDER — ERGOCALCIFEROL 1.25 MG/1
50000 CAPSULE ORAL
Status: DISCONTINUED | OUTPATIENT
Start: 2022-04-29 | End: 2022-04-28 | Stop reason: HOSPADM

## 2022-04-25 RX ORDER — POLYETHYLENE GLYCOL 3350 17 G/17G
17 POWDER, FOR SOLUTION ORAL DAILY PRN
Status: DISCONTINUED | OUTPATIENT
Start: 2022-04-25 | End: 2022-04-28 | Stop reason: HOSPADM

## 2022-04-25 RX ORDER — ACETAMINOPHEN 650 MG/1
650 SUPPOSITORY RECTAL
Status: DISCONTINUED | OUTPATIENT
Start: 2022-04-25 | End: 2022-04-28 | Stop reason: HOSPADM

## 2022-04-25 RX ADMIN — ENOXAPARIN SODIUM 50 MG: 100 INJECTION SUBCUTANEOUS at 14:24

## 2022-04-25 RX ADMIN — MEMANTINE 10 MG: 10 TABLET ORAL at 22:03

## 2022-04-25 RX ADMIN — AMLODIPINE BESYLATE 2.5 MG: 2.5 TABLET ORAL at 19:04

## 2022-04-25 RX ADMIN — IOPAMIDOL 70 ML: 755 INJECTION, SOLUTION INTRAVENOUS at 12:28

## 2022-04-25 RX ADMIN — ACETAMINOPHEN 325MG 650 MG: 325 TABLET ORAL at 19:04

## 2022-04-25 NOTE — ED PROVIDER NOTES
EMERGENCY DEPARTMENT HISTORY AND PHYSICAL EXAM      Date: 4/25/2022  Patient Name: Mignon Huddleston    History of Presenting Illness     Chief Complaint   Patient presents with    Leg Swelling     Pt arrives via EMS from home d/t R leg sweling and pain w/ difficulty ambulating x 2 days. PMS intact but pt reporting significant pain to R leg and ther eis obvious swelling R > L from hip to foot. Pt also reporting mild SOB w/ intermittent CP x 2 days. Pt has hx of dementia        History Provided By: Patient    HPI: Mignon Huddleston, 80 y.o. female with PMHx as noted below presents the emergency department chief complaint of leg pain. Patient states she has been having pain and swelling in her right leg now for 2 weeks. She describes as a dull, throbbing pain that is worse with movement. Patient states the pain is now moderate to severe when she attempts to move and is made it difficult to perform her ADLs at home. Patient notes symptoms otherwise seem to be constant but only problematic when she attempts to move and bear weight. Pain otherwise is nonradiating. She is denying any fevers, chills or systemic symptoms. She does report some mild dyspnea over the last several days but denies any chest pain. PCP: Augie Andrews MD    Current Facility-Administered Medications   Medication Dose Route Frequency Provider Last Rate Last Admin    acetaminophen (TYLENOL) tablet 650 mg  650 mg Oral Q6H PRN Aditi Shi MD        HYDROcodone-acetaminophen (NORCO) 5-325 mg per tablet 1 Tablet  1 Tablet Oral NOW Aditi Shi MD         Current Outpatient Medications   Medication Sig Dispense Refill    multivitamin, tx-iron-ca-min (THERA-M w/ IRON) 9 mg iron-400 mcg tab tablet Take 1 Tablet by mouth daily. take 1 tablet by mouth daily.  CALCIUM CARBONATE PO Take 750 mg by mouth as needed for PRN Reason (Other) (reflux). chew 2-4 tablets as needed for symptoms of reflux or stomach upset.       sertraline (ZOLOFT) 50 mg tablet Take 1 Tablet by mouth daily. 90 Tablet 3    memantine (NAMENDA) 10 mg tablet TAKE 1 TABLET BY MOUTH TWICE DAILY 60 Tablet 5    doxycycline (VIBRAMYCIN) 100 mg capsule TAKE 1 CAPSULE BY MOUTH EVERY 12 HOURS      ergocalciferol (ERGOCALCIFEROL) 1,250 mcg (50,000 unit) capsule Take 1 Capsule by mouth every seven (7) days. 4 Capsule 11    acetaminophen (TYLENOL) 325 mg tablet Take 2 Tablets by mouth every six (6) hours. 1 Tablet 0    polyethylene glycol (MIRALAX) 17 gram packet Take 1 Packet by mouth daily. 1 Each 0    famotidine (PEPCID) 20 mg tablet Take 1 Tablet by mouth two (2) times a day. 60 Tablet 0    atorvastatin (LIPITOR) 20 mg tablet TAKE 1 TABLET BY MOUTH EVERY DAY 90 Tab 0    amLODIPine (NORVASC) 2.5 mg tablet Take 1 Tab by mouth daily. 90 Tab 5    lisinopriL (PRINIVIL, ZESTRIL) 10 mg tablet Take 1 Tab by mouth daily. 90 Tab 3    albuterol (PROVENTIL HFA, VENTOLIN HFA, PROAIR HFA) 90 mcg/actuation inhaler Take 1 Puff by inhalation as needed for Wheezing. 1 Inhaler 5    MULTIVITAMINS (MULTIVITAMIN PO) Take  by mouth daily. Past History     Past Medical History:  Past Medical History:   Diagnosis Date    Adverse effect of anesthesia     Combative, confused for extended period of time with anesthesia    Anemia NEC     Asthma     Cervical spondylarthritis     Depression     beginning stages of dementia, sundowning    DJD (degenerative joint disease) of knee     Esophageal spasm     Has required esophageal dilatation; Dr. Mercer Fuel GERD (gastroesophageal reflux disease)     Hallucination     Hypercholesterolemia     Hypertension     Lumbar stenosis     Neuropathy, upper extremity     left    Vertigo [id number]    Saw Dr. Luly Diehl; improved.      Vitamin D deficiency 1/28/2011       Past Surgical History:  Past Surgical History:   Procedure Laterality Date    HX CATARACT REMOVAL      bilateral    HX HYSTERECTOMY      HX ORTHOPAEDIC      right bunion excision    HX OTHER SURGICAL      broken R hip    HX TONSILLECTOMY         Family History:  Family History   Problem Relation Age of Onset    Heart Disease Mother     Hypertension Mother     Stroke Mother     Heart Disease Brother     Heart Disease Father     Heart Disease Brother        Social History:  Social History     Tobacco Use    Smoking status: Never Smoker    Smokeless tobacco: Never Used   Vaping Use    Vaping Use: Never used   Substance Use Topics    Alcohol use: No    Drug use: No       Allergies: Allergies   Allergen Reactions    Diclofenac Itching     The voltaren gel caused itching.  Gabapentin Swelling    Hydrochlorothiazide Other (comments)     dizziness    Pcn [Penicillins] Swelling         Review of Systems   Review of Systems  Constitutional: Negative for fever, chills, and fatigue. HENT: Negative for congestion, sore throat, rhinorrhea, sneezing and neck stiffness   Eyes: Negative for discharge and redness. Respiratory: Negative for  shortness of breath, wheezing   Cardiovascular: Negative for chest pain, palpitations   Gastrointestinal: Negative for nausea, vomiting, abdominal pain, constipation, diarrhea and blood in stool. Genitourinary: Negative for dysuria, urgency, frequency, hematuria, flank pain, decreased urine volume, discharge,   Musculoskeletal: Negative for myalgias. Positive leg pain  Skin: Negative for rash or lesions . Neurological: Negative weakness, light-headedness, numbness and headaches. Physical Exam   Physical Exam    GENERAL: alert and oriented, no acute distress  EYES: PEERL, No injection, discharge or icterus. ENT: Mucous membranes pink and moist.  NECK: Supple  LUNGS: Airway patent. Non-labored respirations. Breath sounds clear with good air entry bilaterally. HEART: Regular rate and rhythm. No peripheral edema  ABDOMEN: Non-distended and non-tender, without guarding or rebound.   SKIN:  warm, dry  EXTREMITIES: Right leg is diffusely swollen, mildly erythematous, tender and warm to the touch. Patient does have palpable DP pulses and brisk capillary refill. NEUROLOGICAL: Alert, oriented      Diagnostic Study Results     Labs -     Recent Results (from the past 12 hour(s))   EKG, 12 LEAD, INITIAL    Collection Time: 04/25/22  8:12 AM   Result Value Ref Range    Ventricular Rate 71 BPM    Atrial Rate 71 BPM    P-R Interval 212 ms    QRS Duration 124 ms    Q-T Interval 422 ms    QTC Calculation (Bezet) 458 ms    Calculated P Axis 63 degrees    Calculated R Axis -47 degrees    Calculated T Axis 64 degrees    Diagnosis       Sinus rhythm with sinus arrhythmia with 1st degree AV block  Left axis deviation  Left bundle branch block  Confirmed by Jennifer Thakur ([id number]) on 4/25/2022 11:01:53 AM     CBC WITH AUTOMATED DIFF    Collection Time: 04/25/22  8:24 AM   Result Value Ref Range    WBC 7.1 3.6 - 11.0 K/uL    RBC 4.28 3.80 - 5.20 M/uL    HGB 12.3 11.5 - 16.0 g/dL    HCT 39.6 35.0 - 47.0 %    MCV 92.5 80.0 - 99.0 FL    MCH 28.7 26.0 - 34.0 PG    MCHC 31.1 30.0 - 36.5 g/dL    RDW 12.3 11.5 - 14.5 %    PLATELET 080 099 - 038 K/uL    MPV 11.0 8.9 - 12.9 FL    NRBC 0.0 0  WBC    ABSOLUTE NRBC 0.00 0.00 - 0.01 K/uL    NEUTROPHILS 68 32 - 75 %    LYMPHOCYTES 20 12 - 49 %    MONOCYTES 10 5 - 13 %    EOSINOPHILS 1 0 - 7 %    BASOPHILS 1 0 - 1 %    IMMATURE GRANULOCYTES 0 0.0 - 0.5 %    ABS. NEUTROPHILS 4.8 1.8 - 8.0 K/UL    ABS. LYMPHOCYTES 1.4 0.8 - 3.5 K/UL    ABS. MONOCYTES 0.7 0.0 - 1.0 K/UL    ABS. EOSINOPHILS 0.1 0.0 - 0.4 K/UL    ABS. BASOPHILS 0.1 0.0 - 0.1 K/UL    ABS. IMM.  GRANS. 0.0 0.00 - 0.04 K/UL    DF AUTOMATED     METABOLIC PANEL, COMPREHENSIVE    Collection Time: 04/25/22  8:24 AM   Result Value Ref Range    Sodium 139 136 - 145 mmol/L    Potassium 4.4 3.5 - 5.1 mmol/L    Chloride 104 97 - 108 mmol/L    CO2 31 21 - 32 mmol/L    Anion gap 4 (L) 5 - 15 mmol/L    Glucose 89 65 - 100 mg/dL    BUN 16 6 - 20 MG/DL    Creatinine 0.80 0.55 - 1.02 MG/DL    BUN/Creatinine ratio 20 12 - 20      GFR est AA >60 >60 ml/min/1.73m2    GFR est non-AA >60 >60 ml/min/1.73m2    Calcium 10.0 8.5 - 10.1 MG/DL    Bilirubin, total 0.5 0.2 - 1.0 MG/DL    ALT (SGPT) 26 12 - 78 U/L    AST (SGOT) 19 15 - 37 U/L    Alk. phosphatase 100 45 - 117 U/L    Protein, total 7.2 6.4 - 8.2 g/dL    Albumin 2.6 (L) 3.5 - 5.0 g/dL    Globulin 4.6 (H) 2.0 - 4.0 g/dL    A-G Ratio 0.6 (L) 1.1 - 2.2     SAMPLES BEING HELD    Collection Time: 04/25/22  8:24 AM   Result Value Ref Range    SAMPLES BEING HELD  RED, BLUE, PST     COMMENT        Add-on orders for these samples will be processed based on acceptable specimen integrity and analyte stability, which may vary by analyte. PROCALCITONIN    Collection Time: 04/25/22  8:24 AM   Result Value Ref Range    Procalcitonin <0.05 ng/mL   PTT    Collection Time: 04/25/22 12:59 PM   Result Value Ref Range    aPTT 26.8 22.1 - 31.0 sec    aPTT, therapeutic range     58.0 - 77.0 SECS       Radiologic Studies -   CTA CHEST W OR W WO CONT   Final Result   Minimal bibasilar atelectasis. No evidence of pulmonary embolism. DUPLEX LOWER EXT VENOUS RIGHT   Final Result        CT Results  (Last 48 hours)               04/25/22 1228  CTA CHEST W OR W WO CONT Final result    Impression:  Minimal bibasilar atelectasis. No evidence of pulmonary embolism. Narrative:  EXAM:  CTA CHEST W OR W WO CONT       INDICATION:   dyspnea, extensive DVT       COMPARISON: None. TECHNIQUE:    Precontrast  images were obtained to localize the volume for acquisition. Multislice helical CT arteriography was performed from the diaphragm to the   thoracic inlet during uneventful rapid bolus of 70 cc Isovue-370. Lung and soft   tissue windows were generated. Coronal and sagittal images were generated and   3D post processing consisting of coronal maximum intensity images was performed.      CT dose reduction was achieved through use of a standardized protocol tailored   for this examination and automatic exposure control for dose modulation. FINDINGS:   CHEST:   THYROID: No nodule. MEDIASTINUM: No mass or lymphadenopathy. REMY: No mass or lymphadenopathy. THORACIC AORTA: Atherosclerotic without evidence of aneurysm. MAIN PULMONARY ARTERY: There is no evidence of pulmonary embolism. TRACHEA/BRONCHI: Patent. ESOPHAGUS: No wall thickening or dilatation. HEART: Normal in size. PLEURA: No effusion or pneumothorax. LUNGS: Minimal bibasilar atelectasis. INCIDENTALLY IMAGED UPPER ABDOMEN: Multiple hepatic cysts, the largest of which   measures 5.5 cm. BONES: Degenerative changes are seen in the thoracic spine. CXR Results  (Last 48 hours)    None            Medical Decision Making     IElizabeth MD am the first provider for this patient and am the attending of record for this patient encounter. I reviewed the vital signs, available nursing notes, past medical history, past surgical history, family history and social history. Vital Signs-Reviewed the patient's vital signs. Patient Vitals for the past 12 hrs:   Temp Pulse Resp BP SpO2   04/25/22 1425 98.8 °F (37.1 °C) 64 16     04/25/22 1056  66 17 (!) 156/62 98 %   04/25/22 0957  70 18 (!) 169/63 99 %   04/25/22 0810 99.9 °F (37.7 °C) 72 19 (!) 164/71 96 %         Records Reviewed: Nursing Notes and Old Medical Records    Provider Notes (Medical Decision Making): On presentation, the patient is well appearing, in no acute distress with normal vital signs. Based on my history and exam the differential diagnosis for this patient includes DVT, PE, cellulitis, arterial insufficiency, phlegmasia, compartment syndrome, abscess. Labs were overall reassuring. Duplex noted extensive right femoral vein DVT. She has soft compartments, palpable distal pulses, no concern for compartment syndrome or phlegmasia at this time.   Patient is having significant difficulty with ambulation we will plan to admit for further management. We will plan to discuss care with vascular surgery and start patient on Lovenox. ED Course:   Initial assessment performed. The patients presenting problems have been discussed, and they are in agreement with the care plan formulated and outlined with them. I have encouraged them to ask questions as they arise throughout their visit. Medications   acetaminophen (TYLENOL) tablet 650 mg (has no administration in time range)   HYDROcodone-acetaminophen (NORCO) 5-325 mg per tablet 1 Tablet (has no administration in time range)   iopamidoL (ISOVUE-370) 76 % injection 100 mL (70 mL IntraVENous Given 4/25/22 1228)   enoxaparin (LOVENOX) injection 50 mg (50 mg SubCUTAneous Given 4/25/22 1424)         PROGRESS:  The patient has been re-evaluated and sx have improved. Reviewed available results with patient and have counseled them on diagnosis and care plan. They have expressed understanding, and all their questions have been answered. They agree with plan for admission. CONSULT:  Luca Song MD spoke with the hospitalist.  Discussed HPI and PE, available diagnostic tests and clinical findings. He is in agreement with care plans as outlined and will evaluate for admission     Admit Note  Patient is being admitted to the hospitalist.  The results of their tests and reasons for their admission have been discussed with them and/or available family. They convey agreement and understanding for the need to be admitted and for their admission diagnosis. Consultation has been made with the inpatient physician specialist for hospitalization.     Critical Care Note      IMPENDING DETERIORATION -Cardiovascular  ASSOCIATED RISK FACTORS - Vascular Compromise  MANAGEMENT- Bedside Assessment and Supervision of Care  INTERPRETATION -  CT Scan and Screening Ultrasound  INTERVENTIONS -anticoagulation with Lovenox  CASE REVIEW - Hospitalist/Intensivist  TREATMENT RESPONSE -Stable  PERFORMED BY - Self    NOTES   :  I have provided a total of 35 minutes of critical time not including time spent on separately documented procedures. The reason for providing this level of medical care for this critically ill patient was due to a critical illness that impaired one or more vital organ systems such that there was a high probability of imminent or life threatening deterioration in the patients condition. This care involved high complexity decision making to assess, manipulate, and support vital system functions,  lab review, consultations with specialist, family decision- making, bedside attention and documentation. During this entire length of time I was immediately available to the patient      Disposition:  admission    PLAN:  1. Admit     Diagnosis     Clinical Impression:   1. Deep vein thrombosis (DVT) of proximal lower extremity, unspecified chronicity, unspecified laterality ([facility] [address]        Please note that this dictation was completed with Dragon, computer voice recognition software. Quite often unanticipated grammatical, syntax, homophones, and other interpretive errors are inadvertently transcribed by the computer software. Please disregard these errors. Additionally, please excuse any errors that have escaped final proofreading.

## 2022-04-25 NOTE — H&P
Hospitalist Admission Note    NAME: Claire Bui   :  1928   MRN:  [id number]     Date/Time:  2022 4:00 PM    Patient PCP: Erica Perez MD  ______________________________________________________________________  Given the patient's current clinical presentation, I have a high level of concern for decompensation if discharged from the emergency department. Complex decision making was performed, which includes reviewing the patient's available past medical records, laboratory results, and x-ray films. My assessment of this patient's clinical condition and my plan of care is as follows. Assessment / Plan:    Extensive right lower extremity DVT  -Presents with right leg swelling and pain, difficulty ambulating.   -Likely due to inactivity   -Doppler US: Thrombus Right common femoral vein, great saphenous thigh veing, right proximal mid distal femoral vein  -S/p 1 mg/kg Lovenox in eD  -Vascular consulted  -Admit to medical floor  -Continue Lovenox started in the ED  -We will switch to NOAC if no intervention planned per vascular  -Elevate leg  -PT OT    Hypertension   Hyperlipidemia  -resume home amlodipine and lisinopril. Prn hydralazine   -Resume statin    Asthma   -As needed DuoNeb    DJD   -As needed Tylenol    Depression  -Continue Zoloft    Code Status: Full code/has advanced directive  Surrogate Decision Maker: Her daughter Yana Almanza    DVT Prophylaxis: Lovenox  GI Prophylaxis: not indicated    Baseline: Lives with her son, uses wheelchair      Subjective:   CHIEF COMPLAINT: Right leg swelling and pain    HISTORY OF PRESENT ILLNESS:     Mireya Odell is a 80 y.o.  female with past medical history of hypertension, hyperlipidemia, GERD, asthma, and others as listed below who presents with right leg pain and swelling swelling and pain. Per family swelling was first noted about 3 to 4 days ago.    they noted right foot swelling which progressively increased to involve the whole right leg at which time they decided to bring her to the hospital.  Patient lives with her son. She is n spent most of her day in bed. She uses wheelchair. Family make sure at least that she gets out of bed once a day. Denies prior history of DVT/PE. Denies recent surgery. Her daughter has history of DVT which was  felt to be related to birth control pills. Patient otherwise denies chest pain, palpitation. Has some shortness of breath which is chronic. We were asked to admit for work up and evaluation of the above problems. Past Medical History:   Diagnosis Date    Adverse effect of anesthesia     Combative, confused for extended period of time with anesthesia    Anemia NEC     Asthma     Cervical spondylarthritis     Depression     beginning stages of dementia, sundowning    DJD (degenerative joint disease) of knee     Esophageal spasm     Has required esophageal dilatation; Dr. Shobha Katz GERD (gastroesophageal reflux disease)     Hallucination     Hypercholesterolemia     Hypertension     Lumbar stenosis     Neuropathy, upper extremity     left    Vertigo [id number]    Saw Dr. Rafa Jacome; improved.  Vitamin D deficiency 1/28/2011        Past Surgical History:   Procedure Laterality Date    HX CATARACT REMOVAL      bilateral    HX HYSTERECTOMY      HX ORTHOPAEDIC      right bunion excision    HX OTHER SURGICAL      broken R hip    HX TONSILLECTOMY         Social History     Tobacco Use    Smoking status: Never Smoker    Smokeless tobacco: Never Used   Substance Use Topics    Alcohol use: No        Family History   Problem Relation Age of Onset    Heart Disease Mother     Hypertension Mother     Stroke Mother     Heart Disease Brother     Heart Disease Father     Heart Disease Brother      Allergies   Allergen Reactions    Diclofenac Itching     The voltaren gel caused itching.      Gabapentin Swelling    Hydrochlorothiazide Other (comments)     dizziness    Pcn [Penicillins] Swelling        Prior to Admission medications    Medication Sig Start Date End Date Taking? Authorizing Provider   multivitamin, tx-iron-ca-min (THERA-M w/ IRON) 9 mg iron-400 mcg tab tablet Take 1 Tablet by mouth daily. take 1 tablet by mouth daily. Provider, Historical   CALCIUM CARBONATE PO Take 750 mg by mouth as needed for PRN Reason (Other) (reflux). chew 2-4 tablets as needed for symptoms of reflux or stomach upset. Provider, Historical   sertraline (ZOLOFT) 50 mg tablet Take 1 Tablet by mouth daily. 4/11/22   Jah Levine MD   memantine (NAMENDA) 10 mg tablet TAKE 1 TABLET BY MOUTH TWICE DAILY 3/8/22   Jah Levine MD   doxycycline (VIBRAMYCIN) 100 mg capsule TAKE 1 CAPSULE BY MOUTH EVERY 12 HOURS 11/24/21   Provider, Historical   ergocalciferol (ERGOCALCIFEROL) 1,250 mcg (50,000 unit) capsule Take 1 Capsule by mouth every seven (7) days. 12/6/21   Jah Levine MD   acetaminophen (TYLENOL) 325 mg tablet Take 2 Tablets by mouth every six (6) hours. 6/3/21   Jah Levine MD   polyethylene glycol [facility]) 17 gram packet Take 1 Packet by mouth daily. 6/3/21   Jah Levine MD   famotidine (PEPCID) 20 mg tablet Take 1 Tablet by mouth two (2) times a day. 6/3/21   Adal Bradshaw PA-C   atorvastatin (LIPITOR) 20 mg tablet TAKE 1 TABLET BY MOUTH EVERY DAY 5/14/21   Jah Levine MD   amLODIPine (NORVASC) 2.5 mg tablet Take 1 Tab by mouth daily. 5/11/21   Jah Levine MD   lisinopriL (PRINIVIL, ZESTRIL) 10 mg tablet Take 1 Tab by mouth daily. 5/11/21   Jah Levine MD   albuterol (PROVENTIL HFA, VENTOLIN HFA, PROAIR HFA) 90 mcg/actuation inhaler Take 1 Puff by inhalation as needed for Wheezing. 9/10/18   Jah Levine MD   MULTIVITAMINS (MULTIVITAMIN PO) Take  by mouth daily. Provider, Historical       REVIEW OF SYSTEMS:     I am not able to complete the review of systems because:    The patient is intubated and sedated    The patient has altered mental status due to his acute medical problems    The patient has baseline aphasia from prior stroke(s)    The patient has baseline dementia and is not reliable historian    The patient is in acute medical distress and unable to provide information           Total of 12 systems reviewed as follows:       POSITIVE= underlined text  Negative = text not underlined  General:  fever, chills, sweats, generalized weakness, weight loss/gain,      loss of appetite   Eyes:    blurred vision, eye pain, loss of vision, double vision  ENT:    rhinorrhea, pharyngitis   Respiratory:   cough, sputum production, SOB, [place], wheezing, pleuritic pain   Cardiology:   chest pain, palpitations, orthopnea, PND, edema, syncope   Gastrointestinal:  abdominal pain , N/V, diarrhea, dysphagia, constipation, bleeding   Genitourinary:  frequency, urgency, dysuria, hematuria, incontinence   Muskuloskeletal :  arthralgia, myalgia, back pain, leg pain and swelling, right  Hematology:  easy bruising, nose or gum bleeding, lymphadenopathy   Dermatological: rash, ulceration, pruritis, color change / jaundice  Endocrine:   hot flashes or polydipsia   Neurological:  headache, dizziness, confusion, focal weakness, paresthesia,     Speech difficulties, memory loss, gait difficulty  Psychological: Feelings of anxiety, depression, agitation    Objective:   VITALS:    Visit Vitals  BP (!) 156/62   Pulse 64   Temp 98.8 °F (37.1 °C)   Resp 16   Ht 5' (1.524 m)   Wt 50.7 kg (111 lb 12.4 oz)   SpO2 98%   BMI 21.83 kg/m²       PHYSICAL EXAM:    General:    Alert, cooperative, no distress, appears stated age. HEENT: Atraumatic, anicteric sclerae, pink conjunctivae     No oral ulcers, mucosa moist, throat clear, dentition fair  Neck:  Supple, symmetrical,  thyroid: non tender  Lungs:   Clear to auscultation bilaterally. No Wheezing or Rhonchi. No rales. Chest wall:  No tenderness  No Accessory muscle use.   Heart:   Regular  rhythm,  No  murmur    Abdomen:   Soft, non-tender. Not distended. Bowel sounds normal  Extremities: Diffuse swelling of entire right leg, no cyanosis. No clubbing,      Skin turgor normal, Capillary refill normal, Radial dial pulse 2+  Skin:     Not pale. Not Jaundiced  No rashes   Psych:  Good insight. Not depressed. Not anxious or agitated. Neurologic: EOMs intact. No facial asymmetry. No aphasia or slurred speech. Symmetrical strength, Sensation grossly intact. Alert and oriented X 3.     _______________________________________________________________________  Care Plan discussed with:    Comments   Patient x    Family  x dtrs   RN     Care Manager                    Consultant:      _______________________________________________________________________  Expected  Disposition:   Home with Family    HH/PT/OT/RN x   SNF/LTC    DALJIT    ________________________________________________________________________  TOTAL TIME:  54 Minutes    Critical Care Provided     Minutes non procedure based      Comments    x Reviewed previous records   >50% of visit spent in counseling and coordination of care x Discussion with patient and/or family and questions answered       ________________________________________________________________________  Signed: Minda Dodd MD    Procedures: see electronic medical records for all procedures/Xrays and details which were not copied into this note but were reviewed prior to creation of Plan.     LAB DATA REVIEWED:    Recent Results (from the past 24 hour(s))   EKG, 12 LEAD, INITIAL    Collection Time: 04/25/22  8:12 AM   Result Value Ref Range    Ventricular Rate 71 BPM    Atrial Rate 71 BPM    P-R Interval 212 ms    QRS Duration 124 ms    Q-T Interval 422 ms    QTC Calculation (Bezet) 458 ms    Calculated P Axis 63 degrees    Calculated R Axis -47 degrees    Calculated T Axis 64 degrees    Diagnosis       Sinus rhythm with sinus arrhythmia with 1st degree AV block  Left axis deviation  Left bundle branch block  Confirmed by Ifeanyi Pike ([id number]) on 4/25/2022 11:01:53 AM     CBC WITH AUTOMATED DIFF    Collection Time: 04/25/22  8:24 AM   Result Value Ref Range    WBC 7.1 3.6 - 11.0 K/uL    RBC 4.28 3.80 - 5.20 M/uL    HGB 12.3 11.5 - 16.0 g/dL    HCT 39.6 35.0 - 47.0 %    MCV 92.5 80.0 - 99.0 FL    MCH 28.7 26.0 - 34.0 PG    MCHC 31.1 30.0 - 36.5 g/dL    RDW 12.3 11.5 - 14.5 %    PLATELET 534 663 - 656 K/uL    MPV 11.0 8.9 - 12.9 FL    NRBC 0.0 0  WBC    ABSOLUTE NRBC 0.00 0.00 - 0.01 K/uL    NEUTROPHILS 68 32 - 75 %    LYMPHOCYTES 20 12 - 49 %    MONOCYTES 10 5 - 13 %    EOSINOPHILS 1 0 - 7 %    BASOPHILS 1 0 - 1 %    IMMATURE GRANULOCYTES 0 0.0 - 0.5 %    ABS. NEUTROPHILS 4.8 1.8 - 8.0 K/UL    ABS. LYMPHOCYTES 1.4 0.8 - 3.5 K/UL    ABS. MONOCYTES 0.7 0.0 - 1.0 K/UL    ABS. EOSINOPHILS 0.1 0.0 - 0.4 K/UL    ABS. BASOPHILS 0.1 0.0 - 0.1 K/UL    ABS. IMM. GRANS. 0.0 0.00 - 0.04 K/UL    DF AUTOMATED     METABOLIC PANEL, COMPREHENSIVE    Collection Time: 04/25/22  8:24 AM   Result Value Ref Range    Sodium 139 136 - 145 mmol/L    Potassium 4.4 3.5 - 5.1 mmol/L    Chloride 104 97 - 108 mmol/L    CO2 31 21 - 32 mmol/L    Anion gap 4 (L) 5 - 15 mmol/L    Glucose 89 65 - 100 mg/dL    BUN 16 6 - 20 MG/DL    Creatinine 0.80 0.55 - 1.02 MG/DL    BUN/Creatinine ratio 20 12 - 20      GFR est AA >60 >60 ml/min/1.73m2    GFR est non-AA >60 >60 ml/min/1.73m2    Calcium 10.0 8.5 - 10.1 MG/DL    Bilirubin, total 0.5 0.2 - 1.0 MG/DL    ALT (SGPT) 26 12 - 78 U/L    AST (SGOT) 19 15 - 37 U/L    Alk.  phosphatase 100 45 - 117 U/L    Protein, total 7.2 6.4 - 8.2 g/dL    Albumin 2.6 (L) 3.5 - 5.0 g/dL    Globulin 4.6 (H) 2.0 - 4.0 g/dL    A-G Ratio 0.6 (L) 1.1 - 2.2     SAMPLES BEING HELD    Collection Time: 04/25/22  8:24 AM   Result Value Ref Range    SAMPLES BEING HELD  RED, BLUE, PST     COMMENT        Add-on orders for these samples will be processed based on acceptable specimen integrity and analyte stability, which may vary by analyte.    PROCALCITONIN    Collection Time: 04/25/22  8:24 AM   Result Value Ref Range    Procalcitonin <0.05 ng/mL   PTT    Collection Time: 04/25/22 12:59 PM   Result Value Ref Range    aPTT 26.8 22.1 - 31.0 sec    aPTT, therapeutic range     58.0 - 77.0 SECS

## 2022-04-25 NOTE — ED NOTES
Pt arrives via EMS from home d/t R leg sweling and pain w/ difficulty ambulating x 2 days. PMS intact but pt reporting significant pain to R leg and ther eis obvious swelling R > L from hip to foot. Pt also reporting mild SOB w/ intermittent CP x 2 days. Pt has hx of dementia    0930 MD notified that pt is requesting pain meds, no orders received at this time     1315 Pt at rest in [address] care performed and pt placed on PureWick. Ace bandage applied to entire R leg, leg elevated per orders.  Pt family at bedside     36 Hospitalist at bedside

## 2022-04-25 NOTE — CONSULTS
Vascular Surgery Consult Note  4/25/2022    Subjective:     Omi Cervantes is a 80 y.o. female with a pmhx significant for dementia,  HTN, HLD, anemia, and GERD. She presents to the hospital today with complaint of extensive RLE swelling w/ inability to ambulate. Venous duplex reveals a right leg ileofemoral DVT. We have been asked to evaluate. Past medical history  Dementia   Hypertension  Hyperlipidemia   Anemia   Vitamin D deficiency   GERD  Asthma  DJD of the spine    Past procedural history   Esophageal dilation    Removal of bilateral cataracts  Hysterectomy   Right bunionectomy   Right hip ORIF  Tonsillectomy      Family history  Coronary artery disease: mother, father, & brothers x2. Hypertension: mother   Stroke: mother     Social history  Non-smoker  Denies alcohol use      Home medication     multivitamin, tx-iron-ca-min (THERA-M w/ IRON) 9 mg iron-400 mcg tab tablet Take 1 Tablet by mouth daily. take 1 tablet by mouth daily. CALCIUM CARBONATE PO Take 750 mg by mouth as needed for PRN Reason (Other) (reflux). chew 2-4 tablets as needed for symptoms of reflux or stomach upset. sertraline (ZOLOFT) 50 mg tablet Take 1 Tablet by mouth daily. memantine (NAMENDA) 10 mg tablet TAKE 1 TABLET BY MOUTH TWICE DAILY   doxycycline (VIBRAMYCIN) 100 mg capsule TAKE 1 CAPSULE BY MOUTH EVERY 12 HOURS   ergocalciferol (ERGOCALCIFEROL) 1,250 mcg (50,000 unit) capsule Take 1 Capsule by mouth every seven (7) days. acetaminophen (TYLENOL) 325 mg tablet Take 2 Tablets by mouth every six (6) hours. polyethylene glycol (MIRALAX) 17 gram packet Take 1 Packet by mouth daily. famotidine (PEPCID) 20 mg tablet Take 1 Tablet by mouth two (2) times a day. atorvastatin (LIPITOR) 20 mg tablet TAKE 1 TABLET BY MOUTH EVERY DAY   amLODIPine (NORVASC) 2.5 mg tablet Take 1 Tab by mouth daily. lisinopriL (PRINIVIL, ZESTRIL) 10 mg tablet Take 1 Tab by mouth daily.    albuterol (PROVENTIL HFA, VENTOLIN HFA, PROAIR HFA) 90 mcg/actuation inhaler Take 1 Puff by inhalation as needed for Wheezing. Allergies   Diclofenac gel: itching  Gabapentin: swelling  HCTZ: dizziness  PCN: swelling     Review of Systems   Unable to perform ROS: Dementia     Objective:     Patient Vitals for the past 24 hrs:   BP Temp Pulse Resp SpO2 Height Weight   04/25/22 1056 (!) 156/62  66 17 98 %     04/25/22 0957 (!) 169/63  70 18 99 %     04/25/22 0810 (!) 164/71 99.9 °F (37.7 °C) 72 19 96 % 5' (1.524 m) 50.7 kg (111 lb 12.4 oz)     Physical Exam  HENT:      Head: Normocephalic. Nose: Nose normal.      Mouth/Throat:      Mouth: Mucous membranes are moist.   Eyes:      Pupils: Pupils are equal, round, and reactive to light. Cardiovascular:      Rate and Rhythm: Normal rate and regular rhythm. Pulmonary:      Effort: Pulmonary effort is normal. No respiratory distress. Abdominal:      General: Abdomen is flat. There is no distension. Musculoskeletal:         General: Normal range of motion. Cervical back: Normal range of motion. Right lower leg: Edema present. Skin:     General: Skin is warm. Neurological:      Mental Status: She is alert. Mental status is at baseline.    Psychiatric:         Mood and Affect: Mood normal.         Behavior: Behavior normal.         Pertinent Test Results:   Recent Results (from the past 24 hour(s))   EKG, 12 LEAD, INITIAL    Collection Time: 04/25/22  8:12 AM   Result Value Ref Range    Ventricular Rate 71 BPM    Atrial Rate 71 BPM    P-R Interval 212 ms    QRS Duration 124 ms    Q-T Interval 422 ms    QTC Calculation (Bezet) 458 ms    Calculated P Axis 63 degrees    Calculated R Axis -47 degrees    Calculated T Axis 64 degrees    Diagnosis       Sinus rhythm with sinus arrhythmia with 1st degree AV block  Left axis deviation  Left bundle branch block  Confirmed by Tamika Robles ([id number]) on 4/25/2022 11:01:53 AM     CBC WITH AUTOMATED DIFF    Collection Time: 04/25/22  8:24 AM   Result Value Ref Range    WBC 7.1 3.6 - 11.0 K/uL    RBC 4.28 3.80 - 5.20 M/uL    HGB 12.3 11.5 - 16.0 g/dL    HCT 39.6 35.0 - 47.0 %    MCV 92.5 80.0 - 99.0 FL    MCH 28.7 26.0 - 34.0 PG    MCHC 31.1 30.0 - 36.5 g/dL    RDW 12.3 11.5 - 14.5 %    PLATELET 650 265 - 840 K/uL    MPV 11.0 8.9 - 12.9 FL    NRBC 0.0 0  WBC    ABSOLUTE NRBC 0.00 0.00 - 0.01 K/uL    NEUTROPHILS 68 32 - 75 %    LYMPHOCYTES 20 12 - 49 %    MONOCYTES 10 5 - 13 %    EOSINOPHILS 1 0 - 7 %    BASOPHILS 1 0 - 1 %    IMMATURE GRANULOCYTES 0 0.0 - 0.5 %    ABS. NEUTROPHILS 4.8 1.8 - 8.0 K/UL    ABS. LYMPHOCYTES 1.4 0.8 - 3.5 K/UL    ABS. MONOCYTES 0.7 0.0 - 1.0 K/UL    ABS. EOSINOPHILS 0.1 0.0 - 0.4 K/UL    ABS. BASOPHILS 0.1 0.0 - 0.1 K/UL    ABS. IMM. GRANS. 0.0 0.00 - 0.04 K/UL    DF AUTOMATED     METABOLIC PANEL, COMPREHENSIVE    Collection Time: 04/25/22  8:24 AM   Result Value Ref Range    Sodium 139 136 - 145 mmol/L    Potassium 4.4 3.5 - 5.1 mmol/L    Chloride 104 97 - 108 mmol/L    CO2 31 21 - 32 mmol/L    Anion gap 4 (L) 5 - 15 mmol/L    Glucose 89 65 - 100 mg/dL    BUN 16 6 - 20 MG/DL    Creatinine 0.80 0.55 - 1.02 MG/DL    BUN/Creatinine ratio 20 12 - 20      GFR est AA >60 >60 ml/min/1.73m2    GFR est non-AA >60 >60 ml/min/1.73m2    Calcium 10.0 8.5 - 10.1 MG/DL    Bilirubin, total 0.5 0.2 - 1.0 MG/DL    ALT (SGPT) 26 12 - 78 U/L    AST (SGOT) 19 15 - 37 U/L    Alk. phosphatase 100 45 - 117 U/L    Protein, total 7.2 6.4 - 8.2 g/dL    Albumin 2.6 (L) 3.5 - 5.0 g/dL    Globulin 4.6 (H) 2.0 - 4.0 g/dL    A-G Ratio 0.6 (L) 1.1 - 2.2     SAMPLES BEING HELD    Collection Time: 04/25/22  8:24 AM   Result Value Ref Range    SAMPLES BEING HELD  RED, BLUE, PST     COMMENT        Add-on orders for these samples will be processed based on acceptable specimen integrity and analyte stability, which may vary by analyte.    PROCALCITONIN    Collection Time: 04/25/22  8:24 AM   Result Value Ref Range    Procalcitonin <0.05 ng/mL       Assessmen/Plan: Ileofemoral DVT of the right lower extremity  · Anticoagulation, elevation, and compression. We will re-evaluate in the am for procedural intervention.      Hypertension  Hyperlipidemia  Anemia  -H&H currently normal   Dementia     · Management of comorbid conditions by primary team.    VTE Prophylaxis:  LMWH tx dose    Disposition:  TBD     Signed By: Ольга Rojo NP     April 25, 2022

## 2022-04-26 ENCOUNTER — HOME CARE VISIT (OUTPATIENT)
Dept: HOME HEALTH SERVICES | Facility: HOME HEALTH | Age: 87
End: 2022-04-26
Payer: MEDICARE

## 2022-04-26 LAB
ANION GAP SERPL CALC-SCNC: 3 MMOL/L (ref 5–15)
BASOPHILS # BLD: 0 K/UL (ref 0–0.1)
BASOPHILS NFR BLD: 1 % (ref 0–1)
BUN SERPL-MCNC: 16 MG/DL (ref 6–20)
BUN/CREAT SERPL: 26 (ref 12–20)
CALCIUM SERPL-MCNC: 9.6 MG/DL (ref 8.5–10.1)
CHLORIDE SERPL-SCNC: 107 MMOL/L (ref 97–108)
CO2 SERPL-SCNC: 27 MMOL/L (ref 21–32)
CREAT SERPL-MCNC: 0.62 MG/DL (ref 0.55–1.02)
DIFFERENTIAL METHOD BLD: ABNORMAL
EOSINOPHIL # BLD: 0.1 K/UL (ref 0–0.4)
EOSINOPHIL NFR BLD: 2 % (ref 0–7)
ERYTHROCYTE [DISTWIDTH] IN BLOOD BY AUTOMATED COUNT: 12 % (ref 11.5–14.5)
GLUCOSE SERPL-MCNC: 78 MG/DL (ref 65–100)
HCT VFR BLD AUTO: 37.1 % (ref 35–47)
HGB BLD-MCNC: 11.4 G/DL (ref 11.5–16)
IMM GRANULOCYTES # BLD AUTO: 0 K/UL (ref 0–0.04)
IMM GRANULOCYTES NFR BLD AUTO: 0 % (ref 0–0.5)
LYMPHOCYTES # BLD: 1.4 K/UL (ref 0.8–3.5)
LYMPHOCYTES NFR BLD: 19 % (ref 12–49)
MCH RBC QN AUTO: 28.4 PG (ref 26–34)
MCHC RBC AUTO-ENTMCNC: 30.7 G/DL (ref 30–36.5)
MCV RBC AUTO: 92.5 FL (ref 80–99)
MONOCYTES # BLD: 0.9 K/UL (ref 0–1)
MONOCYTES NFR BLD: 12 % (ref 5–13)
NEUTS SEG # BLD: 5 K/UL (ref 1.8–8)
NEUTS SEG NFR BLD: 66 % (ref 32–75)
NRBC # BLD: 0 K/UL (ref 0–0.01)
NRBC BLD-RTO: 0 PER 100 WBC
PLATELET # BLD AUTO: 254 K/UL (ref 150–400)
PMV BLD AUTO: 10.8 FL (ref 8.9–12.9)
POTASSIUM SERPL-SCNC: 4 MMOL/L (ref 3.5–5.1)
RBC # BLD AUTO: 4.01 M/UL (ref 3.8–5.2)
SODIUM SERPL-SCNC: 137 MMOL/L (ref 136–145)
WBC # BLD AUTO: 7.4 K/UL (ref 3.6–11)

## 2022-04-26 PROCEDURE — 85025 COMPLETE CBC W/AUTO DIFF WBC: CPT

## 2022-04-26 PROCEDURE — 3331090001 HH PPS REVENUE CREDIT

## 2022-04-26 PROCEDURE — 97530 THERAPEUTIC ACTIVITIES: CPT

## 2022-04-26 PROCEDURE — 74011250637 HC RX REV CODE- 250/637: Performed by: STUDENT IN AN ORGANIZED HEALTH CARE EDUCATION/TRAINING PROGRAM

## 2022-04-26 PROCEDURE — 74011250636 HC RX REV CODE- 250/636: Performed by: STUDENT IN AN ORGANIZED HEALTH CARE EDUCATION/TRAINING PROGRAM

## 2022-04-26 PROCEDURE — 74011250637 HC RX REV CODE- 250/637: Performed by: INTERNAL MEDICINE

## 2022-04-26 PROCEDURE — 36415 COLL VENOUS BLD VENIPUNCTURE: CPT

## 2022-04-26 PROCEDURE — 97166 OT EVAL MOD COMPLEX 45 MIN: CPT

## 2022-04-26 PROCEDURE — 97161 PT EVAL LOW COMPLEX 20 MIN: CPT

## 2022-04-26 PROCEDURE — 65660000001 HC RM ICU INTERMED STEPDOWN

## 2022-04-26 PROCEDURE — 80048 BASIC METABOLIC PNL TOTAL CA: CPT

## 2022-04-26 PROCEDURE — 74011000250 HC RX REV CODE- 250: Performed by: STUDENT IN AN ORGANIZED HEALTH CARE EDUCATION/TRAINING PROGRAM

## 2022-04-26 PROCEDURE — 3331090002 HH PPS REVENUE DEBIT

## 2022-04-26 RX ADMIN — AMLODIPINE BESYLATE 2.5 MG: 2.5 TABLET ORAL at 09:33

## 2022-04-26 RX ADMIN — Medication 1 TABLET: at 09:33

## 2022-04-26 RX ADMIN — SODIUM CHLORIDE, PRESERVATIVE FREE 10 ML: 5 INJECTION INTRAVENOUS at 16:34

## 2022-04-26 RX ADMIN — FAMOTIDINE 20 MG: 20 TABLET ORAL at 09:33

## 2022-04-26 RX ADMIN — LISINOPRIL 10 MG: 10 TABLET ORAL at 09:33

## 2022-04-26 RX ADMIN — SODIUM CHLORIDE, PRESERVATIVE FREE 10 ML: 5 INJECTION INTRAVENOUS at 05:48

## 2022-04-26 RX ADMIN — ENOXAPARIN SODIUM 50 MG: 100 INJECTION SUBCUTANEOUS at 04:55

## 2022-04-26 RX ADMIN — ATORVASTATIN CALCIUM 20 MG: 20 TABLET, FILM COATED ORAL at 09:33

## 2022-04-26 RX ADMIN — ACETAMINOPHEN 325MG 650 MG: 325 TABLET ORAL at 17:01

## 2022-04-26 RX ADMIN — MEMANTINE 10 MG: 10 TABLET ORAL at 09:33

## 2022-04-26 RX ADMIN — ACETAMINOPHEN 325MG 650 MG: 325 TABLET ORAL at 11:30

## 2022-04-26 RX ADMIN — ACETAMINOPHEN 325MG 650 MG: 325 TABLET ORAL at 06:00

## 2022-04-26 RX ADMIN — ENOXAPARIN SODIUM 50 MG: 100 INJECTION SUBCUTANEOUS at 16:33

## 2022-04-26 RX ADMIN — MEMANTINE 10 MG: 10 TABLET ORAL at 17:01

## 2022-04-26 RX ADMIN — SERTRALINE 50 MG: 50 TABLET, FILM COATED ORAL at 09:33

## 2022-04-26 RX ADMIN — SODIUM CHLORIDE, PRESERVATIVE FREE 10 ML: 5 INJECTION INTRAVENOUS at 20:32

## 2022-04-26 NOTE — PROGRESS NOTES
Problem: Mobility Impaired (Adult and Pediatric)  Goal: *Acute Goals and Plan of Care (Insert Text)  Description: FUNCTIONAL STATUS PRIOR TO ADMISSION: Pt reports assisted household amb using RW. Per chart, family reports pt spending a lot of time in bed with family ensuring that she gets up at least once a day. Assist required with ADLs. HOME SUPPORT PRIOR TO ADMISSION: The patient lived with son who provides assistance. Physical Therapy Goals  Initiated 4/26/2022  1. Patient will move from supine to sit and sit to supine  in bed with minimal assistance/contact guard assist within 7 day(s). 2.  Patient will transfer from bed to chair and chair to bed with moderate assistance  using the least restrictive device within 7 day(s). 3.  Patient will perform sit to stand with minimal assistance/contact guard assist within 7 day(s). 4.  Patient will ambulate with moderate assistance  for at least 5 feet with the least restrictive device within 7 day(s). Outcome: Not Met   PHYSICAL THERAPY EVALUATION  Patient: Ami Chavira (84 y.o. female)  Date: 4/26/2022  Primary Diagnosis: DVT (deep venous thrombosis) ([facility]) [I82.409]        Precautions: fall       ASSESSMENT  Based on the objective data described below, the patient presents with edema and decreased strength/ ROM R LE, impaired balance, general debility, gait dysfunction, increased fall risk following admission for R LE pain/ swelling and decreased mobility status. Pt found to have ileofemoral DVT R LE; receiving treatment dose lovenox. Pt received in supine with R LE elevated. A+O x 1, pleasant and cooperative. Pt able to complete bed mob with mod A of 1. Reported mild dizziness with position change, BP stable. Pt transferred sit to stand to RW with min/ mod A x 2 for lift/balance, cues for upright posture. Noted moderate post lean in standing and unable to attempt sidestep or pivot to chair this date. Pt remains below functional baseline.  Will benefit from con't PT for mobility progression as tolerated to prevent further functional decline in prep for return home with family assist and  PT.    Current Level of Function Impacting Discharge (mobility/balance): bed mob mod A, sit to stand min/ mod A x 2    Other factors to consider for discharge: good family support, required assist at baseline     Patient will benefit from skilled therapy intervention to address the above noted impairments. PLAN :  Recommendations and Planned Interventions: bed mobility training, transfer training, gait training, therapeutic exercises, edema management/control, patient and family training/education, and therapeutic activities      Frequency/Duration: Patient will be followed by physical therapy:  4 times a week to address goals. Recommendation for discharge: (in order for the patient to meet his/her long term goals)  Physical therapy at least 2 days/week in the home AND ensure assist and/or supervision for safety with moblity    This discharge recommendation:  Has not yet been discussed the attending provider and/or case management    IF patient discharges home will need the following DME: patient owns DME required for discharge         SUBJECTIVE:   Patient stated I use my walker re baseline LOF    OBJECTIVE DATA SUMMARY:   HISTORY:    Past Medical History:   Diagnosis Date    Adverse effect of anesthesia     Combative, confused for extended period of time with anesthesia    Anemia NEC     Asthma     Cervical spondylarthritis     Depression     beginning stages of dementia, sundowning    DJD (degenerative joint disease) of knee     Esophageal spasm     Has required esophageal dilatation; Dr. Megha Alberts    GERD (gastroesophageal reflux disease)     Hallucination     Hypercholesterolemia     Hypertension     Lumbar stenosis     Neuropathy, upper extremity     left    Vertigo [id number]    Saw Dr. Lucia Kraft; improved.      Vitamin D deficiency 1/28/2011     Past Surgical History:   Procedure Laterality Date    HX CATARACT REMOVAL      bilateral    HX HYSTERECTOMY      HX ORTHOPAEDIC      right bunion excision    HX OTHER SURGICAL      broken R hip    HX TONSILLECTOMY         Personal factors and/or comorbidities impacting plan of care: good family support    Home Situation  Home Environment: Private residence  One/Two Story Residence: One story  Living Alone: No  Support Systems: Child(tanmay) (son)  Patient Expects to be Discharged to[de-identified] Home  Current DME Used/Available at Home: Bevely Merchant, rolling,Wheelchair,Shower chair,Grab bars  Tub or Shower Type: Shower    EXAMINATION/PRESENTATION/DECISION MAKING:   Critical Behavior:  Neurologic State: Alert  Orientation Level: Oriented to person,Disoriented to time,Disoriented to place  Cognition: Decreased attention/concentration,Decreased command following     Hearing: Auditory  Auditory Impairment: None  Skin:  ace wrap in place R LE  Edema: present R LE  Range Of Motion:  AROM:  (decreased R LE 2* edema and wrap)                       Strength:    Strength:  (decreased R LE)                    Tone & Sensation:   Tone: Normal              Sensation: Impaired (light touch diminished R toes)               Coordination:  Coordination:  (decreased R LE)  Vision:      Functional Mobility:  Bed Mobility:     Supine to Sit: Moderate assistance;Bed Modified (assist with R LE, light assist at trunk)  Sit to Supine: Minimum assistance (assist R LE, CGA at trunk)  Scooting: Minimum assistance (for scooting toward middle of bed)  Transfers:  Sit to Stand: Minimum assistance; Moderate assistance;Assist x2  Stand to Sit: Minimum assistance;Assist x2                       Balance:   Sitting: Impaired  Sitting - Static: Good (unsupported)  Sitting - Dynamic: Fair (occasional)  Standing: Impaired; With support (RW)  Standing - Static: Poor;Constant support ( moderate post lean)  Standing - Dynamic : Not tested     Physical Therapy Evaluation Charge Determination History Examination Presentation Decision-Making   MEDIUM  Complexity : 1-2 comorbidities / personal factors will impact the outcome/ POC  MEDIUM Complexity : 3 Standardized tests and measures addressing body structure, function, activity limitation and / or participation in recreation  LOW Complexity : Stable, uncomplicated  LOW Complexity : FOTO score of       Based on the above components, the patient evaluation is determined to be of the following complexity level: LOW     Pain Rating:  No c/o pain    Activity Tolerance:   Fair    After treatment patient left in no apparent distress:   Supine in bed, Heels elevated for pressure relief, Call bell within reach, Bed / chair alarm activated, and Side rails x 3    COMMUNICATION/EDUCATION:   The patients plan of care was discussed with: Registered nurse. Fall prevention education was provided and the patient/caregiver indicated understanding., Patient/family have participated as able in goal setting and plan of care. , and Patient/family agree to work toward stated goals and plan of care.     Thank you for this referral.  Claude Hobby, PT   Time Calculation: 17 mins

## 2022-04-26 NOTE — PROGRESS NOTES
TRANSFER - IN REPORT:    Verbal report received from Ray KITCHEN  on Rich Roca  being received from ED  for routine progression of care      Report consisted of patients Situation, Background, Assessment and   Recommendations(SBAR). Information from the following report(s) SBAR, Kardex and Recent Results was reviewed with the receiving nurse. Opportunity for questions and clarification was provided. Assessment completed upon patients arrival to unit and care assumed.

## 2022-04-26 NOTE — PROGRESS NOTES
Vascular Surgery Progress Note  Shirley Payan ACNP-BC  4/26/2022       Subjective:     Mckayla Sanchez is a very pleasant 80 y.o. AAF with a pmhx significant for mild dementia, HTN, HLD, anemia, and GERD. She is admitted to the hospital with a rightt leg ileofemoral DVT. She has been initiated on conservative management with leg rest, leg elevation, compression, and anticoagulation. She has had significant improvement in her swelling overnight. Nursing Data:     Patient Vitals for the past 24 hrs:   BP Temp Pulse Resp SpO2   04/26/22 0726 (!) 176/74 98.6 °F (37 °C) 70 18 99 %   04/26/22 0243 (!) 158/73 98 °F (36.7 °C) 69 15 99 %   04/25/22 2210 (!) 158/71 98.2 °F (36.8 °C) 65 18 98 %   04/25/22 2000 126/79  68 19 100 %   04/25/22 1754 (!) 154/80  72 18 100 %   04/25/22 1601 (!) 172/80  71 14    04/25/22 1425  98.8 °F (37.1 °C) 64 16    04/25/22 1056 (!) 156/62  66 17 98 %   04/25/22 0957 (!) 169/63  70 18 99 %         Intake/Output Summary (Last 24 hours) at 4/26/2022 0910  Last data filed at 4/26/2022 0630  Gross per 24 hour   Intake 0 ml   Output 400 ml   Net -400 ml       Exam:     Physical Exam  Constitutional:       Comments: Pleasant, frail, elderly AAF in [facility]   HENT:      Head: Normocephalic. Nose: Nose normal.      Mouth/Throat:      Mouth: Mucous membranes are moist.   Eyes:      Pupils: Pupils are equal, round, and reactive to light. Cardiovascular:      Rate and Rhythm: Normal rate and regular rhythm. Comments: Her RLE edema is significantly improved from previous exam.    Pulmonary:      Effort: Pulmonary effort is normal. No respiratory distress. Abdominal:      General: Abdomen is flat. There is no distension. Musculoskeletal:         General: Normal range of motion. Cervical back: Normal range of motion. Right lower leg: Edema present. Skin:     General: Skin is warm. Neurological:      Mental Status: Mental status is at baseline.        Lab Review: .  Recent Results (from the past 24 hour(s))   PTT    Collection Time: 04/25/22 12:59 PM   Result Value Ref Range    aPTT 26.8 22.1 - 31.0 sec    aPTT, therapeutic range     58.0 - 19.3 SECS   METABOLIC PANEL, BASIC    Collection Time: 04/26/22  2:38 AM   Result Value Ref Range    Sodium 137 136 - 145 mmol/L    Potassium 4.0 3.5 - 5.1 mmol/L    Chloride 107 97 - 108 mmol/L    CO2 27 21 - 32 mmol/L    Anion gap 3 (L) 5 - 15 mmol/L    Glucose 78 65 - 100 mg/dL    BUN 16 6 - 20 MG/DL    Creatinine 0.62 0.55 - 1.02 MG/DL    BUN/Creatinine ratio 26 (H) 12 - 20      GFR est AA >60 >60 ml/min/1.73m2    GFR est non-AA >60 >60 ml/min/1.73m2    Calcium 9.6 8.5 - 10.1 MG/DL   CBC WITH AUTOMATED DIFF    Collection Time: 04/26/22  2:38 AM   Result Value Ref Range    WBC 7.4 3.6 - 11.0 K/uL    RBC 4.01 3.80 - 5.20 M/uL    HGB 11.4 (L) 11.5 - 16.0 g/dL    HCT 37.1 35.0 - 47.0 %    MCV 92.5 80.0 - 99.0 FL    MCH 28.4 26.0 - 34.0 PG    MCHC 30.7 30.0 - 36.5 g/dL    RDW 12.0 11.5 - 14.5 %    PLATELET 665 705 - 945 K/uL    MPV 10.8 8.9 - 12.9 FL    NRBC 0.0 0  WBC    ABSOLUTE NRBC 0.00 0.00 - 0.01 K/uL    NEUTROPHILS 66 32 - 75 %    LYMPHOCYTES 19 12 - 49 %    MONOCYTES 12 5 - 13 %    EOSINOPHILS 2 0 - 7 %    BASOPHILS 1 0 - 1 %    IMMATURE GRANULOCYTES 0 0.0 - 0.5 %    ABS. NEUTROPHILS 5.0 1.8 - 8.0 K/UL    ABS. LYMPHOCYTES 1.4 0.8 - 3.5 K/UL    ABS. MONOCYTES 0.9 0.0 - 1.0 K/UL    ABS. EOSINOPHILS 0.1 0.0 - 0.4 K/UL    ABS. BASOPHILS 0.0 0.0 - 0.1 K/UL    ABS. IMM. GRANS. 0.0 0.00 - 0.04 K/UL    DF AUTOMATED            Assessment/Plan:     Right leg ileofemoral DVT  · She has responded well to conservative management. No need for procedural intervention   · Continue leg elevation and compression (ACE wrap). OOB as needed with assistance. PT ordered. · Anticoagulation and embolic evaluation per primary team.  Length of tx per results/hematology. Likely 3-6 months. · Outpatient f/u as needed.       Mild Dementia Uncontrolled Hypertension  Hyperlipidemia   Anemia   -relatively stable   Vitamin D deficiency   GERD  Asthma  -not in exacerbation     · Management of comorbid conditions by primary team.    VTE prophylaxis:  LMWH    Disposition:   TBD after PT evaluation     Vascular surgery signing off. We appreciate the opportunity to participate in the care of this very pleasant 81 yo AAF. Please reconsult as needed.

## 2022-04-26 NOTE — PROGRESS NOTES
Problem: Falls - Risk of  Goal: *Absence of Falls  Description: Document Tory Pandya Fall Risk and appropriate interventions in the flowsheet.   Outcome: Progressing Towards Goal  Note: Fall Risk Interventions:  Mobility Interventions: Bed/chair exit alarm,OT consult for ADLs,Patient to call before getting OOB,PT Consult for mobility concerns,Utilize walker, cane, or other assistive device,Strengthening exercises (ROM-active/passive),PT Consult for assist device competence    Mentation Interventions: Adequate sleep, hydration, pain control,Bed/chair exit alarm,Door open when patient unattended,Increase mobility,More frequent rounding,Reorient patient,Room close to nurse's station,Toileting rounds,Update white board    Medication Interventions: Bed/chair exit alarm,Patient to call before getting OOB,Teach patient to arise slowly    Elimination Interventions: Bed/chair exit alarm,Call light in reach,Patient to call for help with toileting needs,Toilet paper/wipes in reach,Toileting schedule/hourly rounds              Problem: Patient Education: Go to Patient Education Activity  Goal: Patient/Family Education  Outcome: Progressing Towards Goal     Problem: Deep Venous Thrombosis - Risk of  Goal: *Absence of deep venous thrombosis signs and symptoms(Stroke Metric)  Outcome: Progressing Towards Goal  Goal: *Absence of impaired coagulation signs and symptoms  Outcome: Progressing Towards Goal  Goal: *Knowledge of prescribed medications  Outcome: Progressing Towards Goal  Goal: *Absence of bleeding  Outcome: Progressing Towards Goal     Problem: Patient Education: Go to Patient Education Activity  Goal: Patient/Family Education  Outcome: Progressing Towards Goal

## 2022-04-26 NOTE — PROGRESS NOTES
Transition of Care Plan:    RUR: 6% - low   Disposition: Home with [facility], family and private duty caregivers  Follow up appointments: PCP and specialist as indicated   DME needed: TBD  Transportation at Discharge: Daughter to transport  [place] or means to access home:  Family has     IM Medicare Letter: 1st IM provided on 4/26  Is patient a BCPI-A Bundle:  No       If yes, was Bundle Letter given?:  n/a  Is patient a [place] and connected with the [place]? No   If yes, was [employer] transfer form completed and [facility] notified? n/a  Caregiver Contact: Daughter Estella Downey - [phone]  Discharge Caregiver contacted prior to discharge?  To be contacted  Care Conference needed?:  N/a    Reason for Admission:  DVT                     RUR Score: 9% - low                  Plan for utilizing home health:  [facility]    PCP: First and Last name:  Mariah Cheng MD     Name of Practice:    Are you a current patient: Yes/No: Yes   Approximate date of last visit:    Can you participate in a virtual visit with your PCP:                     Current Advanced Directive/Advance Care Plan: Full Code      Healthcare Decision Maker:   Click here to complete [address] including selection of the Healthcare Decision Maker Relationship (ie \"Primary\")                             Transition of Care Plan:                                       Shira Davis, [address] [facility]  [phone]

## 2022-04-26 NOTE — PROGRESS NOTES
End of Shift Note    Bedside shift change report given to Ivania Valadez RN (oncoming nurse) by Raudel Bowden RN (offgoing nurse). Report included the following information SBAR, Intake/Output, MAR, Recent Results and Cardiac Rhythm NSR with First Degree and BBB    Shift worked:  Day     Shift summary and any significant changes:     - Diet ordered - no plans for intervention for DVT   - PT/OT eval     Concerns for physician to address:  Code status     Zone phone for oncoming shift:   [phone]       Activity:  Activity Level: Up with Assistance  Number times ambulated in hallways past shift: 0  Number of times OOB to chair past shift: 0    Cardiac:   Cardiac Monitoring: Yes      Cardiac Rhythm: Sinus Rhythm,1\" AV Block    Access:   Current line(s): PIV     Genitourinary:   Urinary status: voiding and external catheter    Respiratory:   O2 Device: None (Room air)  Chronic home O2 use?: N/A  Incentive spirometer at bedside: N/A       GI:  Last Bowel Movement Date: 04/25/22  Current diet:  ADULT DIET Regular; Low Fat/Low Chol/High Fiber/2 gm Na  Passing flatus: YES  Tolerating current diet: YES       Pain Management:   Patient states pain is manageable on current regimen: YES    Skin:  Victorino Score: 20  Interventions: increase time out of bed, PT/OT consult and internal/external urinary devices    Patient Safety:  Fall Score:  Total Score: 3  Interventions: bed/chair alarm, assistive device (walker, cane, etc), gripper socks and pt to call before getting OOB  High Fall Risk: Yes    Length of Stay:  Expected LOS: - - -  Actual LOS: 1      Raudel Bowden RN

## 2022-04-26 NOTE — ED NOTES
TRANSITION OF CARE - SBAR OUT    Patient is being transferred to [address], Room# 99 [phone] 97. Report GIVEN TO Aubrey Foley RN on Dillan Deleon for routine progression of care. Report is consisted of the following information SBAR. Patient transferred to receiving unit by: Magaly Rubio RN, other RN or ACT     Called outstanding consults: Yes   Collected routine labs: Yes     All current orders reviewed with accepting nurse: Yes    The following personal items will be sent with the patient during transfer to the floor:   All valuables:                          CARDIAC MONITORING ORDERED: Yes     The following CURRENT information were reported to the receiving RN:    CODE STATUS: Full Code    NIH SCORE:    MICHEAL SCREENING:      NEURO ASSESSMENT: Neuro  Neurologic State: Alert,Confused (04/25/22 0593)  Orientation Level: Oriented to person,Oriented to place,Disoriented to situation,Disoriented to time (04/25/22 0824)  Cognition: Follows commands,Decreased attention/concentration (04/25/22 0824)  Speech: Clear (04/25/22 0824)      RESTRAINTS IN USE: No      IS DOCUMENTATION COMPLETE: Yes      Vital Signs  Level of Consciousness: Alert (0) (04/25/22 1754)  Temp: 98.8 °F (37.1 °C) (04/25/22 1425)  Temp Source: Oral (04/25/22 1425)  Pulse (Heart Rate): 68 (04/25/22 2000)  Heart Rate Source: Monitor (04/25/22 1754)  Cardiac Rhythm: Sinus Rhythm,1\" AV Block,LBBB (04/25/22 0824)  Resp Rate: 19 (04/25/22 2000)  BP: 126/79 (04/25/22 2000)  MAP (Monitor): 94 (04/25/22 2000)  MAP (Calculated): 95 (04/25/22 2000)  BP 1 [place] Right arm (04/25/22 1754)  BP 1 Method: Automatic (04/25/22 1754)  BP Patient Position: At rest (04/25/22 1754)  MEWS Score: 1 (04/25/22 0810)  Pain 1  Pain Scale 1: Numeric (0 - 10) (04/25/22 1754)  Pain Intensity 1: 0 (04/25/22 1754)  Patient Stated Pain Goal: 0 (04/25/22 1754)  Pain Reassessment 1: Yes (04/25/22 1450)      OXYGEN: Oxygen Therapy  O2 Device: None (Room air) (04/25/22 6204)    BRADLEY FALL RISK: Moises Sites Fall Risk  Mobility: Unable to ambulate or transfer (04/25/22 2020)  Mentation: Periodic confusion (04/25/22 2020)  Mentation Interventions: Reorient patient (04/25/22 2020)  Medication: Patient receiving anticonvulsants, sedatives(tranquilizers), psychotropics or hypnotics, hypoglycemics, narcotics, sleep aids, antihypertensives, laxatives, or diuretics (04/25/22 2020)  Medication Interventions: Teach patient to arise slowly,Utilize gait belt for transfers/ambulation (04/25/22 2020)  Elimination: Needs assistance with toileting (04/25/22 2020)  Elimination Interventions: Call light in reach (04/25/22 2020)  Prior Fall History: No (04/25/22 2020)  Total Score: 3 (04/25/22 2020)  Standard Fall Precautions: Yes (04/25/22 2020)  High Fall Risk: Yes (04/25/22 2020)      WOUNDS: No      URINARY CATHETER: voiding    LINE ACCESS:   Peripheral IV 04/25/22 Right Antecubital (Active)   Site Assessment Clean, dry, & intact 04/25/22 0822   Phlebitis Assessment 0 04/25/22 0822   Infiltration Assessment 0 04/25/22 0822   Dressing Status Clean, dry, & intact 04/25/22 0822   Dressing Type Tape;Transparent 04/25/22 0822   Hub Color/Line Status Pink;Flushed 04/25/22 0822   Action Taken Blood drawn 04/25/22 0822   Alcohol Cap Used No 04/25/22 0822       Peripheral IV 19/71/38 Left Basilic (Active)   Site Assessment Clean, dry, & intact 04/25/22 1315   Phlebitis Assessment 0 04/25/22 1315   Infiltration Assessment 0 04/25/22 1315   Dressing Status Clean, dry, & intact 04/25/22 1315   Dressing Type 4 X 4;Tape;Transparent 04/25/22 1315   Hub Color/Line Status Blue;Flushed;Patent 04/25/22 1315   Action Taken Blood drawn 04/25/22 1315   Alcohol Cap Used Yes 04/25/22 1315        Opportunity for questions and clarification were provided.   Matteo Frazier RN

## 2022-04-26 NOTE — PROGRESS NOTES
Hospitalist Progress Note    NAME: Gloria Smart   :  1928   MRN:  [id number]       Assessment / Plan:  Extensive right lower extremity DVT  -Presents with right leg swelling and pain, difficulty ambulating.   -Likely due to inactivity   -Doppler US: Thrombus Right common femoral vein, great saphenous thigh veing, right proximal mid distal femoral vein  -S/p 1 mg/kg Lovenox in eD  -Vascular consulted  -Continue Lovenox started in the ED  -We will switch to NOAC when close to discharge  Patient evaluated by vascular surgery, no plan for surgical intervention  Elevate leg, compression with Ace wrap  -PT OT     Hypertension   Hyperlipidemia  -resume home amlodipine and lisinopril. Prn hydralazine   -Resume statin     Asthma   -As needed DuoNeb     DJD   -As needed Tylenol     Depression  -Continue Zoloft     Code Status: Full code/has advanced directive  Surrogate Decision Maker: Her daughter Kevin Washburn     DVT Prophylaxis: Lovenox  GI Prophylaxis: not indicated     Baseline: Lives with her son, uses wheelchair      18.5 - 24.9 Normal weight / Body mass index is 21.83 kg/m². Estimated discharge date:   Barriers:       Subjective:     Chief Complaint / Reason for Physician Visit  Follow-up extensive DVT discussed with RN events overnight. Review of Systems:  Symptom Y/N Comments  Symptom Y/N Comments   Fever/Chills    Chest Pain     Poor Appetite    Edema     Cough    Abdominal Pain     Sputum    Joint Pain     SOB/STRICKLAND    Pruritis/Rash     Nausea/vomit    Tolerating PT/OT     Diarrhea    Tolerating Diet     Constipation    Other       Could NOT obtain due to:  Confused     Objective:     VITALS:   Last 24hrs VS reviewed since prior progress note.  Most recent are:  Patient Vitals for the past 24 hrs:   Temp Pulse Resp BP SpO2   22 0726 98.6 °F (37 °C) 70 18 (!) 176/74 99 %   22 0243 98 °F (36.7 °C) 69 15 (!) 158/73 99 %   22 2210 98.2 °F (36.8 °C) 65 18 (!) 158/71 98 %   22 2000  68 19 126/79 100 %   04/25/22 1754  72 18 (!) 154/80 100 %   04/25/22 1601  71 14 (!) 172/80    04/25/22 1425 98.8 °F (37.1 °C) 64 16     04/25/22 1056  66 17 (!) 156/62 98 %   04/25/22 0957  70 18 (!) 169/63 99 %       Intake/Output Summary (Last 24 hours) at 4/26/2022 0917  Last data filed at 4/26/2022 0630  Gross per 24 hour   Intake 0 ml   Output 400 ml   Net -400 ml        I had a face to face encounter and independently examined this patient on 4/26/2022, as outlined below:  PHYSICAL EXAM:  General: WD, WN. Alert, cooperative, no acute distress    EENT:  EOMI. Anicteric sclerae. MMM  Resp:  CTA bilaterally, no wheezing or rales. No accessory muscle use  CV:  Regular  rhythm,  No edema  GI:  Soft, Non distended, Non tender. +Bowel sounds  Neurologic:  Alert and oriented X 1, normal speech,   Psych:   Poor insight. Not anxious nor agitated  Skin:  No rashes. No jaundice    Reviewed most current lab test results and cultures  YES  Reviewed most current radiology test results   YES  Review and summation of old records today    NO  Reviewed patient's current orders and MAR    YES  PMH/ reviewed - no change compared to H&P  ________________________________________________________________________  Care Plan discussed with:    Comments   Patient     Family      RN     Care Manager     Consultant                        Multidiciplinary team rounds were held today with , nursing, pharmacist and clinical coordinator. Patient's plan of care was discussed; medications were reviewed and discharge planning was addressed.      ________________________________________________________________________  Total NON critical care TIME: 35  Minutes    Total CRITICAL CARE TIME Spent:   Minutes non procedure based      Comments   >50% of visit spent in counseling and coordination of care     ________________________________________________________________________  Maurilio Castelan MD     Procedures: see electronic medical records for all procedures/Xrays and details which were not copied into this note but were reviewed prior to creation of Plan. LABS:  I reviewed today's most current labs and imaging studies.   Pertinent labs include:  Recent Labs     04/26/22 0238 04/25/22  0824   WBC 7.4 7.1   HGB 11.4* 12.3   HCT 37.1 39.6    275     Recent Labs     04/26/22 0238 04/25/22  0824    139   K 4.0 4.4    104   CO2 27 31   GLU 78 89   BUN 16 16   CREA 0.62 0.80   CA 9.6 10.0   ALB  --  2.6*   TBILI  --  0.5   ALT  --  26       Signed: Lucio Hunter MD

## 2022-04-26 NOTE — HOME CARE
Please note that this patient was open to [address] [place] [address] services at the time of hospital admission. If services will be needed at discharge, please order to resume them.  Thank you, Angela Knight RN, [phone]

## 2022-04-26 NOTE — PROGRESS NOTES
Problem: Self Care Deficits Care Plan (Adult)  Goal: *Acute Goals and Plan of Care (Insert Text)  Description: FUNCTIONAL STATUS PRIOR TO ADMISSION: Patient pleasantly confused and reported completing functional mobility using rolling walker. Per chart review, patient has been mainly in bed with family assisting her to w/c and assisting with majority of ADLs. HOME SUPPORT: The patient lived with family. Occupational Therapy Goals  Initiated 4/26/2022  1. Patient will perform simple grooming sitting EOB with supervision/set-up within 7 day(s). 2.  Patient will perform upper body dressing with supervision/set-up within 7 day(s). 3.  Patient will perform supine > sit EOB to prepare for seated ADLs with contact guard assist within 7 day(s). 4.  Patient will perform toilet transfers to [facility] with minimal assistance within 7 day(s). 5.  Patient will perform all aspects of toileting with maximal assistance within 7 day(s). Outcome: Not Met   OCCUPATIONAL THERAPY EVALUATION  Patient: Dominique Morris (5 y.o. female)  Date: 4/26/2022  Primary Diagnosis: DVT (deep venous thrombosis) ([facility]) [I82.409]        Precautions: Fall,Bed Alarm    ASSESSMENT  Based on the objective data described below, the patient presents with decreased independence in self-care and functional mobility secondary to general weakness, impaired balance, RLE pain, impaired cognition, decreased insight, and decreased activity tolerance following admission for RLE DVT (reciving lovenox treatment). Patient is pleasantly confused and unable to provide PLOF but appears to be functioning below her baseline for self-care and functional mobility. Overall, patient is now completing self-care with contact guard to total assist and functional mobility with min to min/mod assist x2. Patient received semisupine in bed and cleared for therapy by nursing.  Patient came EOB with mod assist and demonstrated impaired sitting balance while completing seated grooming tasks. Cues provided for sequencing and hand placement for balance during grooming tasks. Patient c/o dizziness with BP remaining stable during bed mobility. Patient agreed to attempt stand and required x2 person assist with posterior lean noted. Unable to take lateral steps towards [facility] this date and returned to sitting. Patient returned to supine with min assist to manage RLE. Patient was left semisupine in bed with all needs met, MD present in room, and bed alarmed. Patient would continue from skilled OT services during acute hospital stay. Patient may be close to her functional baseline and would benefit from returning home with 24/7 supervision and assist from family and HHOT/PT, pending progress. Current Level of Function Impacting Discharge (ADLs/self-care): contact guard to total assist for self-care, min to min/mod assist x2 for functional mobility     Functional Outcome Measure: The patient scored 20/100 on the Barthel Index outcome measure which is indicative of being very dependent in ADLs. Other factors to consider for discharge: fall risk, RLE pain, pleasantly confused      Patient will benefit from skilled therapy intervention to address the above noted impairments. PLAN :  Recommendations and Planned Interventions: self care training, functional mobility training, therapeutic exercise, balance training, therapeutic activities, endurance activities, patient education, home safety training and family training/education    Frequency/Duration: Patient will be followed by occupational therapy 3 times a week to address goals.     Recommendation for discharge: (in order for the patient to meet his/her long term goals)  Occupational therapy at least 2 days/week in the home AND ensure assist and/or supervision for safety with ADLs and functional mobility    This discharge recommendation:  Has not yet been discussed the attending provider and/or case management    IF patient discharges home will need the following DME: TBD pending progress        SUBJECTIVE:   Patient stated I use the walker to get into the bathroom.     OBJECTIVE DATA SUMMARY:   HISTORY:   Past Medical History:   Diagnosis Date    Adverse effect of anesthesia     Combative, confused for extended period of time with anesthesia    Anemia NEC     Asthma     Cervical spondylarthritis     Depression     beginning stages of dementia, sundowning    DJD (degenerative joint disease) of knee     Esophageal spasm     Has required esophageal dilatation; Dr. Serena Krishnamurthy GERD (gastroesophageal reflux disease)     Hallucination     Hypercholesterolemia     Hypertension     Lumbar stenosis     Neuropathy, upper extremity     left    Vertigo [id number]    Saw Dr. Chelsea Mcnamara; improved.  Vitamin D deficiency 1/28/2011     Past Surgical History:   Procedure Laterality Date    HX CATARACT REMOVAL      bilateral    HX HYSTERECTOMY      HX ORTHOPAEDIC      right bunion excision    HX OTHER SURGICAL      broken R hip    HX TONSILLECTOMY         Expanded or extensive additional review of patient history:     Home Situation  Home Environment: Private residence  One/Two Story Residence: One story  Living Alone: No  Support Systems: Child(tanmay) (son)  Patient Expects to be Discharged to[de-identified] Home  Current DME Used/Available at Home: Walker, rolling,Wheelchair,Shower chair,Grab bars  Tub or Shower Type: Shower    Hand dominance: Right    EXAMINATION OF PERFORMANCE DEFICITS:  Cognitive/Behavioral Status:  Neurologic State: Alert  Orientation Level: Oriented to person;Disoriented to time;Disoriented to place; Disoriented to situation  Cognition: Follows commands; Impaired decision making;Poor safety awareness  Perception: Appears intact  Perseveration: No perseveration noted  Safety/Judgement: Awareness of environment;Decreased insight into deficits    Hearing:   Auditory  Auditory Impairment: None    Vision/Perceptual:     Acuity: Impaired near vision; Impaired far vision    Corrective Lenses: Reading glasses    Range of Motion:  AROM: Generally decreased, functional (decreased R LE 2* edema and wrap)    Strength:  Strength: Generally decreased, functional (decreased R LE)    Coordination:  Coordination: Generally decreased, functional (decreased R LE)  Fine Motor Skills-Upper: Left Intact; Right Intact    Gross Motor Skills-Upper: Left Intact; Right Intact    Tone & Sensation:  Tone: Normal  Sensation: Impaired (light touch diminished R toes)    Balance:  Sitting: Impaired  Sitting - Static: Good (unsupported)  Sitting - Dynamic: Fair (occasional)  Standing: Impaired; With support (RW)  Standing - Static: Poor;Constant support ( moderate post lean)  Standing - Dynamic : Not tested    Functional Mobility and Transfers for ADLs:  Bed Mobility:  Supine to Sit: Moderate assistance;Bed Modified (assist with R LE, light assist at trunk)  Sit to Supine: Minimum assistance (assist R LE, CGA at trunk)  Scooting: Minimum assistance (for scooting toward middle of bed)    Transfers:  Sit to Stand: Minimum assistance; Moderate assistance;Assist x2  Stand to Sit: Minimum assistance;Assist x2    ADL Assessment:  Feeding:  (NPO)  Oral Facial Hygiene/Grooming: Contact guard assistance (seated EOB)  Bathing: Moderate assistance  Upper Body Dressing: Minimum assistance  Lower Body Dressing: Total assistance  Toileting: Total assistance    ADL Intervention and task modifications:    Grooming  Position Performed: Seated edge of bed  Washing Face: Contact guard assistance  Washing Hands: Contact guard assistance  Cues: Tactile cues provided;Verbal cues provided    Lower Body Dressing Assistance  Socks:  Total assistance (dependent)  Position Performed: Supine  Cues: Don;Doff;Physical assistance;Verbal cues provided    Cognitive Retraining  Safety/Judgement: Awareness of environment;Decreased insight into deficits    Functional Measure:    Barthel Index:  Bathin  Bladder: 5  Bowels: 5  Groomin  Dressin  Feedin  Mobility: 0  Stairs: 0  Toilet Use: 0  Transfer (Bed to Chair and Back): 5  Total: 20/100      The Barthel ADL Index: Guidelines  1. The index should be used as a record of what a patient does, not as a record of what a patient could do. 2. The main aim is to establish degree of independence from any help, physical or verbal, however minor and for whatever reason. 3. The need for supervision renders the patient not independent. 4. A patient's performance should be established using the best available evidence. Asking the patient, friends/relatives and nurses are the usual sources, but direct observation and common sense are also important. However direct testing is not needed. 5. Usually the patient's performance over the preceding 24-48 hours is important, but occasionally longer periods will be relevant. 6. Middle categories imply that the patient supplies over 50 per cent of the effort. 7. Use of aids to be independent is allowed. Score Interpretation (from [facility] 83)    Independent   60-79 Minimally independent   40-59 Partially dependent   20-39 Very dependent   <20 Totally dependent     -Rohan Hood., Barthel, DSandroW. (1965). Functional evaluation: the Barthel Index. [facility] ([address] [facility], [address] [place] (). The Barthel activities of daily living index: self-reporting versus actual performance in the old (> or = 75 years). Journal of [address] [place] [address] 45(7), [facility], OLYA, Naga Fink., Darrell Hanson. (). Measuring the change in disability after inpatient rehabilitation; comparison of the responsiveness of the Barthel Index and Functional [place] Measure. Journal of Neurology, Neurosurgery, and Psychiatry, 66(4), 025-315. Bigg Frank, N.J.A, RISHI Pathak.M, & Tessie Langley, MSandroA. (2004) Assessment of post-stroke quality of life in cost-effectiveness studies:  The usefulness of the Barthel Index and the EuroQoL-5D. Quality of Life Research, 13, 944-79     Based on the above components, the patient evaluation is determined to be of the following complexity level: MEDIUM  Pain Rating:  Patient c/o pain in RLE. Activity Tolerance:   Fair and requires rest breaks    After treatment patient left in no apparent distress:    Supine in bed, Call bell within reach, Bed / chair alarm activated, Side rails x 3, and RN present in room    COMMUNICATION/EDUCATION:   The patients plan of care was discussed with: Physical therapist and Registered nurse. Home safety education was provided and the patient/caregiver indicated understanding., Patient/family have participated as able in goal setting and plan of care. , and Patient/family agree to work toward stated goals and plan of care. This patients plan of care is appropriate for delegation to [facility].     Thank you for this referral.  Annette Arroyo OTR/AUGUSTIN  Time Calculation: 17 mins

## 2022-04-26 NOTE — PROGRESS NOTES
Problem: Falls - Risk of  Goal: *Absence of Falls  Description: Document Palomo Hensley Fall Risk and appropriate interventions in the flowsheet.   Outcome: Progressing Towards Goal  Note: Fall Risk Interventions:       Mentation Interventions: Reorient patient    Medication Interventions: Teach patient to arise slowly,Utilize gait belt for transfers/ambulation    Elimination Interventions: Call light in reach              Problem: Patient Education: Go to Patient Education Activity  Goal: Patient/Family Education  Outcome: Progressing Towards Goal     Problem: Deep Venous Thrombosis - Risk of  Goal: *Absence of deep venous thrombosis signs and symptoms(Stroke Metric)  Outcome: Progressing Towards Goal  Goal: *Absence of impaired coagulation signs and symptoms  Outcome: Progressing Towards Goal  Goal: *Knowledge of prescribed medications  Outcome: Progressing Towards Goal  Goal: *Absence of bleeding  Outcome: Progressing Towards Goal     Problem: Patient Education: Go to Patient Education Activity  Goal: Patient/Family Education  Outcome: Progressing Towards Goal

## 2022-04-27 LAB
BASOPHILS # BLD: 0 K/UL (ref 0–0.1)
BASOPHILS NFR BLD: 1 % (ref 0–1)
DIFFERENTIAL METHOD BLD: NORMAL
EOSINOPHIL # BLD: 0.1 K/UL (ref 0–0.4)
EOSINOPHIL NFR BLD: 1 % (ref 0–7)
ERYTHROCYTE [DISTWIDTH] IN BLOOD BY AUTOMATED COUNT: 11.8 % (ref 11.5–14.5)
HCT VFR BLD AUTO: 37.4 % (ref 35–47)
HGB BLD-MCNC: 12.2 G/DL (ref 11.5–16)
IMM GRANULOCYTES # BLD AUTO: 0 K/UL (ref 0–0.04)
IMM GRANULOCYTES NFR BLD AUTO: 0 % (ref 0–0.5)
LYMPHOCYTES # BLD: 1.4 K/UL (ref 0.8–3.5)
LYMPHOCYTES NFR BLD: 16 % (ref 12–49)
MCH RBC QN AUTO: 29.4 PG (ref 26–34)
MCHC RBC AUTO-ENTMCNC: 32.6 G/DL (ref 30–36.5)
MCV RBC AUTO: 90.1 FL (ref 80–99)
MONOCYTES # BLD: 0.8 K/UL (ref 0–1)
MONOCYTES NFR BLD: 10 % (ref 5–13)
NEUTS SEG # BLD: 6.2 K/UL (ref 1.8–8)
NEUTS SEG NFR BLD: 72 % (ref 32–75)
NRBC # BLD: 0 K/UL (ref 0–0.01)
NRBC BLD-RTO: 0 PER 100 WBC
PLATELET # BLD AUTO: 282 K/UL (ref 150–400)
PMV BLD AUTO: 11 FL (ref 8.9–12.9)
RBC # BLD AUTO: 4.15 M/UL (ref 3.8–5.2)
WBC # BLD AUTO: 8.6 K/UL (ref 3.6–11)

## 2022-04-27 PROCEDURE — 3331090002 HH PPS REVENUE DEBIT

## 2022-04-27 PROCEDURE — 65660000001 HC RM ICU INTERMED STEPDOWN

## 2022-04-27 PROCEDURE — 99233 SBSQ HOSP IP/OBS HIGH 50: CPT | Performed by: INTERNAL MEDICINE

## 2022-04-27 PROCEDURE — 85025 COMPLETE CBC W/AUTO DIFF WBC: CPT

## 2022-04-27 PROCEDURE — 36415 COLL VENOUS BLD VENIPUNCTURE: CPT

## 2022-04-27 PROCEDURE — 74011250637 HC RX REV CODE- 250/637: Performed by: INTERNAL MEDICINE

## 2022-04-27 PROCEDURE — 97530 THERAPEUTIC ACTIVITIES: CPT

## 2022-04-27 PROCEDURE — 74011000250 HC RX REV CODE- 250: Performed by: STUDENT IN AN ORGANIZED HEALTH CARE EDUCATION/TRAINING PROGRAM

## 2022-04-27 PROCEDURE — 74011250636 HC RX REV CODE- 250/636: Performed by: STUDENT IN AN ORGANIZED HEALTH CARE EDUCATION/TRAINING PROGRAM

## 2022-04-27 PROCEDURE — 3331090001 HH PPS REVENUE CREDIT

## 2022-04-27 PROCEDURE — 74011250637 HC RX REV CODE- 250/637: Performed by: STUDENT IN AN ORGANIZED HEALTH CARE EDUCATION/TRAINING PROGRAM

## 2022-04-27 RX ORDER — AMLODIPINE BESYLATE 5 MG/1
5 TABLET ORAL DAILY
Status: DISCONTINUED | OUTPATIENT
Start: 2022-04-28 | End: 2022-04-28 | Stop reason: HOSPADM

## 2022-04-27 RX ADMIN — ENOXAPARIN SODIUM 50 MG: 100 INJECTION SUBCUTANEOUS at 03:20

## 2022-04-27 RX ADMIN — Medication 1 TABLET: at 09:32

## 2022-04-27 RX ADMIN — SODIUM CHLORIDE, PRESERVATIVE FREE 10 ML: 5 INJECTION INTRAVENOUS at 02:24

## 2022-04-27 RX ADMIN — SODIUM CHLORIDE, PRESERVATIVE FREE 10 ML: 5 INJECTION INTRAVENOUS at 13:47

## 2022-04-27 RX ADMIN — AMLODIPINE BESYLATE 2.5 MG: 2.5 TABLET ORAL at 09:32

## 2022-04-27 RX ADMIN — FAMOTIDINE 20 MG: 20 TABLET ORAL at 09:32

## 2022-04-27 RX ADMIN — ATORVASTATIN CALCIUM 20 MG: 20 TABLET, FILM COATED ORAL at 09:32

## 2022-04-27 RX ADMIN — MEMANTINE 10 MG: 10 TABLET ORAL at 09:32

## 2022-04-27 RX ADMIN — SERTRALINE 50 MG: 50 TABLET, FILM COATED ORAL at 09:32

## 2022-04-27 RX ADMIN — MEMANTINE 10 MG: 10 TABLET ORAL at 19:44

## 2022-04-27 RX ADMIN — LISINOPRIL 10 MG: 10 TABLET ORAL at 09:32

## 2022-04-27 NOTE — PROGRESS NOTES
Transition of Care Plan:    RUR: 10% \"low risk\"  Disposition: Home with [facility] (PT/OT/SN), caregivers, & follow-up appts  Follow up appointments: PCP, Vascular  DME needed: No addtl needs  Transportation at Discharge: Dtr; eta 11 AM  [place] or means to access home: Yes  IM Medicare Letter: Reviewed   Caregiver Contact: Rolandsamuel Mooney, [phone]  Discharge Caregiver contacted prior to discharge? Yes  Care Conference needed?:                   Update 4:12 PM  Received call back from pt's dtr Kuldip Looney); agreeable to discharge plan home tomorrow. Will be at [facility] to transport pt home tomorrow AM (eta 11 AM). Initial note-  Reviewed pt's chart. Per attending MD, pt to discharge home with [facility] & follow-up appts tomorrow AM. Ermias order sent. Attempted to contact pt's dtr Kuldip Looney) to provide update bu received no answer. Left VM requesting call back.     SUSANNE Mg  Care Management

## 2022-04-27 NOTE — PROGRESS NOTES
~1900: Report received from day shift RN.    ~2030: Shift Assessment completed. See MAR and Flowsheet for further details. ~2130 - 2200: Patient sitter off floor on break, primary RN at bedside providing continuous observation of patient. ~2300:  Reassessment complete. See MAR and Flowsheet for further details. Patient refusing care and refused standing tylenol order. NP paged and aware of refusal of care.     ~0324: Reassessment complete. See MAR and Flowsheet for further details. ~0500: Patient refused morning Tylenol dose. NP paged and aware    ~0700: Handoff of care given to day shift RN.

## 2022-04-27 NOTE — PROGRESS NOTES
Spiritual Care Partner Volunteer visited patient at [facility] in [facility]ARD[facility] C[facility] on 4/27/2022   Documented by: Arthur Mesa.    Paging Service: [phone])

## 2022-04-27 NOTE — PROGRESS NOTES
Problem: Mobility Impaired (Adult and Pediatric)  Goal: *Acute Goals and Plan of Care (Insert Text)  Description: FUNCTIONAL STATUS PRIOR TO ADMISSION: Pt reports assisted household amb using RW. Per chart, family reports pt spending a lot of time in bed with family ensuring that she gets up at least once a day. Assist required with ADLs. HOME SUPPORT PRIOR TO ADMISSION: The patient lived with son who provides assistance. Physical Therapy Goals  Initiated 4/26/2022  1. Patient will move from supine to sit and sit to supine  in bed with minimal assistance/contact guard assist within 7 day(s). 2.  Patient will transfer from bed to chair and chair to bed with moderate assistance  using the least restrictive device within 7 day(s). 3.  Patient will perform sit to stand with minimal assistance/contact guard assist within 7 day(s). 4.  Patient will ambulate with moderate assistance  for at least 5 feet with the least restrictive device within 7 day(s). Outcome: Not Progressing Towards Goal   PHYSICAL THERAPY TREATMENT  Patient: Nikhil Thao (51 y.o. female)  Date: 4/27/2022  Diagnosis: DVT (deep venous thrombosis) ([facility]) [I82.409] <principal problem not specified>       Precautions: Fall,Bed Alarm  Chart, physical therapy assessment, plan of care and goals were reviewed. ASSESSMENT  Patient continues with skilled PT services and is not progressing towards goals due to increased confusion, increased distractibility this date. Pt received with sitter at bedside, fidgeting with lines, less conversant. Required increased assist with bed mob and standing this date due to decreased attention to task. Tolerated standing x 2 trials with mod/ max A for lift/balance with and without use of RW; demo tendency to feet to slide forward and inability to align COG over KAREN. Pt returned to supine at end of session, R LE elevated. Pt remains below functional baseline.  Will benefit from con't PT for mobility progression as tolerated to prevent further functional decline in acute setting. Recommend pt return home with 24/7 assist and [facility] PT at d/c. Current Level of Function Impacting Discharge (mobility/balance): supine to sit mod/ maxA, sit to stand mod/ max A    Other factors to consider for discharge: required assist at baseline         PLAN :  Patient continues to benefit from skilled intervention to address the above impairments. Continue treatment per established plan of care. to address goals. Recommendation for discharge: (in order for the patient to meet his/her long term goals)  Physical therapy at least 2 days/week in the home AND ensure assist and/or supervision for safety with mobility    This discharge recommendation:  Has not yet been discussed the attending provider and/or case management    IF patient discharges home will need the following DME: patient owns DME required for discharge       SUBJECTIVE:   Patient confused    OBJECTIVE DATA SUMMARY:   Critical Behavior:  Neurologic State: Confused  Orientation Level: Oriented to person,Disoriented to place,Disoriented to situation,Disoriented to time  Cognition: Impulsive,Memory loss,No command following,Poor safety awareness  Safety/Judgement: Awareness of environment,Decreased insight into deficits  Functional Mobility Training:  Bed Mobility:     Supine to Sit: Moderate assistance;Maximum assistance;Bed Modified (impacted by distractibility, fidgeting)  Sit to Supine: Moderate assistance;Maximum assistance  Scooting: Moderate assistance (for scoot to EOB)        Transfers:  Sit to Stand: Moderate assistance;Maximum assistance  Stand to Sit: Moderate assistance                             Balance:  Sitting: Impaired  Sitting - Static: Fair (occasional)  Sitting - Dynamic: Fair (occasional)  Standing: Impaired; With support  Standing - Static: Poor;Constant support (posterior lean with and without use of RW)  Standing - Dynamic : Not tested    Pain Rating:  No s/s of pain    Activity Tolerance:   Fair    After treatment patient left in no apparent distress:   Supine in bed, Heels elevated for pressure relief, Call bell within reach, Bed / chair alarm activated, Side rails x 3, and sitter present    COMMUNICATION/COLLABORATION:   The patients plan of care was discussed with: Registered nurse.      Marion Hernandez, PT   Time Calculation: 19 mins

## 2022-04-27 NOTE — PROGRESS NOTES
INTERNAL MEDICINE ATTENDING NOTE     Patient Name: Mckayla Sanchez   : 1928   Admit: 2022    ASSESSMENT / PLAN    1. DVT (deep venous thrombosis) ([facility] [address] (2022), R ileofemoral: She was evaluated by vascular surgery who have declined intervention. Anticoagulation--dangers discussed with family. 2. Dementia: Ongoing. 3. Hypertension: BP high today. 4. Anemia: Mild. Watch labs. 5. Asthma: Controlled. 6. GERD: PPI. 7. CODE: DNR. Reviewed her scanned advanced directive \"declining resuscitative measures\" and discussed with family who agree with this. 8. PLAN: Home with home health in AM.                       SUBJECTIVE: Ms. Mckayla Sanchez was seen by me today during rounds. At this time, she is resting comfortably. The patient has no new complaints today. ROS otherwise unremarkable except as noted elsewhere. OBJECTIVE: Blood pressure (!) 157/87, pulse 73, temperature 98.2 °F (36.8 °C), resp. rate 20, height 5' (1.524 m), weight 111 lb 12.4 oz (50.7 kg), SpO2 98 %. Gen: Patient is in no acute distress. Lungs: CTAB. Heart: RRR. Abd: S, NT, ND, BS present. Exremities: Warm. R leg wrapped. Recent Results (from the past 12 hour(s))   CBC WITH AUTOMATED DIFF    Collection Time: 22  2:22 AM   Result Value Ref Range    WBC 8.6 3.6 - 11.0 K/uL    RBC 4.15 3.80 - 5.20 M/uL    HGB 12.2 11.5 - 16.0 g/dL    HCT 37.4 35.0 - 47.0 %    MCV 90.1 80.0 - 99.0 FL    MCH 29.4 26.0 - 34.0 PG    MCHC 32.6 30.0 - 36.5 g/dL    RDW 11.8 11.5 - 14.5 %    PLATELET 557 884 - 790 K/uL    MPV 11.0 8.9 - 12.9 FL    NRBC 0.0 0  WBC    ABSOLUTE NRBC 0.00 0.00 - 0.01 K/uL    NEUTROPHILS 72 32 - 75 %    LYMPHOCYTES 16 12 - 49 %    MONOCYTES 10 5 - 13 %    EOSINOPHILS 1 0 - 7 %    BASOPHILS 1 0 - 1 %    IMMATURE GRANULOCYTES 0 0.0 - 0.5 %    ABS. NEUTROPHILS 6.2 1.8 - 8.0 K/UL    ABS. LYMPHOCYTES 1.4 0.8 - 3.5 K/UL    ABS. MONOCYTES 0.8 0.0 - 1.0 K/UL    ABS. EOSINOPHILS 0.1 0.0 - 0.4 K/UL    ABS.  BASOPHILS 0.0 0.0 - 0.1 K/UL    ABS. IMM. GRANS. 0.0 0.00 - 0.04 K/UL    DF AUTOMATED           Rupal Carl MD, FACP  Best contact is via Pager: [phone], or via hospital  at [phone]. I can also be reached by Perfect Serve.

## 2022-04-27 NOTE — PROGRESS NOTES
End of Shift Note    Bedside shift change report given to Vidya Gale RN (oncoming nurse) by Maria Luisa Parada RN (offgoing nurse). Report included the following information SBAR, Intake/Output, MAR, Recent Results and Cardiac Rhythm NSR with First Degree and BBB    Shift worked:  Day     Shift summary and any significant changes:     - Trial Avasys vs sitter   - Dr. Holden Stone now following - no longer hospitalist patient   - No anticoagulation for discharge   - Pt refused tylenol, namenda, and lovenox injection - not willing to take medications and swatting at RN's    - Discharge tomorrow AM     Concerns for physician to address:  None     Zone phone for oncoming shift:   [phone]       Activity:  Activity Level: Bed Rest  Number times ambulated in hallways past shift: 0  Number of times OOB to chair past shift: 0    Cardiac:   Cardiac Monitoring: Yes      Cardiac Rhythm: Sinus Rhythm,BBB,1\" AV Block    Access:   Current line(s): PIV     Genitourinary:   Urinary status: voiding, incontinent and external catheter    Respiratory:   O2 Device: None (Room air)  Chronic home O2 use?: N/A  Incentive spirometer at bedside: N/A       GI:  Last Bowel Movement Date: 04/25/22  Current diet:  ADULT DIET Easy to Chew; Low Fat/Low Chol/High Fiber/2 gm Na  Passing flatus: YES  Tolerating current diet: YES       Pain Management:   Patient states pain is manageable on current regimen: N/A    Skin:  Victorino Score: 16  Interventions: turn team, float heels, increase time out of bed, foam dressing, PT/OT consult and internal/external urinary devices    Patient Safety:  Fall Score:  Total Score: 3  Interventions: bed/chair alarm, assistive device (walker, cane, etc), gripper socks, pt to call before getting OOB and tele sitter   High Fall Risk: Yes    Length of Stay:  Expected LOS: - - -  Actual LOS: 2      Maria Luisa Parada RN

## 2022-04-27 NOTE — PROGRESS NOTES
Problem: Falls - Risk of  Goal: *Absence of Falls  Description: Document [place] Fall Risk and appropriate interventions in the flowsheet.   Outcome: Progressing Towards Goal  Note: Fall Risk Interventions:  Mobility Interventions: Bed/chair exit alarm,OT consult for ADLs,Patient to call before getting OOB,PT Consult for assist device competence,Strengthening exercises (ROM-active/passive),Utilize walker, cane, or other assistive device,PT Consult for mobility concerns    Mentation Interventions: Adequate sleep, hydration, pain control,Bed/chair exit alarm,Door open when patient unattended,Increase mobility,More frequent rounding,Reorient patient,Room close to nurse's station,Toileting rounds,Update white board    Medication Interventions: Bed/chair exit alarm,Patient to call before getting OOB,Teach patient to arise slowly    Elimination Interventions: Bed/chair exit alarm,Call light in reach,Patient to call for help with toileting needs,Toilet paper/wipes in reach,Toileting schedule/hourly rounds              Problem: Patient Education: Go to Patient Education Activity  Goal: Patient/Family Education  Outcome: Progressing Towards Goal     Problem: Deep Venous Thrombosis - Risk of  Goal: *Absence of deep venous thrombosis signs and symptoms(Stroke Metric)  Outcome: Progressing Towards Goal  Goal: *Absence of impaired coagulation signs and symptoms  Outcome: Progressing Towards Goal  Goal: *Knowledge of prescribed medications  Outcome: Progressing Towards Goal  Goal: *Absence of bleeding  Outcome: Progressing Towards Goal     Problem: Patient Education: Go to Patient Education Activity  Goal: Patient/Family Education  Outcome: Progressing Towards Goal     Problem: Patient Education: Go to Patient Education Activity  Goal: Patient/Family Education  Outcome: Progressing Towards Goal     Problem: Patient Education: Go to Patient Education Activity  Goal: Patient/Family Education  Outcome: Progressing Towards Goal Problem: Pressure Injury - Risk of  Goal: *Prevention of pressure injury  Description: Document Victorino Scale and appropriate interventions in the flowsheet. Outcome: Progressing Towards Goal  Note: Pressure Injury Interventions:  Sensory Interventions: Assess changes in LOC,Assess need for specialty bed,Avoid rigorous massage over bony prominences,Check visual cues for pain,Discuss PT/OT consult with provider,Float heels,Keep linens dry and wrinkle-free,Maintain/enhance activity level,Minimize linen layers,Monitor skin under medical devices,Pad between skin to skin,Pressure redistribution bed/mattress (bed type),Turn and reposition approx. every two hours (pillows and wedges if needed),Use 30-degree side-lying position    Moisture Interventions: Absorbent underpads,Apply protective barrier, creams and emollients,Check for incontinence Q2 hours and as needed,Internal/External urinary devices,Maintain skin hydration (lotion/cream),Minimize layers,Moisture barrier,Offer toileting Q_hr    Activity Interventions: Assess need for specialty bed,Pressure redistribution bed/mattress(bed type),PT/OT evaluation    Mobility Interventions: Assess need for specialty bed,Float heels,HOB 30 degrees or less,Pressure redistribution bed/mattress (bed type),PT/OT evaluation,Turn and reposition approx.  every two hours(pillow and wedges)    Nutrition Interventions: Document food/fluid/supplement intake,Offer support with meals,snacks and hydration    Friction and Shear Interventions: Feet elevated on foot rest,Foam dressings/transparent film/skin sealants,HOB 30 degrees or less,Lift sheet,Minimize layers,Transferring/repositioning devices                Problem: Patient Education: Go to Patient Education Activity  Goal: Patient/Family Education  Outcome: Progressing Towards Goal

## 2022-04-28 VITALS
RESPIRATION RATE: 16 BRPM | SYSTOLIC BLOOD PRESSURE: 116 MMHG | WEIGHT: 111.77 LBS | OXYGEN SATURATION: 96 % | DIASTOLIC BLOOD PRESSURE: 58 MMHG | TEMPERATURE: 98.6 F | HEIGHT: 60 IN | BODY MASS INDEX: 21.94 KG/M2 | HEART RATE: 75 BPM

## 2022-04-28 PROCEDURE — 74011250637 HC RX REV CODE- 250/637: Performed by: INTERNAL MEDICINE

## 2022-04-28 PROCEDURE — 3331090001 HH PPS REVENUE CREDIT

## 2022-04-28 PROCEDURE — 74011250637 HC RX REV CODE- 250/637: Performed by: STUDENT IN AN ORGANIZED HEALTH CARE EDUCATION/TRAINING PROGRAM

## 2022-04-28 PROCEDURE — 99239 HOSP IP/OBS DSCHRG MGMT >30: CPT | Performed by: INTERNAL MEDICINE

## 2022-04-28 PROCEDURE — 3331090002 HH PPS REVENUE DEBIT

## 2022-04-28 RX ADMIN — FAMOTIDINE 20 MG: 20 TABLET ORAL at 09:44

## 2022-04-28 RX ADMIN — Medication 1 TABLET: at 09:44

## 2022-04-28 RX ADMIN — APIXABAN 10 MG: 5 TABLET, FILM COATED ORAL at 11:08

## 2022-04-28 RX ADMIN — LISINOPRIL 10 MG: 10 TABLET ORAL at 09:44

## 2022-04-28 RX ADMIN — ATORVASTATIN CALCIUM 20 MG: 20 TABLET, FILM COATED ORAL at 09:44

## 2022-04-28 RX ADMIN — AMLODIPINE BESYLATE 5 MG: 5 TABLET ORAL at 09:44

## 2022-04-28 RX ADMIN — SERTRALINE 50 MG: 50 TABLET, FILM COATED ORAL at 09:44

## 2022-04-28 RX ADMIN — MEMANTINE 10 MG: 10 TABLET ORAL at 09:44

## 2022-04-28 NOTE — PROGRESS NOTES
DISCHARGE SUMMARY FROM [facility] NURSE    The patient is stable for discharge. I have reviewed the discharge instructions with the patient and caregiver. The patient and caregiver verbalized understanding. All questions were fully answered. The patient and caregiver verbalized no complaints. Hard scripts and medication handouts were given and reviewed with the patient and caregiver. Appropriate educational materials and medication side effects teaching were also provided. Cardiac monitor and IV line(s) were removed. The following personal items collected during admission were returned to the patient/family  Home medications: None  Dental Appliance: Dental Appliances: None  Vision: Visual Aid: None  Hearing Aid:  None  Jewelry: Jewelry: None  Clothing: Clothing: None  Other Valuables: Other Valuables: None  Valuables sent to safe: There were no personal belongings, valuables or home medications left at patient's bedside,  or safe.

## 2022-04-28 NOTE — PROGRESS NOTES
[address] [facility] follow-up PCP transitional care appointment has been scheduled with Dr. Melissa Rivero on 5/3/22 at 1000. Pending patient discharge. Chad Bradley, Care Management Assistant     Attempted to schedule hospital follow up PCP appointment. Sent a message to PCP office to find patient an appointment. Awaiting callback from PCP office.  Job Lilly

## 2022-04-28 NOTE — DISCHARGE SUMMARY
Physician Discharge Summary     Patient ID:  Jayna Hopkins  [id number]  24 y.o.  9/12/1928    Admit date: 4/25/2022    Discharge date: 4/28/2022    Admission Diagnoses: DVT (deep venous thrombosis) ([facility] [I82.409]    Discharge Diagnoses:  Principal Diagnosis                                               Active Problems:    DVT (deep venous thrombosis) ([facility] [address] (4/25/2022)       Consults: Vascular Surgery    Significant Diagnostic Studies:     Duplex lower ext venous: Thrombus present in the right common femoral vein. Thrombus present in the right great saphenous thigh vein. Thrombus present in the right saphenofemoral junction. Thrombus present in the right proximal femoral vein. Thrombus present in the right mid femoral vein. Thrombus present in the right distal femoral vein. CTA chest:   Minimal bibasilar atelectasis. No evidence of pulmonary embolism. Hospital Course: Ms. Jayna Hopkins is a patient of mine who presented with the problems above. See the initial H and P for full details. From Dr. Deangelo Davies note:     CHIEF COMPLAINT: Right leg swelling and pain     HISTORY OF PRESENT ILLNESS:     Rosalva Marte is a 80 y.o.  female with past medical history of hypertension, hyperlipidemia, GERD, asthma, and others as listed below who presents with right leg pain and swelling swelling and pain. Per family swelling was first noted about 3 to 4 days ago. they noted right foot swelling which progressively increased to involve the whole right leg at which time they decided to bring her to the hospital.  Patient lives with her son. She is n spent most of her day in bed. She uses wheelchair. Family make sure at least that she gets out of bed once a day. Denies prior history of DVT/PE. Denies recent surgery. Her daughter has history of DVT which was  felt to be related to birth control pills. Patient otherwise denies chest pain, palpitation. Has some shortness of breath which is chronic.      By problem. .. 1. DVT (deep venous thrombosis) ([facility] [address] (4/25/2022), R ileofemoral: She was evaluated by vascular surgery who have declined intervention. Anticoagulation with eliquis will be started--dangers discussed with family. 2. Dementia: Ongoing, severe. 3. Hypertension: BP high today. 4. Anemia: Mild. Watch labs. 5. Asthma: Controlled. 6. GERD: PPI. 7. CODE: DNR. Reviewed her scanned advanced directive \"declining resuscitative measures\" and discussed with family who agree with this. Discharge Exam:  See progress notes. Disposition: home    Patient Instructions:   Current Discharge Medication List      START taking these medications    Details   apixaban (ELIQUIS) 5 mg tablet Take 2 tablets twice a day for 7 days. Then take one tablet twice a day thereafter. Qty: 74 Tablet, Refills: 0         CONTINUE these medications which have NOT CHANGED    Details   multivitamin, tx-iron-ca-min (THERA-M w/ IRON) 9 mg iron-400 mcg tab tablet Take 1 Tablet by mouth daily. take 1 tablet by mouth daily. CALCIUM CARBONATE PO Take 750 mg by mouth as needed for PRN Reason (Other) (reflux). chew 2-4 tablets as needed for symptoms of reflux or stomach upset. sertraline (ZOLOFT) 50 mg tablet Take 1 Tablet by mouth daily. Qty: 90 Tablet, Refills: 3      memantine (NAMENDA) 10 mg tablet TAKE 1 TABLET BY MOUTH TWICE DAILY  Qty: 60 Tablet, Refills: 5      ergocalciferol (ERGOCALCIFEROL) 1,250 mcg (50,000 unit) capsule Take 1 Capsule by mouth every seven (7) days. Qty: 4 Capsule, Refills: 11      acetaminophen (TYLENOL) 325 mg tablet Take 2 Tablets by mouth every six (6) hours. Qty: 1 Tablet, Refills: 0      polyethylene glycol (MIRALAX) 17 gram packet Take 1 Packet by mouth daily. Qty: 1 Each, Refills: 0      famotidine (PEPCID) 20 mg tablet Take 1 Tablet by mouth two (2) times a day.   Qty: 60 Tablet, Refills: 0      atorvastatin (LIPITOR) 20 mg tablet TAKE 1 TABLET BY MOUTH EVERY DAY  Qty: 90 Tab, Refills: 0      amLODIPine (NORVASC) 2.5 mg tablet Take 1 Tab by mouth daily. Qty: 90 Tab, Refills: 5      lisinopriL (PRINIVIL, ZESTRIL) 10 mg tablet Take 1 Tab by mouth daily. Qty: 90 Tab, Refills: 3      albuterol (PROVENTIL HFA, VENTOLIN HFA, PROAIR HFA) 90 mcg/actuation inhaler Take 1 Puff by inhalation as needed for Wheezing. Qty: 1 Inhaler, Refills: 5         STOP taking these medications       doxycycline (VIBRAMYCIN) 100 mg capsule Comments:   Reason for Stopping:         MULTIVITAMINS (MULTIVITAMIN PO) Comments:   Reason for Stopping:             Recommended Diet: Regular Diet and Resume previous diet  Recommended Activity: PT/OT per Home Health. Recommend compression and elevation of leg to reduce swelling. Follow-up With  Details  Why  Contact Luis Estrada MD    as needed for worsening edema. [address]  [facility]  [phone]   Lyndon Enrique MD      [address]  [facility] [address]  [address]   [address]    This is your home health provider. If you do not hear from them within 24-48 hours please contact them directly! [address]  [facility] [address]  [phone]         Signed:  Magy Lopez MD  4/28/2022  8:43 AM    Note: Greater than 30 minutes were spent on activities related to this discharge.

## 2022-04-28 NOTE — PROGRESS NOTES
Ready from CM standpoint. CM has completed SMAART tool & placed on pt's door for RN to complete. Transition of Care Plan:     RUR: 10% \"low risk\"  Disposition: Home with rebekah [address] (PT/OT/SN), caregivers, & follow-up appts  Follow up appointments: PCP, Vascular- details in AVS  DME needed: No addtl needs  Transportation at Discharge: Dtr; eta 11 AM  [place] or means to access home: Yes  IM Medicare Letter: Reviewed   Caregiver Contact: Sheridan Hannah, [phone]  Discharge Caregiver contacted prior to discharge? Yes  Care Conference needed?:     Reviewed pt's chart. Confirmed discharge plan with pt's dtr Miguel Angel Pedro) via phone re: home with rebekah [address] & follow-up appts. Reports no questions or concerns for discharge. Dtr to transport pt home; eta 11 AM.    Care Management Interventions  PCP Verified by CM: Yes  Mode of Transport at Discharge:  Other (see comment) (Family at 6 AM)  Hospital Transport Time of Discharge: 1100  Transition of Care Consult (CM Consult): [address] [place] [address],[address] [id number]: Yes  Discharge Durable Medical Equipment: No  Physical Therapy Consult: Yes  Occupational Therapy Consult: Yes  Support Systems: Child(tanmay),Other Family Member(s),Caregiver/Home Care Staff  Confirm Follow Up Transport: Family  The Plan for Transition of Care is Related to the Following Treatment Goals : Home with rebekah [address] & follow-up appts  The Patient and/or Patient Representative was Provided with a Choice of Provider and Agrees with the Discharge Plan?: Yes  Name of the Patient Representative Who was Provided with a Choice of Provider and Agrees with the Discharge Plan: Dtr- Danial Crawford  Discharge Location  Patient Expects to be Discharged to[de-identified] Home with home health    SUSANNE Isaac  Care Management

## 2022-04-28 NOTE — PROGRESS NOTES
Pharmacist Discharge Medication Reconciliation    Significant PMH:   Past Medical History:   Diagnosis Date    Adverse effect of anesthesia     Combative, confused for extended period of time with anesthesia    Anemia NEC     Asthma     Cervical spondylarthritis     Depression     beginning stages of dementia, sundowning    DJD (degenerative joint disease) of knee     Esophageal spasm     Has required esophageal dilatation; Dr. Ellen Alfred    GERD (gastroesophageal reflux disease)     Hallucination     Hypercholesterolemia     Hypertension     Lumbar stenosis     Neuropathy, upper extremity     left    Vertigo [id number]    Saw Dr. Cuca Porras; improved. Vitamin D deficiency 1/28/2011     Encounter Diagnoses:   Encounter Diagnosis   Name Primary? Deep vein thrombosis (DVT) of proximal lower extremity, unspecified chronicity, unspecified laterality (HCC) Yes     Allergies: Diclofenac, Gabapentin, Hydrochlorothiazide, and Pcn [penicillins]    Discharge Medications:   Current Discharge Medication List        START taking these medications    Details   apixaban (ELIQUIS) 5 mg tablet Take 2 tablets twice a day for 7 days. Then take one tablet twice a day thereafter. Qty: 74 Tablet, Refills: 0  Start date: 4/28/2022           CONTINUE these medications which have NOT CHANGED    Details   multivitamin, tx-iron-ca-min (THERA-M w/ IRON) 9 mg iron-400 mcg tab tablet Take 1 Tablet by mouth daily. take 1 tablet by mouth daily. CALCIUM CARBONATE PO Take 750 mg by mouth as needed for PRN Reason (Other) (reflux). chew 2-4 tablets as needed for symptoms of reflux or stomach upset. sertraline (ZOLOFT) 50 mg tablet Take 1 Tablet by mouth daily. Qty: 90 Tablet, Refills: 3      memantine (NAMENDA) 10 mg tablet TAKE 1 TABLET BY MOUTH TWICE DAILY  Qty: 60 Tablet, Refills: 5      ergocalciferol (ERGOCALCIFEROL) 1,250 mcg (50,000 unit) capsule Take 1 Capsule by mouth every seven (7) days.   Qty: 4 Capsule, Refills: 11 acetaminophen (TYLENOL) 325 mg tablet Take 2 Tablets by mouth every six (6) hours. Qty: 1 Tablet, Refills: 0      polyethylene glycol (MIRALAX) 17 gram packet Take 1 Packet by mouth daily. Qty: 1 Each, Refills: 0      famotidine (PEPCID) 20 mg tablet Take 1 Tablet by mouth two (2) times a day. Qty: 60 Tablet, Refills: 0      atorvastatin (LIPITOR) 20 mg tablet TAKE 1 TABLET BY MOUTH EVERY DAY  Qty: 90 Tab, Refills: 0      amLODIPine (NORVASC) 2.5 mg tablet Take 1 Tab by mouth daily. Qty: 90 Tab, Refills: 5      lisinopriL (PRINIVIL, ZESTRIL) 10 mg tablet Take 1 Tab by mouth daily. Qty: 90 Tab, Refills: 3      albuterol (PROVENTIL HFA, VENTOLIN HFA, PROAIR HFA) 90 mcg/actuation inhaler Take 1 Puff by inhalation as needed for Wheezing. Qty: 1 Inhaler, Refills: 5           STOP taking these medications       doxycycline (VIBRAMYCIN) 100 mg capsule Comments:   Reason for Stopping:         MULTIVITAMINS (MULTIVITAMIN PO) Comments:   Reason for Stopping:               The patient's chart, MAR and AVS were reviewed by Shiv Olivia [facility].     Discharging Provider: Jakub Lazo MD    Thank you,     Shiv Olivia, [address] [facility]

## 2022-04-28 NOTE — PROGRESS NOTES
End of Shift Note        Shift worked:  [id number]     Shift summary and any significant changes:     Pt attempting to get out of bed several times throughout the night. Sitter at bedside redirecting patient to stay in bed. Concerns for physician to address:       Zone phone for oncoming shift:   [phone]       Activity:  Activity Level: Bed Rest  Number times ambulated in hallways past shift: 0  Number of times OOB to chair past shift: 0    Cardiac:   Cardiac Monitoring: Yes      Cardiac Rhythm: Sinus Rhythm,BBB,1\" AV Block    Access:   Current line(s): PIV     Genitourinary:   Urinary status: external catheter    Respiratory:   O2 Device: None (Room air)  Chronic home O2 use?: N/A  Incentive spirometer at bedside: N/A       GI:  Last Bowel Movement Date: 04/25/22  Current diet:  ADULT DIET Easy to Chew; Low Fat/Low Chol/High Fiber/2 gm Na  ADULT ORAL NUTRITION SUPPLEMENT Breakfast, Lunch, Dinner; Standard High Calorie/High Protein  Passing flatus: NO  Tolerating current diet: YES       Pain Management:   Patient states pain is manageable on current regimen: YES    Skin:  Victorino Score: 16  Interventions: turn team, float heels, increase time out of bed, foam dressing, PT/OT consult and internal/external urinary devices    Patient Safety:  Fall Score:  Total Score: 3  Interventions: bed/chair alarm, gripper socks, pt to call before getting OOB and sitter at bedside   High Fall Risk: Yes    Length of Stay:  Expected LOS: 2d 4h  Actual LOS: 3      Ingrid Champion RN

## 2022-04-28 NOTE — DISCHARGE INSTRUCTIONS
PATIENT DISCHARGE INSTRUCTIONS      PATIENT DISCHARGE INSTRUCTIONS    Samantha Mohamud / [id number] : 1928    Admitted 2022 Discharged: 2022       · It is important that you take the medication exactly as they are prescribed. · Keep your medication in the bottles provided by the pharmacist and keep a list of the medication names, dosages, and times to be taken in your wallet. · Do not take other medications without consulting your doctor. What to do at Home    Recommended Diet: Regular Diet and Resume previous diet    Recommended Activity: PT/OT per Home Health. Recommend compression and elevation of leg to reduce swelling.

## 2022-04-29 ENCOUNTER — PATIENT OUTREACH (OUTPATIENT)
Dept: CASE MANAGEMENT | Age: 87
End: 2022-04-29

## 2022-04-29 ENCOUNTER — HOME CARE VISIT (OUTPATIENT)
Dept: SCHEDULING | Facility: HOME HEALTH | Age: 87
End: 2022-04-29
Payer: MEDICARE

## 2022-04-29 ENCOUNTER — HOME CARE VISIT (OUTPATIENT)
Dept: HOME HEALTH SERVICES | Facility: HOME HEALTH | Age: 87
End: 2022-04-29
Payer: MEDICARE

## 2022-04-29 PROCEDURE — 3331090001 HH PPS REVENUE CREDIT

## 2022-04-29 PROCEDURE — G0299 HHS/HOSPICE OF RN EA 15 MIN: HCPCS

## 2022-04-29 PROCEDURE — 3331090002 HH PPS REVENUE DEBIT

## 2022-04-29 NOTE — PROGRESS NOTES
Care Transitions Initial Call    Call within 2 business days of discharge: Yes     Patient: Milly Yung Patient : 1928 MRN: [id number]    Last Discharge [address]       Complaint Diagnosis Description Type Department Provider    22 Leg Swelling Deep vein thrombosis (DVT) of proximal lower extremity, unspecified chronicity, unspecified laterality [facility]) ED to Hosp-Admission (Discharged) (ADMIT) Marcella Sahni MD; Rachna Ramirez... Was this an external facility discharge? No     Challenges to be reviewed by the provider   Additional needs identified to be addressed with provider:   Gerald Hendricks, son, reports patients R leg is less swollen today  Per Gerald Hendricks, pt c/o painful swallowing       Method of communication with provider : none    Discussed COVID-19 related testing which was not done at this time. Advance Care Planning:   Does patient have an Advance Directive:  yes; reviewed and current     Inpatient Readmission Risk score: Unplanned Readmit Risk Score: 9.4 ( )    Was this a readmission? no   Patient stated reason for the admission: r leg swelling    Patients top risk factors for readmission: functional cognitive ability, functional physical ability and medical condition-DVT   Interventions to address risk factors: Scheduled appointment with PCP-5/3 and Obtained and reviewed discharge summary and/or continuity of care documents    Care Transition Nurse (CTN) contacted the patient's son, Gerald Hendricks by telephone to perform post hospital discharge assessment. Verified name and  with family as identifiers. Provided introduction to self, and explanation of the CTN role. Reports pt is lying in bed. Gerald Hendricks states pt c/o that it \"hurts to swallow\" today. Gerald Hendricks said patient ate yesterday without any concerns. Denies chest pain, sob, fever. Reports R leg appears \"normal size\"today. CTN reviewed discharge instructions, medical action plan and red flags with patient who verbalized understanding.  Were discharge instructions available to patient? yes. Reviewed appropriate site of care based on symptoms and resources available to patient including: PCP and Home Health. Family given an opportunity to ask questions and does not have any further questions or concerns at this time. The family agrees to contact the PCP office for questions related to their healthcare. Medication reconciliation was performed with family, who verbalizes understanding of administration of home medications. Referral to Pharm D needed: no     Home Health/Outpatient orders at discharge: home health care, PT, OT, SN and [address]: RENETTA RASMUSSEN [facility]  Date of initial visit: 4/29/2022    Durable Medical Equipment ordered at discharge: None      Was patient discharged with a pulse oximeter? no.     Discussed follow-up appointments. If no appointment was previously scheduled, appointment scheduling offered: no. Is follow up appointment scheduled within 7 days of discharge? yes. [facility] follow up appointment(s):   Future Appointments   Date Time Provider Tyler Cagle   4/29/2022  1:00 PM Dakota Angulo [place]   4/29/2022 To Be Determined [address]   5/3/2022 10:00 AM Mikhail Dubois MD [facility] BS AMB   5/23/2022  2:00 PM Mikhail Dubois MD [facility] BS AMB   8/16/2022  1:30 PM Mikhail Dubois MD [facility] BS AMB     Non-[facility] follow up appointment(s): NA    Plan for follow-up call in 5-7 days based on severity of symptoms and risk factors. Plan for next call: self management-R leg, follow up appointment-PCP and medication management-eliquis  CTN provided contact information for future needs. Goals Addressed                 This Visit's Progress     Prevent complications post hospitalization.           04/29/22   Will not fall    Will report compliance with Eliquis   Reviewed s.e of Eliquis, risk for bleeding   Pt will remain out of the hospital or ER for remainder of JOSEPH period

## 2022-04-30 PROCEDURE — 3331090001 HH PPS REVENUE CREDIT

## 2022-04-30 PROCEDURE — 3331090002 HH PPS REVENUE DEBIT

## 2022-05-01 VITALS
OXYGEN SATURATION: 97 % | HEART RATE: 76 BPM | SYSTOLIC BLOOD PRESSURE: 108 MMHG | TEMPERATURE: 98 F | DIASTOLIC BLOOD PRESSURE: 80 MMHG | RESPIRATION RATE: 18 BRPM

## 2022-05-01 LAB
BACTERIA SPEC CULT: NORMAL
SERVICE CMNT-IMP: NORMAL

## 2022-05-01 PROCEDURE — A6250 SKIN SEAL PROTECT MOISTURIZR: HCPCS

## 2022-05-01 PROCEDURE — 3331090001 HH PPS REVENUE CREDIT

## 2022-05-01 PROCEDURE — A6212 FOAM DRG <=16 SQ IN W/BORDER: HCPCS

## 2022-05-01 PROCEDURE — A9270 NON-COVERED ITEM OR SERVICE: HCPCS

## 2022-05-01 PROCEDURE — 3331090002 HH PPS REVENUE DEBIT

## 2022-05-02 PROCEDURE — 3331090002 HH PPS REVENUE DEBIT

## 2022-05-02 PROCEDURE — 3331090001 HH PPS REVENUE CREDIT

## 2022-05-03 ENCOUNTER — HOME CARE VISIT (OUTPATIENT)
Dept: HOME HEALTH SERVICES | Facility: HOME HEALTH | Age: 87
End: 2022-05-03
Payer: MEDICARE

## 2022-05-03 ENCOUNTER — OFFICE VISIT (OUTPATIENT)
Dept: INTERNAL MEDICINE CLINIC | Age: 87
End: 2022-05-03
Payer: MEDICARE

## 2022-05-03 VITALS
BODY MASS INDEX: 21.83 KG/M2 | HEART RATE: 75 BPM | DIASTOLIC BLOOD PRESSURE: 67 MMHG | TEMPERATURE: 98.9 F | SYSTOLIC BLOOD PRESSURE: 107 MMHG | OXYGEN SATURATION: 96 % | HEIGHT: 60 IN | RESPIRATION RATE: 18 BRPM

## 2022-05-03 DIAGNOSIS — G30.9 ALZHEIMER'S DISEASE, UNSPECIFIED (CODE) (HCC): ICD-10-CM

## 2022-05-03 DIAGNOSIS — R26.89 IMBALANCE: ICD-10-CM

## 2022-05-03 DIAGNOSIS — I82.4Y1 ACUTE DEEP VEIN THROMBOSIS (DVT) OF PROXIMAL VEIN OF RIGHT LOWER EXTREMITY (HCC): Primary | ICD-10-CM

## 2022-05-03 DIAGNOSIS — Z09 HOSPITAL DISCHARGE FOLLOW-UP: ICD-10-CM

## 2022-05-03 DIAGNOSIS — E43 UNSPECIFIED SEVERE PROTEIN-CALORIE MALNUTRITION (HCC): ICD-10-CM

## 2022-05-03 PROCEDURE — 99496 TRANSJ CARE MGMT HIGH F2F 7D: CPT | Performed by: INTERNAL MEDICINE

## 2022-05-03 PROCEDURE — 3331090001 HH PPS REVENUE CREDIT

## 2022-05-03 PROCEDURE — 1111F DSCHRG MED/CURRENT MED MERGE: CPT | Performed by: INTERNAL MEDICINE

## 2022-05-03 PROCEDURE — G8427 DOCREV CUR MEDS BY ELIG CLIN: HCPCS | Performed by: INTERNAL MEDICINE

## 2022-05-03 PROCEDURE — 3331090002 HH PPS REVENUE DEBIT

## 2022-05-03 NOTE — PROGRESS NOTES
PROGRESS NOTE  Name: Cornelio Underwood   : 1928       ASSESSMENT/ PLAN:   1. DVT (deep venous thrombosis) ([address] (2022), R ileofemoral: She was evaluated by vascular surgery who have declined intervention. Anticoagulation with eliquis will be continued. 2. Dementia: Ongoing, severe. 3. Hypertension: BP normal today. 4. Anemia: Mild. 5. Asthma: Controlled. 6. GERD: PPI. 7. CODE: DNR. 8. Family asks about long term care--This is reasonable to look into. SUBJECTIVE  Ms. Cornelio Underwood presents today acutely for     Chief Complaint   Patient presents with   [facility] Follow Up     DVT in rt leg,     Depression    Decreased Appetite     maybe eating 1 meal  aday, ensure 2 a day right now , not taking in much fluids , no issues getting down meds        She was in the hospital on my service:     Hospital Course: Ms. Cornelio Underwood is a patient of mine who presented with the problems above. See the initial H and P for full details. From Dr. Jules Cunningham note:      CHIEF COMPLAINT: Right leg swelling and pain     HISTORY OF PRESENT ILLNESS:     Sally Mehta is a 80 y. o.  female with past medical history of hypertension, hyperlipidemia, GERD, asthma, and others as listed below who presents with right leg pain and swelling swelling and pain.  Per family swelling was first noted about 3 to 4 days ago. Sotero Alvarez noted right foot swelling which progressively increased to involve the whole right leg at which time they decided to bring her to the hospital.  Patient lives with her son. Parminder Schneider is n spent most of her day in bed.  She uses wheelchair.  Family make sure at least that she gets out of bed once a day.  Denies prior history of DVT/PE.  Denies recent surgery.  Her daughter has history of DVT which was  felt to be related to birth control pills.  Patient otherwise denies chest pain, palpitation.  Has some shortness of breath which is chronic.      By problem. ..     1.  DVT (deep venous thrombosis) ([facility] (4/25/2022), R ileofemoral: She was evaluated by vascular surgery who have declined intervention. Anticoagulation with eliquis will be started--dangers discussed with family. 2. Dementia: Ongoing, severe. 3. Hypertension: BP high today. 4. Anemia: Mild. Watch labs. 5. Asthma: Controlled. 6. GERD: PPI. 7. CODE: DNR. Reviewed her scanned advanced directive \"declining resuscitative measures\" and discussed with family who agree with this. She is back home. She is breathing okay. Still has some swelling and pain in her leg. She is wheelchair bound now. Her appetite is low. Not eating much. Uses Ensure twice a day. Past Medical History:   Diagnosis Date    Adverse effect of anesthesia     Combative, confused for extended period of time with anesthesia    Anemia NEC     Asthma     Cervical spondylarthritis     Depression     beginning stages of dementia, sundowning    DJD (degenerative joint disease) of knee     Esophageal spasm     Has required esophageal dilatation; Dr. Asad Schultz GERD (gastroesophageal reflux disease)     Hallucination     Hypercholesterolemia     Hypertension     Lumbar stenosis     Neuropathy, upper extremity     left    Vertigo [id number]    Saw Dr. Jenae Maldonado; improved.  Vitamin D deficiency 1/28/2011     Current Outpatient Medications on File Prior to Visit   Medication Sig Dispense Refill    apixaban (ELIQUIS) 5 mg tablet Take 2 tablets twice a day for 7 days. Then take one tablet twice a day thereafter. 74 Tablet 0    multivitamin, tx-iron-ca-min (THERA-M w/ IRON) 9 mg iron-400 mcg tab tablet Take 1 Tablet by mouth daily. take 1 tablet by mouth daily.  CALCIUM CARBONATE PO Take 750 mg by mouth as needed for PRN Reason (Other) (reflux). chew 2-4 tablets as needed for symptoms of reflux or stomach upset.  sertraline (ZOLOFT) 50 mg tablet Take 1 Tablet by mouth daily.  90 Tablet 3    memantine (NAMENDA) 10 mg tablet TAKE 1 TABLET BY MOUTH TWICE DAILY 60 Tablet 5    ergocalciferol (ERGOCALCIFEROL) 1,250 mcg (50,000 unit) capsule Take 1 Capsule by mouth every seven (7) days. 4 Capsule 11    acetaminophen (TYLENOL) 325 mg tablet Take 2 Tablets by mouth every six (6) hours. 1 Tablet 0    polyethylene glycol (MIRALAX) 17 gram packet Take 1 Packet by mouth daily. 1 Each 0    famotidine (PEPCID) 20 mg tablet Take 1 Tablet by mouth two (2) times a day. 60 Tablet 0    atorvastatin (LIPITOR) 20 mg tablet TAKE 1 TABLET BY MOUTH EVERY DAY 90 Tab 0    amLODIPine (NORVASC) 2.5 mg tablet Take 1 Tab by mouth daily. 90 Tab 5    lisinopriL (PRINIVIL, ZESTRIL) 10 mg tablet Take 1 Tab by mouth daily. 90 Tab 3    albuterol (PROVENTIL HFA, VENTOLIN HFA, PROAIR HFA) 90 mcg/actuation inhaler Take 1 Puff by inhalation as needed for Wheezing. 1 Inhaler 5     No current facility-administered medications on file prior to visit. ROS: Complete review of systems was performed and is otherwise unremarkable except as noted elsewhere. OBJECTIVE  Visit Vitals  /67 (BP 1 [place] Left arm, BP Patient Position: Sitting, BP Cuff Size: Small adult)   Pulse 75   Temp 98.9 °F (37.2 °C) (Temporal)   Resp 18   Ht 5' (1.524 m)   SpO2 96%   BMI 21.83 kg/m²     Gen: Pleasant 80 y.o.  female in NAD. She is in wheelchair. She naps most of the time, and speaks little. HEENT: PERRLA. EOMI. OP moist and pink. Neck: Supple. No LAD. HEART: RRR, No M/G/R.   LUNGS: CTAB No W/R. ABDOMEN: S, NT, ND, BS+. EXTREMITIES: Warm. L LE swelling noted. Duplex lower ext venous: Thrombus present in the right common femoral vein. Thrombus present in the right great saphenous thigh vein. Thrombus present in the right saphenofemoral junction. Thrombus present in the right proximal femoral vein. Thrombus present in the right mid femoral vein. Thrombus present in the right distal femoral vein.      CTA chest:   Minimal bibasilar atelectasis. No evidence of pulmonary embolism.

## 2022-05-04 PROCEDURE — 3331090001 HH PPS REVENUE CREDIT

## 2022-05-04 PROCEDURE — 3331090002 HH PPS REVENUE DEBIT

## 2022-05-05 ENCOUNTER — HOME CARE VISIT (OUTPATIENT)
Dept: SCHEDULING | Facility: HOME HEALTH | Age: 87
End: 2022-05-05
Payer: MEDICARE

## 2022-05-05 VITALS
TEMPERATURE: 97.3 F | SYSTOLIC BLOOD PRESSURE: 105 MMHG | OXYGEN SATURATION: 99 % | DIASTOLIC BLOOD PRESSURE: 68 MMHG | HEART RATE: 72 BPM | RESPIRATION RATE: 18 BRPM

## 2022-05-05 PROCEDURE — 3331090002 HH PPS REVENUE DEBIT

## 2022-05-05 PROCEDURE — 3331090001 HH PPS REVENUE CREDIT

## 2022-05-05 PROCEDURE — G0152 HHCP-SERV OF OT,EA 15 MIN: HCPCS

## 2022-05-05 PROCEDURE — G0299 HHS/HOSPICE OF RN EA 15 MIN: HCPCS

## 2022-05-06 ENCOUNTER — PATIENT OUTREACH (OUTPATIENT)
Dept: CASE MANAGEMENT | Age: 87
End: 2022-05-06

## 2022-05-06 VITALS
DIASTOLIC BLOOD PRESSURE: 64 MMHG | RESPIRATION RATE: 18 BRPM | TEMPERATURE: 98.1 F | HEART RATE: 70 BPM | OXYGEN SATURATION: 98 % | SYSTOLIC BLOOD PRESSURE: 122 MMHG

## 2022-05-06 PROCEDURE — 3331090001 HH PPS REVENUE CREDIT

## 2022-05-06 PROCEDURE — 3331090002 HH PPS REVENUE DEBIT

## 2022-05-06 NOTE — PROGRESS NOTES
Care Transitions Follow Up Call    Care Transition Nurse (CTN) contacted the patient by telephone to follow up after admission on 4/25/2022. Reports hospital bed is been delivered today. Reports tolerating PO. Denies dysphagia. No bladder, bowel concerns. Addressed changes since last contact: none  Follow up appointment completed? yes. Was follow up appointment scheduled within 7 days of discharge? no.     CTN reviewed medical action plan and red flags with patient and discussed any barriers to care and/or understanding of plan of care after discharge. Discussed appropriate site of care based on symptoms and resources available to patient including: Home Health.      Patients top risk factors for readmission: medical condition-DVT   Interventions to address risk factors: Scheduled appointment with PCP-5/23    [facility] follow up appointment(s):   Future Appointments   Date Time Provider Tyler Cagle   5/9/2022 To Be Determined Sonia Ricketts OT [place]   5/10/2022 To Be Determined Sergey Trevino RN [facility]   5/11/2022 To Be Determined Sonia Ricketts OT [facility]   5/12/2022 To Be Determined Sergey Trevino RN [facility]   5/17/2022 To Be Determined Sergey Trevino RN [facility]   5/19/2022 To Be Determined Sergey Trevino RN [address]   5/23/2022  2:00 PM Alejandra Mcclelland MD [facility] BS AMB   5/24/2022 To Be Determined [facility] [place]   5/26/2022 To Be Determined Sergey Trevino RN [facility]   5/31/2022 To Be Determined Sergey Trevino RN [address]   6/2/2022 To Be Determined Sergey Trevino RN [address]   6/7/2022 To Be Determined Sergey Trevino RN [address]   6/9/2022 To Be Determined Sergey Trevino RN [address]   8/16/2022  1:30 PM Alejandra Mcclelland MD [facility] BS AMB     Non-BS follow up appointment(s): [place]    CTN provided contact information for future needs. Plan for follow-up call in 7-10 days based on severity of symptoms and risk factors. Plan for next call: symptom management-any bleeding     Goals Addressed                 This Visit's Progress     Prevent complications post hospitalization.    On track       04/29/22   Will not fall    Will report compliance with Eliquis   Reviewed s.e of Eliquis, risk for bleeding   Pt will remain out of the hospital or ER for remainder of JOSEPH period    05/06/22   No falls   Reports compliance with Eliquis as prescribed   Denies bleeding   Will continue to monitor for bleeding   Attended follow up appt with PCP

## 2022-05-07 PROCEDURE — 3331090002 HH PPS REVENUE DEBIT

## 2022-05-07 PROCEDURE — 3331090001 HH PPS REVENUE CREDIT

## 2022-05-08 PROCEDURE — 3331090002 HH PPS REVENUE DEBIT

## 2022-05-08 PROCEDURE — 3331090001 HH PPS REVENUE CREDIT

## 2022-05-09 ENCOUNTER — HOME CARE VISIT (OUTPATIENT)
Dept: SCHEDULING | Facility: HOME HEALTH | Age: 87
End: 2022-05-09
Payer: MEDICARE

## 2022-05-09 VITALS
OXYGEN SATURATION: 97 % | TEMPERATURE: 97.1 F | SYSTOLIC BLOOD PRESSURE: 122 MMHG | RESPIRATION RATE: 18 BRPM | HEART RATE: 67 BPM | DIASTOLIC BLOOD PRESSURE: 66 MMHG

## 2022-05-09 VITALS
OXYGEN SATURATION: 98 % | DIASTOLIC BLOOD PRESSURE: 62 MMHG | HEART RATE: 77 BPM | SYSTOLIC BLOOD PRESSURE: 120 MMHG | TEMPERATURE: 97.6 F

## 2022-05-09 PROCEDURE — G0152 HHCP-SERV OF OT,EA 15 MIN: HCPCS

## 2022-05-09 PROCEDURE — G0299 HHS/HOSPICE OF RN EA 15 MIN: HCPCS

## 2022-05-09 PROCEDURE — 3331090001 HH PPS REVENUE CREDIT

## 2022-05-09 PROCEDURE — 3331090002 HH PPS REVENUE DEBIT

## 2022-05-10 PROCEDURE — 3331090002 HH PPS REVENUE DEBIT

## 2022-05-10 PROCEDURE — 3331090001 HH PPS REVENUE CREDIT

## 2022-05-11 PROCEDURE — 3331090001 HH PPS REVENUE CREDIT

## 2022-05-11 PROCEDURE — 3331090002 HH PPS REVENUE DEBIT

## 2022-05-12 ENCOUNTER — PATIENT OUTREACH (OUTPATIENT)
Dept: CASE MANAGEMENT | Age: 87
End: 2022-05-12

## 2022-05-12 ENCOUNTER — HOME CARE VISIT (OUTPATIENT)
Dept: SCHEDULING | Facility: HOME HEALTH | Age: 87
End: 2022-05-12
Payer: MEDICARE

## 2022-05-12 VITALS
HEART RATE: 69 BPM | DIASTOLIC BLOOD PRESSURE: 70 MMHG | SYSTOLIC BLOOD PRESSURE: 137 MMHG | TEMPERATURE: 97.7 F | OXYGEN SATURATION: 97 %

## 2022-05-12 PROCEDURE — 3331090002 HH PPS REVENUE DEBIT

## 2022-05-12 PROCEDURE — G0152 HHCP-SERV OF OT,EA 15 MIN: HCPCS

## 2022-05-12 PROCEDURE — 3331090001 HH PPS REVENUE CREDIT

## 2022-05-12 NOTE — PROGRESS NOTES
Care Transitions Follow Up Call    Care Transition Nurse (CTN) contacted the family by telephone to follow up after admission on 4/25/2022. Spoke to son. Reports patient is currently sleeping. States patient's R leg is still a \"little swollen\". Denies leg pain. Reports compliance with medications. Reports no concerns at this time. Addressed changes since last contact: none    CTN reviewed medical action plan and red flags with family and discussed any barriers to care and/or understanding of plan of care after discharge. Patients top risk factors for readmission: NA   Interventions to address risk factors: NA    [facility] follow up appointment(s):   Future Appointments   Date Time Provider Tyler Cagle   5/12/2022 12:30 PM Vane Purdy [facility]   5/17/2022 To Be Determined Corona Vergara RN [facility]   5/19/2022 To Be Determined Corona Vergara RN [address]   5/23/2022  2:00 PM Tobin Villegas MD [facility] BS AMB   5/24/2022 To Be Determined Corona Vergara RN [facility] [address]   5/26/2022 To Be Determined Corona Vergara RN [facility]   5/31/2022 To Be Determined Corona Vergara RN [facility]   6/2/2022 To Be Determined Corona Vergara RN [address]   6/7/2022 To Be Determined Corona Vergara RN [address]   6/9/2022 To Be Determined Corona Vergara RN [address]   8/16/2022  1:30 PM Tobin Villegas MD [facility] BS AMB     Non-[facility] follow up appointment(s): NA    CTN provided contact information for future needs. Plan for follow-up call in 10-14 days based on severity of symptoms and risk factors. Plan for next call: symptom management-R leg swelling     Goals Addressed                 This Visit's Progress     Prevent complications post hospitalization.           04/29/22   Will not fall    Will report compliance with Eliquis   Reviewed s.e of Eliquis, risk for bleeding   Pt will remain out of the hospital or ER for remainder of JOSEPH period    05/06/22   No falls   Reports compliance with Eliquis as prescribed   Denies bleeding   Will continue to monitor for bleeding   Attended follow up appt with PCP

## 2022-05-13 ENCOUNTER — HOME CARE VISIT (OUTPATIENT)
Dept: SCHEDULING | Facility: HOME HEALTH | Age: 87
End: 2022-05-13
Payer: MEDICARE

## 2022-05-13 PROCEDURE — 3331090001 HH PPS REVENUE CREDIT

## 2022-05-13 PROCEDURE — 3331090002 HH PPS REVENUE DEBIT

## 2022-05-14 PROCEDURE — 3331090001 HH PPS REVENUE CREDIT

## 2022-05-14 PROCEDURE — 3331090002 HH PPS REVENUE DEBIT

## 2022-05-15 PROCEDURE — 3331090002 HH PPS REVENUE DEBIT

## 2022-05-15 PROCEDURE — 3331090001 HH PPS REVENUE CREDIT

## 2022-05-16 PROCEDURE — 3331090001 HH PPS REVENUE CREDIT

## 2022-05-16 PROCEDURE — 3331090002 HH PPS REVENUE DEBIT

## 2022-05-17 ENCOUNTER — HOME CARE VISIT (OUTPATIENT)
Dept: SCHEDULING | Facility: HOME HEALTH | Age: 87
End: 2022-05-17
Payer: MEDICARE

## 2022-05-17 VITALS
DIASTOLIC BLOOD PRESSURE: 77 MMHG | OXYGEN SATURATION: 97 % | SYSTOLIC BLOOD PRESSURE: 112 MMHG | HEART RATE: 76 BPM | TEMPERATURE: 97.6 F

## 2022-05-17 VITALS
RESPIRATION RATE: 18 BRPM | OXYGEN SATURATION: 96 % | HEART RATE: 67 BPM | SYSTOLIC BLOOD PRESSURE: 138 MMHG | DIASTOLIC BLOOD PRESSURE: 62 MMHG | TEMPERATURE: 98.1 F

## 2022-05-17 PROCEDURE — G0299 HHS/HOSPICE OF RN EA 15 MIN: HCPCS

## 2022-05-17 PROCEDURE — 3331090001 HH PPS REVENUE CREDIT

## 2022-05-17 PROCEDURE — 400013 HH SOC

## 2022-05-17 PROCEDURE — 3331090002 HH PPS REVENUE DEBIT

## 2022-05-17 PROCEDURE — G0152 HHCP-SERV OF OT,EA 15 MIN: HCPCS

## 2022-05-18 ENCOUNTER — HOME CARE VISIT (OUTPATIENT)
Dept: SCHEDULING | Facility: HOME HEALTH | Age: 87
End: 2022-05-18
Payer: MEDICARE

## 2022-05-18 VITALS
OXYGEN SATURATION: 97 % | SYSTOLIC BLOOD PRESSURE: 110 MMHG | TEMPERATURE: 97.5 F | DIASTOLIC BLOOD PRESSURE: 80 MMHG | HEART RATE: 64 BPM

## 2022-05-18 VITALS
HEART RATE: 64 BPM | OXYGEN SATURATION: 97 % | SYSTOLIC BLOOD PRESSURE: 110 MMHG | TEMPERATURE: 97.5 F | DIASTOLIC BLOOD PRESSURE: 80 MMHG | RESPIRATION RATE: 18 BRPM

## 2022-05-18 PROCEDURE — 3331090001 HH PPS REVENUE CREDIT

## 2022-05-18 PROCEDURE — G0151 HHCP-SERV OF PT,EA 15 MIN: HCPCS

## 2022-05-18 PROCEDURE — G0152 HHCP-SERV OF OT,EA 15 MIN: HCPCS

## 2022-05-18 PROCEDURE — 3331090002 HH PPS REVENUE DEBIT

## 2022-05-19 ENCOUNTER — HOME CARE VISIT (OUTPATIENT)
Dept: SCHEDULING | Facility: HOME HEALTH | Age: 87
End: 2022-05-19
Payer: MEDICARE

## 2022-05-19 VITALS
DIASTOLIC BLOOD PRESSURE: 60 MMHG | RESPIRATION RATE: 16 BRPM | TEMPERATURE: 98.3 F | HEART RATE: 76 BPM | SYSTOLIC BLOOD PRESSURE: 120 MMHG | OXYGEN SATURATION: 97 %

## 2022-05-19 VITALS
SYSTOLIC BLOOD PRESSURE: 110 MMHG | OXYGEN SATURATION: 97 % | TEMPERATURE: 97.4 F | DIASTOLIC BLOOD PRESSURE: 64 MMHG | HEART RATE: 79 BPM | RESPIRATION RATE: 18 BRPM

## 2022-05-19 PROCEDURE — 3331090002 HH PPS REVENUE DEBIT

## 2022-05-19 PROCEDURE — 3331090001 HH PPS REVENUE CREDIT

## 2022-05-19 PROCEDURE — G0151 HHCP-SERV OF PT,EA 15 MIN: HCPCS

## 2022-05-19 PROCEDURE — G0299 HHS/HOSPICE OF RN EA 15 MIN: HCPCS

## 2022-05-20 PROCEDURE — 3331090001 HH PPS REVENUE CREDIT

## 2022-05-20 PROCEDURE — 3331090002 HH PPS REVENUE DEBIT

## 2022-05-21 PROCEDURE — 3331090002 HH PPS REVENUE DEBIT

## 2022-05-21 PROCEDURE — 3331090001 HH PPS REVENUE CREDIT

## 2022-05-22 PROCEDURE — 3331090002 HH PPS REVENUE DEBIT

## 2022-05-22 PROCEDURE — 3331090001 HH PPS REVENUE CREDIT

## 2022-05-23 ENCOUNTER — HOME CARE VISIT (OUTPATIENT)
Dept: SCHEDULING | Facility: HOME HEALTH | Age: 87
End: 2022-05-23
Payer: MEDICARE

## 2022-05-23 VITALS
TEMPERATURE: 98.8 F | OXYGEN SATURATION: 94 % | DIASTOLIC BLOOD PRESSURE: 67 MMHG | HEART RATE: 75 BPM | SYSTOLIC BLOOD PRESSURE: 112 MMHG

## 2022-05-23 PROCEDURE — 3331090002 HH PPS REVENUE DEBIT

## 2022-05-23 PROCEDURE — G0152 HHCP-SERV OF OT,EA 15 MIN: HCPCS

## 2022-05-23 PROCEDURE — 3331090001 HH PPS REVENUE CREDIT

## 2022-05-24 ENCOUNTER — TELEPHONE (OUTPATIENT)
Dept: INTERNAL MEDICINE CLINIC | Age: 87
End: 2022-05-24

## 2022-05-24 ENCOUNTER — HOME CARE VISIT (OUTPATIENT)
Dept: SCHEDULING | Facility: HOME HEALTH | Age: 87
End: 2022-05-24
Payer: MEDICARE

## 2022-05-24 VITALS
SYSTOLIC BLOOD PRESSURE: 110 MMHG | TEMPERATURE: 98.1 F | OXYGEN SATURATION: 98 % | DIASTOLIC BLOOD PRESSURE: 70 MMHG | HEART RATE: 73 BPM | RESPIRATION RATE: 16 BRPM

## 2022-05-24 VITALS
OXYGEN SATURATION: 96 % | TEMPERATURE: 97.5 F | SYSTOLIC BLOOD PRESSURE: 136 MMHG | DIASTOLIC BLOOD PRESSURE: 65 MMHG | RESPIRATION RATE: 18 BRPM | HEART RATE: 73 BPM

## 2022-05-24 DIAGNOSIS — R26.89 IMBALANCE: Primary | ICD-10-CM

## 2022-05-24 PROCEDURE — G0151 HHCP-SERV OF PT,EA 15 MIN: HCPCS

## 2022-05-24 PROCEDURE — 3331090001 HH PPS REVENUE CREDIT

## 2022-05-24 PROCEDURE — G0299 HHS/HOSPICE OF RN EA 15 MIN: HCPCS

## 2022-05-24 PROCEDURE — 3331090002 HH PPS REVENUE DEBIT

## 2022-05-25 ENCOUNTER — HOME CARE VISIT (OUTPATIENT)
Dept: SCHEDULING | Facility: HOME HEALTH | Age: 87
End: 2022-05-25
Payer: MEDICARE

## 2022-05-25 VITALS
SYSTOLIC BLOOD PRESSURE: 118 MMHG | OXYGEN SATURATION: 95 % | DIASTOLIC BLOOD PRESSURE: 70 MMHG | HEART RATE: 74 BPM | TEMPERATURE: 98.2 F

## 2022-05-25 PROCEDURE — G0152 HHCP-SERV OF OT,EA 15 MIN: HCPCS

## 2022-05-25 PROCEDURE — 3331090001 HH PPS REVENUE CREDIT

## 2022-05-25 PROCEDURE — 3331090002 HH PPS REVENUE DEBIT

## 2022-05-26 PROCEDURE — 3331090002 HH PPS REVENUE DEBIT

## 2022-05-26 PROCEDURE — 3331090001 HH PPS REVENUE CREDIT

## 2022-05-27 ENCOUNTER — HOME CARE VISIT (OUTPATIENT)
Dept: SCHEDULING | Facility: HOME HEALTH | Age: 87
End: 2022-05-27
Payer: MEDICARE

## 2022-05-27 ENCOUNTER — TELEPHONE (OUTPATIENT)
Dept: INTERNAL MEDICINE CLINIC | Age: 87
End: 2022-05-27

## 2022-05-27 VITALS
SYSTOLIC BLOOD PRESSURE: 110 MMHG | RESPIRATION RATE: 16 BRPM | OXYGEN SATURATION: 97 % | DIASTOLIC BLOOD PRESSURE: 55 MMHG | HEART RATE: 60 BPM | TEMPERATURE: 98.6 F

## 2022-05-27 PROCEDURE — G0151 HHCP-SERV OF PT,EA 15 MIN: HCPCS

## 2022-05-27 PROCEDURE — 3331090001 HH PPS REVENUE CREDIT

## 2022-05-27 PROCEDURE — 3331090002 HH PPS REVENUE DEBIT

## 2022-05-27 NOTE — TELEPHONE ENCOUNTER
Called, spoke with Chucky Waggoner w/ [facility]. Two pt identifiers confirmed. Chucky Waggoner informed of Dr. Wolfe Parents recommendations. Chucky Waggoner stated she will reach out to pt's family and let them know. Chucky Waggoner verbalized understanding of information discussed w/ no further questions at this time.

## 2022-05-27 NOTE — TELEPHONE ENCOUNTER
Adenike//[facility] states she is calling to advise of patient's Blood Pressure reading today of 110/55. Abe Huerta also states that patient fell yesterday afternoon, 5/26/22 transferring from Standing to Her Bed. Abe Bradley states patient hit her head but declined Son taking her to the ER. Please call if any changes to Plan of care for patient for Blood Pressure or fall.  Thank you

## 2022-05-27 NOTE — TELEPHONE ENCOUNTER
Thanks for the info. That blood pressure is pretty normal.   ER if she develops any symptoms such as lethargy, vision changes, or other neurologic symptoms.

## 2022-05-28 PROCEDURE — 3331090002 HH PPS REVENUE DEBIT

## 2022-05-28 PROCEDURE — 3331090001 HH PPS REVENUE CREDIT

## 2022-05-29 PROCEDURE — 3331090001 HH PPS REVENUE CREDIT

## 2022-05-29 PROCEDURE — 3331090002 HH PPS REVENUE DEBIT

## 2022-05-30 PROCEDURE — 3331090001 HH PPS REVENUE CREDIT

## 2022-05-30 PROCEDURE — 3331090002 HH PPS REVENUE DEBIT

## 2022-05-31 ENCOUNTER — PATIENT OUTREACH (OUTPATIENT)
Dept: CASE MANAGEMENT | Age: 87
End: 2022-05-31

## 2022-05-31 ENCOUNTER — HOME CARE VISIT (OUTPATIENT)
Dept: SCHEDULING | Facility: HOME HEALTH | Age: 87
End: 2022-05-31
Payer: MEDICARE

## 2022-05-31 VITALS
OXYGEN SATURATION: 98 % | DIASTOLIC BLOOD PRESSURE: 70 MMHG | TEMPERATURE: 98.1 F | SYSTOLIC BLOOD PRESSURE: 120 MMHG | HEART RATE: 75 BPM

## 2022-05-31 VITALS
RESPIRATION RATE: 16 BRPM | DIASTOLIC BLOOD PRESSURE: 65 MMHG | SYSTOLIC BLOOD PRESSURE: 115 MMHG | TEMPERATURE: 98.1 F | OXYGEN SATURATION: 98 % | HEART RATE: 75 BPM

## 2022-05-31 PROCEDURE — G0158 HHC OT ASSISTANT EA 15: HCPCS

## 2022-05-31 PROCEDURE — G0151 HHCP-SERV OF PT,EA 15 MIN: HCPCS

## 2022-05-31 PROCEDURE — 3331090002 HH PPS REVENUE DEBIT

## 2022-05-31 PROCEDURE — 3331090001 HH PPS REVENUE CREDIT

## 2022-05-31 NOTE — PROGRESS NOTES
Patient has graduated from the Transitions of Care Coordination  program on 5/31/2022. Patient/family has the ability to self-manage at this time Care management goals have been completed. Patient was not referred to the [facility] team for further management. Goals Addressed                 This Visit's Progress     COMPLETED: Prevent complications post hospitalization. 04/29/22   Will not fall    Will report compliance with Eliquis   Reviewed s.e of Eliquis, risk for bleeding   Pt will remain out of the hospital or ER for remainder of JOSEPH period    05/06/22   No falls   Reports compliance with Eliquis as prescribed   Denies bleeding   Will continue to monitor for bleeding   Attended follow up appt with PCP              Patient has Care Transition Nurse's contact information for any further questions, concerns, or needs.   Patients upcoming visits:    Future Appointments   Date Time Provider Tyler Cagle   6/2/2022 To Be Determined Kaity Lopez [address]   6/3/2022 To Be Determined An Sanchez [facility]   6/6/2022 To Be Determined Kaity Lopez [address]   6/7/2022 To Be Determined Magan Bui, PT [facility]   6/8/2022 To Be Determined Tasha Velazco OT [facility]   6/9/2022 To Be Determined Magan Bui PT [facility]   8/16/2022  1:30 PM Daniel Caba MD [facility] BS AMB

## 2022-06-01 PROCEDURE — 3331090001 HH PPS REVENUE CREDIT

## 2022-06-01 PROCEDURE — 3331090002 HH PPS REVENUE DEBIT

## 2022-06-02 ENCOUNTER — HOME CARE VISIT (OUTPATIENT)
Dept: SCHEDULING | Facility: HOME HEALTH | Age: 87
End: 2022-06-02
Payer: MEDICARE

## 2022-06-02 ENCOUNTER — HOME CARE VISIT (OUTPATIENT)
Dept: HOME HEALTH SERVICES | Facility: HOME HEALTH | Age: 87
End: 2022-06-02
Payer: MEDICARE

## 2022-06-02 VITALS
OXYGEN SATURATION: 95 % | DIASTOLIC BLOOD PRESSURE: 70 MMHG | TEMPERATURE: 98 F | RESPIRATION RATE: 16 BRPM | SYSTOLIC BLOOD PRESSURE: 135 MMHG | HEART RATE: 72 BPM

## 2022-06-02 PROCEDURE — G0151 HHCP-SERV OF PT,EA 15 MIN: HCPCS

## 2022-06-02 PROCEDURE — 3331090001 HH PPS REVENUE CREDIT

## 2022-06-02 PROCEDURE — 3331090002 HH PPS REVENUE DEBIT

## 2022-06-03 ENCOUNTER — HOME CARE VISIT (OUTPATIENT)
Dept: SCHEDULING | Facility: HOME HEALTH | Age: 87
End: 2022-06-03
Payer: MEDICARE

## 2022-06-03 VITALS
SYSTOLIC BLOOD PRESSURE: 110 MMHG | OXYGEN SATURATION: 98 % | DIASTOLIC BLOOD PRESSURE: 72 MMHG | HEART RATE: 72 BPM | TEMPERATURE: 97.7 F

## 2022-06-03 PROCEDURE — 3331090001 HH PPS REVENUE CREDIT

## 2022-06-03 PROCEDURE — G0158 HHC OT ASSISTANT EA 15: HCPCS

## 2022-06-03 PROCEDURE — 3331090002 HH PPS REVENUE DEBIT

## 2022-06-04 PROCEDURE — 3331090001 HH PPS REVENUE CREDIT

## 2022-06-04 PROCEDURE — 3331090002 HH PPS REVENUE DEBIT

## 2022-06-05 PROCEDURE — 3331090001 HH PPS REVENUE CREDIT

## 2022-06-05 PROCEDURE — 3331090002 HH PPS REVENUE DEBIT

## 2022-06-06 ENCOUNTER — HOME CARE VISIT (OUTPATIENT)
Dept: SCHEDULING | Facility: HOME HEALTH | Age: 87
End: 2022-06-06
Payer: MEDICARE

## 2022-06-06 ENCOUNTER — TELEPHONE (OUTPATIENT)
Dept: INTERNAL MEDICINE CLINIC | Age: 87
End: 2022-06-06

## 2022-06-06 VITALS
DIASTOLIC BLOOD PRESSURE: 60 MMHG | HEART RATE: 66 BPM | RESPIRATION RATE: 18 BRPM | TEMPERATURE: 97.2 F | SYSTOLIC BLOOD PRESSURE: 104 MMHG | OXYGEN SATURATION: 100 %

## 2022-06-06 VITALS
SYSTOLIC BLOOD PRESSURE: 138 MMHG | TEMPERATURE: 97.8 F | HEART RATE: 78 BPM | OXYGEN SATURATION: 97 % | DIASTOLIC BLOOD PRESSURE: 80 MMHG

## 2022-06-06 PROCEDURE — 3331090001 HH PPS REVENUE CREDIT

## 2022-06-06 PROCEDURE — G0158 HHC OT ASSISTANT EA 15: HCPCS

## 2022-06-06 PROCEDURE — G0151 HHCP-SERV OF PT,EA 15 MIN: HCPCS

## 2022-06-06 PROCEDURE — 3331090002 HH PPS REVENUE DEBIT

## 2022-06-06 NOTE — Clinical Note
ok, Ill keep you posted after I see her wednesday. OT saw her this afternoon and she was talking/alert and participating.   ----- Message -----  From: Guillermina Covarrubias MD  Sent: 6/6/2022  12:54 PM EDT  To: Avi Jacome PT          ----- Message -----  From: Wilma Saul PT  Sent: 6/6/2022  11:27 AM EDT  To: MD Dr Oneida Hernandez had another fall this weekend and I have concerns about her status since then. I also have major concerns about son's ability to care for her. Son says he cant remember what day she fell but it was sometime this weekend. States he \"picked her up off the floor\" in her bedroom and didnt note any injury. He states when his sister braided her hair yesterday she noted a cut to the back of her head but he is not clear if this was before this last fall or after (she had another fall landing on back of her head last week). No injury/cut noted this visit. She is very sleepy this visit and cant participate at all. I dont know this patient well; I saw her 1x weeks ago. Son states this is her usual state and that she has not been more sleepy since any of the recent falls. He states \"she sleeps all day always. \" Im covering for Dagoberto Correa PT who sent case communication to me stating patient was too sleepy to participate last week. .  Son gave very vague answers and is very easily agitated. I was able to wake patient easily but she falls back asleep. VSS. I tried to talk to someone in your office but Fernando Duke said I had to leave a message. Maybe this is normal alertness for patient?

## 2022-06-06 NOTE — TELEPHONE ENCOUNTER
#[phone]  Roxana Trujillo [facility]TA [facility] states that when she went to pt's home today she was very hard to wake up. Pt had a fall this weekend hitting head and a fall last week. Roxana Cassandra is concerned, but doesn't know pt that well to know if this is usual state. Family tells Roxana Trujillo it is usually this way waking pt. Please call to advise as soon as possible. Thanks.

## 2022-06-06 NOTE — Clinical Note
Dr Lam had another fall this weekend and I have concerns about her status since then. I also have major concerns about son's ability to care for her. Son says he cant remember what day she fell but it was sometime this weekend. States he \"picked her up off the floor\" in her bedroom and didnt note any injury. He states when his sister braided her hair yesterday she noted a cut to the back of her head but he is not clear if this was before this last fall or after (she had another fall landing on back of her head last week). No injury/cut noted this visit. She is very sleepy this visit and cant participate at all. I dont know this patient well; I saw her 1x weeks ago. Son states this is her usual state and that she has not been more sleepy since any of the recent falls. He states \"she sleeps all day always. \" Im covering for Oumou Bernard PT who sent case communication to me stating patient was too sleepy to participate last week. .  Son gave very vague answers and is very easily agitated. I was able to wake patient easily but she falls back asleep. VSS. I tried to talk to someone in your office but Evonne Ruiz said I had to leave a message. Maybe this is normal alertness for patient?

## 2022-06-07 PROCEDURE — 3331090002 HH PPS REVENUE DEBIT

## 2022-06-07 PROCEDURE — 3331090001 HH PPS REVENUE CREDIT

## 2022-06-07 NOTE — TELEPHONE ENCOUNTER
Called, spoke to [facility] with [address] informed per.   See message below. Noted. If she doesn't come around, then REGLA Bolden verbalized understanding of information discussed with no further questions at this time.

## 2022-06-08 ENCOUNTER — APPOINTMENT (OUTPATIENT)
Dept: GENERAL RADIOLOGY | Age: 87
End: 2022-06-08
Attending: EMERGENCY MEDICINE
Payer: MEDICARE

## 2022-06-08 ENCOUNTER — HOME CARE VISIT (OUTPATIENT)
Dept: SCHEDULING | Facility: HOME HEALTH | Age: 87
End: 2022-06-08
Payer: MEDICARE

## 2022-06-08 ENCOUNTER — HOSPITAL ENCOUNTER (EMERGENCY)
Age: 87
Discharge: HOME | End: 2022-06-08
Attending: EMERGENCY MEDICINE
Payer: MEDICARE

## 2022-06-08 ENCOUNTER — HOME CARE VISIT (OUTPATIENT)
Dept: HOME HEALTH SERVICES | Facility: HOME HEALTH | Age: 87
End: 2022-06-08
Payer: MEDICARE

## 2022-06-08 ENCOUNTER — APPOINTMENT (OUTPATIENT)
Dept: CT IMAGING | Age: 87
End: 2022-06-08
Attending: EMERGENCY MEDICINE
Payer: MEDICARE

## 2022-06-08 VITALS
DIASTOLIC BLOOD PRESSURE: 63 MMHG | HEART RATE: 76 BPM | SYSTOLIC BLOOD PRESSURE: 132 MMHG | RESPIRATION RATE: 18 BRPM | TEMPERATURE: 97.2 F

## 2022-06-08 VITALS
DIASTOLIC BLOOD PRESSURE: 63 MMHG | TEMPERATURE: 98.3 F | HEART RATE: 76 BPM | SYSTOLIC BLOOD PRESSURE: 132 MMHG | OXYGEN SATURATION: 97 %

## 2022-06-08 VITALS
HEART RATE: 61 BPM | RESPIRATION RATE: 21 BRPM | OXYGEN SATURATION: 98 % | DIASTOLIC BLOOD PRESSURE: 82 MMHG | SYSTOLIC BLOOD PRESSURE: 158 MMHG | TEMPERATURE: 98.2 F
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
## 2022-06-08 LAB
ALBUMIN SERPL-MCNC: 2.8 G/DL (ref 3.5–5)
ALBUMIN/GLOB SERPL: 0.7 {RATIO} (ref 1.1–2.2)
ALP SERPL-CCNC: 119 U/L (ref 45–117)
ALT SERPL-CCNC: 25 U/L (ref 12–78)
ANION GAP SERPL CALC-SCNC: 3 MMOL/L (ref 5–15)
APPEARANCE UR: CLEAR
AST SERPL-CCNC: 25 U/L (ref 15–37)
BACTERIA URNS QL MICRO: NEGATIVE /HPF
BASOPHILS # BLD: 0 K/UL (ref 0–0.1)
BASOPHILS NFR BLD: 0 % (ref 0–1)
BILIRUB SERPL-MCNC: 0.2 MG/DL (ref 0.2–1)
BILIRUB UR QL: NEGATIVE
BUN SERPL-MCNC: 20 MG/DL (ref 6–20)
BUN/CREAT SERPL: 24 (ref 12–20)
CALCIUM SERPL-MCNC: 10.1 MG/DL (ref 8.5–10.1)
CHLORIDE SERPL-SCNC: 108 MMOL/L (ref 97–108)
CO2 SERPL-SCNC: 30 MMOL/L (ref 21–32)
COLOR UR: ABNORMAL
CREAT SERPL-MCNC: 0.85 MG/DL (ref 0.55–1.02)
DIFFERENTIAL METHOD BLD: ABNORMAL
EOSINOPHIL # BLD: 0.2 K/UL (ref 0–0.4)
EOSINOPHIL NFR BLD: 2 % (ref 0–7)
EPITH CASTS URNS QL MICRO: ABNORMAL /LPF
ERYTHROCYTE [DISTWIDTH] IN BLOOD BY AUTOMATED COUNT: 13.2 % (ref 11.5–14.5)
GLOBULIN SER CALC-MCNC: 4.1 G/DL (ref 2–4)
GLUCOSE SERPL-MCNC: 118 MG/DL (ref 65–100)
GLUCOSE UR STRIP.AUTO-MCNC: NEGATIVE MG/DL
HCT VFR BLD AUTO: 35.8 % (ref 35–47)
HGB BLD-MCNC: 11.3 G/DL (ref 11.5–16)
HGB UR QL STRIP: NEGATIVE
HYALINE CASTS URNS QL MICRO: ABNORMAL /LPF (ref 0–2)
IMM GRANULOCYTES # BLD AUTO: 0 K/UL (ref 0–0.04)
IMM GRANULOCYTES NFR BLD AUTO: 0 % (ref 0–0.5)
KETONES UR QL STRIP.AUTO: NEGATIVE MG/DL
LACTATE BLD-SCNC: 1.21 MMOL/L (ref 0.4–2)
LEUKOCYTE ESTERASE UR QL STRIP.AUTO: ABNORMAL
LYMPHOCYTES # BLD: 1.3 K/UL (ref 0.8–3.5)
LYMPHOCYTES NFR BLD: 17 % (ref 12–49)
MCH RBC QN AUTO: 29 PG (ref 26–34)
MCHC RBC AUTO-ENTMCNC: 31.6 G/DL (ref 30–36.5)
MCV RBC AUTO: 92 FL (ref 80–99)
MONOCYTES # BLD: 0.9 K/UL (ref 0–1)
MONOCYTES NFR BLD: 11 % (ref 5–13)
NEUTS SEG # BLD: 5.4 K/UL (ref 1.8–8)
NEUTS SEG NFR BLD: 70 % (ref 32–75)
NITRITE UR QL STRIP.AUTO: NEGATIVE
NRBC # BLD: 0 K/UL (ref 0–0.01)
NRBC BLD-RTO: 0 PER 100 WBC
PH UR STRIP: 8 [PH] (ref 5–8)
PLATELET # BLD AUTO: 228 K/UL (ref 150–400)
PLATELET COMMENTS,PCOM: ABNORMAL
POTASSIUM SERPL-SCNC: 4.3 MMOL/L (ref 3.5–5.1)
PROT SERPL-MCNC: 6.9 G/DL (ref 6.4–8.2)
PROT UR STRIP-MCNC: NEGATIVE MG/DL
RBC # BLD AUTO: 3.89 M/UL (ref 3.8–5.2)
RBC #/AREA URNS HPF: ABNORMAL /HPF (ref 0–5)
RBC MORPH BLD: ABNORMAL
SODIUM SERPL-SCNC: 141 MMOL/L (ref 136–145)
SP GR UR REFRACTOMETRY: 1.01
TROPONIN-HIGH SENSITIVITY: 19 NG/L (ref 0–51)
UA: UC IF INDICATED,UAUC: ABNORMAL
UROBILINOGEN UR QL STRIP.AUTO: 0.2 EU/DL (ref 0.2–1)
WBC # BLD AUTO: 7.8 K/UL (ref 3.6–11)
WBC MORPH BLD: ABNORMAL
WBC URNS QL MICRO: ABNORMAL /HPF (ref 0–4)

## 2022-06-08 PROCEDURE — 70450 CT HEAD/BRAIN W/O DYE: CPT

## 2022-06-08 PROCEDURE — 81001 URINALYSIS AUTO W/SCOPE: CPT

## 2022-06-08 PROCEDURE — 84484 ASSAY OF TROPONIN QUANT: CPT

## 2022-06-08 PROCEDURE — 87040 BLOOD CULTURE FOR BACTERIA: CPT

## 2022-06-08 PROCEDURE — G0152 HHCP-SERV OF OT,EA 15 MIN: HCPCS

## 2022-06-08 PROCEDURE — 80053 COMPREHEN METABOLIC PANEL: CPT

## 2022-06-08 PROCEDURE — 3331090002 HH PPS REVENUE DEBIT

## 2022-06-08 PROCEDURE — 83605 ASSAY OF LACTIC ACID: CPT

## 2022-06-08 PROCEDURE — 85025 COMPLETE CBC W/AUTO DIFF WBC: CPT

## 2022-06-08 PROCEDURE — 93005 ELECTROCARDIOGRAM TRACING: CPT

## 2022-06-08 PROCEDURE — 71045 X-RAY EXAM CHEST 1 VIEW: CPT

## 2022-06-08 PROCEDURE — 99285 EMERGENCY DEPT VISIT HI MDM: CPT

## 2022-06-08 PROCEDURE — G0151 HHCP-SERV OF PT,EA 15 MIN: HCPCS

## 2022-06-08 PROCEDURE — 3331090001 HH PPS REVENUE CREDIT

## 2022-06-08 PROCEDURE — 36415 COLL VENOUS BLD VENIPUNCTURE: CPT

## 2022-06-08 NOTE — ED NOTES
Assumed care of pt at this time, report received from Shila Cruz, [address] [facility]

## 2022-06-08 NOTE — ED PROVIDER NOTES
EMERGENCY DEPARTMENT HISTORY AND PHYSICAL EXAM          Date: 6/8/2022  Patient Name: Cornelio Underwood  Attending of Record: Ketan Sheridan MD    History of Presenting Illness     Chief Complaint   Patient presents with    Altered mental status     per son she is altered, hx of dementia and alzheimer's    Knee Pain     right, does not recall any injury       History Provided By: Patient    HPI: Cornelio Underwood is a 80 y.o. female, PMH of severe dementia, HTN, anemia, asthma, GERD, recently diagnosed right iliofemoral DVT in 4/22- not intervened upon, on Eliquis senting to the emergency department with encephalopathy and concern for stroke. Per EMS, patient told her family member that she \"was having a stroke\" as she felt dizzy upon standing and he called paramedics to come to the home. Arrival to the emergency department, patient is minimally interactive, answers in yes or no responses. Denies chest pain, abdominal pain, nausea or vomiting. Reviewing patient's records, patient fell over the weekend and reportedly hit her head and also fell last week. Health reports that she has been sleeping more than usual.      PCP: Bryce Craven MD    There are no other complaints, changes, or physical findings at this time. Current Outpatient Medications   Medication Sig Dispense Refill    apixaban (Eliquis) 5 mg tablet Take 1 Tablet by mouth two (2) times a day. 60 Tablet 5    multivitamin, tx-iron-ca-min (THERA-M w/ IRON) 9 mg iron-400 mcg tab tablet Take 1 Tablet by mouth daily. take 1 tablet by mouth daily.  CALCIUM CARBONATE PO Take 750 mg by mouth daily as needed (reflux). chew 2-4 tablets daily as needed for symptoms of reflux or stomach upset.  sertraline (ZOLOFT) 50 mg tablet Take 1 Tablet by mouth daily.  90 Tablet 3    memantine (NAMENDA) 10 mg tablet TAKE 1 TABLET BY MOUTH TWICE DAILY 60 Tablet 5    ergocalciferol (ERGOCALCIFEROL) 1,250 mcg (50,000 unit) capsule Take 1 Capsule by mouth every seven (7) days. 4 Capsule 11    acetaminophen (TYLENOL) 325 mg tablet Take 2 Tablets by mouth every six (6) hours. 1 Tablet 0    polyethylene glycol (MIRALAX) 17 gram packet Take 1 Packet by mouth daily. 1 Each 0    famotidine (PEPCID) 20 mg tablet Take 1 Tablet by mouth two (2) times a day. 60 Tablet 0    atorvastatin (LIPITOR) 20 mg tablet TAKE 1 TABLET BY MOUTH EVERY DAY 90 Tab 0    amLODIPine (NORVASC) 2.5 mg tablet Take 1 Tab by mouth daily. 90 Tab 5    lisinopriL (PRINIVIL, ZESTRIL) 10 mg tablet Take 1 Tab by mouth daily. 90 Tab 3    albuterol (PROVENTIL HFA, VENTOLIN HFA, PROAIR HFA) 90 mcg/actuation inhaler Take 1 Puff by inhalation as needed for Wheezing. 1 Inhaler 5       Past History     Past Medical History:  Past Medical History:   Diagnosis Date    Adverse effect of anesthesia     Combative, confused for extended period of time with anesthesia    Anemia NEC     Asthma     Cervical spondylarthritis     Depression     beginning stages of dementia, sundowning    DJD (degenerative joint disease) of knee     Esophageal spasm     Has required esophageal dilatation; Dr. Ric Golden GERD (gastroesophageal reflux disease)     Hallucination     Hypercholesterolemia     Hypertension     Lumbar stenosis     Neuropathy, upper extremity     left    Vertigo [id number]    Saw Dr. Shalom Bradley; improved.      Vitamin D deficiency 1/28/2011       Past Surgical History:  Past Surgical History:   Procedure Laterality Date    HX CATARACT REMOVAL      bilateral    HX HYSTERECTOMY      HX ORTHOPAEDIC      right bunion excision    HX OTHER SURGICAL      broken R hip    HX TONSILLECTOMY         Family History:  Family History   Problem Relation Age of Onset    Heart Disease Mother     Hypertension Mother     Stroke Mother     Heart Disease Brother     Heart Disease Father     Heart Disease Brother        Social History:  Social History     Tobacco Use    Smoking status: Never Smoker    Smokeless tobacco: Never Used   Vaping Use    Vaping Use: Never used   Substance Use Topics    Alcohol use: No    Drug use: No       Allergies: Allergies   Allergen Reactions    Diclofenac Itching     The voltaren gel caused itching.  Gabapentin Swelling    Hydrochlorothiazide Other (comments)     dizziness    Pcn [Penicillins] Swelling         Review of Systems   Review of Systems   Unable to perform ROS: Dementia       Physical Exam   Physical Exam  Vitals reviewed. Constitutional:       Appearance: She is not ill-appearing. HENT:      Head: Normocephalic and atraumatic. Eyes:      Pupils: Pupils are equal, round, and reactive to light. Cardiovascular:      Rate and Rhythm: Normal rate and regular rhythm. Heart sounds: No murmur heard. No friction rub. No gallop. Pulmonary:      Effort: Pulmonary effort is normal.      Breath sounds: Normal breath sounds. Abdominal:      Palpations: Abdomen is soft. Tenderness: There is no abdominal tenderness. There is no guarding or rebound. Musculoskeletal:      Cervical back: Neck supple. Right lower leg: No edema. Left lower leg: No edema. Skin:     General: Skin is warm and dry. Neurological:      General: No focal deficit present. Mental Status: She is alert. Mental status is at baseline. GCS: GCS eye subscore is 4. GCS verbal subscore is 4. GCS motor subscore is 6. Cranial Nerves: No cranial nerve deficit or facial asymmetry.       Comments: Moves all extremities independently   Psychiatric:         Mood and Affect: Mood normal.          Diagnostic Study Results     Labs -     Recent Results (from the past 12 hour(s))   EKG, 12 LEAD, INITIAL    Collection Time: 06/08/22  6:11 PM   Result Value Ref Range    Ventricular Rate 68 BPM    Atrial Rate 68 BPM    P-R Interval 276 ms    QRS Duration 128 ms    Q-T Interval 430 ms    QTC Calculation (Bezet) 457 ms    Calculated P Axis 63 degrees    Calculated R Axis -46 degrees Calculated T Axis 77 degrees    Diagnosis       Sinus rhythm with marked sinus arrhythmia with 1st degree AV block  Left axis deviation  Left bundle branch block  When compared with ECG of 25-APR-2022 08:12,  No significant change was found     CBC WITH AUTOMATED DIFF    Collection Time: 06/08/22  6:32 PM   Result Value Ref Range    WBC 7.8 3.6 - 11.0 K/uL    RBC 3.89 3.80 - 5.20 M/uL    HGB 11.3 (L) 11.5 - 16.0 g/dL    HCT 35.8 35.0 - 47.0 %    MCV 92.0 80.0 - 99.0 FL    MCH 29.0 26.0 - 34.0 PG    MCHC 31.6 30.0 - 36.5 g/dL    RDW 13.2 11.5 - 14.5 %    PLATELET 073 573 - 873 K/uL    NRBC 0.0 0  WBC    ABSOLUTE NRBC 0.00 0.00 - 0.01 K/uL    NEUTROPHILS 70 32 - 75 %    LYMPHOCYTES 17 12 - 49 %    MONOCYTES 11 5 - 13 %    EOSINOPHILS 2 0 - 7 %    BASOPHILS 0 0 - 1 %    IMMATURE GRANULOCYTES 0 0.0 - 0.5 %    ABS. NEUTROPHILS 5.4 1.8 - 8.0 K/UL    ABS. LYMPHOCYTES 1.3 0.8 - 3.5 K/UL    ABS. MONOCYTES 0.9 0.0 - 1.0 K/UL    ABS. EOSINOPHILS 0.2 0.0 - 0.4 K/UL    ABS. BASOPHILS 0.0 0.0 - 0.1 K/UL    ABS. IMM. GRANS. 0.0 0.00 - 0.04 K/UL    DF SMEAR SCANNED      PLATELET COMMENTS Large Platelets      RBC COMMENTS OVALOCYTES  PRESENT        WBC COMMENTS VACUOLATED POLYS     METABOLIC PANEL, COMPREHENSIVE    Collection Time: 06/08/22  6:32 PM   Result Value Ref Range    Sodium 141 136 - 145 mmol/L    Potassium 4.3 3.5 - 5.1 mmol/L    Chloride 108 97 - 108 mmol/L    CO2 30 21 - 32 mmol/L    Anion gap 3 (L) 5 - 15 mmol/L    Glucose 118 (H) 65 - 100 mg/dL    BUN 20 6 - 20 MG/DL    Creatinine 0.85 0.55 - 1.02 MG/DL    BUN/Creatinine ratio 24 (H) 12 - 20      GFR est AA >60 >60 ml/min/1.73m2    GFR est non-AA >60 >60 ml/min/1.73m2    Calcium 10.1 8.5 - 10.1 MG/DL    Bilirubin, total 0.2 0.2 - 1.0 MG/DL    ALT (SGPT) 25 12 - 78 U/L    AST (SGOT) 25 15 - 37 U/L    Alk.  phosphatase 119 (H) 45 - 117 U/L    Protein, total 6.9 6.4 - 8.2 g/dL    Albumin 2.8 (L) 3.5 - 5.0 g/dL    Globulin 4.1 (H) 2.0 - 4.0 g/dL    A-G Ratio 0.7 (L) 1.1 - 2.2     TROPONIN-HIGH SENSITIVITY    Collection Time: 06/08/22  6:32 PM   Result Value Ref Range    Troponin-High Sensitivity 19 0 - 51 ng/L   POC LACTIC ACID    Collection Time: 06/08/22  6:35 PM   Result Value Ref Range    Lactic Acid (POC) 1.21 0.40 - 2.00 mmol/L   URINALYSIS W/ REFLEX CULTURE    Collection Time: 06/08/22  8:33 PM    Specimen: Urine   Result Value Ref Range    Color YELLOW/STRAW      Appearance CLEAR CLEAR      Specific gravity 1.011      pH (UA) 8.0 5.0 - 8.0      Protein Negative NEG mg/dL    Glucose Negative NEG mg/dL    Ketone Negative NEG mg/dL    Bilirubin Negative NEG      Blood Negative NEG      Urobilinogen 0.2 0.2 - 1.0 EU/dL    Nitrites Negative NEG      Leukocyte Esterase TRACE (A) NEG      UA:UC IF INDICATED CULTURE NOT INDICATED BY UA RESULT CNI      WBC 0-4 0 - 4 /hpf    RBC 0-5 0 - 5 /hpf    Epithelial cells MODERATE (A) FEW /lpf    Bacteria Negative NEG /hpf    Hyaline cast 0-2 0 - 2 /lpf       Radiologic Studies -   CT HEAD WO CONT   Final Result   No acute findings. Nonspecific white matter changes most likely   reflective of chronic small vessel ischemic and/or senescent change. Intracranial atherosclerosis. XR CHEST PORT   Final Result   No acute findings. CT Results  (Last 48 hours)               06/08/22 1756  CT HEAD WO CONT Final result    Impression:  No acute findings. Nonspecific white matter changes most likely   reflective of chronic small vessel ischemic and/or senescent change. Intracranial atherosclerosis. Narrative:  EXAM: CT HEAD WO CONT       INDICATION: encephalopathy, AMS       COMPARISON: CT 7/1/2021. CONTRAST: None. TECHNIQUE: Unenhanced CT of the head was performed using 5 mm images. Brain and   bone windows were generated. Coronal and sagittal reformats. CT dose reduction   was achieved through use of a standardized protocol tailored for this   examination and automatic exposure control for dose modulation. FINDINGS:   The ventricles and sulci are normal in size, shape and configuration. . There is   periventricular and subcortical white matter hypodensity. There is no   intracranial hemorrhage, extra-axial collection, or mass effect. The basilar   cisterns are open. No CT evidence of acute infarct. Atherosclerotic   calcifications affect the carotid siphons and vertebrobasilar system. The bone windows demonstrate no abnormalities. The visualized portions of the   paranasal sinuses and mastoid air cells are clear. CXR Results  (Last 48 hours)               06/08/22 1734  XR CHEST PORT Final result    Impression:  No acute findings. Narrative:  EXAM: XR CHEST PORT       INDICATION: AMS, eval for infiltrate       COMPARISON: CT thorax 4/25/2022 and chest x-ray 7/1/2021       FINDINGS: A portable AP radiograph of the chest was obtained at 17:32 hours. The   lungs are clear. The cardiac and mediastinal contours and pulmonary vascularity   are normal.  Atherosclerotic calcifications affect the aortic arch and the   thoracic aorta is tortuous. The chest wall structures and visualized upper   abdomen show no acute findings with incidental note of degenerative spine and   shoulder changes as well as diffuse osteopenia. Medical Decision Making   I am the first provider for this patient. I reviewed the vital signs, available nursing notes, past medical history, past surgical history, family history and social history. Vital Signs-Reviewed the patient's vital signs.   Patient Vitals for the past 12 hrs:   Temp Pulse Resp BP SpO2   06/08/22 2230 -- 61 21 -- 98 %   06/08/22 2155 -- 69 15 (!) 158/82 97 %   06/08/22 2123 -- 60 20 129/60 98 %   06/08/22 2053 -- (!) 59 17 (!) 179/70 97 %   06/08/22 2038 -- 61 18 (!) 168/81 97 %   06/08/22 1900 -- 71 22 (!) 174/81 96 %   06/08/22 1808 -- 74 20 (!) 177/72 98 %   06/08/22 1739 98.2 °F (36.8 °C) -- -- -- --   06/08/22 1737 -- 74 16 (!) 154/69 97 %       ED EKG interpretation:  Rhythm: sinus bradycardia; and regular . Rate (approx.): 60; Axis: left axis deviation; P wave: prolonged; QRS interval: prolonged; ST/T wave: normal. Other findings: 1st degree AV block, left bundle branch. This EKG was interpreted by Katja Landis DO,ED Provider. Records Reviewed: Nursing Notes and Old Medical Records    Provider Notes (Medical Decision Making):   59-year-old female with medical comorbidities as described presenting to the emergency department with encephalopathy with unknown last well. On arrival, is hemodynamically stable, afebrile. Nonfocal neurologic exam although patient only responds in yes or no questions but is otherwise alert. Given the fact that she is anticoagulated with the last known well that is unclear, she would not be a candidate for advanced stroke therapies. Leading suspicion for etiology such as intracranial hemorrhage, pneumonia, UTI, viral syndrome, metabolic derangement or advancing dementia. Will obtain CT head, broad labs, EKG. Further disposition pending results of workup. ED Course and Progress Notes:   Initial assessment performed. The patients presenting problems have been discussed, and they are in agreement with the care plan formulated and outlined with them. I have encouraged them to ask questions as they arise throughout their visit. ED Course as of 06/09/22 0028   Wed Jun 08, 2022   1812 No visible ICH on CT, CXR without evidence of pneumonia [ME]   1952 Patient's daughter Marisela Walker has come to bedside and reports that she and her sister do not live with the patient. She resides with her son and report that they were texted by him at approximately [phone] when patient reported that she thought she was having a stroke. There was no known facial droop, weakness, word finding difficulty. They report that the patient is at her mental baseline.   Also reports that there is concern she is not adequately taking her medications as they will be found underneath her bed at times or on the kitchen table. They also report that they surmised that the patient fell on Saturday as she had blood on the back of her head that they cleaned. Have no further concerns of illness at this time. They report that they have an advanced directive that patient would not want resuscitation in the event of cardiac arrest. Labs are reassuring thus far, but will await UA. [ME]   2129 UA is negative for signs of infection. Will plan to  DC patient with patient's daughters and referral placed to home health. [ME]   2206 It appears that family members have left the bedside, however I tried to contact the numbers listed as the next of kin and was unable to reach them as they went straight to voicemail. We will still help to expedite home health as patient appears back to baseline. [ME]      ED Course User Index  [ME] Brandee Mares DO         Diagnosis     Clinical Impression:   1. Fall, initial encounter        Disposition:  Home    DISCHARGE PLAN:   1. Discharge Medication List as of 6/8/2022  9:56 PM        2. Follow-up Information     Follow up With Specialties Details Why Contact Info    Adi Allred MD Internal Medicine Physician In 1 week  [address] [facility]  [phone]      hospitals EMERGENCY DEPT Emergency Medicine  If symptoms worsen [address]  [address] [facility]  [phone]        3.  Return to ED if worse         Resident Signature:    David Hua DO  Emergency Medicine, PGY-2

## 2022-06-09 LAB
ATRIAL RATE: 68 BPM
CALCULATED P AXIS, ECG09: 63 DEGREES
CALCULATED R AXIS, ECG10: -46 DEGREES
CALCULATED T AXIS, ECG11: 77 DEGREES
DIAGNOSIS, 93000: NORMAL
P-R INTERVAL, ECG05: 276 MS
Q-T INTERVAL, ECG07: 430 MS
QRS DURATION, ECG06: 128 MS
QTC CALCULATION (BEZET), ECG08: 457 MS
VENTRICULAR RATE, ECG03: 68 BPM

## 2022-06-09 PROCEDURE — 3331090002 HH PPS REVENUE DEBIT

## 2022-06-09 PROCEDURE — 3331090001 HH PPS REVENUE CREDIT

## 2022-06-09 NOTE — ED NOTES
Family x 2 at bedside  Checked purewick, adjusted tubing, small amount of urine starting to flow into container

## 2022-06-09 NOTE — DISCHARGE INSTRUCTIONS
You were seen in the ED today for fall, concern for a stroke. You had CT head and labs performed that demonstrated evidence of bleeding/mass in head. Labs are reassuring and it does not appear that you have a urinary tract infection or pneumonia. You do not have an acute admission requirement at this time and we feel comfortable sending you home. Please follow-up with your primary care provider in 1 week to ensure that you are getting better. Should you experience abdominal pain lasting greater than 6 hours, chest pain, difficulty breathing, numbness/weakness, facial droop, fever/chills, numbness/tingling, skin changes or other symptoms that concern you, return to the ED sooner. If you feel worse over the next 24 hours, please return to the ED. Thank you for trusting us with your care!

## 2022-06-09 NOTE — ED NOTES
Pt's family present x 2, assisted this nurse getting paper scrub pants on, her pants that were in the bag were wet with urine; iv removed, in nad, tolerated well, pt is talkative, denies pain; per family pt is not able to stand on her own, assisted in lifting her to the wc, total assist, pt able to sit up on her own, then pt to car and family member lifted her into the seat with assist by this nurse.

## 2022-06-10 PROCEDURE — 3331090002 HH PPS REVENUE DEBIT

## 2022-06-10 PROCEDURE — 3331090001 HH PPS REVENUE CREDIT

## 2022-06-11 PROCEDURE — 3331090001 HH PPS REVENUE CREDIT

## 2022-06-11 PROCEDURE — 3331090003 HH PPS REVENUE ADJ

## 2022-06-11 PROCEDURE — 3331090002 HH PPS REVENUE DEBIT

## 2022-06-12 PROCEDURE — 3331090001 HH PPS REVENUE CREDIT

## 2022-06-12 PROCEDURE — 400014 HH F/U

## 2022-06-12 PROCEDURE — 3331090002 HH PPS REVENUE DEBIT

## 2022-06-13 LAB
BACTERIA SPEC CULT: NORMAL
SERVICE CMNT-IMP: NORMAL

## 2022-06-13 PROCEDURE — 3331090002 HH PPS REVENUE DEBIT

## 2022-06-13 PROCEDURE — 3331090001 HH PPS REVENUE CREDIT

## 2022-06-14 ENCOUNTER — HOME CARE VISIT (OUTPATIENT)
Dept: SCHEDULING | Facility: HOME HEALTH | Age: 87
End: 2022-06-14
Payer: MEDICARE

## 2022-06-14 ENCOUNTER — TELEPHONE (OUTPATIENT)
Dept: INTERNAL MEDICINE CLINIC | Age: 87
End: 2022-06-14

## 2022-06-14 VITALS
OXYGEN SATURATION: 96 % | RESPIRATION RATE: 16 BRPM | SYSTOLIC BLOOD PRESSURE: 100 MMHG | TEMPERATURE: 98.1 F | HEART RATE: 70 BPM | DIASTOLIC BLOOD PRESSURE: 50 MMHG

## 2022-06-14 PROCEDURE — 3331090001 HH PPS REVENUE CREDIT

## 2022-06-14 PROCEDURE — 3331090002 HH PPS REVENUE DEBIT

## 2022-06-14 NOTE — TELEPHONE ENCOUNTER
[address] [place] [address]  [phone]    Pt seen today for PT    Pt was sleeping in wheel chair and not able to be woken up    States pt son states pt awake and ate breakfast as normal this morning    States pt was sleeping for 1 of her visits last week as well. States please call to advise.

## 2022-06-15 PROCEDURE — 3331090002 HH PPS REVENUE DEBIT

## 2022-06-15 PROCEDURE — 3331090001 HH PPS REVENUE CREDIT

## 2022-06-16 ENCOUNTER — HOME CARE VISIT (OUTPATIENT)
Dept: SCHEDULING | Facility: HOME HEALTH | Age: 87
End: 2022-06-16
Payer: MEDICARE

## 2022-06-16 VITALS
SYSTOLIC BLOOD PRESSURE: 130 MMHG | TEMPERATURE: 98.4 F | DIASTOLIC BLOOD PRESSURE: 70 MMHG | HEART RATE: 73 BPM | OXYGEN SATURATION: 98 % | RESPIRATION RATE: 16 BRPM

## 2022-06-16 PROCEDURE — 3331090002 HH PPS REVENUE DEBIT

## 2022-06-16 PROCEDURE — 400014 HH F/U

## 2022-06-16 PROCEDURE — 3331090001 HH PPS REVENUE CREDIT

## 2022-06-16 PROCEDURE — G0151 HHCP-SERV OF PT,EA 15 MIN: HCPCS

## 2022-06-17 PROCEDURE — 3331090001 HH PPS REVENUE CREDIT

## 2022-06-17 PROCEDURE — 3331090002 HH PPS REVENUE DEBIT

## 2022-06-18 PROCEDURE — 3331090001 HH PPS REVENUE CREDIT

## 2022-06-18 PROCEDURE — 3331090002 HH PPS REVENUE DEBIT

## 2022-06-19 PROCEDURE — 3331090002 HH PPS REVENUE DEBIT

## 2022-06-19 PROCEDURE — 3331090001 HH PPS REVENUE CREDIT

## 2022-06-20 PROCEDURE — 3331090002 HH PPS REVENUE DEBIT

## 2022-06-20 PROCEDURE — 3331090001 HH PPS REVENUE CREDIT

## 2022-06-21 ENCOUNTER — HOME CARE VISIT (OUTPATIENT)
Dept: SCHEDULING | Facility: HOME HEALTH | Age: 87
End: 2022-06-21
Payer: MEDICARE

## 2022-06-21 VITALS
TEMPERATURE: 98.6 F | SYSTOLIC BLOOD PRESSURE: 132 MMHG | OXYGEN SATURATION: 97 % | RESPIRATION RATE: 16 BRPM | HEART RATE: 64 BPM | DIASTOLIC BLOOD PRESSURE: 70 MMHG

## 2022-06-21 PROCEDURE — 3331090002 HH PPS REVENUE DEBIT

## 2022-06-21 PROCEDURE — G0151 HHCP-SERV OF PT,EA 15 MIN: HCPCS

## 2022-06-21 PROCEDURE — 3331090001 HH PPS REVENUE CREDIT

## 2022-06-22 ENCOUNTER — HOME CARE VISIT (OUTPATIENT)
Dept: SCHEDULING | Facility: HOME HEALTH | Age: 87
End: 2022-06-22
Payer: MEDICARE

## 2022-06-22 VITALS
SYSTOLIC BLOOD PRESSURE: 130 MMHG | TEMPERATURE: 98.1 F | HEART RATE: 72 BPM | OXYGEN SATURATION: 100 % | RESPIRATION RATE: 18 BRPM | DIASTOLIC BLOOD PRESSURE: 70 MMHG

## 2022-06-22 PROCEDURE — 3331090001 HH PPS REVENUE CREDIT

## 2022-06-22 PROCEDURE — G0151 HHCP-SERV OF PT,EA 15 MIN: HCPCS

## 2022-06-22 PROCEDURE — 3331090002 HH PPS REVENUE DEBIT

## 2022-06-23 PROCEDURE — 3331090002 HH PPS REVENUE DEBIT

## 2022-06-23 PROCEDURE — 3331090001 HH PPS REVENUE CREDIT

## 2022-06-24 PROCEDURE — 3331090002 HH PPS REVENUE DEBIT

## 2022-06-24 PROCEDURE — 3331090001 HH PPS REVENUE CREDIT

## 2022-06-25 PROCEDURE — 3331090001 HH PPS REVENUE CREDIT

## 2022-06-25 PROCEDURE — 3331090002 HH PPS REVENUE DEBIT

## 2022-06-26 PROCEDURE — 3331090002 HH PPS REVENUE DEBIT

## 2022-06-26 PROCEDURE — 3331090001 HH PPS REVENUE CREDIT

## 2022-06-27 PROCEDURE — 3331090001 HH PPS REVENUE CREDIT

## 2022-06-27 PROCEDURE — 3331090002 HH PPS REVENUE DEBIT

## 2022-06-28 ENCOUNTER — HOME CARE VISIT (OUTPATIENT)
Dept: SCHEDULING | Facility: HOME HEALTH | Age: 87
End: 2022-06-28
Payer: MEDICARE

## 2022-06-28 VITALS
SYSTOLIC BLOOD PRESSURE: 110 MMHG | DIASTOLIC BLOOD PRESSURE: 70 MMHG | RESPIRATION RATE: 16 BRPM | HEART RATE: 72 BPM | TEMPERATURE: 98.8 F | OXYGEN SATURATION: 98 %

## 2022-06-28 PROCEDURE — G0151 HHCP-SERV OF PT,EA 15 MIN: HCPCS

## 2022-06-28 PROCEDURE — 3331090001 HH PPS REVENUE CREDIT

## 2022-06-28 PROCEDURE — 3331090002 HH PPS REVENUE DEBIT

## 2022-06-29 ENCOUNTER — HOME CARE VISIT (OUTPATIENT)
Dept: SCHEDULING | Facility: HOME HEALTH | Age: 87
End: 2022-06-29
Payer: MEDICARE

## 2022-06-29 VITALS
DIASTOLIC BLOOD PRESSURE: 70 MMHG | HEART RATE: 69 BPM | TEMPERATURE: 98.6 F | RESPIRATION RATE: 16 BRPM | OXYGEN SATURATION: 96 % | SYSTOLIC BLOOD PRESSURE: 140 MMHG

## 2022-06-29 PROCEDURE — 3331090002 HH PPS REVENUE DEBIT

## 2022-06-29 PROCEDURE — 3331090001 HH PPS REVENUE CREDIT

## 2022-06-29 PROCEDURE — G0151 HHCP-SERV OF PT,EA 15 MIN: HCPCS

## 2022-06-30 ENCOUNTER — OFFICE VISIT (OUTPATIENT)
Dept: INTERNAL MEDICINE CLINIC | Age: 87
End: 2022-06-30
Payer: MEDICARE

## 2022-06-30 VITALS
TEMPERATURE: 98.4 F | RESPIRATION RATE: 18 BRPM | DIASTOLIC BLOOD PRESSURE: 76 MMHG | HEART RATE: 77 BPM | SYSTOLIC BLOOD PRESSURE: 137 MMHG

## 2022-06-30 DIAGNOSIS — F03.90 DEMENTIA WITHOUT BEHAVIORAL DISTURBANCE, UNSPECIFIED DEMENTIA TYPE: Primary | ICD-10-CM

## 2022-06-30 DIAGNOSIS — N39.0 URINARY TRACT INFECTION WITHOUT HEMATURIA, SITE UNSPECIFIED: ICD-10-CM

## 2022-06-30 PROCEDURE — G8420 CALC BMI NORM PARAMETERS: HCPCS | Performed by: INTERNAL MEDICINE

## 2022-06-30 PROCEDURE — 1090F PRES/ABSN URINE INCON ASSESS: CPT | Performed by: INTERNAL MEDICINE

## 2022-06-30 PROCEDURE — 1101F PT FALLS ASSESS-DOCD LE1/YR: CPT | Performed by: INTERNAL MEDICINE

## 2022-06-30 PROCEDURE — G9717 DOC PT DX DEP/BP F/U NT REQ: HCPCS | Performed by: INTERNAL MEDICINE

## 2022-06-30 PROCEDURE — G0463 HOSPITAL OUTPT CLINIC VISIT: HCPCS | Performed by: INTERNAL MEDICINE

## 2022-06-30 PROCEDURE — G8536 NO DOC ELDER MAL SCRN: HCPCS | Performed by: INTERNAL MEDICINE

## 2022-06-30 PROCEDURE — G8427 DOCREV CUR MEDS BY ELIG CLIN: HCPCS | Performed by: INTERNAL MEDICINE

## 2022-06-30 PROCEDURE — 99213 OFFICE O/P EST LOW 20 MIN: CPT | Performed by: INTERNAL MEDICINE

## 2022-06-30 RX ORDER — NITROFURANTOIN 25; 75 MG/1; MG/1
100 CAPSULE ORAL 2 TIMES DAILY
Qty: 14 CAPSULE | Refills: 0 | Status: SHIPPED | OUTPATIENT
Start: 2022-06-30 | End: 2022-07-07

## 2022-06-30 NOTE — PROGRESS NOTES
PROGRESS NOTE  Name: Jayna Hopkins   : 1928       ASSESSMENT/ PLAN:     Fatigue and altered mental status: Could be UTI (trace rachel). May just be progression of dementia. Dementia without behavioral disturbance, unspecified dementia type [facility]): Worsening over time. Family is thinking of NH placement and I support this. Urinary tract infection without hematuria, site unspecified  -     nitrofurantoin, macrocrystal-monohydrate, (MACROBID) 100 mg capsule; Take 1 Capsule by mouth two (2) times a day for 7 days. , Normal, Disp-14 Capsule, R-0    Follow up as planned. I have reviewed the patient's medications and risks/side effects/benefits were discussed. Diagnosis(-es) explained to patient and questions answered. Literature provided where appropriate. SUBJECTIVE  Ms. Jayna Hopkins presents today acutely for     Chief Complaint   Patient presents with    Follow-up    ED Follow-up     [place]        She was seen in ER on 22:     27-year-old female with a history of severe dementia, hypertension, anemia and GERD who is on Eliquis for previous DVT presents emergency department with a chief complaint of concern for stroke. Family is concerned for stroke as patient has been \"fatigued\" over the last few days. They report she did have a fall a few days ago. Concern for stroke arose from patient telling family \"I'm having a stroke. \"  EMS notes no deficits. Patient denies any complaints. She is pleasantly confused but otherwise without focal deficits. Family states at baseline. Incidentally at triage noted right knee pain.     80year-old female, appears stated age, chronically unwell, resting bed  Extraocular movements intact, no visual field deficits. Head is atraumatic.   Moist mucous membranes  Regular rate and rhythm, no murmurs rubs or gallops  Clear to auscultation bilaterally  No abdominal tenderness  Cranial nerves intact, 5 out of 5 strength in upper and lower extremities. No asymmetric strength.     80year-old female on Eliquis presents emergency department via EMS with what appears to be a nonspecific encephalopathy although family reports patient at baseline. Patient appears appropriate for stated age. She is not a candidate for tPA because she is on Eliquis, but additional I do not believe she is having a stroke as she has no focal deficits or strokelike symptoms. Given she had a fall will check CT head to exclude mass lesion such as subdural.  Will check additional labs, urinalysis, chest x-ray. If these are all negative, and family is comfortable, anticipate patient be discharged home with PCP follow-up and home health referrals.     I personally saw and examined the patient. I have reviewed and agree with the residents findings, including all diagnostic interpretations, and plans as written. I was present during the key portions of separately billed procedures. Reginaldo Lao MD    CT scan:   No acute findings. Nonspecific white matter changes most likely  reflective of chronic small vessel ischemic and/or senescent change. Intracranial atherosclerosis. UA showed trace rachel. Her symptoms were felt to be simply baseline dementia. Now, she is back home. \"Hard to say whether she is better. \" Still sleeps a lot. She is getting ever harder to care for at home. She lives with son. Despite PT, OT via home health, she has declined. \"She won't use the walker. \"  Her pills are sometimes found untaken. \"We worry she is going to injure herself. \" Family is looking into NH placement.       Past Medical History:   Diagnosis Date    Adverse effect of anesthesia     Combative, confused for extended period of time with anesthesia    Anemia NEC     Asthma     Cervical spondylarthritis     Depression     beginning stages of dementia, sundowning    DJD (degenerative joint disease) of knee     Esophageal spasm     Has required esophageal dilatation; Dr. Modesta Cruz  GERD (gastroesophageal reflux disease)     Hallucination     Hypercholesterolemia     Hypertension     Lumbar stenosis     Neuropathy, upper extremity     left    Vertigo [id number]    Saw Dr. Scott Kelsey; improved.  Vitamin D deficiency 1/28/2011     Current Outpatient Medications on File Prior to Visit   Medication Sig Dispense Refill    apixaban (Eliquis) 5 mg tablet Take 1 Tablet by mouth two (2) times a day. 60 Tablet 5    multivitamin, tx-iron-ca-min (THERA-M w/ IRON) 9 mg iron-400 mcg tab tablet Take 1 Tablet by mouth daily. take 1 tablet by mouth daily.  CALCIUM CARBONATE PO Take 750 mg by mouth daily as needed (reflux). chew 2-4 tablets daily as needed for symptoms of reflux or stomach upset.  sertraline (ZOLOFT) 50 mg tablet Take 1 Tablet by mouth daily. 90 Tablet 3    memantine (NAMENDA) 10 mg tablet TAKE 1 TABLET BY MOUTH TWICE DAILY 60 Tablet 5    ergocalciferol (ERGOCALCIFEROL) 1,250 mcg (50,000 unit) capsule Take 1 Capsule by mouth every seven (7) days. 4 Capsule 11    acetaminophen (TYLENOL) 325 mg tablet Take 2 Tablets by mouth every six (6) hours. 1 Tablet 0    polyethylene glycol (MIRALAX) 17 gram packet Take 1 Packet by mouth daily. 1 Each 0    famotidine (PEPCID) 20 mg tablet Take 1 Tablet by mouth two (2) times a day. 60 Tablet 0    atorvastatin (LIPITOR) 20 mg tablet TAKE 1 TABLET BY MOUTH EVERY DAY 90 Tab 0    amLODIPine (NORVASC) 2.5 mg tablet Take 1 Tab by mouth daily. 90 Tab 5    lisinopriL (PRINIVIL, ZESTRIL) 10 mg tablet Take 1 Tab by mouth daily. 90 Tab 3    albuterol (PROVENTIL HFA, VENTOLIN HFA, PROAIR HFA) 90 mcg/actuation inhaler Take 1 Puff by inhalation as needed for Wheezing. (Patient taking differently: Take 1 Puff by inhalation every six (6) hours as needed for Wheezing.) 1 Inhaler 5     No current facility-administered medications on file prior to visit.          OBJECTIVE  Visit Vitals  /76 (BP 1 [place] Left arm, BP Patient Position: Sitting, BP Cuff Size: Adult)   Pulse 77   Temp 98.4 °F (36.9 °C) (Temporal)   Resp 18     Gen: Pleasant 80 y.o.  female in NAD. HEENT: PERRLA. EOMI. OP moist and pink. Neck: Supple. No LAD. HEART: RRR, No M/G/R.   LUNGS: CTAB No W/R. ABDOMEN: S, NT, ND, BS+. EXTREMITIES: Warm. No C/C/E. MUSCULOSKELETAL: Normal ROM, muscle strength 5/5 all groups. NEURO: Alert and oriented x 3. Cranial nerves grossly intact. No focal sensory or motor deficits noted. SKIN: Warm. Dry. No rashes or other lesions noted.

## 2022-07-08 RX ORDER — AMLODIPINE BESYLATE 2.5 MG/1
2.5 TABLET ORAL DAILY
Qty: 90 TABLET | Refills: 0 | Status: SHIPPED | OUTPATIENT
Start: 2022-07-08

## 2022-07-08 RX ORDER — ATORVASTATIN CALCIUM 20 MG/1
TABLET, FILM COATED ORAL
Qty: 90 TABLET | Refills: 0 | Status: SHIPPED | OUTPATIENT
Start: 2022-07-08

## 2022-07-11 ENCOUNTER — APPOINTMENT (OUTPATIENT)
Dept: CT IMAGING | Age: 87
End: 2022-07-11
Attending: EMERGENCY MEDICINE
Payer: MEDICARE

## 2022-07-11 ENCOUNTER — HOSPITAL ENCOUNTER (EMERGENCY)
Age: 87
Discharge: OTHER INSTITUTION | End: 2022-07-11
Attending: EMERGENCY MEDICINE
Payer: MEDICARE

## 2022-07-11 ENCOUNTER — APPOINTMENT (OUTPATIENT)
Dept: GENERAL RADIOLOGY | Age: 87
End: 2022-07-11
Attending: EMERGENCY MEDICINE
Payer: MEDICARE

## 2022-07-11 VITALS
WEIGHT: 112.43 LBS | RESPIRATION RATE: 16 BRPM | TEMPERATURE: 97.9 F | SYSTOLIC BLOOD PRESSURE: 157 MMHG | DIASTOLIC BLOOD PRESSURE: 71 MMHG | OXYGEN SATURATION: 97 % | HEIGHT: 61 IN | HEART RATE: 82 BPM | BODY MASS INDEX: 21.23 KG/M2

## 2022-07-11 DIAGNOSIS — R29.6 FREQUENT FALLS: ICD-10-CM

## 2022-07-11 DIAGNOSIS — S22.42XA CLOSED FRACTURE OF MULTIPLE RIBS OF LEFT SIDE, INITIAL ENCOUNTER: Primary | ICD-10-CM

## 2022-07-11 DIAGNOSIS — J94.8 HYDROPNEUMOTHORAX: ICD-10-CM

## 2022-07-11 LAB
ALBUMIN SERPL-MCNC: 2.7 G/DL (ref 3.5–5)
ALBUMIN/GLOB SERPL: 0.7 {RATIO} (ref 1.1–2.2)
ALP SERPL-CCNC: 110 U/L (ref 45–117)
ALT SERPL-CCNC: 27 U/L (ref 12–78)
ANION GAP SERPL CALC-SCNC: 3 MMOL/L (ref 5–15)
APPEARANCE UR: CLEAR
AST SERPL-CCNC: 27 U/L (ref 15–37)
BACTERIA URNS QL MICRO: NEGATIVE /HPF
BASOPHILS # BLD: 0 K/UL (ref 0–0.1)
BASOPHILS NFR BLD: 1 % (ref 0–1)
BILIRUB SERPL-MCNC: 0.4 MG/DL (ref 0.2–1)
BILIRUB UR QL: NEGATIVE
BUN SERPL-MCNC: 14 MG/DL (ref 6–20)
BUN/CREAT SERPL: 19 (ref 12–20)
CALCIUM SERPL-MCNC: 9.6 MG/DL (ref 8.5–10.1)
CHLORIDE SERPL-SCNC: 106 MMOL/L (ref 97–108)
CO2 SERPL-SCNC: 30 MMOL/L (ref 21–32)
COLOR UR: ABNORMAL
CREAT SERPL-MCNC: 0.73 MG/DL (ref 0.55–1.02)
DIFFERENTIAL METHOD BLD: ABNORMAL
EOSINOPHIL # BLD: 0.1 K/UL (ref 0–0.4)
EOSINOPHIL NFR BLD: 1 % (ref 0–7)
EPITH CASTS URNS QL MICRO: ABNORMAL /LPF
ERYTHROCYTE [DISTWIDTH] IN BLOOD BY AUTOMATED COUNT: 13.3 % (ref 11.5–14.5)
GLOBULIN SER CALC-MCNC: 3.8 G/DL (ref 2–4)
GLUCOSE SERPL-MCNC: 80 MG/DL (ref 65–100)
GLUCOSE UR STRIP.AUTO-MCNC: NEGATIVE MG/DL
HCT VFR BLD AUTO: 37.9 % (ref 35–47)
HGB BLD-MCNC: 12.2 G/DL (ref 11.5–16)
HGB UR QL STRIP: NEGATIVE
IMM GRANULOCYTES # BLD AUTO: 0 K/UL (ref 0–0.04)
IMM GRANULOCYTES NFR BLD AUTO: 1 % (ref 0–0.5)
KETONES UR QL STRIP.AUTO: NEGATIVE MG/DL
LEUKOCYTE ESTERASE UR QL STRIP.AUTO: NEGATIVE
LYMPHOCYTES # BLD: 1.2 K/UL (ref 0.8–3.5)
LYMPHOCYTES NFR BLD: 17 % (ref 12–49)
MCH RBC QN AUTO: 29.8 PG (ref 26–34)
MCHC RBC AUTO-ENTMCNC: 32.2 G/DL (ref 30–36.5)
MCV RBC AUTO: 92.4 FL (ref 80–99)
MONOCYTES # BLD: 0.9 K/UL (ref 0–1)
MONOCYTES NFR BLD: 11 % (ref 5–13)
NEUTS SEG # BLD: 5.3 K/UL (ref 1.8–8)
NEUTS SEG NFR BLD: 69 % (ref 32–75)
NITRITE UR QL STRIP.AUTO: NEGATIVE
NRBC # BLD: 0 K/UL (ref 0–0.01)
NRBC BLD-RTO: 0 PER 100 WBC
PH UR STRIP: 7 [PH] (ref 5–8)
PLATELET # BLD AUTO: 250 K/UL (ref 150–400)
PMV BLD AUTO: 11.8 FL (ref 8.9–12.9)
POTASSIUM SERPL-SCNC: 4.3 MMOL/L (ref 3.5–5.1)
PROT SERPL-MCNC: 6.5 G/DL (ref 6.4–8.2)
PROT UR STRIP-MCNC: ABNORMAL MG/DL
RBC # BLD AUTO: 4.1 M/UL (ref 3.8–5.2)
RBC #/AREA URNS HPF: ABNORMAL /HPF (ref 0–5)
SODIUM SERPL-SCNC: 139 MMOL/L (ref 136–145)
SP GR UR REFRACTOMETRY: 1.02
UA: UC IF INDICATED,UAUC: ABNORMAL
UROBILINOGEN UR QL STRIP.AUTO: 1 EU/DL (ref 0.2–1)
WBC # BLD AUTO: 7.5 K/UL (ref 3.6–11)
WBC URNS QL MICRO: ABNORMAL /HPF (ref 0–4)

## 2022-07-11 PROCEDURE — 70450 CT HEAD/BRAIN W/O DYE: CPT

## 2022-07-11 PROCEDURE — 80053 COMPREHEN METABOLIC PANEL: CPT

## 2022-07-11 PROCEDURE — 81001 URINALYSIS AUTO W/SCOPE: CPT

## 2022-07-11 PROCEDURE — 85025 COMPLETE CBC W/AUTO DIFF WBC: CPT

## 2022-07-11 PROCEDURE — 36415 COLL VENOUS BLD VENIPUNCTURE: CPT

## 2022-07-11 PROCEDURE — 74011250637 HC RX REV CODE- 250/637: Performed by: EMERGENCY MEDICINE

## 2022-07-11 PROCEDURE — 93005 ELECTROCARDIOGRAM TRACING: CPT

## 2022-07-11 PROCEDURE — 71250 CT THORAX DX C-: CPT

## 2022-07-11 PROCEDURE — 99285 EMERGENCY DEPT VISIT HI MDM: CPT

## 2022-07-11 RX ORDER — ACETAMINOPHEN 325 MG/1
325 TABLET ORAL
Status: COMPLETED | OUTPATIENT
Start: 2022-07-11 | End: 2022-07-11

## 2022-07-11 RX ADMIN — ACETAMINOPHEN 325 MG: 325 TABLET ORAL at 11:34

## 2022-07-11 NOTE — ED NOTES
Villa Stanton son was reached advised to transfer patient wherever she needed to go for treatment. Mr. Adriana Queen also provided number of his other sister April Simmons at [phone], no answer when calling.  Other number listed in demographics went to VM

## 2022-07-11 NOTE — ED NOTES
Patient crying and hiding under blanket and yelling \"You're trying to kill me. \"  Patient pulling cardiac monitor, bp cuff, and pulse ox off. Refusing to put it back on. This RN attempted to reorient patient and reassure her we are here to help her.

## 2022-07-11 NOTE — ED TRIAGE NOTES
Patient arrives to ED from home via EMS for c/o multiple falls since Friday. Per EMS, patient c/o left rib and flank pain, but unable to rate it. Patient alert and oriented to self and place.

## 2022-07-11 NOTE — ED PROVIDER NOTES
EMERGENCY DEPARTMENT HISTORY AND PHYSICAL EXAM      Date: 7/11/2022  Patient Name: Debra Childers    History of Presenting Illness     Chief Complaint   Patient presents with    Rib Pain     History Provided By: Patient and EMS    HPI: Debra Childers, 80 y.o. female with past medical history significant for anemia, asthma, depression, degenerative joint disease, GERD, hypertension, hypercholesterolemia, and neuropathy who presents via EMS to the ED with cc of left-sided rib pain after a ground-level fall. Per EMS, patient has fallen at least 5 times over the past 3 days. Patient states she was falling prior to Friday but not nearly as frequently. She denies hitting her head or any other injuries at this time. Patient tells me she lives with her mother and father as well as a brother (suspect patient has dementia given her age. .. She also tells me it is September 1972). According to EMS, she does live with family who is on the way. PMHx: Anemia, asthma, depression, ARCHIE, GERD, hypertension, hypercholesterolemia, neuropathy  Social Hx: Denies alcohol, tobacco, or illegal drug use    PCP: Blanca Salazar MD    History and review of systems limited secondary to dementia. No current facility-administered medications on file prior to encounter. Current Outpatient Medications on File Prior to Encounter   Medication Sig Dispense Refill    amLODIPine (NORVASC) 2.5 mg tablet Take 1 Tablet by mouth daily. 90 Tablet 0    atorvastatin (LIPITOR) 20 mg tablet TAKE 1 TABLET BY MOUTH EVERY DAY 90 Tablet 0    apixaban (Eliquis) 5 mg tablet Take 1 Tablet by mouth two (2) times a day. 60 Tablet 5    multivitamin, tx-iron-ca-min (THERA-M w/ IRON) 9 mg iron-400 mcg tab tablet Take 1 Tablet by mouth daily. take 1 tablet by mouth daily. CALCIUM CARBONATE PO Take 750 mg by mouth daily as needed (reflux). chew 2-4 tablets daily as needed for symptoms of reflux or stomach upset.        sertraline (ZOLOFT) 50 mg tablet Take 1 Tablet by mouth daily. 90 Tablet 3    memantine (NAMENDA) 10 mg tablet TAKE 1 TABLET BY MOUTH TWICE DAILY 60 Tablet 5    ergocalciferol (ERGOCALCIFEROL) 1,250 mcg (50,000 unit) capsule Take 1 Capsule by mouth every seven (7) days. 4 Capsule 11    acetaminophen (TYLENOL) 325 mg tablet Take 2 Tablets by mouth every six (6) hours. 1 Tablet 0    polyethylene glycol (MIRALAX) 17 gram packet Take 1 Packet by mouth daily. 1 Each 0    famotidine (PEPCID) 20 mg tablet Take 1 Tablet by mouth two (2) times a day. 60 Tablet 0    lisinopriL (PRINIVIL, ZESTRIL) 10 mg tablet Take 1 Tab by mouth daily. 90 Tab 3    albuterol (PROVENTIL HFA, VENTOLIN HFA, PROAIR HFA) 90 mcg/actuation inhaler Take 1 Puff by inhalation as needed for Wheezing. (Patient taking differently: Take 1 Puff by inhalation every six (6) hours as needed for Wheezing.) 1 Inhaler 5     Past History     Past Medical History:  Past Medical History:   Diagnosis Date    Adverse effect of anesthesia     Combative, confused for extended period of time with anesthesia    Anemia NEC     Asthma     Cervical spondylarthritis     Depression     beginning stages of dementia, sundowning    DJD (degenerative joint disease) of knee     Esophageal spasm     Has required esophageal dilatation; Dr. Luann Lewis    GERD (gastroesophageal reflux disease)     Hallucination     Hypercholesterolemia     Hypertension     Lumbar stenosis     Neuropathy, upper extremity     left    Vertigo [id number]    Saw Dr. John Hubbard; improved.      Vitamin D deficiency 1/28/2011     Past Surgical History:  Past Surgical History:   Procedure Laterality Date    HX CATARACT REMOVAL      bilateral    HX HYSTERECTOMY      HX ORTHOPAEDIC      right bunion excision    HX OTHER SURGICAL      broken R hip    HX TONSILLECTOMY       Family History:  Family History   Problem Relation Age of Onset    Heart Disease Mother     Hypertension Mother     Stroke Mother     Heart Disease Brother     Heart Disease Father     Heart Disease Brother      Social History:  Social History     Tobacco Use    Smoking status: Never Smoker    Smokeless tobacco: Never Used   Vaping Use    Vaping Use: Never used   Substance Use Topics    Alcohol use: No    Drug use: No     Allergies: Allergies   Allergen Reactions    Diclofenac Itching     The voltaren gel caused itching. Gabapentin Swelling    Hydrochlorothiazide Other (comments)     dizziness    Pcn [Penicillins] Swelling     Review of Systems   Review of Systems   Unable to perform ROS: Dementia   Cardiovascular:  Positive for chest pain (Left-sided rib pain). Physical Exam   Physical Exam  Vitals and nursing note reviewed. Constitutional:       Appearance: Normal appearance. She is well-developed and underweight. HENT:      Head: Normocephalic and atraumatic. Eyes:      Conjunctiva/sclera: Conjunctivae normal.   Cardiovascular:      Rate and Rhythm: Normal rate and regular rhythm. Pulses: Normal pulses. Heart sounds: Normal heart sounds, S1 normal and S2 normal.   Pulmonary:      Effort: Pulmonary effort is normal. No respiratory distress. Breath sounds: Normal breath sounds. No wheezing. Chest:      Chest wall: Tenderness present. No deformity. Abdominal:      General: Bowel sounds are normal. There is no distension. Palpations: Abdomen is soft. Tenderness: There is no abdominal tenderness. There is no rebound. Musculoskeletal:         General: Normal range of motion. Cervical back: Full passive range of motion without pain, normal range of motion and neck supple. Skin:     General: Skin is warm and dry. Findings: No rash. Neurological:      Mental Status: She is alert. Comments: Oriented to self and location, disoriented to year and month   Psychiatric:         Speech: Speech normal.         Behavior: Behavior normal.         Thought Content:  Thought content normal.         Judgment: Judgment normal.     Diagnostic Study Results Labs -     Recent Results (from the past 12 hour(s))   EKG, 12 LEAD, INITIAL    Collection Time: 07/11/22 11:32 AM   Result Value Ref Range    Ventricular Rate 81 BPM    Atrial Rate 81 BPM    P-R Interval 228 ms    QRS Duration 120 ms    Q-T Interval 400 ms    QTC Calculation (Bezet) 464 ms    Calculated P Axis 66 degrees    Calculated R Axis -27 degrees    Calculated T Axis 71 degrees    Diagnosis       Sinus rhythm with 1st degree AV block  Septal infarct , age undetermined  Abnormal ECG  When compared with ECG of 08-JUN-2022 18:11,  Left bundle branch block is no longer present  Septal infarct is now present     CBC WITH AUTOMATED DIFF    Collection Time: 07/11/22 11:34 AM   Result Value Ref Range    WBC 7.5 3.6 - 11.0 K/uL    RBC 4.10 3.80 - 5.20 M/uL    HGB 12.2 11.5 - 16.0 g/dL    HCT 37.9 35.0 - 47.0 %    MCV 92.4 80.0 - 99.0 FL    MCH 29.8 26.0 - 34.0 PG    MCHC 32.2 30.0 - 36.5 g/dL    RDW 13.3 11.5 - 14.5 %    PLATELET 899 553 - 027 K/uL    MPV 11.8 8.9 - 12.9 FL    NRBC 0.0 0  WBC    ABSOLUTE NRBC 0.00 0.00 - 0.01 K/uL    NEUTROPHILS 69 32 - 75 %    LYMPHOCYTES 17 12 - 49 %    MONOCYTES 11 5 - 13 %    EOSINOPHILS 1 0 - 7 %    BASOPHILS 1 0 - 1 %    IMMATURE GRANULOCYTES 1 (H) 0.0 - 0.5 %    ABS. NEUTROPHILS 5.3 1.8 - 8.0 K/UL    ABS. LYMPHOCYTES 1.2 0.8 - 3.5 K/UL    ABS. MONOCYTES 0.9 0.0 - 1.0 K/UL    ABS. EOSINOPHILS 0.1 0.0 - 0.4 K/UL    ABS. BASOPHILS 0.0 0.0 - 0.1 K/UL    ABS. IMM.  GRANS. 0.0 0.00 - 0.04 K/UL    DF AUTOMATED     METABOLIC PANEL, COMPREHENSIVE    Collection Time: 07/11/22 11:34 AM   Result Value Ref Range    Sodium 139 136 - 145 mmol/L    Potassium 4.3 3.5 - 5.1 mmol/L    Chloride 106 97 - 108 mmol/L    CO2 30 21 - 32 mmol/L    Anion gap 3 (L) 5 - 15 mmol/L    Glucose 80 65 - 100 mg/dL    BUN 14 6 - 20 MG/DL    Creatinine 0.73 0.55 - 1.02 MG/DL    BUN/Creatinine ratio 19 12 - 20      GFR est AA >60 >60 ml/min/1.73m2    GFR est non-AA >60 >60 ml/min/1.73m2    Calcium 9.6 8.5 - 10.1 MG/DL    Bilirubin, total 0.4 0.2 - 1.0 MG/DL    ALT (SGPT) 27 12 - 78 U/L    AST (SGOT) 27 15 - 37 U/L    Alk. phosphatase 110 45 - 117 U/L    Protein, total 6.5 6.4 - 8.2 g/dL    Albumin 2.7 (L) 3.5 - 5.0 g/dL    Globulin 3.8 2.0 - 4.0 g/dL    A-G Ratio 0.7 (L) 1.1 - 2.2     URINALYSIS W/ REFLEX CULTURE    Collection Time: 07/11/22 11:34 AM    Specimen: Urine   Result Value Ref Range    Color YELLOW/STRAW      Appearance CLEAR CLEAR      Specific gravity 1.020      pH (UA) 7.0 5.0 - 8.0      Protein TRACE (A) NEG mg/dL    Glucose Negative NEG mg/dL    Ketone Negative NEG mg/dL    Bilirubin Negative NEG      Blood Negative NEG      Urobilinogen 1.0 0.2 - 1.0 EU/dL    Nitrites Negative NEG      Leukocyte Esterase Negative NEG      WBC 0-4 0 - 4 /hpf    RBC 0-5 0 - 5 /hpf    Epithelial cells FEW FEW /lpf    Bacteria Negative NEG /hpf    UA:UC IF INDICATED CULTURE NOT INDICATED BY UA RESULT CNI         Radiologic Studies -   CT HEAD WO CONT   Final Result   No acute abnormality            CT CHEST WO CONT   Final Result   1. Acute, minimally displaced fractures of LEFT sixth through eighth ribs   laterally. 2. There is a small left-sided hydropneumothorax. No evidence of tension   physiology. The findings were called to Dr. Ramandeep Pearson on 7/11/2022 at 1255 hours EST by Dr. Maxx Nieves.      789           CT HEAD WO CONT    Result Date: 7/11/2022  No acute abnormality     CT CHEST WO CONT    Result Date: 7/11/2022  1. Acute, minimally displaced fractures of LEFT sixth through eighth ribs laterally. 2. There is a small left-sided hydropneumothorax. No evidence of tension physiology. The findings were called to Dr. Ramandeep Pearson on 7/11/2022 at 1255 hours EST by Dr. Maxx Nieves. Sherly [place] am the first provider for this patient. I reviewed the vital signs, available nursing notes, past medical history, past surgical history, family history and social history.     Vital Signs-Reviewed the patient's vital signs. Patient Vitals for the past 24 hrs:   Temp Pulse Resp BP SpO2   07/11/22 1103 97.9 °F (36.6 °C) 82 16 (!) 157/71 97 %     Pulse Oximetry Analysis - 97% on RA (normal)    Cardiac Monitor:   Rate: 82 bpm  Rhythm: Normal Sinus Rhythm     ED EKG interpretation: 11:32  Rhythm: normal sinus rhythm and 1st degree block; and regular . Rate (approx.): 81; Axis: normal; P wave: prolonged; QRS interval: normal ; ST/T wave: normal; Other findings: normal. This EKG was interpreted by Jerzy Scales MD,ED Provider. Records Reviewed: Nursing Notes, Old Medical Records, Previous Radiology Studies and Previous Laboratory Studies    Provider Notes (Medical Decision Making):   80-year-old female presents with left-sided rib pains after a fall yesterday. She has had multiple falls over the past 3 days. Will evaluate for fracture and CT her head, check basic labs, and a urinalysis. Apparently her family is in route. Will obtain more information once they arrive. ED Course:   Initial assessment performed. The patients presenting problems have been discussed, and they are in agreement with the care plan formulated and outlined with them. I have encouraged them to ask questions as they arise throughout their visit. Progress Note  11:58 AM  Carmine from radiology called and states patient cannot do rib films due to her pain. Will order a CT chest to further assess. Progress Note  12:57 PM  I have saved a call from the radiologist.  Patient has multiple left-sided rib fractures with a small hydropneumothorax. Patient is resting comfortably in bed. Will reach out to family to discuss transfer. Patient's son states that they were trying to get the patient to [facility] where her primary care doctor is but understands the need for transfer to [facility] and is okay with transferred to [facility] for further management. 1:17 PM  Robyn Roman MD spoke with Ambrocio Morillo, ED Attending at [facility].  Discussed HPI and PE, available diagnostic tests and clinical findings. She is in agreement with care plans as outlined. She accepts transfer to [facility] as an ECHO trauma team alert. CRITICAL CARE NOTE :    1:31 PM    IMPENDING DETERIORATION -Airway, Respiratory and CNS    ASSOCIATED RISK FACTORS - Hypoxia and Trauma    MANAGEMENT- Bedside Assessment and Transfer    INTERPRETATION -  CT Scan and ECG    INTERVENTIONS - hemodynamic mngmt    CASE REVIEW - Medical Sub-Specialist, Nursing and Family    TREATMENT RESPONSE -Improved    PERFORMED BY - Self    NOTES   :    I have spent 55 minutes of critical care time involved in lab review, consultations with specialist, family decision- making, bedside attention and documentation. This time excludes separately billable procedure time. During this entire length of time I was immediately available to the patient. Critical Care: The reason for providing this level of medical care for this critically ill patient was due to a critical illness that impaired one or more vital organ systems such that there was a high probability of imminent or life threatening deterioration in the patients condition. This care involved high complexity decision making to assess, manipulate, and support vital system functions. J Luis Arevalo MD    Progress Note:   Updated pt on all returned results and findings. Discussed the importance of proper follow up as referred below along with return precautions. Pt in agreement with the care plan and expresses agreement with and understanding of all items discussed. Disposition:  Transfer Note:  1:31 PM  Patient is being transferred to the ED at [facility], transfer accepted by Dr. Walker Wooten. The reasons for patient's transfer have been discussed with the patient and available family. Her family conveys agreement and understanding for the need to be transferred as explained to them by Donelda Gottron, MD.     PLAN:  1.   Transfer to the ED at [facility] for trauma    Diagnosis     Clinical Impression:   1. Closed fracture of multiple ribs of left side, initial encounter    2. Hydropneumothorax    3. Frequent falls            Please note that this dictation was completed with Dragon, computer voice recognition software. Quite often unanticipated grammatical, syntax, homophones, and other interpretive errors are inadvertently transcribed by the computer software. Please disregard these errors. Additionally, please excuse any errors that have escaped final proofreading.

## 2022-07-12 LAB
ATRIAL RATE: 81 BPM
CALCULATED P AXIS, ECG09: 66 DEGREES
CALCULATED R AXIS, ECG10: -27 DEGREES
CALCULATED T AXIS, ECG11: 71 DEGREES
DIAGNOSIS, 93000: NORMAL
P-R INTERVAL, ECG05: 228 MS
Q-T INTERVAL, ECG07: 400 MS
QRS DURATION, ECG06: 120 MS
QTC CALCULATION (BEZET), ECG08: 464 MS
VENTRICULAR RATE, ECG03: 81 BPM

## 2022-07-26 RX ORDER — LISINOPRIL 10 MG/1
10 TABLET ORAL DAILY
Qty: 90 TABLET | Refills: 3 | Status: SHIPPED | OUTPATIENT
Start: 2022-07-26

## 2022-08-02 ENCOUNTER — TELEPHONE (OUTPATIENT)
Dept: INTERNAL MEDICINE CLINIC | Age: 87
End: 2022-08-02

## 2022-08-02 NOTE — TELEPHONE ENCOUNTER
Called, spoke with Pt. Two pt identifiers confirmed. Patient fell 3 weeks ago and broke 3 ribs and is currently at [facility] for rehab and she will stay long term after skilled stay. Family thanks Dr. Marisa Vick for the great care he gave her.

## 2022-08-02 NOTE — TELEPHONE ENCOUNTER
----- Message from Katie Gallagher sent at 8/2/2022  2:22 PM EDT -----  Subject: Message to Provider    QUESTIONS  Information for Provider? To Dr. Rena Baires. Daughter Mathew Ruiz is   calling you back to update you on her mothers health. Please contact   Deandre cynthia. ---------------------------------------------------------------------------  --------------  Nancy Clemente INFO  [phone]; OK to leave message on voicemail  ---------------------------------------------------------------------------  --------------  SCRIPT ANSWERS  Relationship to Patient? Other  Representative Name? Mathew Ruiz  Is the Representative on the appropriate HIPAA document in Epic?  Yes

## 2025-07-02 NOTE — ED NOTES
1708 - Pt tremorous during EKG. EGK reading STEMI.   5 - Dr Jael Cuellar given EKG, reports no STEMI.     1716 - Pt cooperative for IV start and blood draw. [phone] (home) [phone] (work)    PCP: Michael Concepcion     HPI: Ms. Howard is a 26 year old female here to establish care for UC. She is transferring her care from pediatric IBD (Dr. Monico Rocha).    UC history    Diagnosed in 5/2010 at age 14 Paul Small MD;  [place] [facility] Tulsa No Acute Distress] : no acute distress [Well Nourished] : well nourished [Well Developed] : well developed [Well-Appearing] : well-appearing [Normal Sclera/Conjunctiva] : normal sclera/conjunctiva [PERRL] : pupils equal round and reactive to light [EOMI] : extraocular movements intact [Normal Outer Ear/Nose] : the outer ears and nose were normal in appearance [Normal Oropharynx] : the oropharynx was normal [No JVD] : no jugular venous distention [No Lymphadenopathy] : no lymphadenopathy [Supple] : supple [Thyroid Normal, No Nodules] : the thyroid was normal and there were no nodules present [No Respiratory Distress] : no respiratory distress  [No Accessory Muscle Use] : no accessory muscle use [Clear to Auscultation] : lungs were clear to auscultation bilaterally [Normal Rate] : normal rate  [Regular Rhythm] : with a regular rhythm [Normal S1, S2] : normal S1 and S2 [No Murmur] : no murmur heard [No Carotid Bruits] : no carotid bruits [No Abdominal Bruit] : a ~M bruit was not heard ~T in the abdomen [No Varicosities] : no varicosities [Pedal Pulses Present] : the pedal pulses are present [No Edema] : there was no peripheral edema [No Palpable Aorta] : no palpable aorta [No Extremity Clubbing/Cyanosis] : no extremity clubbing/cyanosis [Soft] : abdomen soft [Non Tender] : non-tender [Non-distended] : non-distended [No Masses] : no abdominal mass palpated [No HSM] : no HSM [Normal Bowel Sounds] : normal bowel sounds [Normal Posterior Cervical Nodes] : no posterior cervical lymphadenopathy [Normal Anterior Cervical Nodes] : no anterior cervical lymphadenopathy [No CVA Tenderness] : no CVA  tenderness [No Spinal Tenderness] : no spinal tenderness [No Joint Swelling] : no joint swelling [Grossly Normal Strength/Tone] : grossly normal strength/tone [No Rash] : no rash [Coordination Grossly Intact] : coordination grossly intact [No Focal Deficits] : no focal deficits [Normal Gait] : normal gait [Deep Tendon Reflexes (DTR)] : deep tendon reflexes were 2+ and symmetric [Normal Affect] : the affect was normal [Normal Insight/Judgement] : insight and judgment were intact

## (undated) DEVICE — SUTURE STRATAFIX SYMMETRIC SZ 1 L18IN ABSRB VLT CT1 L36CM SXPP1A404

## (undated) DEVICE — PACK,BASIC,SIRUS,V: Brand: MEDLINE

## (undated) DEVICE — REM POLYHESIVE ADULT PATIENT RETURN ELECTRODE: Brand: VALLEYLAB

## (undated) DEVICE — ROCKER SWITCH PENCIL BLADE ELECTRODE, HOLSTER: Brand: EDGE

## (undated) DEVICE — SOLUTION IRRIG 1000ML H2O STRL BLT

## (undated) DEVICE — SOLUTION IV 1000ML 0.9% SOD CHL

## (undated) DEVICE — BIT DRL L330MM DIA4.2MM CALIB 100MM 3 FLUT QUIK CPL

## (undated) DEVICE — KIT POS FOAM HANA TBL

## (undated) DEVICE — SOL IRR SOD CL 0.9% 1000ML BTL --

## (undated) DEVICE — SYR 50ML LR LCK 1ML GRAD NSAF --

## (undated) DEVICE — KENDALL SCD EXPRESS SLEEVES, KNEE LENGTH, MEDIUM: Brand: KENDALL SCD

## (undated) DEVICE — 3000CC GUARDIAN II: Brand: GUARDIAN

## (undated) DEVICE — NDL SPNE QNCKE 18GX3.5IN LF --

## (undated) DEVICE — Device

## (undated) DEVICE — 3M™ IOBAN™ 2 ANTIMICROBIAL INCISE DRAPE 6651EZ: Brand: IOBAN™ 2

## (undated) DEVICE — Z CONVERTED USE 2107985 COVER FLROSCP W36XL28IN 4 SIDE ADH

## (undated) DEVICE — STERILE POLYISOPRENE POWDER-FREE SURGICAL GLOVES: Brand: PROTEXIS

## (undated) DEVICE — STERILE POLYISOPRENE POWDER-FREE SURGICAL GLOVES WITH EMOLLIENT COATING: Brand: PROTEXIS

## (undated) DEVICE — HANDLE LT SNAP ON ULT DURABLE LENS FOR TRUMPF ALC DISPOSABLE

## (undated) DEVICE — SUTURE VCRL SZ 2-0 L36IN ABSRB VLT L36MM CT-1 1/2 CIR J345H

## (undated) DEVICE — SUTURE VCRL SZ 0 L27IN ABSRB VLT L36MM CT-1 1/2 CIR J340H

## (undated) DEVICE — PREP SKN CHLRAPRP APL 26ML STR --

## (undated) DEVICE — SLIM BODY SKIN STAPLER: Brand: APPOSE ULC

## (undated) DEVICE — DRAPE,REIN 53X77,STERILE: Brand: MEDLINE

## (undated) DEVICE — SURGICAL PROCEDURE PACK BASIN MAJ SET CUST NO CAUT

## (undated) DEVICE — COVER,TABLE,60X90,STERILE: Brand: MEDLINE

## (undated) DEVICE — Z DISCONTINUED USE 2717541 SUTURE STRATAFIX SZ 3-0 L30CM NONABSORBABLE UD L26MM FS 3/8

## (undated) DEVICE — INFECTION CONTROL KIT SYS

## (undated) DEVICE — OPTIFOAM GENTLE SA, POSTOP, 4X8: Brand: MEDLINE

## (undated) DEVICE — SCREW BNE L36MM DIA5MM NONSTERILE TIB LT GRN TI ST CANN LOK
Type: IMPLANTABLE DEVICE | Site: HIP | Status: NON-FUNCTIONAL
Removed: 2021-05-29

## (undated) DEVICE — (D)PREP SKN CHLRAPRP APPL 26ML -- CONVERT TO ITEM 371833

## (undated) DEVICE — WATERPROOF, BACTERIA PROOF DRESSING WITH ABSORBENT SEE THROUGH PAD: Brand: OPSITE POST-OP VISIBLE 20X10CM CTN 20

## (undated) DEVICE — 3.2MM GUIDE WIRE 400MM

## (undated) DEVICE — GARMENT,MEDLINE,DVT,INT,CALF,MED, GEN2: Brand: MEDLINE

## (undated) DEVICE — SOL IRR STRL H2O 1000ML BTL --

## (undated) DEVICE — GOWN,SIRUS,NONRNF,SETINSLV,XL,20/CS: Brand: MEDLINE

## (undated) DEVICE — 4-PORT MANIFOLD: Brand: NEPTUNE 2

## (undated) DEVICE — PRECISION FALCON OSCILLATING TIP SAW CARTRIDGE: Brand: PRECISION FALCON

## (undated) DEVICE — 6619 2 PTNT ISO SYS INCISE AREA&LT;(&GT;&&LT;)&GT;P: Brand: STERI-DRAPE™ IOBAN™ 2

## (undated) DEVICE — ELECTRODE BLDE L4IN NONINSULATED EDGE

## (undated) DEVICE — SYSTEM SKIN CLSR 22CM DERMBND PRINEO